# Patient Record
Sex: FEMALE | Race: WHITE | Employment: OTHER | URBAN - METROPOLITAN AREA
[De-identification: names, ages, dates, MRNs, and addresses within clinical notes are randomized per-mention and may not be internally consistent; named-entity substitution may affect disease eponyms.]

---

## 2017-02-20 ENCOUNTER — OFFICE VISIT (OUTPATIENT)
Dept: FAMILY MEDICINE | Facility: CLINIC | Age: 62
End: 2017-02-20
Payer: COMMERCIAL

## 2017-02-20 VITALS
HEIGHT: 58 IN | TEMPERATURE: 98.5 F | SYSTOLIC BLOOD PRESSURE: 98 MMHG | DIASTOLIC BLOOD PRESSURE: 69 MMHG | HEART RATE: 80 BPM | BODY MASS INDEX: 32.12 KG/M2 | WEIGHT: 153 LBS

## 2017-02-20 DIAGNOSIS — R35.0 URINARY FREQUENCY: Primary | ICD-10-CM

## 2017-02-20 DIAGNOSIS — R60.9 EDEMA, UNSPECIFIED TYPE: ICD-10-CM

## 2017-02-20 DIAGNOSIS — R10.32 LLQ ABDOMINAL PAIN: ICD-10-CM

## 2017-02-20 DIAGNOSIS — R01.1 HEART MURMUR: ICD-10-CM

## 2017-02-20 LAB
ALBUMIN SERPL-MCNC: 2.6 G/DL (ref 3.4–5)
ALBUMIN UR-MCNC: NEGATIVE MG/DL
ALP SERPL-CCNC: 83 U/L (ref 40–150)
ALT SERPL W P-5'-P-CCNC: 31 U/L (ref 0–50)
ANION GAP SERPL CALCULATED.3IONS-SCNC: 5 MMOL/L (ref 3–14)
APPEARANCE UR: CLEAR
AST SERPL W P-5'-P-CCNC: 12 U/L (ref 0–45)
BILIRUB SERPL-MCNC: 0.3 MG/DL (ref 0.2–1.3)
BILIRUB UR QL STRIP: NEGATIVE
BUN SERPL-MCNC: 25 MG/DL (ref 7–30)
CALCIUM SERPL-MCNC: 8.1 MG/DL (ref 8.5–10.1)
CHLORIDE SERPL-SCNC: 108 MMOL/L (ref 94–109)
CO2 SERPL-SCNC: 32 MMOL/L (ref 20–32)
COLOR UR AUTO: YELLOW
CREAT SERPL-MCNC: 0.9 MG/DL (ref 0.52–1.04)
ERYTHROCYTE [DISTWIDTH] IN BLOOD BY AUTOMATED COUNT: 13.8 % (ref 10–15)
GFR SERPL CREATININE-BSD FRML MDRD: 63 ML/MIN/1.7M2
GLUCOSE SERPL-MCNC: 85 MG/DL (ref 70–99)
GLUCOSE UR STRIP-MCNC: NEGATIVE MG/DL
HCT VFR BLD AUTO: 37.9 % (ref 35–47)
HGB BLD-MCNC: 11.7 G/DL (ref 11.7–15.7)
HGB UR QL STRIP: NEGATIVE
KETONES UR STRIP-MCNC: NEGATIVE MG/DL
LEUKOCYTE ESTERASE UR QL STRIP: NEGATIVE
MCH RBC QN AUTO: 32.6 PG (ref 26.5–33)
MCHC RBC AUTO-ENTMCNC: 30.9 G/DL (ref 31.5–36.5)
MCV RBC AUTO: 106 FL (ref 78–100)
NITRATE UR QL: NEGATIVE
PH UR STRIP: 7 PH (ref 5–7)
PLATELET # BLD AUTO: 240 10E9/L (ref 150–450)
POTASSIUM SERPL-SCNC: 4 MMOL/L (ref 3.4–5.3)
PROT SERPL-MCNC: 5.6 G/DL (ref 6.8–8.8)
RBC # BLD AUTO: 3.59 10E12/L (ref 3.8–5.2)
SODIUM SERPL-SCNC: 145 MMOL/L (ref 133–144)
SP GR UR STRIP: 1.01 (ref 1–1.03)
URN SPEC COLLECT METH UR: ABNORMAL
UROBILINOGEN UR STRIP-ACNC: 2 EU/DL (ref 0.2–1)
WBC # BLD AUTO: 11.3 10E9/L (ref 4–11)

## 2017-02-20 PROCEDURE — 80053 COMPREHEN METABOLIC PANEL: CPT | Performed by: INTERNAL MEDICINE

## 2017-02-20 PROCEDURE — 85027 COMPLETE CBC AUTOMATED: CPT | Performed by: INTERNAL MEDICINE

## 2017-02-20 PROCEDURE — 99203 OFFICE O/P NEW LOW 30 MIN: CPT | Performed by: INTERNAL MEDICINE

## 2017-02-20 PROCEDURE — 36415 COLL VENOUS BLD VENIPUNCTURE: CPT | Performed by: INTERNAL MEDICINE

## 2017-02-20 PROCEDURE — 81003 URINALYSIS AUTO W/O SCOPE: CPT | Performed by: INTERNAL MEDICINE

## 2017-02-20 NOTE — NURSING NOTE
"Chief Complaint   Patient presents with     Urinary Problem     Pt has had frequency of urination for the past 10 days.     Edema     Also has had some swelling from waist down.          Initial BP 98/69 (BP Location: Left arm, Patient Position: Chair, Cuff Size: Adult Regular)  Pulse 80  Temp 98.5  F (36.9  C) (Tympanic)  Ht 4' 10\" (1.473 m)  Wt 153 lb (69.4 kg)  BMI 31.98 kg/m2 Estimated body mass index is 31.98 kg/(m^2) as calculated from the following:    Height as of this encounter: 4' 10\" (1.473 m).    Weight as of this encounter: 153 lb (69.4 kg).  Medication Reconciliation: complete  Leeanna Lund CMA    "

## 2017-02-20 NOTE — MR AVS SNAPSHOT
After Visit Summary   2/20/2017    Tere Ortiz    MRN: [id number]           Patient Information     Date Of Birth          1955        Visit Information        Provider Department      2/20/2017 3:40 PM David Arias MD [facility]        Today's Diagnoses     Urinary frequency    -  1    Edema, unspecified type        Heart murmur        LLQ abdominal pain           Follow-ups after your visit        Your next 10 appointments already scheduled     Mar 07, 2017  8:15 AM CST   [facility] with [facility]   [facility]    [address] [address]   [phone]              Future tests that were ordered for you today     Open Future Orders        Priority Expected Expires Ordered    Echocardiogram Complete Routine  2/20/2018 2/20/2017    CT Abdomen Pelvis w Contrast Routine  2/20/2018 2/20/2017            Who to contact     If you have questions or need follow up information about today's clinic visit or your schedule please contact [facility] directly at [phone].  Normal or non-critical lab and imaging results will be communicated to you by [employer], letter or phone within 4 business days after the clinic has received the results. If you do not hear from us within 7 days, please contact the clinic through [employer] or phone. If you have a critical or abnormal lab result, we will notify you by phone as soon as possible.  Submit refill requests through [employer] or call your pharmacy and they will forward the refill request to us. Please allow 3 business days for your refill to be completed.          Additional Information About Your Visit        MyChart Information     [employer] gives you secure access to your electronic health record. If you see a primary care provider, you can also send messages to your care team and make appointments. If you have questions, please call your primary care clinic.  " If you do not have a primary care provider, please call [phone] and they will assist you.        Care EveryWhere ID     This is your Care EveryWhere ID. This could be used by other organizations to access your [facility] medical records  [id number]        Your Vitals Were     Pulse Temperature Height BMI (Body Mass Index)          80 98.5  F (36.9  C) (Tympanic) 4' 10\" (1.473 m) 31.98 kg/m2         Blood Pressure from Last 3 Encounters:   02/20/17 98/69   09/23/16 111/69   11/02/15 102/61    Weight from Last 3 Encounters:   02/20/17 153 lb (69.4 kg)   09/23/16 143 lb 12.8 oz (65.2 kg)   11/02/15 160 lb (72.6 kg)              We Performed the Following     *UA reflex to Microscopic and Culture ([facility], [place] and [facility] (except [place] and [place])     CBC with platelets     Comprehensive metabolic panel        Primary Care Provider Office Phone # Fax #    Chelsi Menendez [phone]       [place] [phone]        Thank you!     Thank you for choosing [facility]  for your care. Our goal is always to provide you with excellent care. Hearing back from our patients is one way we can continue to improve our services. Please take a few minutes to complete the written survey that you may receive in the mail after your visit with us. Thank you!             Your Updated Medication List - Protect others around you: Learn how to safely use, store and throw away your medicines at www.disposemymeds.org.          This list is accurate as of: 2/20/17  4:19 PM.  Always use your most recent med list.                   Brand Name Dispense Instructions for use    aspirin 81 MG tablet      Take 1 tablet by mouth daily.       baclofen 10 MG tablet    LIORESAL     Take 10 mg by mouth daily 2 at bedtime       BUSPAR PO      Take 10 mg by mouth At Bedtime       * COUMADIN 7.5 MG tablet   Generic drug:  warfarin      Take 1 tablet by mouth " daily.       * WARFARIN SODIUM PO      Take 10 mg by mouth every 7 days Fridays.       DULoxetine 60 MG EC capsule    CYMBALTA     Take 1 capsule by mouth daily.       DUONEB 0.5-2.5 (3) MG/3ML neb solution   Generic drug:  ipratropium - albuterol 0.5 mg/2.5 mg/3 mL      Take 1 ampule by nebulization every 4 hours as needed.       FLONASE 50 MCG/ACT spray   Generic drug:  fluticasone      Spray 1 spray into both nostrils daily.       IMITREX 100 MG tablet   Generic drug:  SUMAtriptan      Take by mouth at onset of headache Reported on 2/20/2017       ipratropium 0.03 % spray    ATROVENT    1 Box    Spray 2 sprays into both nostrils every 12 hours       LORazepam 0.5 MG tablet    ATIVAN    3 tablet    Take 1 hr before CT scan, can repeat 0.5 hrs before - no driving for 10 hrs after the ativan       * order for DME      Compression stockings (Thigh High)       * order for DME     1 Device    BiPAP: IPAP 11 cm H2O EPAP 6 cm H2O Pt has her own BiPAP New CPAP supplies- try [place] mask if it comes in an extra small size.  Alternatively could try [place] with nasal prongs occluded. Lifetime need and heated humidity.       * order for DME      BiPAP: IPAP 12 cm H2O EPAP 7 cm H2O  Lifetime need and heated humidity.       * order for DME     1 Device    O2: During sleep 1.5 LPM via O2 Concentrator  Bled in through BiPAP       OxyContin 80 MG 12 hr tablet   Generic drug:  oxyCODONE      Take  by mouth every 12 hours. Take at bedtime       potassium chloride 10 MEQ tablet    K-TAB,KLOR-CON    360 tablet    2 tablets twice daily       pramipexole 1 MG tablet    MIRAPEX     Take 1 mg by mouth 3 times daily       Pramoxine-Calamine 1-3 % Lotn    AVEENO ANTI-ITCH    118 mL    Pt to use twice daily       SOMA 350 MG tablet   Generic drug:  carisoprodol      Take 1 tablet by mouth. Take at bedtime       SPIRIVA HANDIHALER 18 MCG capsule   Generic drug:  tiotropium     90 capsule    Inhale 1 capsule into the lungs. Inhale contents of  one capsule       SYNTHROID 150 MCG tablet   Generic drug:  levothyroxine     90 tablet    Take 1 tablet (150 mcg) by mouth daily       TOPAMAX 100 MG tablet   Generic drug:  topiramate      Take  by mouth 2 times daily. Take 1.5 pill in mornings Take 1.5 at bedtime       traZODone HCl 150 MG 24 hr tablet    OLEPTRO     Take  by mouth.       VICODIN ES PO      Take 7.5 mg by mouth as needed       vitamin D3 2000 UNITS Caps      Take by mouth daily       * Notice:  This list has 6 medication(s) that are the same as other medications prescribed for you. Read the directions carefully, and ask your doctor or other care provider to review them with you.

## 2017-02-20 NOTE — PROGRESS NOTES
SUBJECTIVE:                                                    Tere Ortiz is a 62 year old female who presents to clinic today for the following health issues:    URINARY TRACT SYMPTOMS- frequency     Onset: 10 days ago    Description:   Painful urination (Dysuria): no   Blood in urine (Hematuria): no   Delay in urine (Hesitency): no, more incontinence    Frequency: Yes, going every 30 minutes    Intensity: no pain    Progression of Symptoms:  worsening and constant    Accompanying Signs & Symptoms:  Fever/chills: no   Flank pain no   Nausea and vomiting: no   Any vaginal symptoms: none  Abdominal/Pelvic Pain: no    History:   History of frequent UTI's: YES- has only had 2  History of kidney stones: YES  Sexually Active: no   Possibility of pregnancy: No    Precipitating factors:   none         Therapies Tried and outcome: none, normally drinks a lot of water      Concern - edema in lower extremites     Onset: started last night    Description:   Feels bloated around waist, also in legs and feet    Intensity: moderate    Progression of Symptoms:  same    Accompanying Signs & Symptoms:  Lower legs and feet are worse       Previous history of similar problem:   Has had in the past but not come on this fast    Precipitating factors:   Worsened by: has had some deli meats the last couple days, not sure on the sodium    Alleviating factors:  Improved by: none       Therapies Tried and outcome: none  Pt does have an rx for lasix at home but not currently taking.    Has had edema in the past attributed to venous insufficiency and she uses compression stockings.      Recently discharged from [facility] on 2/9 for flare up of copd.  She recently had normal CMP, TSH, BNP, A1C there.        TTE 2008   Final Impression:  1.  Normal left ventricular size with upper normal wall thickness and   normal function.   2.  Moderate ascending aortic root enlargement up to 4.1.  3.  Mild mitral regurgitation, tricuspid  regurgitation and pulmonary   insufficiency.    Problem list and histories reviewed & adjusted, as indicated.  Additional history: as documented    Current Outpatient Prescriptions   Medication Sig Dispense Refill     SYNTHROID 150 MCG tablet Take 1 tablet (150 mcg) by mouth daily 90 tablet 1     Pramoxine-Calamine (AVEENO ANTI-ITCH) 1-3 % LOTN Pt to use twice daily 118 mL 3     ipratropium (ATROVENT) 0.03 % nasal spray Spray 2 sprays into both nostrils every 12 hours 1 Box 1     WARFARIN SODIUM PO Take 10 mg by mouth every 7 days Fridays.       BusPIRone HCl (BUSPAR PO) Take 10 mg by mouth At Bedtime       baclofen (LIORESAL) 10 MG tablet Take 10 mg by mouth daily 2 at bedtime       pramipexole (MIRAPEX) 1 MG tablet Take 1 mg by mouth 3 times daily        Hydrocodone-Acetaminophen (VICODIN ES PO) Take 7.5 mg by mouth as needed        Cholecalciferol (VITAMIN D3) 2000 UNITS CAPS Take by mouth daily       potassium chloride (K-DUR) 10 MEQ tablet 2 tablets twice daily 360 tablet 0     ipratropium - albuterol 0.5 mg/2.5 mg/3 mL (DUONEB) 0.5-2.5 (3) MG/3ML nebulization Take 1 ampule by nebulization every 4 hours as needed.       fluticasone (FLONASE) 50 MCG/ACT nasal spray Spray 1 spray into both nostrils daily.       DULoxetine (CYMBALTA) 60 MG capsule Take 1 capsule by mouth daily.       aspirin 81 MG tablet Take 1 tablet by mouth daily.       oxycodone (OXYCONTIN) 80 MG 12 hr tablet Take  by mouth every 12 hours. Take at bedtime       carisoprodol (SOMA) 350 MG tablet Take 1 tablet by mouth. Take at bedtime       tiotropium (SPIRIVA HANDIHALER) 18 MCG inhalation capsule Inhale 1 capsule into the lungs. Inhale contents of one capsule 90 capsule 3     topiramate (TOPAMAX) 100 MG tablet Take  by mouth 2 times daily. Take 1.5 pill in mornings  Take 1.5 at bedtime       TraZODone HCl 150 MG TB24 Take  by mouth.       LORazepam (ATIVAN) 0.5 MG tablet Take 1 hr before CT scan, can repeat 0.5 hrs before - no driving for 10  "hrs after the ativan (Patient not taking: Reported on 2/20/2017) 3 tablet 0     SUMAtriptan (IMITREX) 100 MG tablet Take by mouth at onset of headache Reported on 2/20/2017       warfarin (COUMADIN) 7.5 MG tablet Take 1 tablet by mouth daily.       ORDER FOR DME Compression stockings (Thigh High)       ORDER FOR DME O2: During sleep  1.5 LPM via O2 Concentrator   Bled in through BiPAP   1 Device 0     ORDER FOR DME BiPAP:  IPAP 12 cm H2O  EPAP 7 cm H2O    Lifetime need and heated humidity.           ORDER FOR DME BiPAP:  IPAP 11 cm H2O  EPAP 6 cm H2O  Pt has her own BiPAP  New CPAP supplies- try [place] mask if it comes in an extra small size.  Alternatively could try [place] with nasal prongs occluded.  Lifetime need and heated humidity.     1 Device 0     Allergies   Allergen Reactions     No Clinical Screening - See Comments      PLASTICS     Prednisone      Sulfa Drugs      Tape [Adhesive Tape]      Nitroglycerin Itching     Flushing, headache       ROS:  Constitutional, CV, and gu systems are negative, except as otherwise noted.    OBJECTIVE:                                                    BP 98/69 (BP Location: Left arm, Patient Position: Chair, Cuff Size: Adult Regular)  Pulse 80  Temp 98.5  F (36.9  C) (Tympanic)  Ht 4' 10\" (1.473 m)  Wt 153 lb (69.4 kg)  BMI 31.98 kg/m2  Body mass index is 31.98 kg/(m^2).  GENERAL: healthy, alert and no distress  RESP: diffuse wheezes, normal effort  CV: regular rate and rhythm, normal S1 S2, 2/6 systolic murmur over the aorta, moderate pitting edema in both lower legs  ABDOMEN: soft, tenderness in LLQ and RLQ, no hepatosplenomegaly, no masses and bowel sounds normal    Diagnostic Test Results:  Results for orders placed or performed in visit on 02/20/17 (from the past 24 hour(s))   *UA reflex to Microscopic and Culture ([facility] (except [place] and [place])   Result Value Ref Range    Color Urine Yellow     Appearance Urine Clear     " Glucose Urine Negative NEG mg/dL    Bilirubin Urine Negative NEG    Ketones Urine Negative NEG mg/dL    Specific Gravity Urine 1.015 1.003 - 1.035    Blood Urine Negative NEG    pH Urine 7.0 5.0 - 7.0 pH    Protein Albumin Urine Negative NEG mg/dL    Urobilinogen Urine 2.0 (H) 0.2 - 1.0 EU/dL    Nitrite Urine Negative NEG    Leukocyte Esterase Urine Negative NEG    Source Midstream Urine    Comprehensive metabolic panel   Result Value Ref Range    Sodium 145 (H) 133 - 144 mmol/L    Potassium 4.0 3.4 - 5.3 mmol/L    Chloride 108 94 - 109 mmol/L    Carbon Dioxide 32 20 - 32 mmol/L    Anion Gap 5 3 - 14 mmol/L    Glucose 85 70 - 99 mg/dL    Urea Nitrogen 25 7 - 30 mg/dL    Creatinine 0.90 0.52 - 1.04 mg/dL    GFR Estimate 63 >60 mL/min/1.7m2    GFR Estimate If Black 77 >60 mL/min/1.7m2    Calcium 8.1 (L) 8.5 - 10.1 mg/dL    Bilirubin Total 0.3 0.2 - 1.3 mg/dL    Albumin 2.6 (L) 3.4 - 5.0 g/dL    Protein Total 5.6 (L) 6.8 - 8.8 g/dL    Alkaline Phosphatase 83 40 - 150 U/L    ALT 31 0 - 50 U/L    AST 12 0 - 45 U/L   CBC with platelets   Result Value Ref Range    WBC 11.3 (H) 4.0 - 11.0 10e9/L    RBC Count 3.59 (L) 3.8 - 5.2 10e12/L    Hemoglobin 11.7 11.7 - 15.7 g/dL    Hematocrit 37.9 35.0 - 47.0 %     (H) 78 - 100 fl    MCH 32.6 26.5 - 33.0 pg    MCHC 30.9 (L) 31.5 - 36.5 g/dL    RDW 13.8 10.0 - 15.0 %    Platelet Count 240 150 - 450 10e9/L        ASSESSMENT/PLAN:                                                        1. Urinary frequency  2. Edema, unspecified type  3. Heart murmur  4. LLQ abdominal pain    U/A normal, creatinine normal.  Na slightly elevated but lytes otherwise okay.  Albumin and protein are low- liver function is otherwise normal and no protein in the urine, so this seems most likely to be due to poor nutritional status related to recent COPD exacerbation and hospitalization.  May be contributing to edema.  She does have an elevated WBC and LLQ and RLQ pain on exam, so will pursue CT for  further work-up.  Aortic murmur noted on exam.  She did recently have a normal BNP on echo at OSH, so less likely to be having CHF contributing to edema, but we will get an updated TTE.        - *UA reflex to Microscopic and Culture ([facility] (except [place] and [place])  - Comprehensive metabolic panel  - CBC with platelets  - CT Abdomen Pelvis w Contrast; Future  - Echocardiogram Complete; Future      AiramShruthi Arias MD  [facility]

## 2017-02-26 ENCOUNTER — HOSPITAL ENCOUNTER (EMERGENCY)
Facility: CLINIC | Age: 62
Discharge: HOME | End: 2017-02-26
Attending: STUDENT IN AN ORGANIZED HEALTH CARE EDUCATION/TRAINING PROGRAM | Admitting: STUDENT IN AN ORGANIZED HEALTH CARE EDUCATION/TRAINING PROGRAM
Payer: COMMERCIAL

## 2017-02-26 ENCOUNTER — APPOINTMENT (OUTPATIENT)
Dept: GENERAL RADIOLOGY | Facility: CLINIC | Age: 62
End: 2017-02-26
Attending: STUDENT IN AN ORGANIZED HEALTH CARE EDUCATION/TRAINING PROGRAM
Payer: COMMERCIAL

## 2017-02-26 ENCOUNTER — TELEPHONE (OUTPATIENT)
Dept: NURSING | Facility: CLINIC | Age: 62
End: 2017-02-26

## 2017-02-26 VITALS
SYSTOLIC BLOOD PRESSURE: 110 MMHG | RESPIRATION RATE: 18 BRPM | TEMPERATURE: 98 F | OXYGEN SATURATION: 94 % | DIASTOLIC BLOOD PRESSURE: 68 MMHG | HEART RATE: 74 BPM

## 2017-02-26 DIAGNOSIS — E88.09 HYPOALBUMINEMIA: ICD-10-CM

## 2017-02-26 DIAGNOSIS — R60.0 BILATERAL EDEMA OF LOWER EXTREMITY: ICD-10-CM

## 2017-02-26 LAB
ALBUMIN SERPL-MCNC: 2.8 G/DL (ref 3.4–5)
ALBUMIN UR-MCNC: NEGATIVE MG/DL
ALP SERPL-CCNC: 74 U/L (ref 40–150)
ALT SERPL W P-5'-P-CCNC: 30 U/L (ref 0–50)
ANION GAP SERPL CALCULATED.3IONS-SCNC: 6 MMOL/L (ref 3–14)
APPEARANCE UR: ABNORMAL
AST SERPL W P-5'-P-CCNC: 15 U/L (ref 0–45)
BASOPHILS # BLD AUTO: 0 10E9/L (ref 0–0.2)
BASOPHILS NFR BLD AUTO: 0.3 %
BILIRUB SERPL-MCNC: 0.2 MG/DL (ref 0.2–1.3)
BILIRUB UR QL STRIP: NEGATIVE
BUN SERPL-MCNC: 18 MG/DL (ref 7–30)
CALCIUM SERPL-MCNC: 8.2 MG/DL (ref 8.5–10.1)
CHLORIDE SERPL-SCNC: 109 MMOL/L (ref 94–109)
CO2 SERPL-SCNC: 28 MMOL/L (ref 20–32)
COLOR UR AUTO: YELLOW
CREAT SERPL-MCNC: 0.76 MG/DL (ref 0.52–1.04)
DIFFERENTIAL METHOD BLD: ABNORMAL
EOSINOPHIL # BLD AUTO: 0.2 10E9/L (ref 0–0.7)
EOSINOPHIL NFR BLD AUTO: 2.8 %
ERYTHROCYTE [DISTWIDTH] IN BLOOD BY AUTOMATED COUNT: 14 % (ref 10–15)
GFR SERPL CREATININE-BSD FRML MDRD: 77 ML/MIN/1.7M2
GLUCOSE SERPL-MCNC: 88 MG/DL (ref 70–99)
GLUCOSE UR STRIP-MCNC: NEGATIVE MG/DL
HCT VFR BLD AUTO: 38.1 % (ref 35–47)
HGB BLD-MCNC: 12.4 G/DL (ref 11.7–15.7)
HGB UR QL STRIP: ABNORMAL
IMM GRANULOCYTES # BLD: 0 10E9/L (ref 0–0.4)
IMM GRANULOCYTES NFR BLD: 0.3 %
INR PPP: 2.74 (ref 0.86–1.14)
KETONES UR STRIP-MCNC: NEGATIVE MG/DL
LEUKOCYTE ESTERASE UR QL STRIP: NEGATIVE
LYMPHOCYTES # BLD AUTO: 2.1 10E9/L (ref 0.8–5.3)
LYMPHOCYTES NFR BLD AUTO: 28.7 %
MCH RBC QN AUTO: 33.7 PG (ref 26.5–33)
MCHC RBC AUTO-ENTMCNC: 32.5 G/DL (ref 31.5–36.5)
MCV RBC AUTO: 104 FL (ref 78–100)
MONOCYTES # BLD AUTO: 0.6 10E9/L (ref 0–1.3)
MONOCYTES NFR BLD AUTO: 7.5 %
MUCOUS THREADS #/AREA URNS LPF: PRESENT /LPF
NEUTROPHILS # BLD AUTO: 4.5 10E9/L (ref 1.6–8.3)
NEUTROPHILS NFR BLD AUTO: 60.4 %
NITRATE UR QL: NEGATIVE
NT-PROBNP SERPL-MCNC: 38 PG/ML (ref 0–900)
PH UR STRIP: 7 PH (ref 5–7)
PLATELET # BLD AUTO: 187 10E9/L (ref 150–450)
POTASSIUM SERPL-SCNC: 3.8 MMOL/L (ref 3.4–5.3)
PROT SERPL-MCNC: 6.4 G/DL (ref 6.8–8.8)
RBC # BLD AUTO: 3.68 10E12/L (ref 3.8–5.2)
RBC #/AREA URNS AUTO: 6 /HPF (ref 0–2)
SODIUM SERPL-SCNC: 143 MMOL/L (ref 133–144)
SP GR UR STRIP: 1.02 (ref 1–1.03)
SQUAMOUS #/AREA URNS AUTO: <1 /HPF (ref 0–1)
TROPONIN I SERPL-MCNC: NORMAL UG/L (ref 0–0.04)
TSH SERPL DL<=0.005 MIU/L-ACNC: 2.19 MU/L (ref 0.4–4)
URN SPEC COLLECT METH UR: ABNORMAL
UROBILINOGEN UR STRIP-MCNC: NORMAL MG/DL (ref 0–2)
WBC # BLD AUTO: 7.4 10E9/L (ref 4–11)
WBC #/AREA URNS AUTO: 0 /HPF (ref 0–2)

## 2017-02-26 PROCEDURE — 85025 COMPLETE CBC W/AUTO DIFF WBC: CPT | Performed by: STUDENT IN AN ORGANIZED HEALTH CARE EDUCATION/TRAINING PROGRAM

## 2017-02-26 PROCEDURE — 84443 ASSAY THYROID STIM HORMONE: CPT | Performed by: STUDENT IN AN ORGANIZED HEALTH CARE EDUCATION/TRAINING PROGRAM

## 2017-02-26 PROCEDURE — 71020 XR CHEST 2 VW: CPT

## 2017-02-26 PROCEDURE — 80053 COMPREHEN METABOLIC PANEL: CPT | Performed by: STUDENT IN AN ORGANIZED HEALTH CARE EDUCATION/TRAINING PROGRAM

## 2017-02-26 PROCEDURE — 99285 EMERGENCY DEPT VISIT HI MDM: CPT | Mod: 25 | Performed by: STUDENT IN AN ORGANIZED HEALTH CARE EDUCATION/TRAINING PROGRAM

## 2017-02-26 PROCEDURE — 84484 ASSAY OF TROPONIN QUANT: CPT | Performed by: STUDENT IN AN ORGANIZED HEALTH CARE EDUCATION/TRAINING PROGRAM

## 2017-02-26 PROCEDURE — 93005 ELECTROCARDIOGRAM TRACING: CPT

## 2017-02-26 PROCEDURE — 99285 EMERGENCY DEPT VISIT HI MDM: CPT | Mod: 25

## 2017-02-26 PROCEDURE — 85610 PROTHROMBIN TIME: CPT | Performed by: STUDENT IN AN ORGANIZED HEALTH CARE EDUCATION/TRAINING PROGRAM

## 2017-02-26 PROCEDURE — 93010 ELECTROCARDIOGRAM REPORT: CPT | Performed by: STUDENT IN AN ORGANIZED HEALTH CARE EDUCATION/TRAINING PROGRAM

## 2017-02-26 PROCEDURE — 83880 ASSAY OF NATRIURETIC PEPTIDE: CPT | Performed by: STUDENT IN AN ORGANIZED HEALTH CARE EDUCATION/TRAINING PROGRAM

## 2017-02-26 PROCEDURE — 81001 URINALYSIS AUTO W/SCOPE: CPT | Performed by: STUDENT IN AN ORGANIZED HEALTH CARE EDUCATION/TRAINING PROGRAM

## 2017-02-26 RX ORDER — LIDOCAINE 40 MG/G
CREAM TOPICAL
Status: DISCONTINUED | OUTPATIENT
Start: 2017-02-26 | End: 2017-02-26 | Stop reason: HOSPADM

## 2017-02-26 RX ORDER — CLINDAMYCIN PHOSPHATE 10 MG/G
1 GEL TOPICAL 2 TIMES DAILY
Qty: 20 G | Refills: 0 | Status: SHIPPED | OUTPATIENT
Start: 2017-02-26 | End: 2017-03-08

## 2017-02-26 RX ORDER — FUROSEMIDE 20 MG
20 TABLET ORAL 2 TIMES DAILY
Qty: 14 TABLET | Refills: 0 | Status: SHIPPED | OUTPATIENT
Start: 2017-02-26 | End: 2017-04-07

## 2017-02-26 NOTE — TELEPHONE ENCOUNTER
"Call Type: Triage Call    Presenting Problem: \" I am scheduled for a heart u/s coming up ,  but  I have a swelling problem that will not go down. I have compresion  sox,  and have been taking Lasix, and have my adjustable bed, but  nothing is helping. My ankles are so swollen, when my feet are on  the ground , my toes don't touch. I couldn't sleep last night my  feet hurt so bad, they hurt bad now. No breathing problems or chest  pain at all. \" Advised to be seen within 24 hours, due to problems  with pain advised to go to [place] ED today.  Triage Note:  Guideline Title: Edema, Atraumatic  Recommended Disposition: See Provider within 24 hours  Original Inclination: Wanted to speak with a nurse  Override Disposition:  Intended Action: Go to Hospital / ED  Physician Contacted: No  Edema newly worse than usual pattern OR newly developed in last 12 hours ?  YES  Pregnant ? NO  Difficulty breathing ? NO  New or worsening shortness of breath/difficulty breathing AND any other cardiac  signs/symptoms for more than 5 minutes, now or within last hour ? NO  Swelling in extremity post trauma ? NO  Swelling in extremity post trauma ? NO  Swelling in extremity post trauma ? NO  Swelling in extremity post trauma ? NO  Swelling in extremity post trauma ? NO  Swelling in extremity post trauma ? NO  Swelling after insect bite/sting ? NO  Previously diagnosed angina AND symptoms have increased in frequency or severity ?  NO  Known heart failure (HF) and new onset of palpitations or irregular pulse ? NO  Worsening breathing problems AND known cardiac or respiratory condition (coronary  artery disease, angina, heart failure, asthma, chronic bronchitis, COPD) NOT  responding to treatment OR treatment not available. ? NO  New or worsening shortness of breath/difficulty breathing AND new onset of  fatigue, weakness, confusion, or change in ability to care out daily activities ?  NO  Chest pain in last 24 hours ? NO  New or worsening " fatigue, weakness, confusion, or change in function. ? NO  New or worsening edema and recent unexplained illness ? NO  Swelling came on in a matter of minutes ? NO  New or worsening one-sided leg swelling with pain that may be described as achy;  pain may worsen with standing or walking. ? NO  New onset or worsening shortness of breath or difficulty breathing ? NO  Physician Instructions:  Care Advice: IMMEDIATE ACTION

## 2017-02-26 NOTE — ED AVS SNAPSHOT
[facility] Emergency Department    [address] [address]    Phone:  [phone]    Fax:  [phone]                                       Tere Ortiz   MRN: [id number]    Department:  [facility] Emergency Department   Date of Visit:  2/26/2017           After Visit Summary Signature Page     I have received my discharge instructions, and my questions have been answered. I have discussed any challenges I see with this plan with the nurse or doctor.    ..........................................................................................................................................  Patient/Patient Representative Signature      ..........................................................................................................................................  Patient Representative Print Name and Relationship to Patient    ..................................................               ................................................  Date                                            Time    ..........................................................................................................................................  Reviewed by Signature/Title    ...................................................              ..............................................  Date                                                            Time

## 2017-02-26 NOTE — ED NOTES
"Pt complains of increased bilateral edema since 2/24/17. Hx of PE, on coumadin. Pt appears drowsy, \" hasn't been sleeping well due to my leg pain and my sleep apnea\" Hx of sleep apnea per . +3 BLE edema, legs elevated in bed. Pulse ox applied. IV started and saline locked. Patient placed on EKG monitor, pulse oximeter and blood pressure cuff.  "

## 2017-02-26 NOTE — ED AVS SNAPSHOT
[facility] Emergency Department    [address] [address]    Phone:  [phone]    Fax:  [phone]                                       Tere Ortiz   MRN: [id number]    Department:  [facility] Emergency Department   Date of Visit:  2/26/2017           Patient Information     Date Of Birth          1955        Your diagnoses for this visit were:     Bilateral edema of lower extremity     Hypoalbuminemia        You were seen by Mj Chou DO.      Follow-up Information     Follow up with Chelsi Menendez MD. Schedule an appointment as soon as possible for a visit in 5 days.    Specialty:  Family Practice    Why:  Followup for reevaluation and managment plan.    Contact information:    [place] [id number] [facility] [id number] [phone]        Discharge References/Attachments     LEG SWELLING (1 LEG ONLY) (ENGLISH)    PERIPHERAL EDEMA, BILATERAL (ENGLISH)      Future Appointments        Provider Department Dept Phone Center    3/6/2017 8:45 AM [facility] ECHO ROOM 1 [facility] Echocardiography [phone] [facility]    3/6/2017 10:30 AM [facility] CT ROOM 1 [facility] [phone] [facility]    3/7/2017 8:15 AM Hugh Schmidt MD [facility] [phone] [facility]      24 Hour Appointment Hotline       To make an appointment at any [facility], call [phone]). If you don't have a family doctor or clinic, we will help you find one. [facility] are conveniently located to serve the needs of you and your family.             Review of your medicines      START taking        Dose / Directions Last dose taken    furosemide 20 MG tablet   Commonly known as:  LASIX   Dose:  20 mg   Quantity:  14 tablet        Take 1 tablet (20 mg) by mouth 2 times daily for 7 days   Refills:  0          Our records show that you are taking the medicines listed below. If these are incorrect,  please call your family doctor or clinic.        Dose / Directions Last dose taken    aspirin 81 MG tablet   Dose:  1 tablet        Take 1 tablet by mouth daily.   Refills:  0        baclofen 10 MG tablet   Commonly known as:  LIORESAL   Dose:  10 mg        Take 10 mg by mouth daily 2 at bedtime   Refills:  0        BUSPAR PO   Dose:  10 mg        Take 10 mg by mouth At Bedtime   Refills:  0        * COUMADIN 7.5 MG tablet   Dose:  1 tablet   Generic drug:  warfarin        Take 1 tablet by mouth daily.   Refills:  0        * WARFARIN SODIUM PO   Dose:  10 mg        Take 10 mg by mouth every 7 days Fridays.   Refills:  0        DULoxetine 60 MG EC capsule   Commonly known as:  CYMBALTA   Dose:  60 mg        Take 1 capsule by mouth daily.   Refills:  0        DUONEB 0.5-2.5 (3) MG/3ML neb solution   Dose:  1 ampule   Generic drug:  ipratropium - albuterol 0.5 mg/2.5 mg/3 mL        Take 1 ampule by nebulization every 4 hours as needed.   Refills:  0        FLONASE 50 MCG/ACT spray   Dose:  1 spray   Generic drug:  fluticasone        Spray 1 spray into both nostrils daily.   Refills:  0        IMITREX 100 MG tablet   Generic drug:  SUMAtriptan        Take by mouth at onset of headache Reported on 2/20/2017   Refills:  0        ipratropium 0.03 % spray   Commonly known as:  ATROVENT   Dose:  2 spray   Quantity:  1 Box        Spray 2 sprays into both nostrils every 12 hours   Refills:  1        LORazepam 0.5 MG tablet   Commonly known as:  ATIVAN   Quantity:  3 tablet        Take 1 hr before CT scan, can repeat 0.5 hrs before - no driving for 10 hrs after the ativan   Refills:  0        * order for DME        Compression stockings (Thigh High)   Refills:  0        * order for DME   Quantity:  1 Device        BiPAP: IPAP 11 cm H2O EPAP 6 cm H2O Pt has her own BiPAP New CPAP supplies- try [place] mask if it comes in an extra small size.  Alternatively could try [place] with nasal prongs occluded. Lifetime need and heated  humidity.   Refills:  0        * order for DME        BiPAP: IPAP 12 cm H2O EPAP 7 cm H2O  Lifetime need and heated humidity.   Refills:  0        * order for DME   Quantity:  1 Device        O2: During sleep 1.5 LPM via O2 Concentrator  Bled in through BiPAP   Refills:  0        OxyContin 80 MG 12 hr tablet   Generic drug:  oxyCODONE        Take  by mouth every 12 hours. Take at bedtime   Refills:  0        potassium chloride 10 MEQ tablet   Commonly known as:  K-TAB,KLOR-CON   Quantity:  360 tablet        2 tablets twice daily   Refills:  0        pramipexole 1 MG tablet   Commonly known as:  MIRAPEX   Dose:  1 mg        Take 1 mg by mouth At Bedtime   Refills:  0        Pramoxine-Calamine 1-3 % Lotn   Commonly known as:  AVEENO ANTI-ITCH   Quantity:  118 mL        Pt to use twice daily   Refills:  3        SOMA 350 MG tablet   Dose:  350 mg   Generic drug:  carisoprodol        Take 1 tablet by mouth. Take at bedtime   Refills:  0        SPIRIVA HANDIHALER 18 MCG capsule   Dose:  18 mcg   Quantity:  90 capsule   Generic drug:  tiotropium        Inhale 1 capsule into the lungs. Inhale contents of one capsule   Refills:  3        SYNTHROID 150 MCG tablet   Dose:  150 mcg   Quantity:  90 tablet   Generic drug:  levothyroxine        Take 1 tablet (150 mcg) by mouth daily   Refills:  1        TOPAMAX 100 MG tablet   Generic drug:  topiramate        Take  by mouth 2 times daily. Take 1.5 pill in mornings Take 1.5 at bedtime   Refills:  0        traZODone HCl 150 MG 24 hr tablet   Commonly known as:  OLEPTRO   Dose:  150 mg        Take 150 mg by mouth At Bedtime   Refills:  0        VICODIN ES PO   Dose:  7.5 mg        Take 7.5 mg by mouth as needed   Refills:  0        vitamin D3 2000 UNITS Caps        Take by mouth daily   Refills:  0        * Notice:  This list has 6 medication(s) that are the same as other medications prescribed for you. Read the directions carefully, and ask your doctor or other care provider to  review them with you.            Prescriptions were sent or printed at these locations (1 Prescription)                   Other Prescriptions                Printed at Department/Unit printer (1 of 1)         furosemide (LASIX) 20 MG tablet                Procedures and tests performed during your visit     CBC with platelets differential    Cardiac Continuous Monitoring    Comprehensive metabolic panel    EKG 12-lead, tracing only    INR    Nt probnp inpatient (BNP)    Peripheral IV: Standard    Pulse oximetry nursing    TSH with free T4 reflex    Troponin I    UA reflex to Microscopic    Vital signs    XR Chest 2 Views      Orders Needing Specimen Collection     None      Pending Results     No orders found from 2/24/2017 to 2/27/2017.            Pending Culture Results     No orders found from 2/24/2017 to 2/27/2017.             Test Results from your hospital stay     2/26/2017  1:26 PM - Interface, Flexilab Results      Component Results     Component Value Ref Range & Units Status    WBC 7.4 4.0 - 11.0 10e9/L Final    RBC Count 3.68 (L) 3.8 - 5.2 10e12/L Final    Hemoglobin 12.4 11.7 - 15.7 g/dL Final    Hematocrit 38.1 35.0 - 47.0 % Final     (H) 78 - 100 fl Final    MCH 33.7 (H) 26.5 - 33.0 pg Final    MCHC 32.5 31.5 - 36.5 g/dL Final    RDW 14.0 10.0 - 15.0 % Final    Platelet Count 187 150 - 450 10e9/L Final    Diff Method Automated Method  Final    % Neutrophils 60.4 % Final    % Lymphocytes 28.7 % Final    % Monocytes 7.5 % Final    % Eosinophils 2.8 % Final    % Basophils 0.3 % Final    % Immature Granulocytes 0.3 % Final    Absolute Neutrophil 4.5 1.6 - 8.3 10e9/L Final    Absolute Lymphocytes 2.1 0.8 - 5.3 10e9/L Final    Absolute Monocytes 0.6 0.0 - 1.3 10e9/L Final    Absolute Eosinophils 0.2 0.0 - 0.7 10e9/L Final    Absolute Basophils 0.0 0.0 - 0.2 10e9/L Final    Abs Immature Granulocytes 0.0 0 - 0.4 10e9/L Final         2/26/2017  1:31 PM - Interface, Flexilab Results      Component  Results     Component Value Ref Range & Units Status    INR 2.74 (H) 0.86 - 1.14 Final         2/26/2017  1:44 PM - Interface, Flexilab Results      Component Results     Component Value Ref Range & Units Status    Sodium 143 133 - 144 mmol/L Final    Potassium 3.8 3.4 - 5.3 mmol/L Final    Chloride 109 94 - 109 mmol/L Final    Carbon Dioxide 28 20 - 32 mmol/L Final    Anion Gap 6 3 - 14 mmol/L Final    Glucose 88 70 - 99 mg/dL Final    Urea Nitrogen 18 7 - 30 mg/dL Final    Creatinine 0.76 0.52 - 1.04 mg/dL Final    GFR Estimate 77 >60 mL/min/1.7m2 Final    Non  GFR Calc    GFR Estimate If Black >90   GFR Calc   >60 mL/min/1.7m2 Final    Calcium 8.2 (L) 8.5 - 10.1 mg/dL Final    Bilirubin Total 0.2 0.2 - 1.3 mg/dL Final    Albumin 2.8 (L) 3.4 - 5.0 g/dL Final    Protein Total 6.4 (L) 6.8 - 8.8 g/dL Final    Alkaline Phosphatase 74 40 - 150 U/L Final    ALT 30 0 - 50 U/L Final    AST 15 0 - 45 U/L Final         2/26/2017  1:20 PM - Interface, Radiant Ib      Narrative     CHEST TWO VIEWS 2/26/2017 1:05 PM     HISTORY: Chest pain    COMPARISON: 9/2/2015 chest CT        Impression     IMPRESSION: Lungs clear. Heart and pulmonary vessels within normal  limits. No pleural effusion.    NAVEEN MELGOZA MD         2/26/2017  1:44 PM - Interface, Flexilab Results      Component Results     Component Value Ref Range & Units Status    N-Terminal Pro BNP Inpatient 38 0 - 900 pg/mL Final    Reference range shown and results flagged as abnormal are suggested inpatient   cut points for confirming diagnosis if CHF in an acute setting. Establishing   a   baseline value for each individual patient is useful for follow-up. An   inpatient or emergency department NT-proPBNP <300 pg/mL effectively rules out   acute CHF, with 99% negative predictive value.  The outpatient non-acute reference range for ruling out CHF is:   0-125 pg/mL (age 18 to less than 75)   0-450 pg/mL (age 75 yrs and older)            2/26/2017  1:44 PM - Interface, Flexilab Results      Component Results     Component Value Ref Range & Units Status    Troponin I ES  0.000 - 0.045 ug/L Final    <0.015  The 99th percentile for upper reference range is 0.045 ug/L.  Troponin values in   the range of 0.045 - 0.120 ug/L may be associated with risks of adverse   clinical events.           2/26/2017  2:15 PM - Interface, Flexilab Results      Component Results     Component Value Ref Range & Units Status    Color Urine Yellow  Final    Appearance Urine Slightly Cloudy  Final    Glucose Urine Negative NEG mg/dL Final    Bilirubin Urine Negative NEG Final    Ketones Urine Negative NEG mg/dL Final    Specific Gravity Urine 1.016 1.003 - 1.035 Final    Blood Urine Trace (A) NEG Final    pH Urine 7.0 5.0 - 7.0 pH Final    Protein Albumin Urine Negative NEG mg/dL Final    Urobilinogen mg/dL Normal 0.0 - 2.0 mg/dL Final    Nitrite Urine Negative NEG Final    Leukocyte Esterase Urine Negative NEG Final    Source Midstream Urine  Final    RBC Urine 6 (H) 0 - 2 /HPF Final    WBC Urine 0 0 - 2 /HPF Final    Squamous Epithelial /HPF Urine <1 0 - 1 /HPF Final    Mucous Urine Present (A) NEG /LPF Final         2/26/2017  1:49 PM - Interface, Flexilab Results      Component Results     Component Value Ref Range & Units Status    TSH 2.19 0.40 - 4.00 mU/L Final                Thank you for choosing [facility]       Thank you for choosing [facility] for your care. Our goal is always to provide you with excellent care. Hearing back from our patients is one way we can continue to improve our services. Please take a few minutes to complete the written survey that you may receive in the mail after you visit with us. Thank you!        [employer] Information     [employer] gives you secure access to your electronic health record. If you see a primary care provider, you can also send messages to your care team and make appointments. If you have questions, please call your primary care  clinic.  If you do not have a primary care provider, please call [phone] and they will assist you.        Care EveryWhere ID     This is your Care EveryWhere ID. This could be used by other organizations to access your [facility] medical records  [id number]        After Visit Summary       This is your record. Keep this with you and show to your community pharmacist(s) and doctor(s) at your next visit.
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
## 2017-03-03 RX ORDER — IOPAMIDOL 755 MG/ML
75 INJECTION, SOLUTION INTRAVASCULAR ONCE
Status: COMPLETED | OUTPATIENT
Start: 2017-03-06 | End: 2017-03-06

## 2017-03-04 ENCOUNTER — MYC MEDICAL ADVICE (OUTPATIENT)
Dept: FAMILY MEDICINE | Facility: CLINIC | Age: 62
End: 2017-03-04

## 2017-03-06 ENCOUNTER — HOSPITAL ENCOUNTER (OUTPATIENT)
Dept: CT IMAGING | Facility: CLINIC | Age: 62
Discharge: HOME | End: 2017-03-06
Attending: INTERNAL MEDICINE | Admitting: INTERNAL MEDICINE
Payer: COMMERCIAL

## 2017-03-06 ENCOUNTER — HOSPITAL ENCOUNTER (OUTPATIENT)
Dept: CARDIOLOGY | Facility: CLINIC | Age: 62
Discharge: HOME | End: 2017-03-06
Attending: INTERNAL MEDICINE | Admitting: INTERNAL MEDICINE
Payer: COMMERCIAL

## 2017-03-06 DIAGNOSIS — R10.32 LLQ ABDOMINAL PAIN: ICD-10-CM

## 2017-03-06 DIAGNOSIS — I77.810 AORTIC ROOT DILATION (H): Primary | ICD-10-CM

## 2017-03-06 DIAGNOSIS — R60.9 EDEMA, UNSPECIFIED TYPE: ICD-10-CM

## 2017-03-06 DIAGNOSIS — R01.1 HEART MURMUR: ICD-10-CM

## 2017-03-06 PROCEDURE — 25000125 ZZHC RX 250: Performed by: RADIOLOGY

## 2017-03-06 PROCEDURE — 25500064 ZZH RX 255 OP 636: Performed by: RADIOLOGY

## 2017-03-06 PROCEDURE — 74177 CT ABD & PELVIS W/CONTRAST: CPT

## 2017-03-06 PROCEDURE — 93306 TTE W/DOPPLER COMPLETE: CPT | Mod: 26 | Performed by: INTERNAL MEDICINE

## 2017-03-06 RX ADMIN — IOPAMIDOL 75 ML: 755 INJECTION, SOLUTION INTRAVENOUS at 10:29

## 2017-03-06 RX ADMIN — SODIUM CHLORIDE 54 ML: 9 INJECTION, SOLUTION INTRAVENOUS at 10:29

## 2017-03-07 ENCOUNTER — OFFICE VISIT (OUTPATIENT)
Dept: UROLOGY | Facility: CLINIC | Age: 62
End: 2017-03-07
Payer: COMMERCIAL

## 2017-03-07 VITALS — DIASTOLIC BLOOD PRESSURE: 60 MMHG | RESPIRATION RATE: 18 BRPM | HEART RATE: 104 BPM | SYSTOLIC BLOOD PRESSURE: 107 MMHG

## 2017-03-07 DIAGNOSIS — Z72.0 TOBACCO ABUSE: ICD-10-CM

## 2017-03-07 DIAGNOSIS — R35.0 URINARY FREQUENCY: Primary | ICD-10-CM

## 2017-03-07 LAB
ALBUMIN UR-MCNC: NEGATIVE MG/DL
AMORPH CRY #/AREA URNS HPF: ABNORMAL /HPF
APPEARANCE UR: ABNORMAL
BILIRUB UR QL STRIP: NEGATIVE
COLOR UR AUTO: YELLOW
GLUCOSE UR STRIP-MCNC: NEGATIVE MG/DL
HGB UR QL STRIP: ABNORMAL
KETONES UR STRIP-MCNC: NEGATIVE MG/DL
LEUKOCYTE ESTERASE UR QL STRIP: NEGATIVE
NITRATE UR QL: NEGATIVE
NON-SQ EPI CELLS #/AREA URNS LPF: ABNORMAL /LPF
PH UR STRIP: 7.5 PH (ref 5–7)
RBC #/AREA URNS AUTO: ABNORMAL /HPF (ref 0–2)
SP GR UR STRIP: 1.01 (ref 1–1.03)
URN SPEC COLLECT METH UR: ABNORMAL
UROBILINOGEN UR STRIP-ACNC: 1 EU/DL (ref 0.2–1)
WBC #/AREA URNS AUTO: ABNORMAL /HPF (ref 0–2)

## 2017-03-07 PROCEDURE — 52000 CYSTOURETHROSCOPY: CPT | Performed by: UROLOGY

## 2017-03-07 PROCEDURE — 81001 URINALYSIS AUTO W/SCOPE: CPT | Performed by: UROLOGY

## 2017-03-07 PROCEDURE — 99204 OFFICE O/P NEW MOD 45 MIN: CPT | Mod: 25 | Performed by: UROLOGY

## 2017-03-07 NOTE — PATIENT INSTRUCTIONS
Per Physician's instructions    HOW TO QUIT SMOKING  Smoking is one of the hardest habits to break. About half of all those who have ever smoked have been able to quit, and most of those (about 70%) who still smoke want to quit. Here are some of the best ways to stop smoking.     KEEP TRYING:  It takes most smokers about 8 tries before they are finally able to fully quit. So, the more often you try and fail, the better your chance of quitting the next time! So, don't give up!    GO COLD TURKEY:  Most ex-smokers quit cold turkey. Trying to cut back gradually doesn't seem to work as well, perhaps because it continues the smoking habit. Also, it is possible to fool yourself by inhaling more while smoking fewer cigarettes. This results in the same amount of nicotine in your body!    GET SUPPORT:  Support programs can make an important difference, especially for the heavy smoker. These groups offer lectures, methods to change your behavior and peer support. Call the free national Quitline for more information. 800-QUIT-NOW (213-696-4819). Low-cost or free programs are offered by many hospitals, local chapters of the American Lung Association (270-507-4088) and the American Cancer Society (393-032-5080). Support at home is important too. Non-smokers can help by offering praise and encouragement. If the smoker fails to quit, encourage them to try again!    OVER-THE-COUNTER MEDICINES:  For those who can't quit on their own, Nicotine Replacement Therapy (NRT) may make quitting much easier. Certain aids such as the nicotine patch, gum and lozenge are available without a prescription. However, it is best to use these under the guidance of your doctor. The skin patch provides a steady supply of nicotine to the body. Nicotine gum and lozenge gives temporary bursts of low levels of nicotine. Both methods take the edge off the craving for cigarettes. WARNING: If you feel symptoms of nicotine overdose, such as nausea, vomiting,  dizziness, weakness, or fast heartbeat, stop using these and see your doctor.    PRESCRIPTION MEDICINES:  After evaluating your smoking patterns and prior attempts at quitting, your doctor may offer a prescription medicine such as bupropion (Zyban, Wellbutrin), varenicline (Chantix, Champix), a niocotine inhaler or nasal spray. Each has its unique advantage and side effects which your doctor can review with you.    HEALTH BENEFITS OF QUITTING:  The benefits of quitting start right away and keep improving the longer you go without smokin minutes: blood pressure and pulse return to normal  8 hours: oxygen levels return to normal  2 days: ability to smell and taste begins to improve as damaged nerves start to regrow  2-3 weeks: circulation and lung function improves  1-9 months: decreased cough, congestion and shortness of breath; less tired  1 year: risk of heart attack decreases by half  5 years: risk of lung cancer decreases by half; risk of stroke becomes the same as a non-smoker  For information about how to quit smoking, visit the following links:  [employer] [place] ,   Clearing the Air, Quit Smoking Today   - an online booklet. http://www.smokefree.gov/pubs/clearing_the_air.pdf  Smokefree.gov http://smokefree.gov/  QuitNet http://www.quitnet.com/    8836-2839 [facility], [address] [place] [address]. All rights reserved. This information is not intended as a substitute for professional medical care. Always follow your healthcare professional's instructions.    The Benefits of Living Smoke Free  What do you want to gain from quitting? Check off some reasons to quit.  Health Benefits  ___ Reduce my risk of lung cancer, heart disease, chronic lung disease  ___ Have fewer wrinkles and softer skin  ___ Improve my sense of taste and smell  ___ For pregnant women--reduce the risk of having a miscarriage, stillbirth, premature birth, or low-birth-weight baby  Personal Benefits  ___ Feel  more in control of my life  ___ Have better-smelling hair, breath, clothes, home, and car  ___ Save time by not having to take smoke breaks, buy cigarettes, or hunt for a light  ___ Have whiter teeth  Family Benefits  ___ Reduce my children s respiratory tract infections  ___ Set a good example for my children  ___ Reduce my family s cancer risk  Financial Benefits  ___ Save hundreds of dollars each year that would be spent on cigarettes  ___ Save money on medical bills  ___ Save on life, health, and car insurance premiums    Those Dollars Add Up!  Cigarettes are expensive, and getting more expensive all the time. Do you realize how much money you are spending on cigarettes per year? What is the average amount you spend on a pack of cigarettes? What is the average number of packs that you smoke per day? Using your answers to these questions, fill in this formula to help you find out:  ($ _____ per pack) ×  ( _____ number of packs per day) × (365 days) =  $ _____ yearly cost of smoking  Besides tobacco, there are other costs, including extra cleaning bills and replacement costs for clothing and furniture; medical expenses for smoking-related illnesses; and higher health, life, and car insurance premiums.    Cigars and Pipes Count Too!  Cigars and pipes are also dangerous. So are smokeless (chewing) tobacco and snuff. All of these products contain nicotine, a highly addictive substance that has harmful effects on your body. Quitting smoking means giving up all tobacco products.      6160-6613 [facility], [address] [place] [address], [place] [address]. All rights reserved. This information is not intended as a substitute for professional medical care. Always follow your healthcare professional's instructions.
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
## 2017-03-07 NOTE — MR AVS SNAPSHOT
After Visit Summary   3/7/2017    Tere Ortiz    MRN: [id number]           Patient Information     Date Of Birth          1955        Visit Information        Provider Department      3/7/2017 8:15 AM Hugh Schmidt MD [facility]        Today's Diagnoses     Urinary frequency    -  1    Tobacco abuse          Care Instructions    Per Physician's instructions    HOW TO QUIT SMOKING  Smoking is one of the hardest habits to break. About half of all those who have ever smoked have been able to quit, and most of those (about 70%) who still smoke want to quit. Here are some of the best ways to stop smoking.     KEEP TRYING:  It takes most smokers about 8 tries before they are finally able to fully quit. So, the more often you try and fail, the better your chance of quitting the next time! So, don't give up!    GO COLD TURKEY:  Most ex-smokers quit cold turkey. Trying to cut back gradually doesn't seem to work as well, perhaps because it continues the smoking habit. Also, it is possible to fool yourself by inhaling more while smoking fewer cigarettes. This results in the same amount of nicotine in your body!    GET SUPPORT:  Support programs can make an important difference, especially for the heavy smoker. These groups offer lectures, methods to change your behavior and peer support. Call the free national Quitline for more information. 800-QUIT-NOW (232-742-0261). Low-cost or free programs are offered by many hospitals, local chapters of the American Lung Association (233-328-2568) and the American Cancer Society (733-335-6167). Support at home is important too. Non-smokers can help by offering praise and encouragement. If the smoker fails to quit, encourage them to try again!    OVER-THE-COUNTER MEDICINES:  For those who can't quit on their own, Nicotine Replacement Therapy (NRT) may make quitting much easier. Certain aids such as the nicotine patch, gum and lozenge are  available without a prescription. However, it is best to use these under the guidance of your doctor. The skin patch provides a steady supply of nicotine to the body. Nicotine gum and lozenge gives temporary bursts of low levels of nicotine. Both methods take the edge off the craving for cigarettes. WARNING: If you feel symptoms of nicotine overdose, such as nausea, vomiting, dizziness, weakness, or fast heartbeat, stop using these and see your doctor.    PRESCRIPTION MEDICINES:  After evaluating your smoking patterns and prior attempts at quitting, your doctor may offer a prescription medicine such as bupropion (Zyban, Wellbutrin), varenicline (Chantix, Champix), a niocotine inhaler or nasal spray. Each has its unique advantage and side effects which your doctor can review with you.    HEALTH BENEFITS OF QUITTING:  The benefits of quitting start right away and keep improving the longer you go without smokin minutes: blood pressure and pulse return to normal  8 hours: oxygen levels return to normal  2 days: ability to smell and taste begins to improve as damaged nerves start to regrow  2-3 weeks: circulation and lung function improves  1-9 months: decreased cough, congestion and shortness of breath; less tired  1 year: risk of heart attack decreases by half  5 years: risk of lung cancer decreases by half; risk of stroke becomes the same as a non-smoker  For information about how to quit smoking, visit the following links:  National Cancer [place] ,   Clearing the Air, Quit Smoking Today   - an online booklet. http://www.smokefree.gov/pubs/clearing_the_air.pdf  Smokefree.gov http://smokefree.gov/  QuitNet http://www.quitnet.com/    0517-6640 Heena Wade, [address] [place] [address], [place] [address]. All rights reserved. This information is not intended as a substitute for professional medical care. Always follow your healthcare professional's instructions.    The Benefits of Living Smoke Free  What do you  want to gain from quitting? Check off some reasons to quit.  Health Benefits  ___ Reduce my risk of lung cancer, heart disease, chronic lung disease  ___ Have fewer wrinkles and softer skin  ___ Improve my sense of taste and smell  ___ For pregnant women--reduce the risk of having a miscarriage, stillbirth, premature birth, or low-birth-weight baby  Personal Benefits  ___ Feel more in control of my life  ___ Have better-smelling hair, breath, clothes, home, and car  ___ Save time by not having to take smoke breaks, buy cigarettes, or hunt for a light  ___ Have whiter teeth  Family Benefits  ___ Reduce my children s respiratory tract infections  ___ Set a good example for my children  ___ Reduce my family s cancer risk  Financial Benefits  ___ Save hundreds of dollars each year that would be spent on cigarettes  ___ Save money on medical bills  ___ Save on life, health, and car insurance premiums    Those Dollars Add Up!  Cigarettes are expensive, and getting more expensive all the time. Do you realize how much money you are spending on cigarettes per year? What is the average amount you spend on a pack of cigarettes? What is the average number of packs that you smoke per day? Using your answers to these questions, fill in this formula to help you find out:  ($ _____ per pack) ×  ( _____ number of packs per day) × (365 days) =  $ _____ yearly cost of smoking  Besides tobacco, there are other costs, including extra cleaning bills and replacement costs for clothing and furniture; medical expenses for smoking-related illnesses; and higher health, life, and car insurance premiums.    Cigars and Pipes Count Too!  Cigars and pipes are also dangerous. So are smokeless (chewing) tobacco and snuff. All of these products contain nicotine, a highly addictive substance that has harmful effects on your body. Quitting smoking means giving up all tobacco products.      1417-4554 Heena Wade, [address], [place]  [address]. All rights reserved. This information is not intended as a substitute for professional medical care. Always follow your healthcare professional's instructions.        Follow-ups after your visit        Additional Services     QUITPLAN  Referral       [place] [employer] QUITLINES FAX FORM  Fax form to: [phone]    The clinic will facilitate the referral to the quitline.    Provider Information:  ===============================================================  Hugh Schmidt MD  ID#: [id number] - [facility]: [facility] [phone] Fax: [phone]   [email or web address]  Payor: HEALTH PARTNERS / Plan: HEALTHPARTJump On It CIGNA / Product Type: PPO /   ===============================================================    The Public Health Service Guideline does not recommend providing over-the-counter nicotine replacement therapy products without physician authorization to patients with the following conditions: pregnancy, uncontrolled high blood pressure, or cardiovascular diseases.     I authorize the [place] Tobacco Quitlines to provide over-the-counter nicotine replacement products for the patient listed below if the patient's health plan benefits cover NRT or if the patient is eligible for QUITPLAN services.    Patient Consented to:  ===============================================================  - YES - I am ready to quit tobacco and request the above information be given to the quitline so they may contact me.  I understand that one of [facility] Tobacco Quitlines will inform my provider about my participation.  ===============================================================  Please check the BEST 3-hour call window for them to reach you: 11am - 2pm  May we leave a message?  YES  Language Preference:  English  Phone Number: Home Phone      [phone]  Mobile          [phone]     E-mail Address:  [email or web address]    ========================================================================  FOR QUITLINE USE ONLY:  THIS INFORMATION WILL BE PROVIDED BACK TO THE PROVIDER  Contact date: __/ __/__ or ____ Did not reach after three attempts.    Outcome:  __ Enrolled in telephone counseling program  __ Declined  __ Not Reached    Stage of readiness: _______________________  Planned Quit Date: ___/ ___/ ___  Comments:      [facility]   This message funded by [employer] [place], an independent licensee of the Blue Cross and Blue Shield Association. Rev. 11/1/12                  Follow-up notes from your care team     Return in about 1 month (around 4/7/2017).      Your next 10 appointments already scheduled     Apr 04, 2017  1:30 PM CDT   [facility] Schmidt MD   [facility]    [address] [address] [phone]              Future tests that were ordered for you today     Open Future Orders        Priority Expected Expires Ordered    QUITPLAN  Referral Routine  5/6/2017 3/7/2017    Echocardiogram Complete Routine 9/6/2017 3/6/2018 3/6/2017            Who to contact     If you have questions or need follow up information about today's clinic visit or your schedule please contact [facility] directly at [phone].  Normal or non-critical lab and imaging results will be communicated to you by MyChart, letter or phone within 4 business days after the clinic has received the results. If you do not hear from us within 7 days, please contact the clinic through MyChart or phone. If you have a critical or abnormal lab result, we will notify you by phone as soon as possible.  Submit refill requests through [employer] or call your pharmacy and they will forward the refill request to us. Please allow 3 business days for your refill to be completed.          Additional Information About Your  Visit        MyChart Information     [employer] gives you secure access to your electronic health record. If you see a primary care provider, you can also send messages to your care team and make appointments. If you have questions, please call your primary care clinic.  If you do not have a primary care provider, please call [phone] and they will assist you.        Care EveryWhere ID     This is your Care EveryWhere ID. This could be used by other organizations to access your [facility] medical records  [id number]        Your Vitals Were     Pulse Respirations                104 18           Blood Pressure from Last 3 Encounters:   03/07/17 107/60   02/26/17 110/68   02/20/17 98/69    Weight from Last 3 Encounters:   02/20/17 153 lb (69.4 kg)   09/23/16 143 lb 12.8 oz (65.2 kg)   11/02/15 160 lb (72.6 kg)              We Performed the Following     *UA reflex to Microscopic and Culture ([facility] and [facility] (except [place] and [place])     CYSTOURETHROSCOPY     Tobacco Cessation - for Health Maintenance     Urine Microscopic        Primary Care Provider Office Phone # Fax #    AiramShruthi Arias [phone]       [facility] [address] [facility] [address]        Thank you!     Thank you for choosing [facility]  for your care. Our goal is always to provide you with excellent care. Hearing back from our patients is one way we can continue to improve our services. Please take a few minutes to complete the written survey that you may receive in the mail after your visit with us. Thank you!             Your Updated Medication List - Protect others around you: Learn how to safely use, store and throw away your medicines at www.disposemymeds.org.          This list is accurate as of: 3/7/17  9:35 AM.  Always use your most recent med list.                   Brand Name Dispense Instructions for use    aspirin 81 MG tablet      Take 1 tablet by mouth daily.        baclofen 10 MG tablet    LIORESAL     Take 10 mg by mouth daily 2 at bedtime       BUSPAR PO      Take 10 mg by mouth At Bedtime       clindamycin 1 % topical gel    CLINDAMAX    20 g    Apply 1 g topically 2 times daily for 10 days       * COUMADIN 7.5 MG tablet   Generic drug:  warfarin      Take 1 tablet by mouth daily.       * WARFARIN SODIUM PO      Take 10 mg by mouth every 7 days Fridays.       DULoxetine 60 MG EC capsule    CYMBALTA     Take 1 capsule by mouth daily.       DUONEB 0.5-2.5 (3) MG/3ML neb solution   Generic drug:  ipratropium - albuterol 0.5 mg/2.5 mg/3 mL      Take 1 ampule by nebulization every 4 hours as needed.       FLONASE 50 MCG/ACT spray   Generic drug:  fluticasone      Spray 1 spray into both nostrils daily.       furosemide 20 MG tablet    LASIX    14 tablet    Take 1 tablet (20 mg) by mouth 2 times daily for 7 days       IMITREX 100 MG tablet   Generic drug:  SUMAtriptan      Take by mouth at onset of headache Reported on 2/20/2017       ipratropium 0.03 % spray    ATROVENT    1 Box    Spray 2 sprays into both nostrils every 12 hours       LORazepam 0.5 MG tablet    ATIVAN    3 tablet    Take 1 hr before CT scan, can repeat 0.5 hrs before - no driving for 10 hrs after the ativan       * order for DME      Compression stockings (Thigh High)       * order for DME     1 Device    BiPAP: IPAP 11 cm H2O EPAP 6 cm H2O Pt has her own BiPAP New CPAP supplies- try Bern mask if it comes in an extra small size.  Alternatively could try [place] with nasal prongs occluded. Lifetime need and heated humidity.       * order for DME      BiPAP: IPAP 12 cm H2O EPAP 7 cm H2O  Lifetime need and heated humidity.       * order for DME     1 Device    O2: During sleep 1.5 LPM via O2 Concentrator  Bled in through BiPAP       OxyContin 80 MG 12 hr tablet   Generic drug:  oxyCODONE      Take  by mouth every 12 hours. Take at bedtime       potassium chloride 10 MEQ tablet    K-TAB,KLOR-CON    360  tablet    2 tablets twice daily       pramipexole 1 MG tablet    MIRAPEX     Take 1 mg by mouth At Bedtime       Pramoxine-Calamine 1-3 % Lotn    AVEENO ANTI-ITCH    118 mL    Pt to use twice daily       SOMA 350 MG tablet   Generic drug:  carisoprodol      Take 1 tablet by mouth. Take at bedtime       SPIRIVA HANDIHALER 18 MCG capsule   Generic drug:  tiotropium     90 capsule    Inhale 1 capsule into the lungs. Inhale contents of one capsule       SYNTHROID 150 MCG tablet   Generic drug:  levothyroxine     90 tablet    Take 1 tablet (150 mcg) by mouth daily       TOPAMAX 100 MG tablet   Generic drug:  topiramate      Take  by mouth 2 times daily. Take 1.5 pill in mornings Take 1.5 at bedtime       traZODone HCl 150 MG 24 hr tablet    OLEPTRO     Take 150 mg by mouth At Bedtime       VICODIN ES PO      Take 7.5 mg by mouth as needed       vitamin D3 2000 UNITS Caps      Take by mouth daily       * Notice:  This list has 6 medication(s) that are the same as other medications prescribed for you. Read the directions carefully, and ask your doctor or other care provider to review them with you.

## 2017-03-15 DIAGNOSIS — I74.9 EMBOLISM (H): Primary | ICD-10-CM

## 2017-03-15 DIAGNOSIS — I26.99 PULMONARY EMBOLISM WITH INFARCTION (H): ICD-10-CM

## 2017-03-15 DIAGNOSIS — Z86.711 HISTORY OF PULMONARY EMBOLISM: Primary | ICD-10-CM

## 2017-03-15 PROBLEM — Z79.01 LONG-TERM (CURRENT) USE OF ANTICOAGULANTS: Status: ACTIVE | Noted: 2017-03-15

## 2017-03-23 ENCOUNTER — ANTICOAGULATION THERAPY VISIT (OUTPATIENT)
Dept: ANTICOAGULATION | Facility: CLINIC | Age: 62
End: 2017-03-23
Payer: COMMERCIAL

## 2017-03-23 DIAGNOSIS — I26.99 PULMONARY EMBOLISM WITH INFARCTION (H): ICD-10-CM

## 2017-03-23 DIAGNOSIS — D68.62 LUPUS ANTICOAGULANT INHIBITOR SYNDROME (H): ICD-10-CM

## 2017-03-23 DIAGNOSIS — I74.9 EMBOLISM (H): ICD-10-CM

## 2017-03-23 DIAGNOSIS — Z79.01 LONG-TERM (CURRENT) USE OF ANTICOAGULANTS: ICD-10-CM

## 2017-03-23 LAB — INR POINT OF CARE: 2.1 (ref 0.86–1.14)

## 2017-03-23 PROCEDURE — 85610 PROTHROMBIN TIME: CPT | Mod: QW

## 2017-03-23 PROCEDURE — 36416 COLLJ CAPILLARY BLOOD SPEC: CPT

## 2017-03-23 PROCEDURE — 99211 OFF/OP EST MAY X REQ PHY/QHP: CPT

## 2017-03-23 NOTE — MR AVS SNAPSHOT
Tere GREWAL Diana   3/23/2017 2:30 PM   Anticoagulation Therapy Visit    Description:  62 year old female   Provider:  [facility]   Department:  [facility]           INR as of 3/23/2017     Today's INR 2.1      Anticoagulation Summary as of 3/23/2017     INR goal 2.0-3.0   Today's INR 2.1   Full instructions 7.5 mg every day   Next INR check 4/24/2017    Indications   Lupus anticoagulant inhibitor syndrome (H) [D68.62]  Pulmonary embolism with infarction (H) [I26.99]  Long-term (current) use of anticoagulants [Z79.01] [Z79.01]  Embolism - blood clot [I74.9]         Description     Warfarin dose: 7.5mg daily has been her stable maintenance dose per pt.      Contact Numbers     Please call [phone] to cancel and/or reschedule your appointment.  Please call [phone] with any problems or questions regarding your therapy          March 2017 Details    Sun Mon Tue Wed Thu Fri Sat        1               2               3               4                 5               6               7               8               9               10               11                 12               13               14               15               16               17               18                 19               20               21               22               23      7.5 mg   See details      24      7.5 mg         25      7.5 mg           26      7.5 mg         27      7.5 mg         28      7.5 mg         29      7.5 mg         30      7.5 mg         31      7.5 mg           Date Details   03/23 This INR check               How to take your warfarin dose     To take:  7.5 mg Take 1.5 of the 5 mg tablets.           April 2017 Details    Sun Mon Tue Wed Thu Fri Sat           1      7.5 mg           2      7.5 mg         3      7.5 mg         4      7.5 mg         5      7.5 mg         6      7.5 mg         7      7.5 mg         8      7.5 mg           9      7.5 mg         10      7.5 mg         11      7.5 mg          12      7.5 mg         13      7.5 mg         14      7.5 mg         15      7.5 mg           16      7.5 mg         17      7.5 mg         18      7.5 mg         19      7.5 mg         20      7.5 mg         21      7.5 mg         22      7.5 mg           23      7.5 mg         24            25               26               27               28               29                 30                      Date Details   No additional details    Date of next INR:  4/24/2017         How to take your warfarin dose     To take:  7.5 mg Take 1.5 of the 5 mg tablets.

## 2017-03-23 NOTE — PROGRESS NOTES
ANTICOAGULATION INITIAL CLINIC VISIT    Patient Name:  Tere Ortiz  Date:  3/23/2017  Referred by: Dr. Arias  Contact Type:  Face to Face    SUBJECTIVE:  Coumadin education was completed today.  Topics covered include:  -Introduction to coumadin- Pt has been our warfarin 10 years  -Proper Administration  -INR Testing  -Sign/Symptoms of Bleeding  -Signs/Symptoms of Clot Formation or Stroke  -Dietary Intake of Vitamin K - Pt reports she is very consistent with diet  -Drug Interactions  -Anticoagulation Identification (bracelet, necklace or wallet card) Pt has medical ID bracelet  -Future Surgery  -Effects of Alcohol, Tobacco, and Exercise on Coumadin Pt in     Coumadin Education Booklet and Coumadin Identification Wallet Card were given to the patient.       Patient Findings     Comments Patient has been on warfarin 10 years she is switching from the [facility] in [place]. Prefers Mondays in the afternoon.          OBJECTIVE    INR Protime   Date Value Ref Range Status   03/23/2017 2.1 (A) 0.86 - 1.14 Final       ASSESSMENT / PLAN  No question data found.  Anticoagulation Summary as of 3/23/2017     INR goal 2.0-3.0   Today's INR 2.1   Maintenance plan 7.5 mg (5 mg x 1.5) every day   Full instructions 7.5 mg every day   Weekly total 52.5 mg   Plan last modified Ara Kaplan, RN (3/23/2017)   Next INR check 4/24/2017   Target end date Indefinite    Indications   Lupus anticoagulant inhibitor syndrome (H) [D68.62]  Pulmonary embolism with infarction (H) [I26.99]  Long-term (current) use of anticoagulants [Z79.01] [Z79.01]  Embolism - blood clot [I74.9]         Anticoagulation Episode Summary     INR check location     Preferred lab     Send INR reminders to [facility] POOL    Comments  + comes in       Anticoagulation Care Providers     Provider Role Specialty Phone number    David Arias MD Referring Internal Medicine [phone]            See the Encounter Report to view Anticoagulation  Flowsheet and Dosing Calendar (Go to Encounters tab in chart review, and find the Anticoagulation Therapy Visit)    Dosage adjustment made based on physician directed care plan.    Ara Kaplan RN

## 2017-04-07 ENCOUNTER — OFFICE VISIT (OUTPATIENT)
Dept: ENDOCRINOLOGY | Facility: CLINIC | Age: 62
End: 2017-04-07

## 2017-04-07 VITALS — DIASTOLIC BLOOD PRESSURE: 71 MMHG | SYSTOLIC BLOOD PRESSURE: 108 MMHG | HEART RATE: 112 BPM

## 2017-04-07 DIAGNOSIS — R60.9 EDEMA, UNSPECIFIED TYPE: Primary | ICD-10-CM

## 2017-04-07 DIAGNOSIS — R59.1 LA (LYMPHADENOPATHY): ICD-10-CM

## 2017-04-07 DIAGNOSIS — R35.0 URINARY FREQUENCY: ICD-10-CM

## 2017-04-07 DIAGNOSIS — G44.219 EPISODIC TENSION-TYPE HEADACHE, NOT INTRACTABLE: ICD-10-CM

## 2017-04-07 RX ORDER — SUMATRIPTAN 20 MG/1
SPRAY NASAL
Qty: 30 EACH | Refills: 1 | Status: SHIPPED | OUTPATIENT
Start: 2017-04-07 | End: 2017-04-07

## 2017-04-07 RX ORDER — SUMATRIPTAN 20 MG/1
SPRAY NASAL
Qty: 30 EACH | Refills: 1 | Status: SHIPPED | OUTPATIENT
Start: 2017-04-07 | End: 2018-01-01

## 2017-04-07 ASSESSMENT — ENCOUNTER SYMPTOMS
WHEEZING: 1
HOARSE VOICE: 0
HALLUCINATIONS: 0
POLYDIPSIA: 1
SINUS CONGESTION: 0
HEADACHES: 1
EXERCISE INTOLERANCE: 1
SORE THROAT: 1
NAIL CHANGES: 0
STIFFNESS: 1
SWOLLEN GLANDS: 1
MYALGIAS: 1
WEAKNESS: 1
DISTURBANCES IN COORDINATION: 1
DIZZINESS: 1
TREMORS: 0
CHILLS: 1
SYNCOPE: 0
DOUBLE VISION: 1
TINGLING: 1
SINUS PAIN: 1
LOSS OF CONSCIOUSNESS: 0
PANIC: 1
NUMBNESS: 1
POOR WOUND HEALING: 1
POSTURAL DYSPNEA: 1
NIGHT SWEATS: 0
ARTHRALGIAS: 1
NERVOUS/ANXIOUS: 1
NECK PAIN: 1
PARALYSIS: 0
EYE PAIN: 0
INCREASED ENERGY: 1
ALTERED TEMPERATURE REGULATION: 1
BACK PAIN: 1
DECREASED APPETITE: 1
LEG PAIN: 1
COUGH: 1
ORTHOPNEA: 1
SKIN CHANGES: 0
TACHYCARDIA: 0
FEVER: 0
HYPERTENSION: 0
SPUTUM PRODUCTION: 1
MUSCLE WEAKNESS: 1
SHORTNESS OF BREATH: 1
TASTE DISTURBANCE: 0
CLAUDICATION: 0
JOINT SWELLING: 1
RESPIRATORY PAIN: 0
POLYPHAGIA: 0
SNORES LOUDLY: 0
COUGH DISTURBING SLEEP: 0
SLEEP DISTURBANCES DUE TO BREATHING: 1
EYE WATERING: 0
SPEECH CHANGE: 1
SMELL DISTURBANCE: 0
BRUISES/BLEEDS EASILY: 1
EYE REDNESS: 1
LIGHT-HEADEDNESS: 1
WEIGHT GAIN: 1
SEIZURES: 0
LEG SWELLING: 1
WEIGHT LOSS: 0
NECK MASS: 1
DYSPNEA ON EXERTION: 1
DECREASED CONCENTRATION: 1
PALPITATIONS: 0
MEMORY LOSS: 1
MUSCLE CRAMPS: 1
EYE IRRITATION: 1
INSOMNIA: 1
HYPOTENSION: 1
DEPRESSION: 1
HEMOPTYSIS: 0
TROUBLE SWALLOWING: 0
FATIGUE: 1

## 2017-04-07 ASSESSMENT — PAIN SCALES - GENERAL: PAINLEVEL: NO PAIN (0)

## 2017-04-07 NOTE — MR AVS SNAPSHOT
After Visit Summary   2017    Tere Ortiz    MRN: [id number]           Patient Information     Date Of Birth          1955        Visit Information        Provider Department      2017 12:30 PM Karol Simmons MD [facility]        Today's Diagnoses     Edema, unspecified type    -  1    Episodic tension-type headache, not intractable        Urinary frequency        LA (lymphadenopathy)          Care Instructions    1) see neurology locally for headaches  2) See urology for urinary frequency  3) get lower extremity US and neck  4) take naprosyn for arthritis - 1 tablet twice daily for 2 weeks then 1 tablet daily for 1 week then as needed    See Dr. Arias for follow up    [facility]: [phone]    To expedite your medication refill(s), please contact your pharmacy and have them fax a refill request to: [phone].  *Please allow 3 business days for routine medication refills.  *Please allow 5 business days for controlled substance medication refills.  --------------------  For scheduling appointments (including lab work), please request an appointment through [facility], or call: [phone].    For questions for your provider or the endocrine nurse, please send a [facility] message.  For after-hours urgent issues, please dial [phone], and ask to speak with the Endocrinologist On-Call.  --------------------  Please Note: If you are active on [facility], all future test results will be sent by [facility] message only and will no longer be sent by mail. You may also receive communication directly from your physician.          Follow-ups after your visit        Additional Services     NEUROLOGY ADULT REFERRAL       Pt has headaches - please see at [facility]            UROLOGY ADULT REFERRAL       Pt to see urology at [facility]                  Follow-up notes from your care team     Return in about 6 months (around 10/7/2017).      Your next 10 appointments  already scheduled     Apr 11, 2017  2:00 PM CDT   SHORT with David Arias MD   [facility]    [address]  [facility] [address]   [phone]            Apr 24, 2017  2:00 PM CDT   Anticoagulation Visit with NB ANTI COAG   [facility]    [address] [place] [id number] [phone]            Oct 13, 2017 11:30 AM CDT   (Arrive by 11:15 AM)   RETURN ENDOCRINE with Karol Simmons MD   [facility] ([facility] [place])    [address] [facility] [address]  3rd Floor  [facility] [address] [phone]              Future tests that were ordered for you today     Open Future Orders        Priority Expected Expires Ordered    US head neck soft tissue Routine 7/6/2017 11/3/2017 4/7/2017    US Venous lower extremity bilat Routine  4/7/2018 4/7/2017            Who to contact     Please call your clinic at [phone] to:    Ask questions about your health    Make or cancel appointments    Discuss your medicines    Learn about your test results    Speak to your doctor   If you have compliments or concerns about an experience at your clinic, or if you wish to file a complaint, please contact [facility] Patient Relations at [phone] or email us at [email or web address]         Additional Information About Your Visit        [employer] Information     [employer] gives you secure access to your electronic health record. If you see a primary care provider, you can also send messages to your care team and make appointments. If you have questions, please call your primary care clinic.  If you do not have a primary care provider, please call [phone] and they will assist you.      [employer] is an electronic gateway that provides easy, online access to your medical records. With [employer], you can request a clinic appointment, read your test results, renew a  prescription or communicate with your care team.     To access your existing account, please contact your [facility] or call [phone] for assistance.        Care EveryWhere ID     This is your Care EveryWhere ID. This could be used by other organizations to access your [facility] medical records  [id number]        Your Vitals Were     Pulse                   112            Blood Pressure from Last 3 Encounters:   04/07/17 108/71   03/07/17 107/60   02/26/17 110/68    Weight from Last 3 Encounters:   02/20/17 69.4 kg (153 lb)   09/23/16 65.2 kg (143 lb 12.8 oz)   11/02/15 72.6 kg (160 lb)              We Performed the Following     NEUROLOGY ADULT REFERRAL     UROLOGY ADULT REFERRAL          Today's Medication Changes          These changes are accurate as of: 4/7/17  1:17 PM.  If you have any questions, ask your nurse or doctor.               Start taking these medicines.        Dose/Directions    naproxen 375 MG tablet   Commonly known as:  NAPROSYN   Used for:  Episodic tension-type headache, not intractable   Started by:  Karol Simmons MD        Dose:  375 mg   Take 1 tablet (375 mg) by mouth 2 times daily (with meals) For 2 weeks, then 1 tablet daily for 2 weeks, then as needed   Quantity:  70 tablet   Refills:  1       SUMAtriptan 20 MG/ACT nasal spray   Commonly known as:  IMITREX   Used for:  Episodic tension-type headache, not intractable   Started by:  Karol Simmons MD        Give once, can repeat in 2 hour if not better   Quantity:  30 each   Refills:  1         These medicines have changed or have updated prescriptions.        Dose/Directions    WARFARIN SODIUM PO   This may have changed:  Another medication with the same name was removed. Continue taking this medication, and follow the directions you see here.   Changed by:  Oh Navarrete MD        Dose:  10 mg   Take 10 mg by mouth every 7 days Fridays.   Refills:  0         Stop taking these  medicines if you haven't already. Please contact your care team if you have questions.     furosemide 20 MG tablet   Commonly known as:  LASIX   Stopped by:  Karol Simmons MD           IMITREX 100 MG tablet   Generic drug:  SUMAtriptan   Stopped by:  Karol Simmons MD                Where to get your medicines      These medications were sent to [place], [place] - [address]  [address], [facility] [address]    Hours:  TRANSPLANT PHONE NUMBER [phone] Phone:  [phone]     naproxen 375 MG tablet    SUMAtriptan 20 MG/ACT nasal spray                Primary Care Provider Office Phone # Fax #    AiramShruthi Arias [phone]       [facility] [address] [facility] [address]        Thank you!     Thank you for choosing [facility]  for your care. Our goal is always to provide you with excellent care. Hearing back from our patients is one way we can continue to improve our services. Please take a few minutes to complete the written survey that you may receive in the mail after your visit with us. Thank you!             Your Updated Medication List - Protect others around you: Learn how to safely use, store and throw away your medicines at www.disposemymeds.org.          This list is accurate as of: 4/7/17  1:17 PM.  Always use your most recent med list.                   Brand Name Dispense Instructions for use    aspirin 81 MG tablet      Take 1 tablet by mouth daily.       baclofen 10 MG tablet    LIORESAL     Take 10 mg by mouth daily 2 at bedtime       BUSPAR PO      Take 10 mg by mouth At Bedtime       DULoxetine 60 MG EC capsule    CYMBALTA     Take 1 capsule by mouth daily.       DUONEB 0.5-2.5 (3) MG/3ML neb solution   Generic drug:  ipratropium - albuterol 0.5 mg/2.5 mg/3 mL      Take 1 ampule by nebulization every 4 hours as needed.       FLONASE 50 MCG/ACT spray   Generic drug:   fluticasone      Spray 1 spray into both nostrils daily.       ipratropium 0.03 % spray    ATROVENT    1 Box    Spray 2 sprays into both nostrils every 12 hours       LORazepam 0.5 MG tablet    ATIVAN    3 tablet    Take 1 hr before CT scan, can repeat 0.5 hrs before - no driving for 10 hrs after the ativan       naproxen 375 MG tablet    NAPROSYN    70 tablet    Take 1 tablet (375 mg) by mouth 2 times daily (with meals) For 2 weeks, then 1 tablet daily for 2 weeks, then as needed       * order for DME      Compression stockings (Thigh High)       * order for DME     1 Device    BiPAP: IPAP 11 cm H2O EPAP 6 cm H2O Pt has her own BiPAP New CPAP supplies- try Halls mask if it comes in an extra small size.  Alternatively could try Freeborn with nasal prongs occluded. Lifetime need and heated humidity.       * order for DME      BiPAP: IPAP 12 cm H2O EPAP 7 cm H2O  Lifetime need and heated humidity.       * order for DME     1 Device    O2: During sleep 1.5 LPM via O2 Concentrator  Bled in through BiPAP       OxyContin 80 MG 12 hr tablet   Generic drug:  oxyCODONE      Take  by mouth every 12 hours. Take at bedtime       potassium chloride 10 MEQ tablet    K-TAB,KLOR-CON    360 tablet    2 tablets twice daily       pramipexole 1 MG tablet    MIRAPEX     Take 1 mg by mouth At Bedtime       Pramoxine-Calamine 1-3 % Lotn    AVEENO ANTI-ITCH    118 mL    Pt to use twice daily       SOMA 350 MG tablet   Generic drug:  carisoprodol      Take 1 tablet by mouth. Take at bedtime       SPIRIVA HANDIHALER 18 MCG capsule   Generic drug:  tiotropium     90 capsule    Inhale 1 capsule into the lungs. Inhale contents of one capsule       SUMAtriptan 20 MG/ACT nasal spray    IMITREX    30 each    Give once, can repeat in 2 hour if not better       SYNTHROID 150 MCG tablet   Generic drug:  levothyroxine     90 tablet    Take 1 tablet (150 mcg) by mouth daily       TOPAMAX 100 MG tablet   Generic drug:  topiramate      Take  by mouth 2  times daily. Take 1.5 pill in mornings Take 1.5 at bedtime       traZODone HCl 150 MG 24 hr tablet    OLEPTRO     Take 150 mg by mouth At Bedtime       VICODIN ES PO      Take 7.5 mg by mouth as needed       vitamin D3 2000 UNITS Caps      Take by mouth daily       WARFARIN SODIUM PO      Take 10 mg by mouth every 7 days Fridays.       * Notice:  This list has 4 medication(s) that are the same as other medications prescribed for you. Read the directions carefully, and ask your doctor or other care provider to review them with you.
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
## 2017-04-07 NOTE — NURSING NOTE
Chief Complaint   Patient presents with     RECHECK     F/U ADRENAL NODULE, THYROID     Sirena Rodriguez, [facility]  Endocrinology & Diabetes 3G
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
## 2017-04-14 ENCOUNTER — TELEPHONE (OUTPATIENT)
Dept: FAMILY MEDICINE | Facility: CLINIC | Age: 62
End: 2017-04-14

## 2017-04-14 NOTE — TELEPHONE ENCOUNTER
Dr Karol Simmons  Called to  [facility]  Dr Argueta  Was able to review  Recent clinic visit Endo   04/07/2017  Patient had  multiple issues   That need review   Any questions please page  [phone]  Please call and advise  Melinda Lainez- ROSS

## 2017-04-14 NOTE — TELEPHONE ENCOUNTER
Pt notified, she apologized for missing her appt, her  had surgery. She did reschedule for Monday April 17 at 120 pm.    [facility]

## 2017-04-14 NOTE — TELEPHONE ENCOUNTER
Yes, I was able to see that note.  Please call Tere to recommend she reschedule her appointment- she no showed for an appointment with me earlier this week and should be seen to address multiple issues.  Thanks.    David Arias MD

## 2017-04-17 ENCOUNTER — RADIANT APPOINTMENT (OUTPATIENT)
Dept: GENERAL RADIOLOGY | Facility: CLINIC | Age: 62
End: 2017-04-17
Attending: INTERNAL MEDICINE
Payer: COMMERCIAL

## 2017-04-17 ENCOUNTER — OFFICE VISIT (OUTPATIENT)
Dept: FAMILY MEDICINE | Facility: CLINIC | Age: 62
End: 2017-04-17
Payer: COMMERCIAL

## 2017-04-17 VITALS
WEIGHT: 148.8 LBS | TEMPERATURE: 98.3 F | HEIGHT: 58 IN | SYSTOLIC BLOOD PRESSURE: 99 MMHG | DIASTOLIC BLOOD PRESSURE: 60 MMHG | HEART RATE: 92 BPM | BODY MASS INDEX: 31.23 KG/M2 | OXYGEN SATURATION: 90 %

## 2017-04-17 DIAGNOSIS — R06.02 SOB (SHORTNESS OF BREATH): ICD-10-CM

## 2017-04-17 DIAGNOSIS — J44.9 CHRONIC OBSTRUCTIVE PULMONARY DISEASE, UNSPECIFIED COPD TYPE (H): ICD-10-CM

## 2017-04-17 DIAGNOSIS — R05.9 COUGH: ICD-10-CM

## 2017-04-17 DIAGNOSIS — R60.0 PERIPHERAL EDEMA: ICD-10-CM

## 2017-04-17 DIAGNOSIS — R53.83 FATIGUE, UNSPECIFIED TYPE: Primary | ICD-10-CM

## 2017-04-17 DIAGNOSIS — G25.81 RESTLESS LEG SYNDROME: ICD-10-CM

## 2017-04-17 DIAGNOSIS — R63.8 OTHER SYMPTOMS AND SIGNS CONCERNING FOOD AND FLUID INTAKE: ICD-10-CM

## 2017-04-17 DIAGNOSIS — J44.1 COPD EXACERBATION (H): ICD-10-CM

## 2017-04-17 PROCEDURE — 99214 OFFICE O/P EST MOD 30 MIN: CPT | Performed by: INTERNAL MEDICINE

## 2017-04-17 PROCEDURE — 71020 XR CHEST 2 VW: CPT

## 2017-04-17 RX ORDER — DOXYCYCLINE 100 MG/1
100 CAPSULE ORAL 2 TIMES DAILY
Qty: 20 CAPSULE | Refills: 0 | Status: SHIPPED | OUTPATIENT
Start: 2017-04-17 | End: 2017-06-05

## 2017-04-17 NOTE — PATIENT INSTRUCTIONS
Call the INR clinic to let them know you are starting doxycycline since this interacts with warfarin.      Increase your Advair to twice per day.

## 2017-04-17 NOTE — NURSING NOTE
"Chief Complaint   Patient presents with     RECHECK     referred from endocrinologist for follow up        Initial BP 99/60 (BP Location: Left arm, Patient Position: Chair, Cuff Size: Adult Regular)  Pulse 92  Temp 98.3  F (36.8  C) (Tympanic)  Ht 4' 10\" (1.473 m)  Wt 148 lb 12.8 oz (67.5 kg)  SpO2 90%  BMI 31.1 kg/m2 Estimated body mass index is 31.1 kg/(m^2) as calculated from the following:    Height as of this encounter: 4' 10\" (1.473 m).    Weight as of this encounter: 148 lb 12.8 oz (67.5 kg).  Medication Reconciliation: complete   Hannah PERSAUD CMA (AAMA)    "

## 2017-04-17 NOTE — PROGRESS NOTES
SUBJECTIVE:                                                    Tere Ortiz is a 62 year old female who presents to clinic today for the following health issues:  Chief Complaint   Patient presents with     RECHECK     referred from endocrinologist for follow up        Tere presents to follow-up for multiple issues, the most prominent of which is fatigue.  She has been having excessive daytime fatigue and trouble sleeping for over a year that seems to be worsening.  She falls asleep at night okay but wakens multiples times overnight.  She estimates amount of sleep at 3-4 hours nightly.  She naps frequently during the day.  Does have some restless legs overnight despite pramipexole.  She has SOB constantly and is having some cough.  She has not had any fevers, chills, night sweats, chest pain, abdominal pain, urinary problems, blood in the stool or urine.  She is on Oxycontin and Vicodin twice daily for chronic low back pain and fibromyalgia which she reports is managed by her neurologist.  She has been on this for 10 years at least and says these meds do not make her sleepy.  She does have TITO with BiPAP overnight and is also on chronic oxygen, but does not have a portable machine.      Problem list and histories reviewed & adjusted, as indicated.  Additional history: as documented    Current Outpatient Prescriptions   Medication Sig Dispense Refill     doxycycline (VIBRAMYCIN) 100 MG capsule Take 1 capsule (100 mg) by mouth 2 times daily 20 capsule 0     order for DME Compression stockings (Thigh High)- 2 pairs 4 Units 0     naproxen (NAPROSYN) 375 MG tablet Take 1 tablet (375 mg) by mouth 2 times daily (with meals) For 2 weeks, then 1 tablet daily for 2 weeks, then as needed 70 tablet 1     SYNTHROID 150 MCG tablet Take 1 tablet (150 mcg) by mouth daily 90 tablet 1     WARFARIN SODIUM PO Take 10 mg by mouth every 7 days Fridays.       BusPIRone HCl (BUSPAR PO) Take 10 mg by mouth At Bedtime       baclofen  (LIORESAL) 10 MG tablet Take 10 mg by mouth daily 2 at bedtime       pramipexole (MIRAPEX) 1 MG tablet Take 1 mg by mouth At Bedtime        Hydrocodone-Acetaminophen (VICODIN ES PO) Take 7.5 mg by mouth as needed        Cholecalciferol (VITAMIN D3) 2000 UNITS CAPS Take by mouth daily       potassium chloride (K-DUR) 10 MEQ tablet 2 tablets twice daily 360 tablet 0     ipratropium - albuterol 0.5 mg/2.5 mg/3 mL (DUONEB) 0.5-2.5 (3) MG/3ML nebulization Take 1 ampule by nebulization every 4 hours as needed.       fluticasone (FLONASE) 50 MCG/ACT nasal spray Spray 1 spray into both nostrils daily.       DULoxetine (CYMBALTA) 60 MG capsule Take 1 capsule by mouth daily.       aspirin 81 MG tablet Take 1 tablet by mouth daily.       oxycodone (OXYCONTIN) 80 MG 12 hr tablet Take  by mouth every 12 hours. Take at bedtime       carisoprodol (SOMA) 350 MG tablet Take 1 tablet by mouth. Take at bedtime       tiotropium (SPIRIVA HANDIHALER) 18 MCG inhalation capsule Inhale 1 capsule into the lungs. Inhale contents of one capsule 90 capsule 3     topiramate (TOPAMAX) 100 MG tablet Take  by mouth 2 times daily. Take 1.5 pill in mornings  Take 1.5 at bedtime       TraZODone HCl 150 MG TB24 Take 150 mg by mouth At Bedtime        SUMAtriptan (IMITREX) 20 MG/ACT nasal spray Give once, can repeat in 2 hour if not better 30 each 1     Pramoxine-Calamine (AVEENO ANTI-ITCH) 1-3 % LOTN Pt to use twice daily 118 mL 3     LORazepam (ATIVAN) 0.5 MG tablet Take 1 hr before CT scan, can repeat 0.5 hrs before - no driving for 10 hrs after the ativan 3 tablet 0     ipratropium (ATROVENT) 0.03 % nasal spray Spray 2 sprays into both nostrils every 12 hours 1 Box 1     ORDER FOR DME O2: During sleep  1.5 LPM via O2 Concentrator   Bled in through BiPAP   1 Device 0     ORDER FOR DME BiPAP:  IPAP 12 cm H2O  EPAP 7 cm H2O    Lifetime need and heated humidity.           ORDER FOR DME BiPAP:  IPAP 11 cm H2O  EPAP 6 cm H2O  Pt has her own BiPAP  New CPAP  "supplies- try [place] mask if it comes in an extra small size.  Alternatively could try [place] with nasal prongs occluded.  Lifetime need and heated humidity.     1 Device 0     Allergies   Allergen Reactions     No Clinical Screening - See Comments      PLASTICS     Prednisone      Sulfa Drugs      Tape [Adhesive Tape]      Nitroglycerin Itching     Flushing, headache       Reviewed and updated as needed this visit by clinical staff  Tobacco  Allergies  Med Hx  Surg Hx  Fam Hx  Soc Hx      Reviewed and updated as needed this visit by Provider         ROS:  Constitutional, cardiovascular, pulmonary, gi and gu systems are negative, except as otherwise noted.    OBJECTIVE:                                                    BP 99/60 (BP Location: Left arm, Patient Position: Chair, Cuff Size: Adult Regular)  Pulse 92  Temp 98.3  F (36.8  C) (Tympanic)  Ht 4' 10\" (1.473 m)  Wt 148 lb 12.8 oz (67.5 kg)  SpO2 90%  BMI 31.1 kg/m2  Body mass index is 31.1 kg/(m^2).  GENERAL: healthy, alert and no distress  RESP: lungs are diffusely course  CV: regular rate and rhythm, normal S1 S2, no S3 or S4, no murmur, click or rub  ABDOMEN: soft, nontender    Diagnostic Test Results:  Results for orders placed or performed in visit on 04/17/17 (from the past 24 hour(s))   XR Chest 2 Views    Narrative    XR CHEST 2 VW 4/17/2017 2:13 PM    HISTORY: Cough.    COMPARISON: 2/26/2017.      Impression    IMPRESSION: 2 views of the chest show modest pulmonary vascular  congestion with no hector CHF. Heart size is within normal limits.  Slight blunting of the right posterior costophrenic angle on the  lateral view is seen and minimal pleural fluid cannot be excluded. No  focal consolidation is identified.     CHAU BAIRD MD        ASSESSMENT/PLAN:                                                        1. Fatigue, unspecified type  2. Restless leg syndrome    I think that seeing the sleep clinic is going to be key for further work-up " for her fatigue since she is clearing not sleeping well at night.  She reports she has an appointment scheduled with them soon.  Restless legs may also be contributing to her disrupted sleep, so we will check iron levels to see if this may be contributing to the restless sensation.  She is on a number of sedating medications, but she reports she's been on these for years and doesn't feel that the are contributing and she is reluctant to make changes.  Her poor respiratory status may also be contributing to fatigue- see below.   TSH recently checked and wnl.      - Iron and iron binding capacity; Future  - Ferritin; Future    4. Cough  5. SOB (shortness of breath)  6. COPD exacerbation (H)    She reports having PFTs done recently at [facility], but I can't find these in the records.  She reports having a pulmonologist, but can't remember his name.  She is on chronic O2 but does not have a portable cannister, so I recommended she contact her pulmonologist to get a prescription for this. Her lungs sounds are very poor on exam today.  CXR shows possible small effusion on right and increased vascular markings (recent TTE showed normal function).  We will treat with doxycycline for COPD exacerbation.  She has an allergy listed to prednisone but doesn't remember what this is, so will not treat with prednisone at this time.  She is on Spiriva and thinks she might be on Advair, though this is not on our list.  I showed her a picture of an Advair diskus and she said it looked familiar.  She thinks she's only taking it once per day though, so I recommended increasing to BID.  If it turns out she is not taking Advair, would recommend starting.  She is interested in trying pulmonary rehab; states she has not done it before.     - doxycycline (VIBRAMYCIN) 100 MG capsule; Take 1 capsule (100 mg) by mouth 2 times daily  Dispense: 20 capsule; Refill: 0    7. Chronic obstructive pulmonary disease, unspecified COPD type (H)    - PULMONARY  REHAB REFERRAL; Future    8. Peripheral edema    Venous ultrasound previously ordered by Dr. Simmons in endocrinology, but not yet completed.      - order for DME; Compression stockings (Thigh High)- 2 pairs  Dispense: 4 Units; Refill: 0      David Arias MD  [facility]

## 2017-04-17 NOTE — MR AVS SNAPSHOT
After Visit Summary   4/17/2017    Tere Ortiz    MRN: [id number]           Patient Information     Date Of Birth          1955        Visit Information        Provider Department      4/17/2017 1:20 PM David Arias MD [facility]        Today's Diagnoses     Cough    -  1    SOB (shortness of breath)        Restless leg syndrome        COPD exacerbation (H)        Fatigue, unspecified type        Other symptoms and signs concerning food and fluid intake         Chronic obstructive pulmonary disease, unspecified COPD type (H)        Peripheral edema          Care Instructions    Call the INR clinic to let them know you are starting doxycycline since this interacts with warfarin.      Increase your Advair to twice per day.          Follow-ups after your visit        Additional Services     PULMONARY REHAB REFERRAL                 Your next 10 appointments already scheduled     Apr 24, 2017  2:00 PM CDT   Anticoagulation Visit with NB ANTI COAG   [facility] ([facility])    [address] [place] [id number] [phone]            Oct 13, 2017 11:30 AM CDT   (Arrive by 11:15 AM)   RETURN ENDOCRINE with Karol Simmons MD   [facility] ([facility])    [address] [place] [id number] [phone]              Future tests that were ordered for you today     Open Future Orders        Priority Expected Expires Ordered    PULMONARY REHAB REFERRAL Routine  4/17/2018 4/17/2017            Who to contact     If you have questions or need follow up information about today's clinic visit or your schedule please contact [facility] directly at [phone].  Normal or non-critical lab and imaging results will be communicated to you by MyChart, letter or phone within 4 business days after the clinic has received the results. If you do not hear from us within 7  "days, please contact the clinic through [employer] or phone. If you have a critical or abnormal lab result, we will notify you by phone as soon as possible.  Submit refill requests through [employer] or call your pharmacy and they will forward the refill request to us. Please allow 3 business days for your refill to be completed.          Additional Information About Your Visit        [email or web address] Information     [employer] gives you secure access to your electronic health record. If you see a primary care provider, you can also send messages to your care team and make appointments. If you have questions, please call your primary care clinic.  If you do not have a primary care provider, please call [phone] and they will assist you.        Care EveryWhere ID     This is your Care EveryWhere ID. This could be used by other organizations to access your [facility] medical records  [id number]        Your Vitals Were     Pulse Temperature Height Pulse Oximetry BMI (Body Mass Index)       92 98.3  F (36.8  C) (Tympanic) 4' 10\" (1.473 m) 90% 31.1 kg/m2        Blood Pressure from Last 3 Encounters:   04/17/17 99/60   04/07/17 108/71   03/07/17 107/60    Weight from Last 3 Encounters:   04/17/17 148 lb 12.8 oz (67.5 kg)   02/20/17 153 lb (69.4 kg)   09/23/16 143 lb 12.8 oz (65.2 kg)              We Performed the Following     Ferritin     Iron and iron binding capacity     XR Chest 2 Views          Today's Medication Changes          These changes are accurate as of: 4/17/17  2:39 PM.  If you have any questions, ask your nurse or doctor.               Start taking these medicines.        Dose/Directions    doxycycline 100 MG capsule   Commonly known as:  VIBRAMYCIN   Used for:  COPD exacerbation (H)   Started by:  David Arias MD        Dose:  100 mg   Take 1 capsule (100 mg) by mouth 2 times daily   Quantity:  20 capsule   Refills:  0         These medicines have changed or have updated prescriptions.        Dose/Directions    " order for DME   This may have changed:  additional instructions   Used for:  Peripheral edema   Changed by:  David Arias MD        Compression stockings (Thigh High)- 2 pairs   Quantity:  4 Units   Refills:  0            Where to get your medicines      These medications were sent to [id number] - [place], [place] - [address]  [address] [facility] [address]    Hours:  Closed 10-16-08 business to [facility] Phone:  [phone]     doxycycline 100 MG capsule         Some of these will need a paper prescription and others can be bought over the counter.  Ask your nurse if you have questions.     Bring a paper prescription for each of these medications     order for DME                Primary Care Provider Office Phone # Fax #    David Arias [phone]       [facility] [address] [facility] [address]        Thank you!     Thank you for choosing [facility]  for your care. Our goal is always to provide you with excellent care. Hearing back from our patients is one way we can continue to improve our services. Please take a few minutes to complete the written survey that you may receive in the mail after your visit with us. Thank you!             Your Updated Medication List - Protect others around you: Learn how to safely use, store and throw away your medicines at www.disposemymeds.org.          This list is accurate as of: 4/17/17  2:39 PM.  Always use your most recent med list.                   Brand Name Dispense Instructions for use    aspirin 81 MG tablet      Take 1 tablet by mouth daily.       baclofen 10 MG tablet    LIORESAL     Take 10 mg by mouth daily 2 at bedtime       BUSPAR PO      Take 10 mg by mouth At Bedtime       doxycycline 100 MG capsule    VIBRAMYCIN    20 capsule    Take 1 capsule (100 mg) by mouth 2 times daily       DULoxetine 60 MG EC capsule    CYMBALTA     Take 1 capsule by mouth daily.       DUONEB  0.5-2.5 (3) MG/3ML neb solution   Generic drug:  ipratropium - albuterol 0.5 mg/2.5 mg/3 mL      Take 1 ampule by nebulization every 4 hours as needed.       FLONASE 50 MCG/ACT spray   Generic drug:  fluticasone      Spray 1 spray into both nostrils daily.       ipratropium 0.03 % spray    ATROVENT    1 Box    Spray 2 sprays into both nostrils every 12 hours       LORazepam 0.5 MG tablet    ATIVAN    3 tablet    Take 1 hr before CT scan, can repeat 0.5 hrs before - no driving for 10 hrs after the ativan       naproxen 375 MG tablet    NAPROSYN    70 tablet    Take 1 tablet (375 mg) by mouth 2 times daily (with meals) For 2 weeks, then 1 tablet daily for 2 weeks, then as needed       * order for DME     1 Device    BiPAP: IPAP 11 cm H2O EPAP 6 cm H2O Pt has her own BiPAP New CPAP supplies- try [place] mask if it comes in an extra small size.  Alternatively could try [place] with nasal prongs occluded. Lifetime need and heated humidity.       * order for DME      BiPAP: IPAP 12 cm H2O EPAP 7 cm H2O  Lifetime need and heated humidity.       * order for DME     1 Device    O2: During sleep 1.5 LPM via O2 Concentrator  Bled in through BiPAP       order for DME     4 Units    Compression stockings (Thigh High)- 2 pairs       OxyContin 80 MG 12 hr tablet   Generic drug:  oxyCODONE      Take  by mouth every 12 hours. Take at bedtime       potassium chloride 10 MEQ tablet    K-TAB,KLOR-CON    360 tablet    2 tablets twice daily       pramipexole 1 MG tablet    MIRAPEX     Take 1 mg by mouth At Bedtime       Pramoxine-Calamine 1-3 % Lotn    AVEENO ANTI-ITCH    118 mL    Pt to use twice daily       SOMA 350 MG tablet   Generic drug:  carisoprodol      Take 1 tablet by mouth. Take at bedtime       SPIRIVA HANDIHALER 18 MCG capsule   Generic drug:  tiotropium     90 capsule    Inhale 1 capsule into the lungs. Inhale contents of one capsule       SUMAtriptan 20 MG/ACT nasal spray    IMITREX    30 each    Give once, can repeat in 2  hour if not better       SYNTHROID 150 MCG tablet   Generic drug:  levothyroxine     90 tablet    Take 1 tablet (150 mcg) by mouth daily       TOPAMAX 100 MG tablet   Generic drug:  topiramate      Take  by mouth 2 times daily. Take 1.5 pill in mornings Take 1.5 at bedtime       traZODone HCl 150 MG 24 hr tablet    OLEPTRO     Take 150 mg by mouth At Bedtime       VICODIN ES PO      Take 7.5 mg by mouth as needed       vitamin D3 2000 UNITS Caps      Take by mouth daily       WARFARIN SODIUM PO      Take 10 mg by mouth every 7 days Fridays.       * Notice:  This list has 3 medication(s) that are the same as other medications prescribed for you. Read the directions carefully, and ask your doctor or other care provider to review them with you.

## 2017-04-24 ENCOUNTER — ANTICOAGULATION THERAPY VISIT (OUTPATIENT)
Dept: ANTICOAGULATION | Facility: CLINIC | Age: 62
End: 2017-04-24
Payer: COMMERCIAL

## 2017-04-24 LAB — INR POINT OF CARE: 1.2 (ref 0.86–1.14)

## 2017-04-24 PROCEDURE — 85610 PROTHROMBIN TIME: CPT | Mod: QW

## 2017-04-24 PROCEDURE — 99207 ZZC NO CHARGE NURSE ONLY: CPT

## 2017-04-24 PROCEDURE — 36416 COLLJ CAPILLARY BLOOD SPEC: CPT

## 2017-04-24 RX ORDER — WARFARIN SODIUM 5 MG/1
TABLET ORAL
Qty: 30 TABLET | COMMUNITY
Start: 2017-04-24 | End: 2017-06-05

## 2017-04-24 NOTE — MR AVS SNAPSHOT
Tere Ortiz   4/24/2017 2:00 PM   Anticoagulation Therapy Visit    Description:  62 year old female   Provider:  NB ANTI COAG   Department:  [facility] Antico[facility]           INR as of 4/24/2017     Today's INR 1.2!      Anticoagulation Summary as of 4/24/2017     INR goal 2.0-3.0   Today's INR 1.2!   Full instructions 4/24: 12.5 mg; 4/25: 10 mg; Otherwise 10 mg on Mon, Fri; 7.5 mg all other days   Next INR check 5/4/2017    Indications   Lupus anticoagulant inhibitor syndrome (H) [D68.62]  Pulmonary embolism with infarction (H) [I26.99]  Long-term (current) use of anticoagulants [Z79.01] [Z79.01]  Embolism - blood clot [I74.9]         Description     Take 12.5mg Monday 4/24/17.  Take 10mg Tuesday 4/25/17.  Then begin 10mg Monday & Friday and 7.5mg all other days.  Recheck INR Thursday 5/4/17.      Your next Anticoagulation Clinic appointment(s)     May 04, 2017  2:00 PM CDT   Anticoagulation Visit with NB ANTI COAG   [facility] ([facility]    [address] [place] [id number] [phone]              Contact Numbers     Please call [phone] to cancel and/or reschedule your appointment.  Please call [phone] with any problems or questions regarding your therapy          April 2017 Details    Sun Mon Tue Wed Thu Fri Sat           1                 2               3               4               5               6               7               8                 9               10               11               12               13               14               15                 16               17               18               19               20               21               22                 23               24      12.5 mg   See details      25      10 mg         26      7.5 mg         27      7.5 mg         28      10 mg         29      7.5 mg           30      7.5 mg                Date Details   04/24 This INR check               How to take your  warfarin dose     To take:  7.5 mg Take 1.5 of the 5 mg tablets.    To take:  10 mg Take 2 of the 5 mg tablets.    To take:  12.5 mg Take 2.5 of the 5 mg tablets.           May 2017 Details    Sun Mon Tue Wed Thu Fri Sat      1      10 mg         2      7.5 mg         3      7.5 mg         4            5               6                 7               8               9               10               11               12               13                 14               15               16               17               18               19               20                 21               22               23               24               25               26               27                 28               29               30               31                   Date Details   No additional details    Date of next INR:  5/4/2017         How to take your warfarin dose     To take:  7.5 mg Take 1.5 of the 5 mg tablets.    To take:  10 mg Take 2 of the 5 mg tablets.

## 2017-04-24 NOTE — Clinical Note
Just FYI; pt seen in Anticoag Clinic today. Pt was started on naproxen 4/7/17 by Dr. Simmons and pt did not fill/take doxycycline prescribed 4/17/17.

## 2017-04-24 NOTE — PROGRESS NOTES
"  ANTICOAGULATION FOLLOW-UP CLINIC VISIT    Patient Name:  Tere Ortiz  Date:  4/24/2017  Contact Type:  Face to Face    SUBJECTIVE:     Patient Findings     Positives Change in medications (Dr. Simmons started the pt on naproxen 4/7/17 - writer discussed increased risk of bleeding when on warfarin ), Antibiotic use or infection (pt was prescribed doxycyline 4/17, however never picked it up, never took it), Missed doses (4/20)    Comments Pt reports having an \"upper respiratory\" and has been using her neb TID. Reports she is feeling better today. \"I actually got dressed, last night I took a shower!\"           OBJECTIVE    INR Protime   Date Value Ref Range Status   04/24/2017 1.2 (A) 0.86 - 1.14 Final       ASSESSMENT / PLAN  INR assessment SUB increase dose x2 days then increase overall maintenance dose 9.5%   Recheck INR In: 10 DAYS    INR Location Clinic      Anticoagulation Summary as of 4/24/2017     INR goal 2.0-3.0   Today's INR 1.2!   Maintenance plan 10 mg (5 mg x 2) on Mon, Fri; 7.5 mg (5 mg x 1.5) all other days   Full instructions 4/24: 12.5 mg; 4/25: 10 mg; Otherwise 10 mg on Mon, Fri; 7.5 mg all other days   Weekly total 57.5 mg   Plan last modified Cony Pagan RN (4/24/2017)   Next INR check 5/4/2017   Target end date Indefinite    Indications   Lupus anticoagulant inhibitor syndrome (H) [D68.62]  Pulmonary embolism with infarction (H) [I26.99]  Long-term (current) use of anticoagulants [Z79.01] [Z79.01]  Embolism - blood clot [I74.9]         Anticoagulation Episode Summary     INR check location     Preferred lab     Send INR reminders to [facility]    Comments * comes in electric WC. warfarin 5mg tablets.      Anticoagulation Care Providers     Provider Role Specialty Phone number    David Arias MD [facility] [phone]            See the Encounter Report to view Anticoagulation Flowsheet and Dosing Calendar (Go to Encounters tab in chart review, and find " the Anticoagulation Therapy Visit)    Routed to Dr. Arias that 1.) pt was started on naproxen and 2.) pt did not fill/take doxycycline.    Cony Pagan RN

## 2017-04-26 ENCOUNTER — TRANSFERRED RECORDS (OUTPATIENT)
Dept: HEALTH INFORMATION MANAGEMENT | Facility: CLINIC | Age: 62
End: 2017-04-26

## 2017-05-01 ENCOUNTER — TELEPHONE (OUTPATIENT)
Dept: ANTICOAGULATION | Facility: CLINIC | Age: 62
End: 2017-05-01

## 2017-05-01 RX ORDER — CLINDAMYCIN PHOSPHATE 10 UG/ML
LOTION TOPICAL 2 TIMES DAILY
Qty: 60 ML | Refills: 11 | COMMUNITY
Start: 2017-05-01 | End: 2019-01-01

## 2017-05-01 NOTE — TELEPHONE ENCOUNTER
Patient called to report that she is starting clindamycin 1% topical medication to her face BID. This should not interact with her warfarin or INR but writer will update medication list.    THAI NesbittN, RN

## 2017-05-17 ENCOUNTER — OFFICE VISIT (OUTPATIENT)
Dept: FAMILY MEDICINE | Facility: CLINIC | Age: 62
End: 2017-05-17
Payer: COMMERCIAL

## 2017-05-17 VITALS
OXYGEN SATURATION: 91 % | HEART RATE: 75 BPM | SYSTOLIC BLOOD PRESSURE: 101 MMHG | TEMPERATURE: 99.2 F | DIASTOLIC BLOOD PRESSURE: 54 MMHG

## 2017-05-17 DIAGNOSIS — B37.2 CANDIDIASIS OF SKIN: Primary | ICD-10-CM

## 2017-05-17 PROCEDURE — 99213 OFFICE O/P EST LOW 20 MIN: CPT | Performed by: INTERNAL MEDICINE

## 2017-05-17 RX ORDER — NYSTATIN 100000 [USP'U]/G
POWDER TOPICAL 3 TIMES DAILY
Qty: 60 G | Refills: 1 | Status: SHIPPED | OUTPATIENT
Start: 2017-05-17 | End: 2017-12-19

## 2017-05-17 NOTE — PROGRESS NOTES
SUBJECTIVE:                                                    Tere Ortiz is a 62 year old female who presents to clinic today for the following health issues:  Chief Complaint   Patient presents with     Rash     x 1 week, lower abdomen, buring/itchy/blisters     Rash     Onset: x 1 week     Description:   Location: lower abdomen  Character: painful, burning, red  Itching (Pruritis): YES    Progression of Symptoms:  worsening    Accompanying Signs & Symptoms:  Fever: no, but feels a bit feverish at times.   Body aches or joint pain: no   Sore throat symptoms: no   Recent cold symptoms: no    History:   Previous similar rash: no     Precipitating factors:   Exposure to similar rash: no   New exposures: None   Recent travel: no     Alleviating factors:  None      Therapies Tried and outcome: A & D ointment - made worse, [employer] Medicated Powder - no help.      Problem list and histories reviewed & adjusted, as indicated.  Additional history: as documented    Current Outpatient Prescriptions   Medication Sig Dispense Refill     nystatin (MYCOSTATIN) 530762 UNIT/GM POWD Apply topically 3 times daily For 1-2 weeks until rash cleared 60 g 1     clindamycin (CLINDAMAX) 1 % lotion Apply topically 2 times daily 60 mL 11     warfarin (COUMADIN) 5 MG tablet Take 10mg by mouth every Mon & Fri and 7.5mg all other days or as directed by Anticoagulation Clinic 30 tablet      doxycycline (VIBRAMYCIN) 100 MG capsule Take 1 capsule (100 mg) by mouth 2 times daily 20 capsule 0     SYNTHROID 150 MCG tablet Take 1 tablet (150 mcg) by mouth daily 90 tablet 1     ipratropium (ATROVENT) 0.03 % nasal spray Spray 2 sprays into both nostrils every 12 hours 1 Box 1     BusPIRone HCl (BUSPAR PO) Take 10 mg by mouth At Bedtime       baclofen (LIORESAL) 10 MG tablet Take 10 mg by mouth daily 2 at bedtime       pramipexole (MIRAPEX) 1 MG tablet Take 1 mg by mouth At Bedtime        Hydrocodone-Acetaminophen (VICODIN ES PO) Take 7.5 mg by  mouth as needed        Cholecalciferol (VITAMIN D3) 2000 UNITS CAPS Take by mouth daily       potassium chloride (K-DUR) 10 MEQ tablet 2 tablets twice daily 360 tablet 0     fluticasone (FLONASE) 50 MCG/ACT nasal spray Spray 1 spray into both nostrils daily.       DULoxetine (CYMBALTA) 60 MG capsule Take 1 capsule by mouth daily.       aspirin 81 MG tablet Take 1 tablet by mouth daily.       oxycodone (OXYCONTIN) 80 MG 12 hr tablet Take  by mouth every 12 hours. Take at bedtime       carisoprodol (SOMA) 350 MG tablet Take 1 tablet by mouth. Take at bedtime       tiotropium (SPIRIVA HANDIHALER) 18 MCG inhalation capsule Inhale 1 capsule into the lungs. Inhale contents of one capsule 90 capsule 3     topiramate (TOPAMAX) 100 MG tablet Take  by mouth 2 times daily. Take 1.5 pill in mornings  Take 1.5 at bedtime       TraZODone HCl 150 MG TB24 Take 150 mg by mouth At Bedtime        order for DME Compression stockings (Thigh High)- 2 pairs 4 Units 0     SUMAtriptan (IMITREX) 20 MG/ACT nasal spray Give once, can repeat in 2 hour if not better 30 each 1     naproxen (NAPROSYN) 375 MG tablet Take 1 tablet (375 mg) by mouth 2 times daily (with meals) For 2 weeks, then 1 tablet daily for 2 weeks, then as needed 70 tablet 1     Pramoxine-Calamine (AVEENO ANTI-ITCH) 1-3 % LOTN Pt to use twice daily 118 mL 3     LORazepam (ATIVAN) 0.5 MG tablet Take 1 hr before CT scan, can repeat 0.5 hrs before - no driving for 10 hrs after the ativan 3 tablet 0     ipratropium - albuterol 0.5 mg/2.5 mg/3 mL (DUONEB) 0.5-2.5 (3) MG/3ML nebulization Take 1 ampule by nebulization every 4 hours as needed.       ORDER FOR DME O2: During sleep  1.5 LPM via O2 Concentrator   Bled in through BiPAP   1 Device 0     ORDER FOR DME BiPAP:  IPAP 12 cm H2O  EPAP 7 cm H2O    Lifetime need and heated humidity.           ORDER FOR DME BiPAP:  IPAP 11 cm H2O  EPAP 6 cm H2O  Pt has her own BiPAP  New CPAP supplies- try [place] mask if it comes in an extra small  size.  Alternatively could try [place] with nasal prongs occluded.  Lifetime need and heated humidity.     1 Device 0     Allergies   Allergen Reactions     No Clinical Screening - See Comments      PLASTICS     Prednisone      Sulfa Drugs      Tape [Adhesive Tape]      Nitroglycerin Itching     Flushing, headache       Reviewed and updated as needed this visit by clinical staff  Tobacco  Allergies  Med Hx  Surg Hx  Fam Hx  Soc Hx      Reviewed and updated as needed this visit by Provider         ROS:  Constitutional, derm systems are negative, except as otherwise noted.    OBJECTIVE:                                                    /54 (BP Location: Right arm, Patient Position: Chair, Cuff Size: Adult Regular)  Pulse 75  Temp 99.2  F (37.3  C) (Tympanic)  SpO2 91%  There is no height or weight on file to calculate BMI.  GENERAL: healthy, alert and no distress  SKIN: red rash with satellite lesions present below abdominal pannus R>L    Diagnostic Test Results:  none      ASSESSMENT/PLAN:                                                        1. Candidiasis of skin    Rash consistent with candida.  She notes she's been perspiring a lot in the skin folds with the warmer weather, so will try nystatin powder rather than cream to see if that will keep the area drier.  Also suggested placing paper towels between the skin folds to avoid moisture and reviewed handout on candida skin infections.  Follow-up if not improving.      - nystatin (MYCOSTATIN) 016052 UNIT/GM POWD; Apply topically 3 times daily For 1-2 weeks until rash cleared  Dispense: 60 g; Refill: 1    I did see her last month for fatigue, and she had not followed up with all recommended testing.  However, she appears much more alert today and she says her fatigue is much improved after wearing her BiPAP more consistently at night, so further work-up may not be needed as long as things continue to go well for her.      David Arias MD  [place]  [facility]

## 2017-05-17 NOTE — PATIENT INSTRUCTIONS
Candida Skin Infection (Adult)  Candida is type of yeast. It grows naturally on the skin and in the mouth. If it grows out of control, it can cause an infection. Candida can cause infections in the genital area, skin folds, and under the breasts. Anyone can get this infection. It is more common in a person with a weakened immune system, such as from diabetes, HIV, or cancer. It s also more common in someone who has been on antibiotic therapy. And it s more common people who are overweight or who have incontinence. Wearing tight-fitting clothing and taking part in activities with lots of skin-to-skin contact can also put you at risk.  Candida causes the skin to become bright red and inflamed. The border of the infected part of the skin is often raised. The infection causes pain and itching. Sometimes the skin peels and bleeds.  A Candida rash is most often treated with an antifungal cream or ointment. The rash will clear a few days after starting the medication. Infections that don t go away may need a prescription medication. In rare cases, a bacterial infection can also occur.  Home care  Your health care provider will recommend an antifungal cream or ointment for the rash. He or she may also prescribe a medication for the itch. Follow all instructions for using these medications. Don t use cornstarch powder. Cornstarch can cause the Candida infection to get worse.  General care:    Keep your skin clean by washing the area twice a day.    Use the cream as directed until your rash is gone. Once the skin has healed, keep it dry to prevent another infection.     If you are overweight, talk with your health care provider about a plan to lose excess weight.    Avoid clothes that fit tightly.  Follow-up care  Your rash will clear in 7 to 14 days. Follow up with your health care provider if the rash is not gone after 14 days.  When to seek medical advice  Call your health care provider right away if any of these  occur:    Pain or redness that gets worse or spreads    Fluid coming from the skin    Yellow crusts on the skin    Fever of 100.4 F (38 C) or higher, or as directed by your health care provider       8866-6202 [employer]. [address] [place] [address], [place] [address]. All rights reserved. This information is not intended as a substitute for professional medical care. Always follow your healthcare professional's instructions.

## 2017-05-17 NOTE — NURSING NOTE
"Chief Complaint   Patient presents with     Rash     x 1 week, lower abdomen, buring/itchy/blisters       Initial /54 (BP Location: Right arm, Patient Position: Chair, Cuff Size: Adult Regular)  Pulse 75  Temp 99.2  F (37.3  C) (Tympanic)  SpO2 91% Estimated body mass index is 31.1 kg/(m^2) as calculated from the following:    Height as of 4/17/17: 4' 10\" (1.473 m).    Weight as of 4/17/17: 148 lb 12.8 oz (67.5 kg).  Medication Reconciliation: complete   Hannah PERSAUD CMA (AAMA)    "

## 2017-05-17 NOTE — MR AVS SNAPSHOT
After Visit Summary   5/17/2017    Tere Ortiz    MRN: [id number]           Patient Information     Date Of Birth          1955        Visit Information        Provider Department      5/17/2017 11:20 AM David Arias MD [facility]        Today's Diagnoses     Candidiasis of skin    -  1      Care Instructions      Candida Skin Infection (Adult)  Candida is type of yeast. It grows naturally on the skin and in the mouth. If it grows out of control, it can cause an infection. Candida can cause infections in the genital area, skin folds, and under the breasts. Anyone can get this infection. It is more common in a person with a weakened immune system, such as from diabetes, HIV, or cancer. It s also more common in someone who has been on antibiotic therapy. And it s more common people who are overweight or who have incontinence. Wearing tight-fitting clothing and taking part in activities with lots of skin-to-skin contact can also put you at risk.  Candida causes the skin to become bright red and inflamed. The border of the infected part of the skin is often raised. The infection causes pain and itching. Sometimes the skin peels and bleeds.  A Candida rash is most often treated with an antifungal cream or ointment. The rash will clear a few days after starting the medication. Infections that don t go away may need a prescription medication. In rare cases, a bacterial infection can also occur.  Home care  Your health care provider will recommend an antifungal cream or ointment for the rash. He or she may also prescribe a medication for the itch. Follow all instructions for using these medications. Don t use cornstarch powder. Cornstarch can cause the Candida infection to get worse.  General care:    Keep your skin clean by washing the area twice a day.    Use the cream as directed until your rash is gone. Once the skin has healed, keep it dry to prevent another infection.     If you  are overweight, talk with your health care provider about a plan to lose excess weight.    Avoid clothes that fit tightly.  Follow-up care  Your rash will clear in 7 to 14 days. Follow up with your health care provider if the rash is not gone after 14 days.  When to seek medical advice  Call your health care provider right away if any of these occur:    Pain or redness that gets worse or spreads    Fluid coming from the skin    Yellow crusts on the skin    Fever of 100.4 F (38 C) or higher, or as directed by your health care provider       3839-8564 [employer]. [address] [place] [address]. All rights reserved. This information is not intended as a substitute for professional medical care. Always follow your healthcare professional's instructions.              Follow-ups after your visit        Your next 10 appointments already scheduled     Oct 13, 2017 11:30 AM CDT   (Arrive by 11:15 AM)   RETURN ENDOCRINE with Karol Simmons MD   [facility] Endocr[facility]ology ([facility])    [address] [place] [id number] [phone]              Who to contact     If you have questions or need follow up information about today's clinic visit or your schedule please contact [facility] directly at [phone].  Normal or non-critical lab and imaging results will be communicated to you by [employer], letter or phone within 4 business days after the clinic has received the results. If you do not hear from us within 7 days, please contact the clinic through [employer] or phone. If you have a critical or abnormal lab result, we will notify you by phone as soon as possible.  Submit refill requests through [employer] or call your pharmacy and they will forward the refill request to us. Please allow 3 business days for your refill to be completed.          Additional Information About Your Visit        [employer] Information     [employer] gives you secure  access to your electronic health record. If you see a primary care provider, you can also send messages to your care team and make appointments. If you have questions, please call your primary care clinic.  If you do not have a primary care provider, please call [phone] and they will assist you.        Care EveryWhere ID     This is your Care EveryWhere ID. This could be used by other organizations to access your [facility] medical records  [id number]        Your Vitals Were     Pulse Temperature Pulse Oximetry             75 99.2  F (37.3  C) (Tympanic) 91%          Blood Pressure from Last 3 Encounters:   05/17/17 101/54   04/17/17 99/60   04/07/17 108/71    Weight from Last 3 Encounters:   04/17/17 148 lb 12.8 oz (67.5 kg)   02/20/17 153 lb (69.4 kg)   09/23/16 143 lb 12.8 oz (65.2 kg)              Today, you had the following     No orders found for display         Today's Medication Changes          These changes are accurate as of: 5/17/17 11:51 AM.  If you have any questions, ask your nurse or doctor.               Start taking these medicines.        Dose/Directions    nystatin 806183 UNIT/GM Powd   Commonly known as:  MYCOSTATIN   Used for:  Candidiasis of skin   Started by:  David Arias MD        Apply topically 3 times daily For 1-2 weeks until rash cleared   Quantity:  60 g   Refills:  1            Where to get your medicines      These medications were sent to [id number] [address] [facility]  [address] [facility] [address]    Hours:  Closed 10-16-08 business to [facility] Phone:  [phone]     nystatin 279191 UNIT/GM Powd                Primary Care Provider Office Phone # Fax #    David Arias [phone]       [facility] [address] [facility] [place] [address]        Thank you!     Thank you for choosing [facility]  for your care. Our goal is always to provide you with excellent care. Hearing back from our  patients is one way we can continue to improve our services. Please take a few minutes to complete the written survey that you may receive in the mail after your visit with us. Thank you!             Your Updated Medication List - Protect others around you: Learn how to safely use, store and throw away your medicines at www.disposemymeds.org.          This list is accurate as of: 5/17/17 11:51 AM.  Always use your most recent med list.                   Brand Name Dispense Instructions for use    aspirin 81 MG tablet      Take 1 tablet by mouth daily.       baclofen 10 MG tablet    LIORESAL     Take 10 mg by mouth daily 2 at bedtime       BUSPAR PO      Take 10 mg by mouth At Bedtime       CLINDAMAX 1 % lotion   Generic drug:  clindamycin     60 mL    Apply topically 2 times daily       doxycycline 100 MG capsule    VIBRAMYCIN    20 capsule    Take 1 capsule (100 mg) by mouth 2 times daily       DULoxetine 60 MG EC capsule    CYMBALTA     Take 1 capsule by mouth daily.       DUONEB 0.5-2.5 (3) MG/3ML neb solution   Generic drug:  ipratropium - albuterol 0.5 mg/2.5 mg/3 mL      Take 1 ampule by nebulization every 4 hours as needed.       FLONASE 50 MCG/ACT spray   Generic drug:  fluticasone      Spray 1 spray into both nostrils daily.       ipratropium 0.03 % spray    ATROVENT    1 Box    Spray 2 sprays into both nostrils every 12 hours       LORazepam 0.5 MG tablet    ATIVAN    3 tablet    Take 1 hr before CT scan, can repeat 0.5 hrs before - no driving for 10 hrs after the ativan       naproxen 375 MG tablet    NAPROSYN    70 tablet    Take 1 tablet (375 mg) by mouth 2 times daily (with meals) For 2 weeks, then 1 tablet daily for 2 weeks, then as needed       nystatin [id number] UNIT/GM Powd    MYCOSTATIN    60 g    Apply topically 3 times daily For 1-2 weeks until rash cleared       * order for DME     1 Device    BiPAP: IPAP 11 cm H2O EPAP 6 cm H2O Pt has her own BiPAP New CPAP supplies- try [place] mask if it comes  in an extra small size.  Alternatively could try [place] with nasal prongs occluded. Lifetime need and heated humidity.       * order for DME      BiPAP: IPAP 12 cm H2O EPAP 7 cm H2O  Lifetime need and heated humidity.       * order for DME     1 Device    O2: During sleep 1.5 LPM via O2 Concentrator  Bled in through BiPAP       order for DME     4 Units    Compression stockings (Thigh High)- 2 pairs       OxyContin 80 MG 12 hr tablet   Generic drug:  oxyCODONE      Take  by mouth every 12 hours. Take at bedtime       potassium chloride 10 MEQ tablet    K-TAB,KLOR-CON    360 tablet    2 tablets twice daily       pramipexole 1 MG tablet    MIRAPEX     Take 1 mg by mouth At Bedtime       Pramoxine-Calamine 1-3 % Lotn    AVEENO ANTI-ITCH    118 mL    Pt to use twice daily       SOMA 350 MG tablet   Generic drug:  carisoprodol      Take 1 tablet by mouth. Take at bedtime       SPIRIVA HANDIHALER 18 MCG capsule   Generic drug:  tiotropium     90 capsule    Inhale 1 capsule into the lungs. Inhale contents of one capsule       SUMAtriptan 20 MG/ACT nasal spray    IMITREX    30 each    Give once, can repeat in 2 hour if not better       SYNTHROID 150 MCG tablet   Generic drug:  levothyroxine     90 tablet    Take 1 tablet (150 mcg) by mouth daily       TOPAMAX 100 MG tablet   Generic drug:  topiramate      Take  by mouth 2 times daily. Take 1.5 pill in mornings Take 1.5 at bedtime       traZODone HCl 150 MG 24 hr tablet    OLEPTRO     Take 150 mg by mouth At Bedtime       VICODIN ES PO      Take 7.5 mg by mouth as needed       vitamin D3 2000 UNITS Caps      Take by mouth daily       warfarin 5 MG tablet    COUMADIN    30 tablet    Take 10mg by mouth every Mon & Fri and 7.5mg all other days or as directed by Anticoagulation Clinic       * Notice:  This list has 3 medication(s) that are the same as other medications prescribed for you. Read the directions carefully, and ask your doctor or other care provider to review them  with you.

## 2017-05-18 ENCOUNTER — ANTICOAGULATION THERAPY VISIT (OUTPATIENT)
Dept: ANTICOAGULATION | Facility: CLINIC | Age: 62
End: 2017-05-18
Payer: COMMERCIAL

## 2017-05-18 DIAGNOSIS — I26.99 PULMONARY EMBOLISM WITH INFARCTION (H): ICD-10-CM

## 2017-05-18 DIAGNOSIS — Z79.01 LONG-TERM (CURRENT) USE OF ANTICOAGULANTS: ICD-10-CM

## 2017-05-18 DIAGNOSIS — D68.62 LUPUS ANTICOAGULANT INHIBITOR SYNDROME (H): ICD-10-CM

## 2017-05-18 DIAGNOSIS — I74.9 EMBOLISM (H): ICD-10-CM

## 2017-05-18 LAB — INR POINT OF CARE: 1.4 (ref 0.86–1.14)

## 2017-05-18 PROCEDURE — 85610 PROTHROMBIN TIME: CPT | Mod: QW

## 2017-05-18 PROCEDURE — 36416 COLLJ CAPILLARY BLOOD SPEC: CPT

## 2017-06-05 ENCOUNTER — ANTICOAGULATION THERAPY VISIT (OUTPATIENT)
Dept: ANTICOAGULATION | Facility: CLINIC | Age: 62
End: 2017-06-05
Payer: COMMERCIAL

## 2017-06-05 DIAGNOSIS — Z79.01 LONG-TERM (CURRENT) USE OF ANTICOAGULANTS: ICD-10-CM

## 2017-06-05 DIAGNOSIS — I74.9 EMBOLISM (H): Primary | ICD-10-CM

## 2017-06-05 DIAGNOSIS — D68.62 LUPUS ANTICOAGULANT INHIBITOR SYNDROME (H): ICD-10-CM

## 2017-06-05 DIAGNOSIS — I26.99 PULMONARY EMBOLISM WITH INFARCTION (H): ICD-10-CM

## 2017-06-05 DIAGNOSIS — I74.9 EMBOLISM (H): ICD-10-CM

## 2017-06-05 LAB — INR POINT OF CARE: 1.7 (ref 0.86–1.14)

## 2017-06-05 PROCEDURE — 36416 COLLJ CAPILLARY BLOOD SPEC: CPT

## 2017-06-05 PROCEDURE — 99207 ZZC NO CHARGE NURSE ONLY: CPT

## 2017-06-05 PROCEDURE — 85610 PROTHROMBIN TIME: CPT | Mod: QW

## 2017-06-05 RX ORDER — WARFARIN SODIUM 5 MG/1
TABLET ORAL
Qty: 156 TABLET | Refills: 0 | Status: SHIPPED | OUTPATIENT
Start: 2017-06-05 | End: 2017-06-22

## 2017-06-05 NOTE — PROGRESS NOTES
ANTICOAGULATION FOLLOW-UP CLINIC VISIT    Patient Name:  Tere Ortiz  Date:  6/5/2017  Contact Type:  Face to Face    SUBJECTIVE:     Patient Findings     Positives No Problem Findings    Comments Already took 10mg dose this AM.           OBJECTIVE    INR Protime   Date Value Ref Range Status   06/05/2017 1.7 (A) 0.86 - 1.14 Final       ASSESSMENT / PLAN  INR assessment SUB increase maintenance dose 13%   Recheck INR In: 2 WEEKS    INR Location Clinic      Anticoagulation Summary as of 6/5/2017     INR goal 2.0-3.0   Today's INR 1.7!   Maintenance plan 7.5 mg (5 mg x 1.5) on Mon, Fri; 10 mg (5 mg x 2) all other days   Full instructions 7.5 mg on Mon, Fri; 10 mg all other days   Weekly total 65 mg   Plan last modified Cony Pagan RN (6/5/2017)   Next INR check 6/22/2017   Priority INR   Target end date Indefinite    Indications   Lupus anticoagulant inhibitor syndrome (H) [D68.62]  Pulmonary embolism with infarction (H) [I26.99]  Long-term (current) use of anticoagulants [Z79.01] [Z79.01]  Embolism - blood clot [I74.9]         Anticoagulation Episode Summary     INR check location     Preferred lab     Send INR reminders to [facility]    Comments * comes in electric WC. warfarin 5mg tablets.      Anticoagulation Care Providers     Provider Role Specialty Phone number    David Arias MD [facility] Internal Medicine [phone]            See the Encounter Report to view Anticoagulation Flowsheet and Dosing Calendar (Go to Encounters tab in chart review, and find the Anticoagulation Therapy Visit)    Cony Pagan RN

## 2017-06-05 NOTE — MR AVS SNAPSHOT
Tere Ortiz   6/5/2017 3:00 PM   Anticoagulation Therapy Visit    Description:  62 year old female   Provider:  NB ANTI COAG   Department:  [facility] Anticoag           INR as of 6/5/2017     Today's INR 1.7!      Anticoagulation Summary as of 6/5/2017     INR goal 2.0-3.0   Today's INR 1.7!   Full instructions 7.5 mg on Mon, Fri; 10 mg all other days   Next INR check 6/22/2017    Indications   Lupus anticoagulant inhibitor syndrome (H) [D68.62]  Pulmonary embolism with infarction (H) [I26.99]  Long-term (current) use of anticoagulants [Z79.01] [Z79.01]  Embolism - blood clot [I74.9]         Description     Increase dose to every 7.5mg Mon & Fri and 10mg all other days.  Recheck INR in 2 1/2 weeks.      Your next Anticoagulation Clinic appointment(s)     Jun 22, 2017  3:00 PM CDT   Anticoagulation Visit with NB ANTI COAG   [facility]    [address] [place] [address] [phone]              Contact Numbers     Please call [phone] to cancel and/or reschedule your appointment.  Please call [phone] with any problems or questions regarding your therapy          June 2017 Details    Sun Mon Tue Wed Thu Fri Sat         1               2               3                 4               5      10 mg   See details      6      10 mg         7      10 mg         8      10 mg         9      7.5 mg         10      10 mg           11      10 mg         12      7.5 mg         13      10 mg         14      10 mg         15      10 mg         16      7.5 mg         17      10 mg           18      10 mg         19      7.5 mg         20      10 mg         21      10 mg         22            23               24                 25               26               27               28               29               30                 Date Details   06/05 This INR check       Date of next INR:  6/22/2017         How to take your warfarin dose     To take:  7.5  mg Take 1.5 of the 5 mg tablets.    To take:  10 mg Take 2 of the 5 mg tablets.

## 2017-06-12 DIAGNOSIS — E03.9 HYPOTHYROIDISM: ICD-10-CM

## 2017-06-12 RX ORDER — LEVOTHYROXINE SODIUM 150 MCG
150 TABLET ORAL DAILY
Qty: 90 TABLET | Refills: 3 | Status: SHIPPED | OUTPATIENT
Start: 2017-06-12 | End: 2017-10-13

## 2017-06-14 ENCOUNTER — TELEPHONE (OUTPATIENT)
Dept: ANTICOAGULATION | Facility: CLINIC | Age: 62
End: 2017-06-14

## 2017-06-14 NOTE — TELEPHONE ENCOUNTER
Patient called and given current dosing instructions. She has no further questions.    Sharee Braxton, BSN, RN

## 2017-06-14 NOTE — TELEPHONE ENCOUNTER
Reason for Call:  Other     Detailed comments: Tere says she lost her schedule of how she is to take her Warfarin. She would like someone to call her to go over this with her.     Phone Number Patient can be reached at: Home number on file [phone] (home)    Best Time: anytime    Can we leave a detailed message on this number? YES    Call taken on 6/14/2017 at 10:54 AM by Irma Anthony

## 2017-06-22 ENCOUNTER — ANTICOAGULATION THERAPY VISIT (OUTPATIENT)
Dept: ANTICOAGULATION | Facility: CLINIC | Age: 62
End: 2017-06-22
Payer: COMMERCIAL

## 2017-06-22 DIAGNOSIS — D68.62 LUPUS ANTICOAGULANT INHIBITOR SYNDROME (H): ICD-10-CM

## 2017-06-22 DIAGNOSIS — I74.9 EMBOLISM (H): ICD-10-CM

## 2017-06-22 DIAGNOSIS — I26.99 PULMONARY EMBOLISM WITH INFARCTION (H): ICD-10-CM

## 2017-06-22 DIAGNOSIS — Z79.01 LONG-TERM (CURRENT) USE OF ANTICOAGULANTS: ICD-10-CM

## 2017-06-22 LAB — INR POINT OF CARE: 1.8 (ref 0.86–1.14)

## 2017-06-22 PROCEDURE — 85610 PROTHROMBIN TIME: CPT | Mod: QW

## 2017-06-22 PROCEDURE — 36416 COLLJ CAPILLARY BLOOD SPEC: CPT

## 2017-06-22 PROCEDURE — 99207 ZZC NO CHARGE NURSE ONLY: CPT

## 2017-06-22 RX ORDER — WARFARIN SODIUM 5 MG/1
TABLET ORAL
Qty: 156 TABLET | Refills: 0 | COMMUNITY
Start: 2017-06-22 | End: 2017-09-05

## 2017-06-22 NOTE — MR AVS SNAPSHOT
Tere Ortiz   6/22/2017 3:00 PM   Anticoagulation Therapy Visit    Description:  62 year old female   Provider:  NB ANTI COAG   Department:  [facility]           INR as of 6/22/2017     Today's INR 1.8!      Anticoagulation Summary as of 6/22/2017     INR goal 2.0-3.0   Today's INR 1.8!   Full instructions 10 mg every day   Next INR check 7/6/2017    Indications   Lupus anticoagulant inhibitor syndrome (H) [D68.62]  Pulmonary embolism with infarction (H) [I26.99]  Long-term (current) use of anticoagulants [Z79.01] [Z79.01]  Embolism - blood clot [I74.9]         Your next Anticoagulation Clinic appointment(s)     Jul 06, 2017  3:00 PM CDT   Anticoagulation Visit with NB ANTI COAG   [facility]    [address] [place] [address] [phone]              Contact Numbers     Please call [phone] to cancel and/or reschedule your appointment.  Please call [phone] with any problems or questions regarding your therapy          June 2017 Details    Sun Mon Tue Wed Thu Fri Sat         1               2               3                 4               5               6               7               8               9               10                 11               12               13               14               15               16               17                 18               19               20               21               22      10 mg   See details      23      10 mg         24      10 mg           25      10 mg         26      10 mg         27      10 mg         28      10 mg         29      10 mg         30      10 mg           Date Details   06/22 This INR check               How to take your warfarin dose     To take:  10 mg Take 2 of the 5 mg tablets.           July 2017 Details    Sun Mon Tue Wed Thu Fri Sat           1      10 mg           2      10 mg         3      10 mg         4      10 mg         5      10 mg          6            7               8                 9               10               11               12               13               14               15                 16               17               18               19               20               21               22                 23               24               25               26               27               28               29                 30               31                     Date Details   No additional details    Date of next INR:  7/6/2017         How to take your warfarin dose     To take:  10 mg Take 2 of the 5 mg tablets.

## 2017-06-22 NOTE — PROGRESS NOTES
ANTICOAGULATION FOLLOW-UP CLINIC VISIT    Patient Name:  Tere Ortiz  Date:  6/22/2017  Contact Type:  Face to Face    SUBJECTIVE:     Patient Findings     Positives No Problem Findings    Comments Patient's dose has been increased steadily for the last couple months. Her recent increase only raised the INR 0.1 so will increase another 7.7% with a recheck in 2 weeks. She asked what she can take for allergy drainage and writer suggested Claritin or another antihistamine. These shouldn't interfere with her INR but suggested she speak with a pharmacist also in case there are other reasons she should not take this.           OBJECTIVE    INR Protime   Date Value Ref Range Status   06/22/2017 1.8 (A) 0.86 - 1.14 Final       ASSESSMENT / PLAN  INR assessment SUB    Recheck INR In: 2 WEEKS    INR Location Clinic      Anticoagulation Summary as of 6/22/2017     INR goal 2.0-3.0   Today's INR 1.8!   Maintenance plan 10 mg (5 mg x 2) every day   Full instructions 10 mg every day   Weekly total 70 mg   Plan last modified Sharee Braxton RN (6/22/2017)   Next INR check 7/6/2017   Priority INR   Target end date Indefinite    Indications   Lupus anticoagulant inhibitor syndrome (H) [D68.62]  Pulmonary embolism with infarction (H) [I26.99]  Long-term (current) use of anticoagulants [Z79.01] [Z79.01]  Embolism - blood clot [I74.9]         Anticoagulation Episode Summary     INR check location     Preferred lab     Send INR reminders to [facility] PO[facility]    Comments * comes in electric WC. warfarin 5mg tablets.      Anticoagulation Care Providers     Provider Role Specialty Phone number    David Arias MD [facility] Internal Medicine [phone]            See the Encounter Report to view Anticoagulation Flowsheet and Dosing Calendar (Go to Encounters tab in chart review, and find the Anticoagulation Therapy Visit)        Sharee Braxton RN

## 2017-07-06 ENCOUNTER — ANTICOAGULATION THERAPY VISIT (OUTPATIENT)
Dept: ANTICOAGULATION | Facility: CLINIC | Age: 62
End: 2017-07-06
Payer: COMMERCIAL

## 2017-07-06 DIAGNOSIS — Z79.01 LONG-TERM (CURRENT) USE OF ANTICOAGULANTS: ICD-10-CM

## 2017-07-06 DIAGNOSIS — I74.9 EMBOLISM (H): ICD-10-CM

## 2017-07-06 DIAGNOSIS — I26.99 PULMONARY EMBOLISM WITH INFARCTION (H): ICD-10-CM

## 2017-07-06 DIAGNOSIS — D68.62 LUPUS ANTICOAGULANT INHIBITOR SYNDROME (H): ICD-10-CM

## 2017-07-06 LAB — INR POINT OF CARE: 2.1 (ref 0.86–1.14)

## 2017-07-06 PROCEDURE — 36416 COLLJ CAPILLARY BLOOD SPEC: CPT

## 2017-07-06 PROCEDURE — 99207 ZZC NO CHARGE NURSE ONLY: CPT

## 2017-07-06 PROCEDURE — 85610 PROTHROMBIN TIME: CPT | Mod: QW

## 2017-07-06 NOTE — MR AVS SNAPSHOT
Tere Ortiz   7/6/2017 3:00 PM   Anticoagulation Therapy Visit    Description:  62 year old female   Provider:  NB ANTI COAG   Department:  [facility]           INR as of 7/6/2017     Today's INR 2.1      Anticoagulation Summary as of 7/6/2017     INR goal 2.0-3.0   Today's INR 2.1   Full instructions 10 mg every day   Next INR check 7/24/2017    Indications   Lupus anticoagulant inhibitor syndrome (H) [D68.62]  Pulmonary embolism with infarction (H) [I26.99]  Long-term (current) use of anticoagulants [Z79.01] [Z79.01]  Embolism - blood clot [I74.9]         Description     Warfarin dose: 10mg daily      Your next Anticoagulation Clinic appointment(s)     Jul 24, 2017  3:15 PM CDT   Anticoagulation Visit with NB ANTI COAG   [facility])    [address] [place] [address] [phone]              Contact Numbers     Please call [phone] to cancel and/or reschedule your appointment.  Please call [phone] with any problems or questions regarding your therapy          July 2017 Details    Sun Mon Tue Wed Thu Fri Sat           1                 2               3               4               5               6      10 mg   See details      7      10 mg         8      10 mg           9      10 mg         10      10 mg         11      10 mg         12      10 mg         13      10 mg         14      10 mg         15      10 mg           16      10 mg         17      10 mg         18      10 mg         19      10 mg         20      10 mg         21      10 mg         22      10 mg           23      10 mg         24            25               26               27               28               29                 30               31                     Date Details   07/06 This INR check       Date of next INR:  7/24/2017         How to take your warfarin dose     To take:  10 mg Take 2 of the 5 mg tablets.

## 2017-07-06 NOTE — PROGRESS NOTES
ANTICOAGULATION FOLLOW-UP CLINIC VISIT    Patient Name:  Tere Ortiz  Date:  7/6/2017  Contact Type:  Face to Face    SUBJECTIVE:     Patient Findings     Comments Patient was taking an over the counter Generic Sinus medication           OBJECTIVE    INR Protime   Date Value Ref Range Status   07/06/2017 2.1 (A) 0.86 - 1.14 Final       ASSESSMENT / PLAN  No question data found.  Anticoagulation Summary as of 7/6/2017     INR goal 2.0-3.0   Today's INR 2.1   Maintenance plan 10 mg (5 mg x 2) every day   Full instructions 10 mg every day   Weekly total 70 mg   Plan last modified Sharee Braxton RN (6/22/2017)   Next INR check 7/24/2017   Priority INR   Target end date Indefinite    Indications   Lupus anticoagulant inhibitor syndrome (H) [D68.62]  Pulmonary embolism with infarction (H) [I26.99]  Long-term (current) use of anticoagulants [Z79.01] [Z79.01]  Embolism - blood clot [I74.9]         Anticoagulation Episode Summary     INR check location     Preferred lab     Send INR reminders to [facility] [place]    Comments * comes in electric WC. warfarin 5mg tablets.      Anticoagulation Care Providers     Provider Role Specialty Phone number    David Arias MD [facility] Internal Medicine [phone]            See the Encounter Report to view Anticoagulation Flowsheet and Dosing Calendar (Go to Encounters tab in chart review, and find the Anticoagulation Therapy Visit)        Ara Kaplan RN                Cardiac

## 2017-07-08 ENCOUNTER — HEALTH MAINTENANCE LETTER (OUTPATIENT)
Age: 62
End: 2017-07-08

## 2017-07-14 ENCOUNTER — MYC MEDICAL ADVICE (OUTPATIENT)
Dept: FAMILY MEDICINE | Facility: CLINIC | Age: 62
End: 2017-07-14

## 2017-07-17 NOTE — TELEPHONE ENCOUNTER
Dr Arias,Please see her mychart note.    I don't see reason for her hysterectomy on the chart.   What do you advise? Thanks,   Angela Bell RNC

## 2017-07-17 NOTE — TELEPHONE ENCOUNTER
It depends.  What was the reason for the hysterectomy?  Did they leave her cervix in place?    If the hysterectomy was for benign reasons and it they removed her cervix, then no, she no longer needs a PAP.  Then the surgical history needs to be properly documented by our staff so that the PAP screening comes off her Health Maintenance.    If the hysterectomy was for cancer (depending on the year) or if they left her cervix, then she still needs a PAP.    If she is unsure if she has a cervix or not, then we can assess for it her during her annual physical.    Talia Vallejo, CNP

## 2017-07-31 ENCOUNTER — ANTICOAGULATION THERAPY VISIT (OUTPATIENT)
Dept: ANTICOAGULATION | Facility: CLINIC | Age: 62
End: 2017-07-31
Payer: COMMERCIAL

## 2017-07-31 DIAGNOSIS — D68.62 LUPUS ANTICOAGULANT INHIBITOR SYNDROME (H): ICD-10-CM

## 2017-07-31 DIAGNOSIS — Z79.01 LONG-TERM (CURRENT) USE OF ANTICOAGULANTS: ICD-10-CM

## 2017-07-31 DIAGNOSIS — I74.9 EMBOLISM (H): ICD-10-CM

## 2017-07-31 DIAGNOSIS — I26.99 PULMONARY EMBOLISM WITH INFARCTION (H): ICD-10-CM

## 2017-07-31 LAB — INR POINT OF CARE: 3.5 (ref 0.86–1.14)

## 2017-07-31 PROCEDURE — 36416 COLLJ CAPILLARY BLOOD SPEC: CPT

## 2017-07-31 PROCEDURE — 99207 ZZC NO CHARGE NURSE ONLY: CPT

## 2017-07-31 PROCEDURE — 85610 PROTHROMBIN TIME: CPT | Mod: QW

## 2017-07-31 NOTE — PROGRESS NOTES
ANTICOAGULATION FOLLOW-UP CLINIC VISIT    Patient Name:  Tere Ortiz  Date:  7/31/2017  Contact Type:  Face to Face    SUBJECTIVE:     Patient Findings     Positives Other complaints (patient is very stressed. her  is in the hospital awaiting some type of surgery on his bowels)    Comments No bleeding concerns.           OBJECTIVE    INR Protime   Date Value Ref Range Status   07/31/2017 3.5 (A) 0.86 - 1.14 Final       ASSESSMENT / PLAN  INR assessment SUPRA    Recheck INR In: 2 WEEKS    INR Location Clinic      Anticoagulation Summary as of 7/31/2017     INR goal 2.0-3.0   Today's INR 3.5!   Maintenance plan 10 mg (5 mg x 2) every day   Full instructions 8/1: 5 mg; Otherwise 10 mg every day   Weekly total 70 mg   Plan last modified Sharee Braxton RN (6/22/2017)   Next INR check 8/14/2017   Priority INR   Target end date Indefinite    Indications   Lupus anticoagulant inhibitor syndrome (H) [D68.62]  Pulmonary embolism with infarction (H) [I26.99]  Long-term (current) use of anticoagulants [Z79.01] [Z79.01]  Embolism - blood clot [I74.9]         Anticoagulation Episode Summary     INR check location     Preferred lab     Send INR reminders to [facility] [place]    Comments * comes in electric WC. warfarin 5mg tablets.      Anticoagulation Care Providers     Provider Role Specialty Phone number    David Arias MD [facility] Internal Medicine [phone]            See the Encounter Report to view Anticoagulation Flowsheet and Dosing Calendar (Go to Encounters tab in chart review, and find the Anticoagulation Therapy Visit)        Sharee Braxton RN

## 2017-07-31 NOTE — MR AVS SNAPSHOT
Tere Ortiz   7/31/2017 3:00 PM   Anticoagulation Therapy Visit    Description:  62 year old female   Provider:  NB ANTI COAG   Department:  [facility]           INR as of 7/31/2017     Today's INR 3.5!      Anticoagulation Summary as of 7/31/2017     INR goal 2.0-3.0   Today's INR 3.5!   Full instructions 8/1: 5 mg; Otherwise 10 mg every day   Next INR check 8/14/2017    Indications   Lupus anticoagulant inhibitor syndrome (H) [D68.62]  Pulmonary embolism with infarction (H) [I26.99]  Long-term (current) use of anticoagulants [Z79.01] [Z79.01]  Embolism - blood clot [I74.9]         Your next Anticoagulation Clinic appointment(s)     Aug 14, 2017  3:00 PM CDT   Anticoagulation Visit with NB ANTI COAG   [facility] ([facility])    [address] [place] [address] [phone]              Contact Numbers     Please call [phone] to cancel and/or reschedule your appointment.  Please call [phone] with any problems or questions regarding your therapy          July 2017 Details    Sun Mon Tue Wed Thu Fri Sat           1                 2               3               4               5               6               7               8                 9               10               11               12               13               14               15                 16               17               18               19               20               21               22                 23               24               25               26               27               28               29                 30               31      10 mg   See details            Date Details   07/31 This INR check               How to take your warfarin dose     To take:  10 mg Take 2 of the 5 mg tablets.           August 2017 Details    Sun Mon Tue Wed Thu Fri Sat       1      5 mg         2      10 mg         3      10 mg         4      10 mg         5      10 mg            6      10 mg         7      10 mg         8      10 mg         9      10 mg         10      10 mg         11      10 mg         12      10 mg           13      10 mg         14            15               16               17               18               19                 20               21               22               23               24               25               26                 27               28               29               30               31                  Date Details   No additional details    Date of next INR:  8/14/2017         How to take your warfarin dose     To take:  5 mg Take 1 of the 5 mg tablets.    To take:  10 mg Take 2 of the 5 mg tablets.

## 2017-08-14 ENCOUNTER — ANTICOAGULATION THERAPY VISIT (OUTPATIENT)
Dept: ANTICOAGULATION | Facility: CLINIC | Age: 62
End: 2017-08-14
Payer: COMMERCIAL

## 2017-08-14 DIAGNOSIS — I26.99 PULMONARY EMBOLISM WITH INFARCTION (H): ICD-10-CM

## 2017-08-14 DIAGNOSIS — Z79.01 LONG-TERM (CURRENT) USE OF ANTICOAGULANTS: ICD-10-CM

## 2017-08-14 DIAGNOSIS — D68.62 LUPUS ANTICOAGULANT INHIBITOR SYNDROME (H): ICD-10-CM

## 2017-08-14 DIAGNOSIS — I74.9 EMBOLISM (H): ICD-10-CM

## 2017-08-14 LAB — INR POINT OF CARE: 2 (ref 0.86–1.14)

## 2017-08-14 PROCEDURE — 85610 PROTHROMBIN TIME: CPT | Mod: QW

## 2017-08-14 PROCEDURE — 36416 COLLJ CAPILLARY BLOOD SPEC: CPT

## 2017-08-14 PROCEDURE — 99207 ZZC NO CHARGE NURSE ONLY: CPT

## 2017-08-14 NOTE — PROGRESS NOTES
ANTICOAGULATION FOLLOW-UP CLINIC VISIT    Patient Name:  Tere Ortiz  Date:  8/14/2017  Contact Type:  Face to Face    SUBJECTIVE:     Patient Findings     Positives No Problem Findings           OBJECTIVE    INR Protime   Date Value Ref Range Status   08/14/2017 2.0 (A) 0.86 - 1.14 Final       ASSESSMENT / PLAN  INR assessment THER    Recheck INR In: 4 WEEKS    INR Location Clinic      Anticoagulation Summary as of 8/14/2017     INR goal 2.0-3.0   Today's INR 2.0   Maintenance plan 10 mg (5 mg x 2) every day   Full instructions 10 mg every day   Weekly total 70 mg   No change documented Cony Pagan RN   Plan last modified Sharee Braxton RN (6/22/2017)   Next INR check 9/11/2017   Priority INR   Target end date Indefinite    Indications   Lupus anticoagulant inhibitor syndrome (H) [D68.62]  Pulmonary embolism with infarction (H) [I26.99]  Long-term (current) use of anticoagulants [Z79.01] [Z79.01]  Embolism - blood clot [I74.9]         Anticoagulation Episode Summary     INR check location     Preferred lab     Send INR reminders to [facility] POOL    Comments * comes in electric WC. warfarin 5mg tablets.      Anticoagulation Care Providers     Provider Role Specialty Phone number    David Arias MD Responsible Internal Medicine [phone]            See the Encounter Report to view Anticoagulation Flowsheet and Dosing Calendar (Go to Encounters tab in chart review, and find the Anticoagulation Therapy Visit)    Cony Pagan, RN

## 2017-08-14 NOTE — MR AVS SNAPSHOT
Tere Ortiz   8/14/2017 3:00 PM   Anticoagulation Therapy Visit    Description:  62 year old female   Provider:  NB ANTI COAG   Department:  [facility]tico[facility]           INR as of 8/14/2017     Today's INR 2.0      Anticoagulation Summary as of 8/14/2017     INR goal 2.0-3.0   Today's INR 2.0   Full instructions 10 mg every day   Next INR check 9/11/2017    Indications   Lupus anticoagulant inhibitor syndrome (H) [D68.62]  Pulmonary embolism with infarction (H) [I26.99]  Long-term (current) use of anticoagulants [Z79.01] [Z79.01]  Embolism - blood clot [I74.9]         Description     No change, recheck INR in 4 weeks.      Your next Anticoagulation Clinic appointment(s)     Sep 11, 2017  3:00 PM CDT   Anticoagulation Visit with NB ANTI COAG   [facility] ([facility])    [address] [place] [address] [phone]              Contact Numbers     Please call [phone] to cancel and/or reschedule your appointment.  Please call [phone] with any problems or questions regarding your therapy          August 2017 Details    Sun Mon Tue Wed Thu Fri Sat       1               2               3               4               5                 6               7               8               9               10               11               12                 13               14      10 mg   See details      15      10 mg         16      10 mg         17      10 mg         18      10 mg         19      10 mg           20      10 mg         21      10 mg         22      10 mg         23      10 mg         24      10 mg         25      10 mg         26      10 mg           27      10 mg         28      10 mg         29      10 mg         30      10 mg         31      10 mg            Date Details   08/14 This INR check               How to take your warfarin dose     To take:  10 mg Take 2 of the 5 mg tablets.           September 2017 Details    Sun Mon Tue Wed Thu  Fri Sat          1      10 mg         2      10 mg           3      10 mg         4      10 mg         5      10 mg         6      10 mg         7      10 mg         8      10 mg         9      10 mg           10      10 mg         11            12               13               14               15               16                 17               18               19               20               21               22               23                 24               25               26               27               28               29               30                Date Details   No additional details    Date of next INR:  9/11/2017         How to take your warfarin dose     To take:  10 mg Take 2 of the 5 mg tablets.

## 2017-08-26 ENCOUNTER — NURSE TRIAGE (OUTPATIENT)
Dept: NURSING | Facility: CLINIC | Age: 62
End: 2017-08-26

## 2017-08-27 ENCOUNTER — HOSPITAL ENCOUNTER (EMERGENCY)
Facility: CLINIC | Age: 62
Discharge: HOME | End: 2017-08-27
Attending: EMERGENCY MEDICINE | Admitting: EMERGENCY MEDICINE
Payer: COMMERCIAL

## 2017-08-27 ENCOUNTER — APPOINTMENT (OUTPATIENT)
Dept: GENERAL RADIOLOGY | Facility: CLINIC | Age: 62
End: 2017-08-27
Attending: EMERGENCY MEDICINE
Payer: COMMERCIAL

## 2017-08-27 VITALS
RESPIRATION RATE: 22 BRPM | SYSTOLIC BLOOD PRESSURE: 101 MMHG | WEIGHT: 160 LBS | OXYGEN SATURATION: 95 % | DIASTOLIC BLOOD PRESSURE: 61 MMHG | HEIGHT: 59 IN | TEMPERATURE: 98.4 F | HEART RATE: 93 BPM | BODY MASS INDEX: 32.25 KG/M2

## 2017-08-27 DIAGNOSIS — R07.89 RIGHT-SIDED CHEST WALL PAIN: ICD-10-CM

## 2017-08-27 DIAGNOSIS — R06.02 SOB (SHORTNESS OF BREATH): ICD-10-CM

## 2017-08-27 LAB
ANION GAP SERPL CALCULATED.3IONS-SCNC: 3 MMOL/L (ref 3–14)
BASOPHILS # BLD AUTO: 0 10E9/L (ref 0–0.2)
BASOPHILS NFR BLD AUTO: 0 %
BUN SERPL-MCNC: 8 MG/DL (ref 7–30)
CALCIUM SERPL-MCNC: 8.4 MG/DL (ref 8.5–10.1)
CHLORIDE SERPL-SCNC: 107 MMOL/L (ref 94–109)
CO2 SERPL-SCNC: 32 MMOL/L (ref 20–32)
CREAT SERPL-MCNC: 0.68 MG/DL (ref 0.52–1.04)
DIFFERENTIAL METHOD BLD: ABNORMAL
EOSINOPHIL # BLD AUTO: 0.1 10E9/L (ref 0–0.7)
EOSINOPHIL NFR BLD AUTO: 1.9 %
ERYTHROCYTE [DISTWIDTH] IN BLOOD BY AUTOMATED COUNT: 13.4 % (ref 10–15)
GFR SERPL CREATININE-BSD FRML MDRD: 88 ML/MIN/1.7M2
GLUCOSE SERPL-MCNC: 95 MG/DL (ref 70–99)
HCT VFR BLD AUTO: 43.1 % (ref 35–47)
HGB BLD-MCNC: 14.4 G/DL (ref 11.7–15.7)
IMM GRANULOCYTES # BLD: 0 10E9/L (ref 0–0.4)
IMM GRANULOCYTES NFR BLD: 0.2 %
INR PPP: 1.29 (ref 0.86–1.14)
LYMPHOCYTES # BLD AUTO: 1.9 10E9/L (ref 0.8–5.3)
LYMPHOCYTES NFR BLD AUTO: 32.5 %
MCH RBC QN AUTO: 34.4 PG (ref 26.5–33)
MCHC RBC AUTO-ENTMCNC: 33.4 G/DL (ref 31.5–36.5)
MCV RBC AUTO: 103 FL (ref 78–100)
MONOCYTES # BLD AUTO: 0.5 10E9/L (ref 0–1.3)
MONOCYTES NFR BLD AUTO: 8.3 %
NEUTROPHILS # BLD AUTO: 3.4 10E9/L (ref 1.6–8.3)
NEUTROPHILS NFR BLD AUTO: 57.1 %
PLATELET # BLD AUTO: 174 10E9/L (ref 150–450)
POTASSIUM SERPL-SCNC: 3.6 MMOL/L (ref 3.4–5.3)
RBC # BLD AUTO: 4.19 10E12/L (ref 3.8–5.2)
SODIUM SERPL-SCNC: 142 MMOL/L (ref 133–144)
TROPONIN I SERPL-MCNC: <0.015 UG/L (ref 0–0.04)
WBC # BLD AUTO: 5.9 10E9/L (ref 4–11)

## 2017-08-27 PROCEDURE — 94640 AIRWAY INHALATION TREATMENT: CPT

## 2017-08-27 PROCEDURE — 99285 EMERGENCY DEPT VISIT HI MDM: CPT | Mod: 25

## 2017-08-27 PROCEDURE — 76604 US EXAM CHEST: CPT | Mod: 26 | Performed by: EMERGENCY MEDICINE

## 2017-08-27 PROCEDURE — 84484 ASSAY OF TROPONIN QUANT: CPT | Performed by: EMERGENCY MEDICINE

## 2017-08-27 PROCEDURE — 71101 X-RAY EXAM UNILAT RIBS/CHEST: CPT | Mod: RT

## 2017-08-27 PROCEDURE — 76604 US EXAM CHEST: CPT

## 2017-08-27 PROCEDURE — 40000270 ZZH STATISTIC OXYGEN  O2DAILY TECH TIME

## 2017-08-27 PROCEDURE — 93005 ELECTROCARDIOGRAM TRACING: CPT

## 2017-08-27 PROCEDURE — 85610 PROTHROMBIN TIME: CPT | Performed by: EMERGENCY MEDICINE

## 2017-08-27 PROCEDURE — 85025 COMPLETE CBC W/AUTO DIFF WBC: CPT | Performed by: EMERGENCY MEDICINE

## 2017-08-27 PROCEDURE — 93010 ELECTROCARDIOGRAM REPORT: CPT | Mod: 59 | Performed by: EMERGENCY MEDICINE

## 2017-08-27 PROCEDURE — 99285 EMERGENCY DEPT VISIT HI MDM: CPT | Mod: 25 | Performed by: EMERGENCY MEDICINE

## 2017-08-27 PROCEDURE — 40000275 ZZH STATISTIC RCP TIME EA 10 MIN

## 2017-08-27 PROCEDURE — 25000125 ZZHC RX 250: Performed by: EMERGENCY MEDICINE

## 2017-08-27 PROCEDURE — 25000132 ZZH RX MED GY IP 250 OP 250 PS 637: Performed by: EMERGENCY MEDICINE

## 2017-08-27 PROCEDURE — 80048 BASIC METABOLIC PNL TOTAL CA: CPT | Performed by: EMERGENCY MEDICINE

## 2017-08-27 RX ORDER — IPRATROPIUM BROMIDE AND ALBUTEROL SULFATE 2.5; .5 MG/3ML; MG/3ML
3 SOLUTION RESPIRATORY (INHALATION) ONCE
Status: COMPLETED | OUTPATIENT
Start: 2017-08-27 | End: 2017-08-27

## 2017-08-27 RX ORDER — ACETAMINOPHEN 500 MG
1000 TABLET ORAL ONCE
Status: COMPLETED | OUTPATIENT
Start: 2017-08-27 | End: 2017-08-27

## 2017-08-27 RX ADMIN — ACETAMINOPHEN 1000 MG: 500 TABLET ORAL at 01:39

## 2017-08-27 RX ADMIN — IPRATROPIUM BROMIDE AND ALBUTEROL SULFATE 3 ML: .5; 3 SOLUTION RESPIRATORY (INHALATION) at 01:19

## 2017-08-27 NOTE — ED AVS SNAPSHOT
[facility] Emergency Department    [address] [facility] [address]    Phone:  [phone]    Fax:  [phone]                                       Tere Ortiz   MRN: [id number]    Department:  [facility] Emergency Department   Date of Visit:  8/27/2017           After Visit Summary Signature Page     I have received my discharge instructions, and my questions have been answered. I have discussed any challenges I see with this plan with the nurse or doctor.    ..........................................................................................................................................  Patient/Patient Representative Signature      ..........................................................................................................................................  Patient Representative Print Name and Relationship to Patient    ..................................................               ................................................  Date                                            Time    ..........................................................................................................................................  Reviewed by Signature/Title    ...................................................              ..............................................  Date                                                            Time

## 2017-08-27 NOTE — TELEPHONE ENCOUNTER
Pt states last night she tripped and fell at home and hit her ribs. Tonight she is having moderate chest pain, worse w/ breathing, and feeling SOB. Speaking in complete sentences. She is also on wafarin and has multiple health problems including COPD, sleep apnea and hx of PE. Advised ED now for evaluation.  Advised to have another person drive. Pt agreed. Kelle Hernandez RN/FNA    Reason for Disposition    [1] Difficulty breathing AND [2] not severe    Additional Information    Negative: Major injury from dangerous force or speed (e.g., MVA, fall > 10 feet or 3 meters)    Negative: Bullet wound, knife wound, or other penetrating object    Negative: Puncture wound that sounds life-threatening to the triager    Negative: Severe difficulty breathing (e.g., struggling for each breath, speaks in single words)    Negative: [1] Major bleeding (e.g., actively dripping or spurting) AND [2] can't be stopped    Negative: Open wound of the chest with sound of moving air (sucking wound) or visible air bubbles    Negative: Shock suspected (e.g., cold/pale/clammy skin, too weak to stand, low BP, rapid pulse)    Negative: Coughing or spitting up blood    Negative: Bluish lips, tongue, or face now    Negative: Unconscious or was unconscious    Negative: Sounds like a life-threatening emergency to the triager    Negative: [1] Injuries at more than 1 site AND [2] unsure which guideline to use    Negative: Chest pain not from an injury OR cause is unknown    Negative: Wound looks infected    Negative: SEVERE chest pain    Protocols used: CHEST INJURY-ADULT-

## 2017-08-27 NOTE — ED PROVIDER NOTES
"  History     Chief Complaint   Patient presents with     Rib Pain     Shortness of Breath     HPI  Tere Ortiz is a 62 year old female who presents for evaluation of shortness of breath and right-sided chest pain.  Symptoms started last night after the patient fell and landed with her right chest wall against the arm of a reclining chair.  She did not hit her head or have loss of consciousness.  She denies cough, headache, lightheadedness, nausea, vomiting, diarrhea, dysuria, or rash.  She does have a history of COPD and uses oxygen at night as well as BiPAP.  She is not sure what her normal oxygen saturations are.  Review of her chart shows she normally is around 90%.  She has been taking hydrocodone and oxycodone for pain.    Past medical history includes DVT and PE, hypothyroidism, depression, chronic pain, COPD  Daily medications include hydrocodone, oxycodone, trazodone, topiramate, Spiriva, aspirin, duloxetine, warfarin, Mirapex, baclofen, levothyroxine, warfarin  Drug allergies include sulfa, nitroglycerin, prednisone  Current everyday smoker      I have reviewed the Medications, Allergies, Past Medical and Surgical History, and Social History in the Epic system.         Review of Systems  Pertinent positives and negatives listed in the HPI, all other systems reviewed and are negative.    Physical Exam   BP: 101/61  Pulse: 93  Temp: 98.4  F (36.9  C)  Resp: 22  Height: 149.9 cm (4' 11\")  Weight: 72.6 kg (160 lb)  SpO2: (!) 87 %  Physical Exam  /61  Pulse 93  Temp 98.4  F (36.9  C) (Oral)  Resp 22  Ht 1.499 m (4' 11\")  Wt 72.6 kg (160 lb)  SpO2 95%  Breastfeeding? No  BMI 32.32 kg/m2   GENERAL:  Alert, cooperative, mild distress  HEAD: No scalp laceration, hematoma, or abrasion.  No tenderness.  EYES: Conjunctivae clear, EOM intact. PERLL, Pupils are 3 mm.  HEENT:  Normal external ears, normal TM's without evidence of hemotympanum.  No nasal discharge or evidence of septal hematoma. No " facial instability or asymmetry. No evidence of intraoral trauma.  Intact bite without malalignment.  NECK: Full painless range of motion.  No midline tenderness, no lateral tenderness.  CHEST:  No crepitus; tenderness with palpation of the right chest wall without ecchymosis  CARDIOVASCULAR : Nml rate, distal pulses are normal and symmetric  LUNGS: No respiratory distress.  GI: Soft, ND, NT.   PELVIS: No crepitus or tenderness with AP or lateral compression  BACK: No step-offs palpated, no tenderness to palpation of thoracic or lumbar spine.  EXTREMITIES: No obvious deformities, no tenderness to palpation.  NEUROLOGICAL : GCS= 15.  Awake, alert, and oriented x 3.  Cranial nerves II-XII grossly intact. Normal muscle strength and tone, sensation to light touch grossly intact in all extremities.  No focal deficits.   SKIN : Normal color, no jaundice or rash. No abrasions.      ED Course     ED Course     Procedures  Results for orders placed during the hospital encounter of 08/27/17   POC US CHEST B-SCAN    Impression [facility] Procedure Note      Limited Bedside ED Ultrasound of Thorax:    PROCEDURE: PERFORMED BY: Dr. Jason Falk  INDICATIONS/SYMPTOM:  Chest pain  PROBE: High frequency linear probe  BODY LOCATION: Chest  FINDINGS:  Images of both lung hemithoracies taken in 2D in multiple rib spaces        Right side:  Lung sliding artifact  Present     Comet tail artifacts  Present   Hemothorax: Right side Absent        Pleural effusion: Right side Absent          INTERPRETATION: The exam was normal. There was no free fluid identified in the chest cavity. No evidence of pneumothorax, hemothorax or pleural effusion.  IMAGE DOCUMENTATION: Images were archived to PACs system.                 EKG Interpretation:      Interpreted by Jaosn Falk  Time reviewed: 0135  Symptoms at time of EKG: SOB   Rhythm: sinus   Rate: 76  Axis: Normal  Ectopy: none  Conduction: normal  ST Segments/ T Waves: No  "acute ischemic changes  Q Waves: v3  Comparison to prior: Unchanged    Clinical Impression: no acute changes    Critical Care time:  none             Labs Ordered and Resulted from Time of ED Arrival Up to the Time of Departure from the ED   BASIC METABOLIC PANEL - Abnormal; Notable for the following:        Result Value    Calcium 8.4 (*)     All other components within normal limits   CBC WITH PLATELETS DIFFERENTIAL - Abnormal; Notable for the following:      (*)     MCH 34.4 (*)     All other components within normal limits   INR - Abnormal; Notable for the following:     INR 1.29 (*)     All other components within normal limits   TROPONIN I       Assessments & Plan (with Medical Decision Making)   63-year-old female who presents with shortness of breath and right chest wall pain.  Differential includes rib fracture, pneumonia, pneumothorax, COPD exacerbation, soft tissue injury.  Blood pressure 101/61, heart rate 93, temperature 98.4 F, SPO2 87% on room air.  She is placed on oxygen via nasal cannula with improvement in her oxygen saturations.  Bedside ultrasound negative for signs of hemothorax or pneumothorax.  EKG sinus rhythm without signs of ischemia.  Troponin normal.  She was given a DuoNeb with improvement in her shortness of breath.  Afterwards she was taken off oxygen and was able to maintain oxygen saturations above 90%.  X-ray of the ribs obtained, images reviewed independently as well as radiology report reviewed, no signs of pneumonia, pneumothorax, rib fracture.  White blood cell count 5.9.  Elect lites normal.  Her INR is low and she says that she has been taking it.  During her evaluation the patient reported that she did have a headache, came on while she was here, described as her \"normal\" headache.  Repeat neurologic examination was normal.  She was asking for acetaminophen for this and this was provided.  With her breathing better now, she is safe to discharge home with instructions " to return if she has worsening symptoms or concerns, otherwise follow up clinic.  She is told to take 15 mg of warfarin daily for the next 2 days and then return to her normal dose of 10 mg of warfarin daily.  She does have follow-up in the INR clinic scheduled for later this week and is told to follow-up then.  She is in agreement with this plan.    I have reviewed the nursing notes.    I have reviewed the findings, diagnosis, plan and need for follow up with the patient.       New Prescriptions    No medications on file       Final diagnoses:   Right-sided chest wall pain   SOB (shortness of breath)       8/27/2017   [facility] EMERGENCY DEPARTMENT     Jason Falk MD  08/27/17 0236

## 2017-08-27 NOTE — DISCHARGE INSTRUCTIONS
Your INR is low.  Take 3 tablets (15 mg) of your warfarin daily for the next 2 days.  Then go back to your normal dose of 2 tablets (10 mg) daily and have your INR checked in clinic on Thursday.  Return to the emergency department.  Difficulty breathing, worsening symptoms, fever, or other concerns.  Otherwise follow up in clinic.

## 2017-08-27 NOTE — ED NOTES
"Arrived through triage c/o right sided rib pain since falling on her way to the recliner landing on the recliner arm, denies LOC. Happened Friday morning around 0200. Pain has progressed and now \"hurts to breath\" HX COPD and smokes 1 pack a day, oxygen sat's 85-88% RA. Pt admits to using home oxygen at night 4 liters. Placed on NC @ 4 liters, oxygen saturation now 94%.  "

## 2017-08-27 NOTE — ED AVS SNAPSHOT
[facility] Emergency Department    [address] [address]    Phone:  [phone]    Fax:  [phone]                                       Tere Ortiz   MRN: [id number]    Department:  [facility] Emergency Department   Date of Visit:  8/27/2017           Patient Information     Date Of Birth          1955        Your diagnoses for this visit were:     Right-sided chest wall pain     SOB (shortness of breath)        You were seen by Jason Falk MD.        Discharge Instructions       Your INR is low.  Take 3 tablets (15 mg) of your warfarin daily for the next 2 days.  Then go back to your normal dose of 2 tablets (10 mg) daily and have your INR checked in clinic on Thursday.  Return to the emergency department.  Difficulty breathing, worsening symptoms, fever, or other concerns.  Otherwise follow up in clinic.    Future Appointments        Provider Department Dept Phone Center    9/11/2017 3:00 PM [place] Anticoagulation provider, [facility] [phone] [facility]    10/13/2017 11:30 AM Karol Simmons MD [facility]ocrinology [phone] [facility]      24 Hour Appointment Hotline       To make an appointment at any [facility], call [phone] ([phone]). If you don't have a family doctor or clinic, we will help you find one. [facility] are conveniently located to serve the needs of you and your family.             Review of your medicines      Our records show that you are taking the medicines listed below. If these are incorrect, please call your family doctor or clinic.        Dose / Directions Last dose taken    aspirin 81 MG tablet   Dose:  1 tablet        Take 1 tablet by mouth daily.   Refills:  0        baclofen 10 MG tablet   Commonly known as:  LIORESAL   Dose:  10 mg        Take 10 mg by mouth daily 2 at bedtime   Refills:  0        BUSPAR PO   Dose:  10 mg        Take 10 mg by mouth At Bedtime   Refills:  0         CLINDAMAX 1 % lotion   Quantity:  60 mL   Generic drug:  clindamycin        Apply topically 2 times daily   Refills:  11        DULoxetine 60 MG EC capsule   Commonly known as:  CYMBALTA   Dose:  60 mg        Take 1 capsule by mouth daily.   Refills:  0        DUONEB 0.5-2.5 (3) MG/3ML neb solution   Dose:  1 ampule   Generic drug:  ipratropium - albuterol 0.5 mg/2.5 mg/3 mL        Take 1 ampule by nebulization every 4 hours as needed.   Refills:  0        FLONASE 50 MCG/ACT spray   Dose:  1 spray   Generic drug:  fluticasone        Spray 1 spray into both nostrils daily.   Refills:  0        ipratropium 0.03 % spray   Commonly known as:  ATROVENT   Dose:  2 spray   Quantity:  1 Box        Spray 2 sprays into both nostrils every 12 hours   Refills:  1        LORazepam 0.5 MG tablet   Commonly known as:  ATIVAN   Quantity:  3 tablet        Take 1 hr before CT scan, can repeat 0.5 hrs before - no driving for 10 hrs after the ativan   Refills:  0        naproxen 375 MG tablet   Commonly known as:  NAPROSYN   Dose:  375 mg   Quantity:  70 tablet        Take 1 tablet (375 mg) by mouth 2 times daily (with meals) For 2 weeks, then 1 tablet daily for 2 weeks, then as needed   Refills:  1        nystatin 610858 UNIT/GM Powd   Commonly known as:  MYCOSTATIN   Quantity:  60 g        Apply topically 3 times daily For 1-2 weeks until rash cleared   Refills:  1        * order for DME   Quantity:  1 Device        BiPAP: IPAP 11 cm H2O EPAP 6 cm H2O Pt has her own BiPAP New CPAP supplies- try [place] mask if it comes in an extra small size.  Alternatively could try [place] with nasal prongs occluded. Lifetime need and heated humidity.   Refills:  0        * order for DME        BiPAP: IPAP 12 cm H2O EPAP 7 cm H2O  Lifetime need and heated humidity.   Refills:  0        * order for DME   Quantity:  1 Device        O2: During sleep 1.5 LPM via O2 Concentrator  Bled in through BiPAP   Refills:  0        order for DME   Quantity:   4 Units        Compression stockings (Thigh High)- 2 pairs   Refills:  0        OxyContin 80 MG 12 hr tablet   Generic drug:  oxyCODONE        Take  by mouth every 12 hours. Take at bedtime   Refills:  0        potassium chloride 10 MEQ tablet   Commonly known as:  K-TAB,KLOR-CON   Quantity:  360 tablet        2 tablets twice daily   Refills:  0        pramipexole 1 MG tablet   Commonly known as:  MIRAPEX   Dose:  1 mg        Take 1 mg by mouth At Bedtime   Refills:  0        Pramoxine-Calamine 1-3 % Lotn   Commonly known as:  AVEENO ANTI-ITCH   Quantity:  118 mL        Pt to use twice daily   Refills:  3        SOMA 350 MG tablet   Dose:  350 mg   Generic drug:  carisoprodol        Take 1 tablet by mouth. Take at bedtime   Refills:  0        SPIRIVA HANDIHALER 18 MCG capsule   Dose:  18 mcg   Quantity:  90 capsule   Generic drug:  tiotropium        Inhale 1 capsule into the lungs. Inhale contents of one capsule   Refills:  3        SUMAtriptan 20 MG/ACT nasal spray   Commonly known as:  IMITREX   Quantity:  30 each        Give once, can repeat in 2 hour if not better   Refills:  1        SYNTHROID 150 MCG tablet   Dose:  150 mcg   Quantity:  90 tablet   Generic drug:  levothyroxine        Take 1 tablet (150 mcg) by mouth daily   Refills:  3        TOPAMAX 100 MG tablet   Generic drug:  topiramate        Take  by mouth 2 times daily. Take 1.5 pill in mornings Take 1.5 at bedtime   Refills:  0        traZODone HCl 150 MG 24 hr tablet   Commonly known as:  OLEPTRO   Dose:  150 mg        Take 150 mg by mouth At Bedtime   Refills:  0        VICODIN ES PO   Dose:  7.5 mg        Take 7.5 mg by mouth as needed   Refills:  0        vitamin D3 2000 UNITS Caps        Take by mouth daily   Refills:  0        warfarin 5 MG tablet   Commonly known as:  COUMADIN   Quantity:  156 tablet        Take 10 mg daily or as directed by Anticoagulation Clinic   Refills:  0        * Notice:  This list has 3 medication(s) that are the same  as other medications prescribed for you. Read the directions carefully, and ask your doctor or other care provider to review them with you.            Procedures and tests performed during your visit     Basic metabolic panel    CBC with platelets differential    EKG 12 lead    INR    POC US CHEST B-SCAN    Ribs XR, unilat 3 views + PA chest, right    Troponin I      Orders Needing Specimen Collection     None      Pending Results     No orders found from 8/25/2017 to 8/28/2017.            Pending Culture Results     No orders found from 8/25/2017 to 8/28/2017.            Pending Results Instructions     If you had any lab results that were not finalized at the time of your Discharge, you can call the ED Lab Result RN at [phone]. You will be contacted by this team for any positive Lab results or changes in treatment. The nurses are available 7 days a week from 10A to 6:30P.  You can leave a message 24 hours per day and they will return your call.        Test Results From Your Hospital Stay        8/27/2017  1:13 AM      Impression     [facility] Procedure Note      Limited Bedside ED Ultrasound of Thorax:    PROCEDURE: PERFORMED BY: Dr. Jason Falk  INDICATIONS/SYMPTOM:  Chest pain  PROBE: High frequency linear probe  BODY LOCATION: Chest  FINDINGS:  Images of both lung hemithoracies taken in 2D in multiple rib spaces        Right side:  Lung sliding artifact  Present     Comet tail artifacts  Present   Hemothorax: Right side Absent        Pleural effusion: Right side Absent          INTERPRETATION: The exam was normal. There was no free fluid identified in the chest cavity. No evidence of pneumothorax, hemothorax or pleural effusion.  IMAGE DOCUMENTATION: Images were archived to PACs system.             8/27/2017  2:18 AM      Narrative     XR RIBS & CHEST RT 3VW  8/27/2017 1:58 AM     INDICATION: Fall, right lateral rib pain.    COMPARISON: Chest x-ray 4/17/2017.        Impression      IMPRESSION: Interstitial markings appear less prominent than on the  previous exam. No new focal air-space disease or other significant  change. Normal heart size. Calcified tortuous aorta. No displaced rib  fracture is identified. No pneumothorax.    CINTHIA JUNIOR MD         8/27/2017  2:00 AM      Component Results     Component Value Ref Range & Units Status    Troponin I ES <0.015 0.000 - 0.045 ug/L Final    The 99th percentile for upper reference range is 0.045 ug/L.  Troponin values   in the range of 0.045 - 0.120 ug/L may be associated with risks of adverse   clinical events.           8/27/2017  2:00 AM      Component Results     Component Value Ref Range & Units Status    Sodium 142 133 - 144 mmol/L Final    Potassium 3.6 3.4 - 5.3 mmol/L Final    Chloride 107 94 - 109 mmol/L Final    Carbon Dioxide 32 20 - 32 mmol/L Final    Anion Gap 3 3 - 14 mmol/L Final    Glucose 95 70 - 99 mg/dL Final    Urea Nitrogen 8 7 - 30 mg/dL Final    Creatinine 0.68 0.52 - 1.04 mg/dL Final    GFR Estimate 88 >60 mL/min/1.7m2 Final    Non  GFR Calc    GFR Estimate If Black >90 >60 mL/min/1.7m2 Final    African American GFR Calc    Calcium 8.4 (L) 8.5 - 10.1 mg/dL Final         8/27/2017  1:48 AM      Component Results     Component Value Ref Range & Units Status    WBC 5.9 4.0 - 11.0 10e9/L Final    RBC Count 4.19 3.8 - 5.2 10e12/L Final    Hemoglobin 14.4 11.7 - 15.7 g/dL Final    Hematocrit 43.1 35.0 - 47.0 % Final     (H) 78 - 100 fl Final    MCH 34.4 (H) 26.5 - 33.0 pg Final    MCHC 33.4 31.5 - 36.5 g/dL Final    RDW 13.4 10.0 - 15.0 % Final    Platelet Count 174 150 - 450 10e9/L Final    Diff Method Automated Method  Final    % Neutrophils 57.1 % Final    % Lymphocytes 32.5 % Final    % Monocytes 8.3 % Final    % Eosinophils 1.9 % Final    % Basophils 0.0 % Final    % Immature Granulocytes 0.2 % Final    Absolute Neutrophil 3.4 1.6 - 8.3 10e9/L Final    Absolute Lymphocytes 1.9 0.8 - 5.3 10e9/L  Final    Absolute Monocytes 0.5 0.0 - 1.3 10e9/L Final    Absolute Eosinophils 0.1 0.0 - 0.7 10e9/L Final    Absolute Basophils 0.0 0.0 - 0.2 10e9/L Final    Abs Immature Granulocytes 0.0 0 - 0.4 10e9/L Final         8/27/2017  1:49 AM      Component Results     Component Value Ref Range & Units Status    INR 1.29 (H) 0.86 - 1.14 Final                Thank you for choosing [facility]       Thank you for choosing [facility] for your care. Our goal is always to provide you with excellent care. Hearing back from our patients is one way we can continue to improve our services. Please take a few minutes to complete the written survey that you may receive in the mail after you visit with us. Thank you!        [employer] Information     [employer] gives you secure access to your electronic health record. If you see a primary care provider, you can also send messages to your care team and make appointments. If you have questions, please call your primary care clinic.  If you do not have a primary care provider, please call [phone] and they will assist you.        Care EveryWhere ID     This is your Care EveryWhere ID. This could be used by other organizations to access your [facility] medical records  [id number]        Equal Access to Services     SHANE ATKINSON : Nicola Macias, wacharleen fofana, qaephraimta kaalmayolanda mckenzie, salas cifuentes. So [facility] [phone].    ATENCIÓN: Si habla español, tiene a acosta disposición servicios gratuitos de asistencia lingüística. Llame al [phone].    We comply with applicable federal civil rights laws and [place] laws. We do not discriminate on the basis of race, color, national origin, age, disability sex, sexual orientation or gender identity.            After Visit Summary       This is your record. Keep this with you and show to your community pharmacist(s) and doctor(s) at your next visit.

## 2017-08-27 NOTE — ED NOTES
Received report from prev shift. Pt states still has pain with breathing after neb, SaO2 stable on 4L per NC. Pt c/o HA, asking for ice pack, given.

## 2017-09-05 DIAGNOSIS — I74.9 EMBOLISM (H): ICD-10-CM

## 2017-09-05 DIAGNOSIS — D68.62 LUPUS ANTICOAGULANT INHIBITOR SYNDROME (H): ICD-10-CM

## 2017-09-06 NOTE — TELEPHONE ENCOUNTER
Warfarin    Last Written Prescription Date: 06/22/2017  Last Fill Qty: 156, # refills: 0  Last Office Visit with G, P or [facility] prescribing provider: 05/17/2017  Next 5 appointments (look out 90 days)     Sep 11, 2017  2:40 PM CDT   Office Visit with Zina Pruett PA-C   [facility] ([facility])    [address] [place] [address] [phone]                   Date and Result of Last PT/INR:   Lab Results   Component Value Date    INR 1.29 08/27/2017    INR 2.0 08/14/2017    INR 3.5 07/31/2017    INR 2.74 02/26/2017        Jason HODGES (R)

## 2017-09-07 RX ORDER — WARFARIN SODIUM 5 MG/1
TABLET ORAL
Qty: 168 TABLET | Refills: 0 | Status: SHIPPED | OUTPATIENT
Start: 2017-09-07 | End: 2017-10-02

## 2017-09-11 ENCOUNTER — ANTICOAGULATION THERAPY VISIT (OUTPATIENT)
Dept: ANTICOAGULATION | Facility: CLINIC | Age: 62
End: 2017-09-11
Payer: COMMERCIAL

## 2017-09-11 ENCOUNTER — HOSPITAL ENCOUNTER (OUTPATIENT)
Dept: CT IMAGING | Facility: CLINIC | Age: 62
Discharge: HOME | End: 2017-09-11
Attending: PHYSICIAN ASSISTANT | Admitting: PHYSICIAN ASSISTANT
Payer: COMMERCIAL

## 2017-09-11 ENCOUNTER — OFFICE VISIT (OUTPATIENT)
Dept: FAMILY MEDICINE | Facility: CLINIC | Age: 62
End: 2017-09-11
Payer: COMMERCIAL

## 2017-09-11 VITALS
RESPIRATION RATE: 20 BRPM | OXYGEN SATURATION: 91 % | TEMPERATURE: 98.7 F | HEART RATE: 68 BPM | BODY MASS INDEX: 32.25 KG/M2 | WEIGHT: 160 LBS | HEIGHT: 59 IN | DIASTOLIC BLOOD PRESSURE: 72 MMHG | SYSTOLIC BLOOD PRESSURE: 108 MMHG

## 2017-09-11 DIAGNOSIS — G25.81 RESTLESS LEGS SYNDROME (RLS): ICD-10-CM

## 2017-09-11 DIAGNOSIS — M62.9 DISORDER OF MUSCLE: ICD-10-CM

## 2017-09-11 DIAGNOSIS — F11.20 NARCOTIC DEPENDENCE (H): ICD-10-CM

## 2017-09-11 DIAGNOSIS — R06.02 SOB (SHORTNESS OF BREATH): Primary | ICD-10-CM

## 2017-09-11 DIAGNOSIS — I74.9 EMBOLISM (H): ICD-10-CM

## 2017-09-11 DIAGNOSIS — R79.9 ABNORMAL FINDING OF BLOOD CHEMISTRY: ICD-10-CM

## 2017-09-11 DIAGNOSIS — D75.89 MACROCYTOSIS WITHOUT ANEMIA: ICD-10-CM

## 2017-09-11 DIAGNOSIS — D68.62 LUPUS ANTICOAGULANT INHIBITOR SYNDROME (H): ICD-10-CM

## 2017-09-11 DIAGNOSIS — M79.605 LOWER EXTREMITY PAIN, BILATERAL: ICD-10-CM

## 2017-09-11 DIAGNOSIS — I26.99 PULMONARY EMBOLISM WITH INFARCTION (H): ICD-10-CM

## 2017-09-11 DIAGNOSIS — F32.0 MILD MAJOR DEPRESSION (H): ICD-10-CM

## 2017-09-11 DIAGNOSIS — E27.9 ADRENAL NODULE (H): ICD-10-CM

## 2017-09-11 DIAGNOSIS — Z79.01 LONG-TERM (CURRENT) USE OF ANTICOAGULANTS: ICD-10-CM

## 2017-09-11 DIAGNOSIS — M79.604 LOWER EXTREMITY PAIN, BILATERAL: ICD-10-CM

## 2017-09-11 DIAGNOSIS — G47.33 OSA (OBSTRUCTIVE SLEEP APNEA): ICD-10-CM

## 2017-09-11 DIAGNOSIS — J44.9 CHRONIC OBSTRUCTIVE PULMONARY DISEASE, UNSPECIFIED COPD TYPE (H): ICD-10-CM

## 2017-09-11 DIAGNOSIS — I77.89 ENLARGED AORTA (H): ICD-10-CM

## 2017-09-11 LAB
ERYTHROCYTE [DISTWIDTH] IN BLOOD BY AUTOMATED COUNT: 13.3 % (ref 10–15)
HCT VFR BLD AUTO: 46.6 % (ref 35–47)
HGB BLD-MCNC: 14.8 G/DL (ref 11.7–15.7)
INR POINT OF CARE: 2.5 (ref 0.86–1.14)
MCH RBC QN AUTO: 33.2 PG (ref 26.5–33)
MCHC RBC AUTO-ENTMCNC: 31.8 G/DL (ref 31.5–36.5)
MCV RBC AUTO: 105 FL (ref 78–100)
PLATELET # BLD AUTO: 212 10E9/L (ref 150–450)
RBC # BLD AUTO: 4.46 10E12/L (ref 3.8–5.2)
WBC # BLD AUTO: 7.9 10E9/L (ref 4–11)

## 2017-09-11 PROCEDURE — 93000 ELECTROCARDIOGRAM COMPLETE: CPT | Performed by: PHYSICIAN ASSISTANT

## 2017-09-11 PROCEDURE — 84439 ASSAY OF FREE THYROXINE: CPT | Performed by: PHYSICIAN ASSISTANT

## 2017-09-11 PROCEDURE — 82728 ASSAY OF FERRITIN: CPT | Performed by: PHYSICIAN ASSISTANT

## 2017-09-11 PROCEDURE — 83550 IRON BINDING TEST: CPT | Performed by: PHYSICIAN ASSISTANT

## 2017-09-11 PROCEDURE — 25000128 H RX IP 250 OP 636: Performed by: PHYSICIAN ASSISTANT

## 2017-09-11 PROCEDURE — 83540 ASSAY OF IRON: CPT | Performed by: PHYSICIAN ASSISTANT

## 2017-09-11 PROCEDURE — 99215 OFFICE O/P EST HI 40 MIN: CPT | Performed by: PHYSICIAN ASSISTANT

## 2017-09-11 PROCEDURE — 85610 PROTHROMBIN TIME: CPT | Mod: QW

## 2017-09-11 PROCEDURE — 84443 ASSAY THYROID STIM HORMONE: CPT | Performed by: PHYSICIAN ASSISTANT

## 2017-09-11 PROCEDURE — 36416 COLLJ CAPILLARY BLOOD SPEC: CPT

## 2017-09-11 PROCEDURE — 82746 ASSAY OF FOLIC ACID SERUM: CPT | Performed by: PHYSICIAN ASSISTANT

## 2017-09-11 PROCEDURE — 80048 BASIC METABOLIC PNL TOTAL CA: CPT | Performed by: PHYSICIAN ASSISTANT

## 2017-09-11 PROCEDURE — 25000125 ZZHC RX 250: Performed by: PHYSICIAN ASSISTANT

## 2017-09-11 PROCEDURE — 82607 VITAMIN B-12: CPT | Performed by: PHYSICIAN ASSISTANT

## 2017-09-11 PROCEDURE — 85027 COMPLETE CBC AUTOMATED: CPT | Performed by: PHYSICIAN ASSISTANT

## 2017-09-11 PROCEDURE — 71260 CT THORAX DX C+: CPT

## 2017-09-11 PROCEDURE — 99207 ZZC NO CHARGE NURSE ONLY: CPT

## 2017-09-11 RX ORDER — IOPAMIDOL 755 MG/ML
80 INJECTION, SOLUTION INTRAVASCULAR ONCE
Status: COMPLETED | OUTPATIENT
Start: 2017-09-11 | End: 2017-09-11

## 2017-09-11 RX ORDER — ALPRAZOLAM 0.5 MG
TABLET ORAL
Qty: 1 TABLET | Refills: 0 | Status: SHIPPED | OUTPATIENT
Start: 2017-09-11 | End: 2017-09-12

## 2017-09-11 RX ORDER — PREDNISONE 20 MG/1
20 TABLET ORAL DAILY
Qty: 5 TABLET | Refills: 0 | Status: SHIPPED | OUTPATIENT
Start: 2017-09-11 | End: 2017-09-16

## 2017-09-11 RX ADMIN — SODIUM CHLORIDE 100 ML: 9 INJECTION, SOLUTION INTRAVENOUS at 18:22

## 2017-09-11 RX ADMIN — IOPAMIDOL 80 ML: 755 INJECTION, SOLUTION INTRAVENOUS at 18:22
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
## 2017-09-11 NOTE — MR AVS SNAPSHOT
Tere Ortiz   9/11/2017 3:00 PM   Anticoagulation Therapy Visit    Description:  62 year old female   Provider:  NB ANTI COAG   Department:  [facility] Anticoag           INR as of 9/11/2017     Today's INR 2.5      Anticoagulation Summary as of 9/11/2017     INR goal 2.0-3.0   Today's INR 2.5   Full instructions 10 mg every day   Next INR check 10/2/2017    Indications   Lupus anticoagulant inhibitor syndrome (H) [D68.62]  Pulmonary embolism with infarction (H) [I26.99]  Long-term (current) use of anticoagulants [Z79.01] [Z79.01]  Embolism - blood clot [I74.9]         Description     No change, recheck INR in three weeks, unless instructed to come in sooner by Zina.       Your next Anticoagulation Clinic appointment(s)     Oct 02, 2017  3:15 PM CDT   Anticoagulation Visit with [facility] ANTI COAG   [facility]    [address] [place] [address] [phone]              Contact Numbers     Please call [phone] to cancel and/or reschedule your appointment.  Please call [phone] with any problems or questions regarding your therapy          September 2017 Details    Sun Mon Tue Wed Thu Fri Sat          1               2                 3               4               5               6               7               8               9                 10               11      10 mg   See details      12      10 mg         13      10 mg         14      10 mg         15      10 mg         16      10 mg           17      10 mg         18      10 mg         19      10 mg         20      10 mg         21      10 mg         22      10 mg         23      10 mg           24      10 mg         25      10 mg         26      10 mg         27      10 mg         28      10 mg         29      10 mg         30      10 mg          Date Details   09/11 This INR check               How to take your warfarin dose     To take:  10 mg Take 2 of the 5 mg tablets.            October 2017 Details    Sun Mon Tue Wed Thu Fri Sat     1      10 mg         2            3               4               5               6               7                 8               9               10               11               12               13               14                 15               16               17               18               19               20               21                 22               23               24               25               26               27               28                 29               30               31                    Date Details   No additional details    Date of next INR:  10/2/2017         How to take your warfarin dose     To take:  10 mg Take 2 of the 5 mg tablets.

## 2017-09-11 NOTE — PATIENT INSTRUCTIONS
Tough to breathe -  EKG, labs and CT to check for any blood clots   Will go from there    Leg pain -  Rule out your back as a cause with MRI.  1 tab xanax given to get you through MRI.  Caution on combining this with pain pills - can slow breathing  - so would not overlap.  Need a  after taking that med.  If normal, either neurology to work on further or we order ultrasound to check blood vessels.    Recheck if not improving.

## 2017-09-11 NOTE — PROGRESS NOTES
ANTICOAGULATION FOLLOW-UP CLINIC VISIT    Patient Name:  Tere Ortiz  Date:  9/11/2017  Contact Type:  Face to Face    SUBJECTIVE:     Patient Findings     Positives Hospital admission (Seen in ED 8/27 - INR was only 1.27 at that time, per notes: '..Her INR is low and she says that she has been taking it..')    Comments In to establish care with Zina Pruett PA-C today - having work up done at time of this ACC entry/visit.           OBJECTIVE    INR Protime   Date Value Ref Range Status   09/11/2017 2.5 (A) 0.86 - 1.14 Final       ASSESSMENT / PLAN  INR assessment THER    Recheck INR In: 3 WEEKS    INR Location Clinic      Anticoagulation Summary as of 9/11/2017     INR goal 2.0-3.0   Today's INR 2.5   Maintenance plan 10 mg (5 mg x 2) every day   Full instructions 10 mg every day   Weekly total 70 mg   No change documented Cony Pagan RN   Plan last modified Sharee Braxton RN (6/22/2017)   Next INR check 10/2/2017   Priority INR   Target end date Indefinite    Indications   Lupus anticoagulant inhibitor syndrome (H) [D68.62]  Pulmonary embolism with infarction (H) [I26.99]  Long-term (current) use of anticoagulants [Z79.01] [Z79.01]  Embolism - blood clot [I74.9]         Anticoagulation Episode Summary     INR check location     Preferred lab     Send INR reminders to [facility] [place]    Comments * comes in electric WC. warfarin 5mg tablets.      Anticoagulation Care Providers     Provider Role Specialty Phone number    David Arias MD [facility] [phone]            See the Encounter Report to view Anticoagulation Flowsheet and Dosing Calendar (Go to Encounters tab in chart review, and find the Anticoagulation Therapy Visit)    Cony Pagan, RN

## 2017-09-11 NOTE — MR AVS SNAPSHOT
After Visit Summary   9/11/2017    Tere Ortiz    MRN: [id number]           Patient Information     Date Of Birth          1955        Visit Information        Provider Department      9/11/2017 2:40 PM Zina Pruett PA-C [facility]        Today's Diagnoses     SOB (shortness of breath)    -  1    Chronic obstructive pulmonary disease, unspecified COPD type (H)        Pulmonary embolism with infarction (H)        Enlarged aorta (H)        Adrenal nodule (H)        Mild major depression (H)        TITO (obstructive sleep apnea)        Lower extremity pain, bilateral        Restless legs syndrome (RLS)        Disorder of muscle         Abnormal finding of blood chemistry           Care Instructions    Tough to breathe -  EKG, labs and CT to check for any blood clots   Will go from there    Leg pain -  Rule out your back as a cause with MRI.  1 tab xanax given to get you through MRI.  Caution on combining this with pain pills - can slow breathing  - so would not overlap.  Need a  after taking that med.  If normal, either neurology to work on further or we order ultrasound to check blood vessels.    Recheck if not improving.          Follow-ups after your visit        Your next 10 appointments already scheduled     Oct 13, 2017 11:30 AM CDT   (Arrive by 11:15 AM)   RETURN ENDOCRINE with Karol Simmons MD   [facility] ([facility])    [address] [place] [id number] [phone]              Future tests that were ordered for you today     Open Future Orders        Priority Expected Expires Ordered    MR Lumbar Spine w/o Contrast Routine  9/11/2018 9/11/2017            Who to contact     If you have questions or need follow up information about today's clinic visit or your schedule please contact [facility] directly at [phone].  Normal or non-critical lab and imaging results  "will be communicated to you by [employer], letter or phone within 4 business days after the clinic has received the results. If you do not hear from us within 7 days, please contact the clinic through [employer] or phone. If you have a critical or abnormal lab result, we will notify you by phone as soon as possible.  Submit refill requests through [employer] or call your pharmacy and they will forward the refill request to us. Please allow 3 business days for your refill to be completed.          Additional Information About Your Visit        [employer] Information     [employer] gives you secure access to your electronic health record. If you see a primary care provider, you can also send messages to your care team and make appointments. If you have questions, please call your primary care clinic.  If you do not have a primary care provider, please call [phone] and they will assist you.        Care EveryWhere ID     This is your Care EveryWhere ID. This could be used by other organizations to access your [facility] medical records  [id number]        Your Vitals Were     Pulse Temperature Respirations Height Pulse Oximetry BMI (Body Mass Index)    68 98.7  F (37.1  C) 20 4' 11\" (1.499 m) 91% 32.32 kg/m2       Blood Pressure from Last 3 Encounters:   09/11/17 108/72   08/27/17 101/61   05/17/17 101/54    Weight from Last 3 Encounters:   09/11/17 160 lb (72.6 kg)   08/27/17 160 lb (72.6 kg)   04/17/17 148 lb 12.8 oz (67.5 kg)              We Performed the Following     Basic metabolic panel  (Ca, Cl, CO2, Creat, Gluc, K, Na, BUN)     CBC with platelets     CT Chest Pulmonary Embolism w Contrast     EKG 12-lead complete w/read - Clinics     Ferritin     Iron and iron binding capacity     TSH with free T4 reflex          Today's Medication Changes          These changes are accurate as of: 9/11/17  3:39 PM.  If you have any questions, ask your nurse or doctor.               Start taking these medicines.        Dose/Directions    " ALPRAZolam 0.5 MG tablet   Commonly known as:  XANAX   Used for:  Lower extremity pain, bilateral   Started by:  Zina Pruett PA-C        1 tab by mouth approx 20 minutes prior to MRI.  Will need .   Quantity:  1 tablet   Refills:  0         Stop taking these medicines if you haven't already. Please contact your care team if you have questions.     LORazepam 0.5 MG tablet   Commonly known as:  ATIVAN   Stopped by:  Zina Pruett PA-C                Where to get your medicines      Some of these will need a paper prescription and others can be bought over the counter.  Ask your nurse if you have questions.     Bring a paper prescription for each of these medications     ALPRAZolam 0.5 MG tablet                Primary Care Provider Office Phone # Fax #    AiramShruthi Arias [phone] [address] [facility] [address]        Equal Access to Services     [facility] campusSUSAN : Hadii alcides tellezo Sotena, waaxda luqadaha, qaybta kaalmada adealexyayolanda, salas callahan . So [facility] [phone].    ATENCIÓN: Si habla español, tiene a acosta disposición servicios gratuitos de asistencia lingüística. Llame al [phone].    We comply with applicable federal civil rights laws and [place] laws. We do not discriminate on the basis of race, color, national origin, age, disability sex, sexual orientation or gender identity.            Thank you!     Thank you for choosing [facility]  for your care. Our goal is always to provide you with excellent care. Hearing back from our patients is one way we can continue to improve our services. Please take a few minutes to complete the written survey that you may receive in the mail after your visit with us. Thank you!             Your Updated Medication List - Protect others around you: Learn how to safely use, store and throw away your medicines at www.disposemymeds.org.          This list is accurate as of:  9/11/17  3:39 PM.  Always use your most recent med list.                   Brand Name Dispense Instructions for use Diagnosis    ALPRAZolam 0.5 MG tablet    XANAX    1 tablet    1 tab by mouth approx 20 minutes prior to MRI.  Will need .    Lower extremity pain, bilateral       aspirin 81 MG tablet      Take 1 tablet by mouth daily.        baclofen 10 MG tablet    LIORESAL     Take 10 mg by mouth daily 2 at bedtime        BUSPAR PO      Take 10 mg by mouth At Bedtime        CLINDAMAX 1 % lotion   Generic drug:  clindamycin     60 mL    Apply topically 2 times daily        DULoxetine 60 MG EC capsule    CYMBALTA     Take 1 capsule by mouth daily.        DUONEB 0.5-2.5 (3) MG/3ML neb solution   Generic drug:  ipratropium - albuterol 0.5 mg/2.5 mg/3 mL      Take 1 ampule by nebulization every 4 hours as needed.        FLONASE 50 MCG/ACT spray   Generic drug:  fluticasone      Spray 1 spray into both nostrils daily.        ipratropium 0.03 % spray    ATROVENT    1 Box    Spray 2 sprays into both nostrils every 12 hours    Other acute sinusitis, recurrence not specified       naproxen 375 MG tablet    NAPROSYN    70 tablet    Take 1 tablet (375 mg) by mouth 2 times daily (with meals) For 2 weeks, then 1 tablet daily for 2 weeks, then as needed    Episodic tension-type headache, not intractable       nystatin 054563 UNIT/GM Powd    MYCOSTATIN    60 g    Apply topically 3 times daily For 1-2 weeks until rash cleared    Candidiasis of skin       * order for DME     1 Device    BiPAP: IPAP 11 cm H2O EPAP 6 cm H2O Pt has her own BiPAP New CPAP supplies- try [employer] mask if it comes in an extra small size.  Alternatively could try [place] with nasal prongs occluded. Lifetime need and heated humidity.    TITO (obstructive sleep apnea)       * order for DME      BiPAP: IPAP 12 cm H2O EPAP 7 cm H2O  Lifetime need and heated humidity.    TITO (obstructive sleep apnea)       * order for DME     1 Device    O2: During sleep 1.5  LPM via O2 Concentrator  Bled in through BiPAP    TITO (obstructive sleep apnea), Sleep related hypoventilation/hypoxemia in other disease       order for DME     4 Units    Compression stockings (Thigh High)- 2 pairs    Peripheral edema       OxyContin 80 MG 12 hr tablet   Generic drug:  oxyCODONE      Take  by mouth every 12 hours. Take at bedtime        potassium chloride 10 MEQ tablet    K-TAB,KLOR-CON    360 tablet    2 tablets twice daily    Low blood potassium       pramipexole 1 MG tablet    MIRAPEX     Take 1 mg by mouth At Bedtime        Pramoxine-Calamine 1-3 % Lotn    AVEENO ANTI-ITCH    118 mL    Pt to use twice daily    Dry skin       SOMA 350 MG tablet   Generic drug:  carisoprodol      Take 1 tablet by mouth. Take at bedtime        SPIRIVA HANDIHALER 18 MCG capsule   Generic drug:  tiotropium     90 capsule    Inhale 1 capsule into the lungs. Inhale contents of one capsule        SUMAtriptan 20 MG/ACT nasal spray    IMITREX    30 each    Give once, can repeat in 2 hour if not better    Episodic tension-type headache, not intractable       SYNTHROID 150 MCG tablet   Generic drug:  levothyroxine     90 tablet    Take 1 tablet (150 mcg) by mouth daily    Hypothyroidism       TOPAMAX 100 MG tablet   Generic drug:  topiramate      Take  by mouth 2 times daily. Take 1.5 pill in mornings Take 1.5 at bedtime        traZODone HCl 150 MG 24 hr tablet    OLEPTRO     Take 150 mg by mouth At Bedtime        VICODIN ES PO      Take 7.5 mg by mouth as needed        vitamin D3 2000 UNITS Caps      Take by mouth daily        warfarin 5 MG tablet    COUMADIN    168 tablet    Take 10mg by mouth daily or as directed by Anticoagulation Clinic    Embolism (H), Lupus anticoagulant inhibitor syndrome (H)       * Notice:  This list has 3 medication(s) that are the same as other medications prescribed for you. Read the directions carefully, and ask your doctor or other care provider to review them with you.

## 2017-09-11 NOTE — NURSING NOTE
"Chief Complaint   Patient presents with     Establish Care       Initial /72 (BP Location: Left arm, Patient Position: Chair, Cuff Size: Adult Regular)  Pulse 68  Temp 98.7  F (37.1  C)  Resp 20  Ht 4' 11\" (1.499 m)  Wt 160 lb (72.6 kg)  SpO2 91%  BMI 32.32 kg/m2 Estimated body mass index is 32.32 kg/(m^2) as calculated from the following:    Height as of this encounter: 4' 11\" (1.499 m).    Weight as of this encounter: 160 lb (72.6 kg).  Medication Reconciliation: complete    Health Maintenance that is potentially due pending provider review:  NONE        Is there anyone who you would like to be able to receive your results? No  If yes have patient fill out DON      "

## 2017-09-12 DIAGNOSIS — M79.605 LOWER EXTREMITY PAIN, BILATERAL: ICD-10-CM

## 2017-09-12 DIAGNOSIS — M79.604 LOWER EXTREMITY PAIN, BILATERAL: ICD-10-CM

## 2017-09-12 LAB
ANION GAP SERPL CALCULATED.3IONS-SCNC: 8 MMOL/L (ref 3–14)
BUN SERPL-MCNC: 12 MG/DL (ref 7–30)
CALCIUM SERPL-MCNC: 8.8 MG/DL (ref 8.5–10.1)
CHLORIDE SERPL-SCNC: 105 MMOL/L (ref 94–109)
CO2 SERPL-SCNC: 24 MMOL/L (ref 20–32)
CREAT SERPL-MCNC: 0.63 MG/DL (ref 0.52–1.04)
FERRITIN SERPL-MCNC: 34 NG/ML (ref 8–252)
FOLATE SERPL-MCNC: 17 NG/ML
GFR SERPL CREATININE-BSD FRML MDRD: >90 ML/MIN/1.7M2
GLUCOSE SERPL-MCNC: 68 MG/DL (ref 70–99)
IRON SATN MFR SERPL: 26 % (ref 15–46)
IRON SERPL-MCNC: 96 UG/DL (ref 35–180)
POTASSIUM SERPL-SCNC: 4.2 MMOL/L (ref 3.4–5.3)
SODIUM SERPL-SCNC: 137 MMOL/L (ref 133–144)
T4 FREE SERPL-MCNC: 1.27 NG/DL (ref 0.76–1.46)
TIBC SERPL-MCNC: 369 UG/DL (ref 240–430)
TSH SERPL DL<=0.005 MIU/L-ACNC: 0.29 MU/L (ref 0.4–4)
VIT B12 SERPL-MCNC: 385 PG/ML (ref 193–986)

## 2017-09-12 NOTE — TELEPHONE ENCOUNTER
Pt says yesterday Zina sent in Rx for her for Xanax yesterday for her MRI. She picked it up and took it but when she got there they had scheduled a CT scan.  She says they told her she needs to scheduled the MRI. She says she will need another Xanax to take for the MRI.  She has not scheduled it yet and will once she has a Rx.  Send to [facility].  She is aware Zina is not in the office today.

## 2017-09-13 RX ORDER — ALPRAZOLAM 0.5 MG
TABLET ORAL
Qty: 1 TABLET | Refills: 0 | Status: SHIPPED | OUTPATIENT
Start: 2017-09-13 | End: 2017-10-11

## 2017-09-13 NOTE — PROGRESS NOTES
Mercy Carranza,    Your labs all look fine.  Blood cells are slightly different size.  I've placed future lab orders to check into this a bit, but am not worried.      Please call clinic at [phone] if you have questions.    Zina Pruett PA-C

## 2017-09-21 ENCOUNTER — HOSPITAL ENCOUNTER (OUTPATIENT)
Dept: MRI IMAGING | Facility: CLINIC | Age: 62
Discharge: HOME | End: 2017-09-21
Attending: PHYSICIAN ASSISTANT | Admitting: PHYSICIAN ASSISTANT
Payer: COMMERCIAL

## 2017-09-21 ENCOUNTER — NURSE TRIAGE (OUTPATIENT)
Dept: NURSING | Facility: CLINIC | Age: 62
End: 2017-09-21

## 2017-09-21 DIAGNOSIS — M79.604 LOWER EXTREMITY PAIN, BILATERAL: ICD-10-CM

## 2017-09-21 DIAGNOSIS — M79.605 LOWER EXTREMITY PAIN, BILATERAL: ICD-10-CM

## 2017-09-21 PROCEDURE — 72148 MRI LUMBAR SPINE W/O DYE: CPT

## 2017-09-21 NOTE — TELEPHONE ENCOUNTER
Patient calling requesting to know if she could eat prior to MRI? Reviewed per [facility]. Before an MRI exam, eat normally and continue to take your usual medications, unless otherwise instructed.     Additional Information    Negative: [1] Caller is not with the adult (patient) AND [2] reporting urgent symptoms    Negative: Lab result questions    Negative: Medication questions    Negative: Caller cannot be reached by phone    Negative: Caller has already spoken to PCP or another triager    Negative: RN needs further essential information from caller in order to complete triage    Negative: Requesting regular office appointment    Negative: [1] Caller requesting NON-URGENT health information AND [2] PCP's office is the best resource    Health Information question, no triage required and triager able to answer question    Protocols used: INFORMATION ONLY CALL-ADULT-

## 2017-09-22 ENCOUNTER — TELEPHONE (OUTPATIENT)
Dept: FAMILY MEDICINE | Facility: CLINIC | Age: 62
End: 2017-09-22

## 2017-09-22 NOTE — TELEPHONE ENCOUNTER
Tere is having troubles sleeping for the past several months. She states she wakes 2-3 x a night. She has had her bi-pap machine checked and filter changed and no problems.  Is wondering if she needs to be seen?

## 2017-10-02 ENCOUNTER — ANTICOAGULATION THERAPY VISIT (OUTPATIENT)
Dept: ANTICOAGULATION | Facility: CLINIC | Age: 62
End: 2017-10-02
Payer: COMMERCIAL

## 2017-10-02 DIAGNOSIS — D68.62 LUPUS ANTICOAGULANT INHIBITOR SYNDROME (H): ICD-10-CM

## 2017-10-02 DIAGNOSIS — I26.99 PULMONARY EMBOLISM WITH INFARCTION (H): ICD-10-CM

## 2017-10-02 DIAGNOSIS — I74.9 EMBOLISM (H): ICD-10-CM

## 2017-10-02 DIAGNOSIS — Z79.01 LONG-TERM (CURRENT) USE OF ANTICOAGULANTS: ICD-10-CM

## 2017-10-02 LAB — INR POINT OF CARE: 1.4 (ref 0.86–1.14)

## 2017-10-02 PROCEDURE — 36416 COLLJ CAPILLARY BLOOD SPEC: CPT

## 2017-10-02 PROCEDURE — 99207 ZZC NO CHARGE NURSE ONLY: CPT

## 2017-10-02 PROCEDURE — 85610 PROTHROMBIN TIME: CPT | Mod: QW

## 2017-10-02 RX ORDER — WARFARIN SODIUM 5 MG/1
TABLET ORAL
Qty: 168 TABLET | Refills: 0 | COMMUNITY
Start: 2017-10-02 | End: 2017-10-19

## 2017-10-02 NOTE — MR AVS SNAPSHOT
Tere CARMINA Ortiz   10/2/2017 3:15 PM   Anticoagulation Therapy Visit    Description:  62 year old female   Provider:  NB ANTI COAG   Department:  [facility]           INR as of 10/2/2017     Today's INR 1.4!      Anticoagulation Summary as of 10/2/2017     INR goal 2.0-3.0   Today's INR 1.4!   Full instructions 10/2: 15 mg; Otherwise 10 mg on Mon, Fri; 12.5 mg all other days   Next INR check 10/16/2017    Indications   Lupus anticoagulant inhibitor syndrome (H) [D68.62]  Pulmonary embolism with infarction (H) [I26.99]  Long-term (current) use of anticoagulants [Z79.01] [Z79.01]  Embolism - blood clot [I74.9]         Description     Take 15mg Monday 10/2/17.  Then increase dose to 10mg every Mon & Fri and 12.5mg all other days.  Recheck INR in two weeks.         Your next Anticoagulation Clinic appointment(s)     Oct 16, 2017  3:30 PM CDT   [facility]    [address] [place] [address] [phone]              Contact Numbers     Please call [phone] to cancel and/or reschedule your appointment.  Please call [phone] with any problems or questions regarding your therapy          October 2017 Details    Sun Mon Tue Wed Thu Fri Sat     1               2      15 mg   See details      3      12.5 mg         4      12.5 mg         5      12.5 mg         6      10 mg         7      12.5 mg           8      12.5 mg         9      10 mg         10      12.5 mg         11      12.5 mg         12      12.5 mg         13      10 mg         14      12.5 mg           15      12.5 mg         16            17               18               19               20               21                 22               23               24               25               26               27               28                 29               30               31                    Date Details   10/02 This INR check        Date of next INR:  10/16/2017         How to take your warfarin dose     To take:  10 mg Take 2 of the 5 mg tablets.    To take:  12.5 mg Take 2.5 of the 5 mg tablets.    To take:  15 mg Take 3 of the 5 mg tablets.

## 2017-10-02 NOTE — PROGRESS NOTES
ANTICOAGULATION FOLLOW-UP CLINIC VISIT    Patient Name:  Tere Ortiz  Date:  10/2/2017  Contact Type:  Face to Face    SUBJECTIVE:     Patient Findings     Positives Change in medications (was on prednisone for five days), Dental/Other procedures (five teeth extracted last Monday - states she did not hold warfarin at all for procedure, did have some post surgical bleeding. Will be getting dentures.), Herbal/Supplements (pt has been having Ensure 1-2x/day since she had her dental extractions. She usually only has one QOD. She will be getting more, and will change to BOOST.), Antibiotic use or infection (pt was put on PCN last week after extractions, and will be on for approx another week)    Comments Discussed continuing her supplements as she has and that warfarin will need to be adjusted according to her intake.            OBJECTIVE    INR Protime   Date Value Ref Range Status   10/02/2017 1.4 (A) 0.86 - 1.14 Final       ASSESSMENT / PLAN  INR assessment SUB increase maintenance dose 17.9%   Recheck INR In: 2 WEEKS    INR Location Clinic      Anticoagulation Summary as of 10/2/2017     INR goal 2.0-3.0   Today's INR 1.4!   Maintenance plan 10 mg (5 mg x 2) on Mon, Fri; 12.5 mg (5 mg x 2.5) all other days   Full instructions 10/2: 15 mg; Otherwise 10 mg on Mon, Fri; 12.5 mg all other days   Weekly total 82.5 mg   Plan last modified Cony Pagan RN (10/2/2017)   Next INR check 10/16/2017   Priority INR   Target end date Indefinite    Indications   Lupus anticoagulant inhibitor syndrome (H) [D68.62]  Pulmonary embolism with infarction (H) [I26.99]  Long-term (current) use of anticoagulants [Z79.01] [Z79.01]  Embolism - blood clot [I74.9]         Anticoagulation Episode Summary     INR check location     Preferred lab     Send INR reminders to [facility] CLINIC POOL    Comments * comes in electric WC. warfarin 5mg tablets.      Anticoagulation Care Providers     Provider Role Specialty Phone number     Zina Pruett PA-C Responsible Physician Assistant [phone]            See the Encounter Report to view Anticoagulation Flowsheet and Dosing Calendar (Go to Encounters tab in chart review, and find the Anticoagulation Therapy Visit)      Cony Pagan RN

## 2017-10-03 DIAGNOSIS — R11.0 NAUSEA: Primary | ICD-10-CM

## 2017-10-03 NOTE — TELEPHONE ENCOUNTER
I talked with Tere and she says the Ondansetron was prescribed by another doctor.  She says she uses this occasionally.  She is aware that Zina Pruett PA-C is not in clinic today to review this refill  Nayana Maier RN

## 2017-10-03 NOTE — TELEPHONE ENCOUNTER
RX Fax from pharmacy -     Zofran 4mg Tab - Place one tablet on the tongue every 8 hours as needed for nausea/vomiting

## 2017-10-04 RX ORDER — ONDANSETRON 4 MG/1
4 TABLET, ORALLY DISINTEGRATING ORAL EVERY 8 HOURS PRN
Qty: 18 TABLET | Refills: 0 | Status: SHIPPED | OUTPATIENT
Start: 2017-10-04 | End: 2017-12-19

## 2017-10-04 NOTE — TELEPHONE ENCOUNTER
Prescription sent for occasional use.  If using very often, we need to discuss further at an appt.

## 2017-10-11 ENCOUNTER — OFFICE VISIT (OUTPATIENT)
Dept: FAMILY MEDICINE | Facility: CLINIC | Age: 62
End: 2017-10-11
Payer: COMMERCIAL

## 2017-10-11 VITALS
OXYGEN SATURATION: 90 % | WEIGHT: 143 LBS | DIASTOLIC BLOOD PRESSURE: 54 MMHG | TEMPERATURE: 98 F | RESPIRATION RATE: 16 BRPM | BODY MASS INDEX: 28.88 KG/M2 | SYSTOLIC BLOOD PRESSURE: 92 MMHG | HEART RATE: 93 BPM

## 2017-10-11 DIAGNOSIS — J44.9 CHRONIC OBSTRUCTIVE PULMONARY DISEASE, UNSPECIFIED COPD TYPE (H): ICD-10-CM

## 2017-10-11 DIAGNOSIS — G47.33 OSA (OBSTRUCTIVE SLEEP APNEA): ICD-10-CM

## 2017-10-11 DIAGNOSIS — Z23 NEED FOR PROPHYLACTIC VACCINATION AND INOCULATION AGAINST INFLUENZA: ICD-10-CM

## 2017-10-11 DIAGNOSIS — Z72.0 TOBACCO ABUSE: ICD-10-CM

## 2017-10-11 DIAGNOSIS — F41.9 ANXIETY: Primary | ICD-10-CM

## 2017-10-11 PROCEDURE — 90686 IIV4 VACC NO PRSV 0.5 ML IM: CPT | Performed by: PHYSICIAN ASSISTANT

## 2017-10-11 PROCEDURE — 90471 IMMUNIZATION ADMIN: CPT | Performed by: PHYSICIAN ASSISTANT

## 2017-10-11 PROCEDURE — 99214 OFFICE O/P EST MOD 30 MIN: CPT | Mod: 25 | Performed by: PHYSICIAN ASSISTANT

## 2017-10-11 RX ORDER — BUSPIRONE HYDROCHLORIDE 10 MG/1
20-30 TABLET ORAL AT BEDTIME
Qty: 90 TABLET | Refills: 1 | Status: SHIPPED | OUTPATIENT
Start: 2017-10-11 | End: 2018-01-01

## 2017-10-11 ASSESSMENT — ANXIETY QUESTIONNAIRES
5. BEING SO RESTLESS THAT IT IS HARD TO SIT STILL: NEARLY EVERY DAY
IF YOU CHECKED OFF ANY PROBLEMS ON THIS QUESTIONNAIRE, HOW DIFFICULT HAVE THESE PROBLEMS MADE IT FOR YOU TO DO YOUR WORK, TAKE CARE OF THINGS AT HOME, OR GET ALONG WITH OTHER PEOPLE: VERY DIFFICULT
7. FEELING AFRAID AS IF SOMETHING AWFUL MIGHT HAPPEN: SEVERAL DAYS
1. FEELING NERVOUS, ANXIOUS, OR ON EDGE: NEARLY EVERY DAY
3. WORRYING TOO MUCH ABOUT DIFFERENT THINGS: NEARLY EVERY DAY
2. NOT BEING ABLE TO STOP OR CONTROL WORRYING: NEARLY EVERY DAY
GAD7 TOTAL SCORE: 17
6. BECOMING EASILY ANNOYED OR IRRITABLE: SEVERAL DAYS

## 2017-10-11 ASSESSMENT — PATIENT HEALTH QUESTIONNAIRE - PHQ9
5. POOR APPETITE OR OVEREATING: NEARLY EVERY DAY
SUM OF ALL RESPONSES TO PHQ QUESTIONS 1-9: 17

## 2017-10-11 NOTE — MR AVS SNAPSHOT
After Visit Summary   10/11/2017    Tere Ortiz    MRN: [id number]           Patient Information     Date Of Birth          1955        Visit Information        Provider Department      10/11/2017 11:00 AM Zina Pruett PA-C [facility]        Today's Diagnoses     Anxiety    -  1    TITO (obstructive sleep apnea)        Chronic obstructive pulmonary disease, unspecified COPD type (H)        Tobacco abuse          Care Instructions    Anxiety -  Try increasing buspar to 20 mg at night.  Can increase to 30 mg if needed.  If awake later in night, we may need to add 2nd dose.  Can adjust dose every 3 days if needed.  Will receive a call to set up counselor.    See me if not doing well.    Sleep -  Call to set up appt.  buspar strategies as above.  Other strategies if sleep specialist booked out far.    COPD/smoking -  Flu shot today  Tackle smoking when anxiety better            Follow-ups after your visit        Additional Services     MENTAL HEALTH REFERRAL       Your provider has referred you to: [facility] Providers Intake Scheduling [phone]  [email or web address]  NEEDS COUNSELOR IN [place]    All scheduling is subject to the client's specific insurance plan & benefits, provider/location availability, and provider clinical specialities.  Please arrive 15 minutes early for your first appointment and bring your completed paperwork.    Please be aware that coverage of these services is subject to the terms and limitations of your health insurance plan.  Call member services at your health plan with any benefit or coverage questions.            SLEEP EVALUATION & MANAGEMENT REFERRAL - ADULT       Please be aware that coverage of these services is subject to the terms and limitations of your health insurance plan.  Call member services at your health plan with any benefit or coverage questions.      Please bring the following to your  appointment:    >>   List of current medications   >>   This referral request   >>   Any documents/labs given to you for this referral    [facility] / Lab  Ph [phone] (Age 2 and up)                  Your next 10 appointments already scheduled     Oct 13, 2017 11:30 AM CDT   (Arrive by 11:15 AM)   RETURN ENDOCRINE with Karol Simmons MD   [facility] ([facility])    [address]  3rd Floor  [place] [place] [id number] [phone]            Oct 16, 2017  3:30 PM CDT   Anticoagulation Visit with NB ANTI COAG   [facility] ([facility])    [address] [place] [address] [phone]              Future tests that were ordered for you today     Open Future Orders        Priority Expected Expires Ordered    SLEEP EVALUATION & MANAGEMENT REFERRAL - ADULT Routine  10/11/2018 10/11/2017            Who to contact     If you have questions or need follow up information about today's clinic visit or your schedule please contact [facility] directly at [phone].  Normal or non-critical lab and imaging results will be communicated to you by Sunglasshart, letter or phone within 4 business days after the clinic has received the results. If you do not hear from us within 7 days, please contact the clinic through Trippyt or phone. If you have a critical or abnormal lab result, we will notify you by phone as soon as possible.  Submit refill requests through [employer] or call your pharmacy and they will forward the refill request to us. Please allow 3 business days for your refill to be completed.          Additional Information About Your Visit        Sunglasshart Information     [employer] gives you secure access to your electronic health record. If you see a primary care provider, you can also send messages to your care team and make appointments. If you have questions, please call your primary care  clinic.  If you do not have a primary care provider, please call [phone] and they will assist you.        Care EveryWhere ID     This is your Care EveryWhere ID. This could be used by other organizations to access your [facility] medical records  [id number]        Your Vitals Were     Pulse Temperature Respirations Pulse Oximetry BMI (Body Mass Index)       93 98  F (36.7  C) (Tympanic) 16 90% 28.88 kg/m2        Blood Pressure from Last 3 Encounters:   10/11/17 92/54   09/11/17 108/72   08/27/17 101/61    Weight from Last 3 Encounters:   10/11/17 143 lb (64.9 kg)   09/11/17 160 lb (72.6 kg)   08/27/17 160 lb (72.6 kg)              We Performed the Following     MENTAL HEALTH REFERRAL          Today's Medication Changes          These changes are accurate as of: 10/11/17 11:40 AM.  If you have any questions, ask your nurse or doctor.               These medicines have changed or have updated prescriptions.        Dose/Directions    busPIRone 10 MG tablet   Commonly known as:  BUSPAR   This may have changed:    - medication strength  - how much to take   Used for:  Anxiety   Changed by:  Zina Pruett PA-C        Dose:  20-30 mg   Take 2-3 tablets (20-30 mg) by mouth At Bedtime   Quantity:  90 tablet   Refills:  1         Stop taking these medicines if you haven't already. Please contact your care team if you have questions.     ALPRAZolam 0.5 MG tablet   Commonly known as:  XANAX   Stopped by:  Zina Pruett PA-C                Where to get your medicines      These medications were sent to [id number] [address]  [address] [address]    Hours:  Closed 10-16-08 business to [facility] Phone:  [phone]     busPIRone 10 MG tablet                Primary Care Provider Office Phone # Fax #    Zina Pruett PA-C [phone] [address] [facility] [address]        Equal Access to Services     SHANE ATKINSON AH:  Hadii aad ku hadyulyo Soomaali, waaxda luqadaha, qaybta kaalmada jonah, salas jassieloisa currycristopher vane. So [facility] [phone].    ATENCIÓN: Si kvng smalls, tiene a acosta disposición servicios gratuitos de asistencia lingüística. Llame al [phone].    We comply with applicable federal civil rights laws and [place] laws. We do not discriminate on the basis of race, color, national origin, age, disability, sex, sexual orientation, or gender identity.            Thank you!     Thank you for choosing [facility]  for your care. Our goal is always to provide you with excellent care. Hearing back from our patients is one way we can continue to improve our services. Please take a few minutes to complete the written survey that you may receive in the mail after your visit with us. Thank you!             Your Updated Medication List - Protect others around you: Learn how to safely use, store and throw away your medicines at www.disposemymeds.org.          This list is accurate as of: 10/11/17 11:40 AM.  Always use your most recent med list.                   Brand Name Dispense Instructions for use Diagnosis    aspirin 81 MG tablet      Take 1 tablet by mouth daily.        baclofen 10 MG tablet    LIORESAL     Take 10 mg by mouth daily 2 at bedtime        busPIRone 10 MG tablet    BUSPAR    90 tablet    Take 2-3 tablets (20-30 mg) by mouth At Bedtime    Anxiety       CLINDAMAX 1 % lotion   Generic drug:  clindamycin     60 mL    Apply topically 2 times daily        DULoxetine 60 MG EC capsule    CYMBALTA     Take 1 capsule by mouth daily.        DUONEB 0.5-2.5 (3) MG/3ML neb solution   Generic drug:  ipratropium - albuterol 0.5 mg/2.5 mg/3 mL      Take 1 ampule by nebulization every 4 hours as needed.        FLONASE 50 MCG/ACT spray   Generic drug:  fluticasone      Spray 1 spray into both nostrils daily.        ipratropium 0.03 % spray    ATROVENT    1 Box    Spray 2 sprays into both  nostrils every 12 hours    Other acute sinusitis, recurrence not specified       naproxen 375 MG tablet    NAPROSYN    70 tablet    Take 1 tablet (375 mg) by mouth 2 times daily (with meals) For 2 weeks, then 1 tablet daily for 2 weeks, then as needed    Episodic tension-type headache, not intractable       nystatin 369329 UNIT/GM Powd    MYCOSTATIN    60 g    Apply topically 3 times daily For 1-2 weeks until rash cleared    Candidiasis of skin       ondansetron 4 MG ODT tab    ZOFRAN ODT    18 tablet    Take 1 tablet (4 mg) by mouth every 8 hours as needed    Nausea       * order for DME     1 Device    BiPAP: IPAP 11 cm H2O EPAP 6 cm H2O Pt has her own BiPAP New CPAP supplies- try [place] mask if it comes in an extra small size.  Alternatively could try [place] with nasal prongs occluded. Lifetime need and heated humidity.    TITO (obstructive sleep apnea)       * order for DME      BiPAP: IPAP 12 cm H2O EPAP 7 cm H2O  Lifetime need and heated humidity.    TITO (obstructive sleep apnea)       * order for DME     1 Device    O2: During sleep 1.5 LPM via O2 Concentrator  Bled in through BiPAP    TITO (obstructive sleep apnea), Sleep related hypoventilation/hypoxemia in other disease       order for DME     4 Units    Compression stockings (Thigh High)- 2 pairs    Peripheral edema       OxyContin 80 MG 12 hr tablet   Generic drug:  oxyCODONE      Take  by mouth every 12 hours. Take at bedtime        potassium chloride 10 MEQ tablet    K-TAB,KLOR-CON    360 tablet    2 tablets twice daily    Low blood potassium       pramipexole 1 MG tablet    MIRAPEX     Take 1 mg by mouth At Bedtime        Pramoxine-Calamine 1-3 % Lotn    AVEENO ANTI-ITCH    118 mL    Pt to use twice daily    Dry skin       SOMA 350 MG tablet   Generic drug:  carisoprodol      Take 1 tablet by mouth. Take at bedtime        SPIRIVA HANDIHALER 18 MCG capsule   Generic drug:  tiotropium     90 capsule    Inhale 1 capsule into the lungs. Inhale contents of  one capsule        SUMAtriptan 20 MG/ACT nasal spray    IMITREX    30 each    Give once, can repeat in 2 hour if not better    Episodic tension-type headache, not intractable       SYNTHROID 150 MCG tablet   Generic drug:  levothyroxine     90 tablet    Take 1 tablet (150 mcg) by mouth daily    Hypothyroidism       TOPAMAX 100 MG tablet   Generic drug:  topiramate      Take  by mouth 2 times daily. Take 1.5 pill in mornings Take 1.5 at bedtime        traZODone HCl 150 MG 24 hr tablet    OLEPTRO     Take 150 mg by mouth At Bedtime        VICODIN ES PO      Take 7.5 mg by mouth as needed        vitamin D3 2000 UNITS Caps      Take by mouth daily        warfarin 5 MG tablet    COUMADIN    168 tablet    Take 10mg by mouth Mon & Fri and 12.5mg all other days or as directed by Anticoagulation Clinic    Embolism (H), Lupus anticoagulant inhibitor syndrome (H)       * Notice:  This list has 3 medication(s) that are the same as other medications prescribed for you. Read the directions carefully, and ask your doctor or other care provider to review them with you.

## 2017-10-11 NOTE — PROGRESS NOTES
"  SUBJECTIVE:   Tere Ortiz is a 62 year old female who presents to clinic today for the following health issues:    Chronic fatigue - falling asleep in random places    Amount of exercise or physical activity: None    Problems taking medications regularly: No    Medication side effects: none    Diet: regular (no restrictions)    Anxiety Follow-Up    Status since last visit: Worsened     Other associated symptoms:    Complicating factors:   Significant life event: Yes-   is ill and income   Current substance abuse: None  Depression symptoms: No  DAMION-7 SCORE 10/11/2017   Total Score 17       GAD7    On trazodone 150, cymbalta 60, buspar 10 qday - makes sleepy if taken during day.     Anxiety is lifelong.  \"I take on everyone's problems.\"  Thinks remote history depression.  Stress with , daughter's death few yrs ago, other deaths in family.  Has tried relaxation CDs, meditation, biofeedback.  Helpful but causes to fall asleep.  Had done [facility] yrs ago - very helpful, has continued some of those relaxation strategies.    Never saw a counselor but open to it.  Usually loves crafts, but no desire to do it currently.  Sees endocrine for thyroid on Friday.    TITO - on bipap.  Last sleep appt 1 yr ago.  Awakens after 1 hr at night.  Unsure why wakes up, happens even if stress is down.  Falls asleep suddenly in days, banging face.  Some intentional naps.      COPD.  Smoking - 1 ppd.  Easy for her to quit, but hard to remain that way.    Problem list and histories reviewed & adjusted, as indicated.  Additional history: as documented    BP Readings from Last 3 Encounters:   10/11/17 92/54   09/11/17 108/72   08/27/17 101/61    Wt Readings from Last 3 Encounters:   10/11/17 143 lb (64.9 kg)   09/11/17 160 lb (72.6 kg)   08/27/17 160 lb (72.6 kg)         Reviewed and updated as needed this visit by clinical staffTobacco  Allergies  Meds  Problems  Med Hx  Surg Hx  Fam Hx  Soc Hx      "   Reviewed and updated as needed this visit by Provider  Tobacco  Allergies  Meds  Problems  Med Hx  Surg Hx  Fam Hx  Soc Hx          ROS:  Constitutional, cardiovascular, pulmonary, and psych systems are negative, except as otherwise noted.      OBJECTIVE:   BP 92/54 (BP Location: Right arm)  Pulse 93  Temp 98  F (36.7  C) (Tympanic)  Resp 16  Wt 143 lb (64.9 kg)  SpO2 90%  BMI 28.88 kg/m2  Body mass index is 28.88 kg/(m^2).  GENERAL: healthy, alert and no distress  PSYCH: mentation appears normal, affect normal/bright    PHQ-9 SCORE 10/11/2017   Total Score 17     DAMION-7 SCORE 10/11/2017   Total Score 17     ASSESSMENT/PLAN:       ICD-10-CM    1. Anxiety F41.9 MENTAL HEALTH REFERRAL     busPIRone (BUSPAR) 10 MG tablet   2. TITO (obstructive sleep apnea) G47.33 SLEEP EVALUATION & MANAGEMENT REFERRAL - ADULT   3. Chronic obstructive pulmonary disease, unspecified COPD type (H) J44.9 SLEEP EVALUATION & MANAGEMENT REFERRAL - ADULT   4. Tobacco abuse Z72.0    5. Need for prophylactic vaccination and inoculation against influenza Z23 FLU VAC, SPLIT VIRUS IM > 3 YO (QUADRIVALENT) [66710]     Vaccine Administration, Initial [47650]       Patient Instructions   Anxiety -  Try increasing buspar to 20 mg at night.  Can increase to 30 mg if needed.  If awake later in night, we may need to add 2nd dose.  Can adjust dose every 3 days if needed.  Will receive a call to set up counselor.    See me if not doing well.    Sleep -  Call to set up appt.  buspar strategies as above.  Other strategies if sleep specialist booked out far.    COPD/smoking -  Flu shot today  Tackle smoking when anxiety better        Zina Pruett PA-C  [facility]

## 2017-10-11 NOTE — PATIENT INSTRUCTIONS
Anxiety -  Try increasing buspar to 20 mg at night.  Can increase to 30 mg if needed.  If awake later in night, we may need to add 2nd dose.  Can adjust dose every 3 days if needed.  Will receive a call to set up counselor.    See me if not doing well.    Sleep -  Call to set up appt.  buspar strategies as above.  Other strategies if sleep specialist booked out far.    COPD/smoking -  Flu shot today  Tackle smoking when anxiety better

## 2017-10-11 NOTE — PROGRESS NOTES
Injectable Influenza Immunization Documentation    1.  Is the person to be vaccinated sick today?   No    2. Does the person to be vaccinated have an allergy to a component   of the vaccine?   No    3. Has the person to be vaccinated ever had a serious reaction   to influenza vaccine in the past?   No    4. Has the person to be vaccinated ever had Guillain-Barré syndrome?   No    Form completed by joanna

## 2017-10-11 NOTE — NURSING NOTE
"Chief Complaint   Patient presents with     Sleep Problem     falling asleep in random places      Anxiety       Initial BP 92/54 (BP Location: Right arm)  Pulse 93  Temp 98  F (36.7  C) (Tympanic)  Resp 16  Wt 143 lb (64.9 kg)  SpO2 90%  BMI 28.88 kg/m2 Estimated body mass index is 28.88 kg/(m^2) as calculated from the following:    Height as of 9/11/17: 4' 11\" (1.499 m).    Weight as of this encounter: 143 lb (64.9 kg).  Medication Reconciliation: complete    Health Maintenance that is potentially due pending provider review:  NONE    n/a    Is there anyone who you would like to be able to receive your results? No  If yes have patient fill out DON    "

## 2017-10-12 ASSESSMENT — ENCOUNTER SYMPTOMS
NERVOUS/ANXIOUS: 1
WHEEZING: 1
HEMOPTYSIS: 0
TREMORS: 0
CLAUDICATION: 0
SLEEP DISTURBANCES DUE TO BREATHING: 0
SPEECH CHANGE: 0
STIFFNESS: 1
SEIZURES: 0
NIGHT SWEATS: 0
DEPRESSION: 1
JOINT SWELLING: 0
COUGH: 1
CHILLS: 1
TINGLING: 0
DECREASED APPETITE: 1
POLYDIPSIA: 1
PANIC: 1
MUSCLE WEAKNESS: 1
DECREASED CONCENTRATION: 1
TACHYCARDIA: 0
MYALGIAS: 1
RESPIRATORY PAIN: 0
HALLUCINATIONS: 0
MUSCLE CRAMPS: 1
POSTURAL DYSPNEA: 1
DYSPNEA ON EXERTION: 1
ALTERED TEMPERATURE REGULATION: 1
WEAKNESS: 1
BRUISES/BLEEDS EASILY: 1
LEG SWELLING: 1
SNORES LOUDLY: 0
WEIGHT GAIN: 0
INSOMNIA: 1
PALPITATIONS: 0
POLYPHAGIA: 0
DIZZINESS: 1
EXERCISE INTOLERANCE: 1
PARALYSIS: 0
ARTHRALGIAS: 1
INCREASED ENERGY: 1
NUMBNESS: 0
HYPERTENSION: 0
COUGH DISTURBING SLEEP: 1
LIGHT-HEADEDNESS: 0
FEVER: 0
FATIGUE: 1
SYNCOPE: 0
BACK PAIN: 1
ORTHOPNEA: 1
LOSS OF CONSCIOUSNESS: 0
HEADACHES: 1
DISTURBANCES IN COORDINATION: 1
NECK PAIN: 1
HYPOTENSION: 1
LEG PAIN: 1
MEMORY LOSS: 1
SHORTNESS OF BREATH: 1
WEIGHT LOSS: 1
SWOLLEN GLANDS: 1
SPUTUM PRODUCTION: 1

## 2017-10-12 ASSESSMENT — ANXIETY QUESTIONNAIRES: GAD7 TOTAL SCORE: 17

## 2017-10-13 ENCOUNTER — OFFICE VISIT (OUTPATIENT)
Dept: ENDOCRINOLOGY | Facility: CLINIC | Age: 62
End: 2017-10-13

## 2017-10-13 ENCOUNTER — ALLIED HEALTH/NURSE VISIT (OUTPATIENT)
Dept: ENDOCRINOLOGY | Facility: CLINIC | Age: 62
End: 2017-10-13

## 2017-10-13 ENCOUNTER — TELEPHONE (OUTPATIENT)
Dept: FAMILY MEDICINE | Facility: CLINIC | Age: 62
End: 2017-10-13

## 2017-10-13 VITALS — HEART RATE: 73 BPM

## 2017-10-13 DIAGNOSIS — E03.9 HYPOTHYROIDISM, UNSPECIFIED TYPE: Primary | ICD-10-CM

## 2017-10-13 DIAGNOSIS — Z78.0 ASYMPTOMATIC MENOPAUSAL STATE: ICD-10-CM

## 2017-10-13 DIAGNOSIS — M85.80 OSTEOPENIA, UNSPECIFIED LOCATION: ICD-10-CM

## 2017-10-13 DIAGNOSIS — E27.9 ADRENAL NODULE (H): ICD-10-CM

## 2017-10-13 DIAGNOSIS — Z71.9 VISIT FOR COUNSELING: Primary | ICD-10-CM

## 2017-10-13 RX ORDER — LEVOTHYROXINE SODIUM 125 MCG
125 TABLET ORAL DAILY
Qty: 90 TABLET | Refills: 3 | Status: SHIPPED | OUTPATIENT
Start: 2017-10-13 | End: 2018-01-01

## 2017-10-13 ASSESSMENT — PAIN SCALES - GENERAL: PAINLEVEL: NO PAIN (0)

## 2017-10-13 NOTE — NURSING NOTE
Chief Complaint   Patient presents with     RECHECK     F/U HYPOTHYROID     Sirena Rodriguez, SIDNEY  Endocrinology & Diabetes 3G
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
## 2017-10-13 NOTE — TELEPHONE ENCOUNTER
COMPARISON: 9/2/2015.     TECHNIQUE: Pulmonary embolism protocol with attention to the pulmonary  arteries.  IV contrast: 80 mL Isovue-370. Coronal reconstructions.  Radiation dose for this scan was reduced using automated exposure  control, adjustment of the mA and/or kV according to patient size, or  iterative reconstruction technique.     FINDINGS: No thoracic aortic dissection. No pulmonary embolism  identified. Aortic tortuosity. Aneurysmal dilatation of the ascending  thoracic aorta with a transverse diameter of 4.4 cm; this is  unchanged. No pleural effusion. No acute consolidation. Stable  appearance of multiple small bilateral lung nodules, as previously  described.         IMPRESSION:  1. No PE identified.  2. Additional findings discussed above.       MEDINA MARTINEZ MD

## 2017-10-13 NOTE — MR AVS SNAPSHOT
After Visit Summary   10/13/2017    Tere Ortiz    MRN: [id number]           Patient Information     Date Of Birth          1955        Visit Information        Provider Department      10/13/2017 12:27 PM Dory Crowder LICSW  [facility]        Today's Diagnoses     Visit for counseling    -  1       Follow-ups after your visit        Your next 10 appointments already scheduled     Oct 16, 2017  3:30 PM CDT   Anticoagulation Visit with NB ANTI COAG   [facility] ([facility])    [address] [place] [address] [phone]            Apr 03, 2018 11:00 AM CDT   DX HIP/PELVIS/SPINE with WYDX1   [facility] ([facility])    [address] [facility] [id number] [phone]           Please do not take any of the following 24 hours prior to the day of your exam: vitamins, calcium tablets, antacids.  If possible, please wear clothes without metal (snaps, zippers). A sweatsuit works well.            Apr 13, 2018 11:30 AM CDT   (Arrive by 11:15 AM)   RETURN ENDOCRINE with Karol Simmons MD   [facility] ([facility])    [address] [address] [phone]              Future tests that were ordered for you today     Open Future Orders        Priority Expected Expires Ordered    TSH Routine 2/1/2018 5/18/2018 10/13/2017    T4 free Routine 2/1/2018 5/18/2018 10/13/2017    Basic metabolic panel Routine 2/1/2018 5/18/2018 10/13/2017    25 Hydroxyvitamin D2 and D3 Routine 2/1/2018 5/18/2018 10/13/2017    Dexa hip/pelvis/spine Routine  5/11/2018 10/13/2017            Who to contact     Please call your clinic at [phone] to:    Ask questions about your health    Make or cancel appointments    Discuss your medicines    Learn about your test results    Speak to your doctor   If you have compliments or concerns about an experience  at your clinic, or if you wish to file a complaint, please contact [facility] Patient Relations at [phone] or email us at [email or web address]         Additional Information About Your Visit        [email or web address] Information     [employer] gives you secure access to your electronic health record. If you see a primary care provider, you can also send messages to your care team and make appointments. If you have questions, please call your primary care clinic.  If you do not have a primary care provider, please call [phone] and they will assist you.      [employer] is an electronic gateway that provides easy, online access to your medical records. With [employer], you can request a clinic appointment, read your test results, renew a prescription or communicate with your care team.     To access your existing account, please contact your [facility] or call [phone] for assistance.        Care EveryWhere ID     This is your Care EveryWhere ID. This could be used by other organizations to access your [place] medical records  [id number]         Blood Pressure from Last 3 Encounters:   10/13/17 (P) 101/67   10/11/17 92/54   09/11/17 108/72    Weight from Last 3 Encounters:   10/11/17 64.9 kg (143 lb)   09/11/17 72.6 kg (160 lb)   08/27/17 72.6 kg (160 lb)              Today, you had the following     No orders found for display         Today's Medication Changes          These changes are accurate as of: 10/13/17 12:33 PM.  If you have any questions, ask your nurse or doctor.               Start taking these medicines.        Dose/Directions    SYNTHROID 125 MCG tablet   Used for:  Hypothyroidism, unspecified type   Generic drug:  levothyroxine   Replaces:  SYNTHROID 150 MCG tablet   Started by:  Karol Simmons MD        Dose:  125 mcg   Take 1 tablet (125 mcg) by mouth daily   Quantity:  90 tablet   Refills:  3         Stop taking these medicines if  you haven't already. Please contact your care team if you have questions.     SYNTHROID 150 MCG tablet   Generic drug:  levothyroxine   Replaced by:  SYNTHROID 125 MCG tablet   Stopped by:  Karol Simmons MD                Where to get your medicines      These medications were sent to [id number] - [facility] [address] [facility]  [address] [facility] [address]    Hours:  Closed 10-16-08 business to [facility] Phone:  [phone]     SYNTHROID 125 MCG tablet                Primary Care Provider Office Phone # Fax #    Zina Pruett PA-C [phone] [address] [facility] [address]        Equal Access to Services     MELISA [facility] CenterSUSAN : Hadii alcides tellezo Colleen, waaxda luqadaha, qaybta kaalmada gallitoyayolanda, salas callahan . So [facility] [phone].    ATENCIÓN: Si habla español, tiene a acosta disposición servicios gratuitos de asistencia lingüística. [facility] [phone].    We comply with applicable federal civil rights laws and [place] laws. We do not discriminate on the basis of race, color, national origin, age, disability, sex, sexual orientation, or gender identity.            Thank you!     Thank you for choosing [facility]  for your care. Our goal is always to provide you with excellent care. Hearing back from our patients is one way we can continue to improve our services. Please take a few minutes to complete the written survey that you may receive in the mail after your visit with us. Thank you!             Your Updated Medication List - Protect others around you: Learn how to safely use, store and throw away your medicines at www.disposemymeds.org.          This list is accurate as of: 10/13/17 12:33 PM.  Always use your most recent med list.                   Brand Name Dispense Instructions for use Diagnosis    aspirin 81 MG tablet      Take 1 tablet by mouth daily.        baclofen 10 MG tablet    LIORESAL      Take 10 mg by mouth daily 2 at bedtime        busPIRone 10 MG tablet    BUSPAR    90 tablet    Take 2-3 tablets (20-30 mg) by mouth At Bedtime    Anxiety       CLINDAMAX 1 % lotion   Generic drug:  clindamycin     60 mL    Apply topically 2 times daily        DULoxetine 60 MG EC capsule    CYMBALTA     Take 1 capsule by mouth daily.        DUONEB 0.5-2.5 (3) MG/3ML neb solution   Generic drug:  ipratropium - albuterol 0.5 mg/2.5 mg/3 mL      Take 1 ampule by nebulization every 4 hours as needed.        FLONASE 50 MCG/ACT spray   Generic drug:  fluticasone      Spray 1 spray into both nostrils daily.        ipratropium 0.03 % spray    ATROVENT    1 Box    Spray 2 sprays into both nostrils every 12 hours    Other acute sinusitis, recurrence not specified       naproxen 375 MG tablet    NAPROSYN    70 tablet    Take 1 tablet (375 mg) by mouth 2 times daily (with meals) For 2 weeks, then 1 tablet daily for 2 weeks, then as needed    Episodic tension-type headache, not intractable       nystatin 532980 UNIT/GM Powd    MYCOSTATIN    60 g    Apply topically 3 times daily For 1-2 weeks until rash cleared    Candidiasis of skin       ondansetron 4 MG ODT tab    ZOFRAN ODT    18 tablet    Take 1 tablet (4 mg) by mouth every 8 hours as needed    Nausea       * order for DME     1 Device    BiPAP: IPAP 11 cm H2O EPAP 6 cm H2O Pt has her own BiPAP New CPAP supplies- try [place] mask if it comes in an extra small size.  Alternatively could try [place] with nasal prongs occluded. Lifetime need and heated humidity.    TITO (obstructive sleep apnea)       * order for DME      BiPAP: IPAP 12 cm H2O EPAP 7 cm H2O  Lifetime need and heated humidity.    TITO (obstructive sleep apnea)       * order for DME     1 Device    O2: During sleep 1.5 LPM via O2 Concentrator  Bled in through BiPAP    TITO (obstructive sleep apnea), Sleep related hypoventilation/hypoxemia in other disease       order for DME     4 Units    Compression stockings  (Thigh High)- 2 pairs    Peripheral edema       OxyContin 80 MG 12 hr tablet   Generic drug:  oxyCODONE      Take  by mouth every 12 hours. Take at bedtime        potassium chloride 10 MEQ tablet    K-TAB,KLOR-CON    360 tablet    2 tablets twice daily    Low blood potassium       pramipexole 1 MG tablet    MIRAPEX     Take 1 mg by mouth At Bedtime        Pramoxine-Calamine 1-3 % Lotn    AVEENO ANTI-ITCH    118 mL    Pt to use twice daily    Dry skin       SOMA 350 MG tablet   Generic drug:  carisoprodol      Take 1 tablet by mouth. Take at bedtime        SPIRIVA HANDIHALER 18 MCG capsule   Generic drug:  tiotropium     90 capsule    Inhale 1 capsule into the lungs. Inhale contents of one capsule        SUMAtriptan 20 MG/ACT nasal spray    IMITREX    30 each    Give once, can repeat in 2 hour if not better    Episodic tension-type headache, not intractable       SYNTHROID 125 MCG tablet   Generic drug:  levothyroxine     90 tablet    Take 1 tablet (125 mcg) by mouth daily    Hypothyroidism, unspecified type       TOPAMAX 100 MG tablet   Generic drug:  topiramate      Take  by mouth 2 times daily. Take 1.5 pill in mornings Take 1.5 at bedtime        traZODone HCl 150 MG 24 hr tablet    OLEPTRO     Take 150 mg by mouth At Bedtime        VICODIN ES PO      Take 7.5 mg by mouth as needed        vitamin D3 2000 UNITS Caps      Take by mouth daily        warfarin 5 MG tablet    COUMADIN    168 tablet    Take 10mg by mouth Mon & Fri and 12.5mg all other days or as directed by Anticoagulation Clinic    Embolism (H), Lupus anticoagulant inhibitor syndrome (H)       * Notice:  This list has 3 medication(s) that are the same as other medications prescribed for you. Read the directions carefully, and ask your doctor or other care provider to review them with you.

## 2017-10-13 NOTE — PROGRESS NOTES
Social Work Brief Intervention  [facility]    Data/Intervention:    Patient Name:  Tere Ortiz  /Age:  1955 (62 year old)    Visit Type: in person  Referral Source: Dr Simmons  Reason for Referral:  Financial issues    Collaborated With:    -Tere  Living Environment:  Private Home  Patient Concerns/Issues:  Tere indicated that she and her husb have many medical bills at home. They are mostly from her husb who had major surgery this summer. He was unable to work for a few months and their income dropped significantly.His bills from from [employer]. Hers are from [employer]. She is not having any difficulty with medication expenses. She indicated that she has no copays for her meds    Education and Resources Provided:  Encouraged her to contact both [employer] and [employer] about applying for their [employer] programs. discussed that they will need documents to verify their income and assets    Assessment:  Pt glad to hear they may be eligible for assistance. She didn't have any other concerns to discuss today.    Plan:  No further f/u planned at this time    Provided patient with contact information and availability to follow up as needed.

## 2017-10-13 NOTE — TELEPHONE ENCOUNTER
Tere was seen today at the [facility]  Endocrinologist and they told her she has an aneurysm.  They didn't say where.  This is news to her and she is upset.  Please call her.  Hannah Aquino  [facility] [place]

## 2017-10-13 NOTE — MR AVS SNAPSHOT
After Visit Summary   10/13/2017    Tere Ortiz    MRN: [id number]           Patient Information     Date Of Birth          1955        Visit Information        Provider Department      10/13/2017 11:30 AM Karol Simmons MD [facility]        Today's Diagnoses     Hypothyroidism, unspecified type    -  1    Adrenal nodule (H)        Osteopenia, unspecified location        Asymptomatic menopausal state           Care Instructions    Pt to take glucoasmine chondritin     Pt to use amlactin cream on dry skin      To expedite your medication refill(s), please contact your pharmacy and have them fax a refill request to: [phone].  *Please allow 3 business days for routine medication refills.  *Please allow 5 business days for controlled substance medication refills.  --------------------  For scheduling appointments (including lab work), please request an appointment through [employer], or call: [phone].    For questions for your provider or the endocrine nurse, please send a [employer] message.  For after-hours urgent issues, please dial [phone], and ask to speak with the Endocrinologist On-Call.  --------------------  Please Note: If you are active on [employer], all future test results will be sent by [employer] message only and will no longer be sent by mail. You may also receive communication directly from your physician.            Follow-ups after your visit        Additional Services      REFERRAL       Please have rhea vazquezs see pt to help with financial concerns and social support                  Follow-up notes from your care team     Return in about 6 months (around 4/13/2018).      Your next 10 appointments already scheduled     Oct 16, 2017  3:30 PM CDT   Anticoagulation Visit with NB ANTI COAG   [facility] ([facility]    [address] [place] [address] [phone]            Apr 03, 2018  11:00 AM CDT   DX HIP/PELVIS/SPINE with WYDX1   [facility] ([facility])    [address]  [facility] [address] [phone]           Please do not take any of the following 24 hours prior to the day of your exam: vitamins, calcium tablets, antacids.  If possible, please wear clothes without metal (snaps, zippers). A sweatsuit works well.            Apr 13, 2018 11:30 AM CDT   (Arrive by 11:15 AM)   RETURN ENDOCRINE with Karol Simmons MD   [facility] ([facility] [place])    [address] [facility]  3rd Floor  [facility] [address] [phone]              Future tests that were ordered for you today     Open Future Orders        Priority Expected Expires Ordered    TSH Routine 2/1/2018 5/18/2018 10/13/2017    T4 free Routine 2/1/2018 5/18/2018 10/13/2017    Basic metabolic panel Routine 2/1/2018 5/18/2018 10/13/2017    25 Hydroxyvitamin D2 and D3 Routine 2/1/2018 5/18/2018 10/13/2017    Dexa hip/pelvis/spine Routine  5/11/2018 10/13/2017            Who to contact     Please call your clinic at [phone] to:    Ask questions about your health    Make or cancel appointments    Discuss your medicines    Learn about your test results    Speak to your doctor   If you have compliments or concerns about an experience at your clinic, or if you wish to file a complaint, please contact [facility] Patient Relations at [phone] or email us at [email or web address]         Additional Information About Your Visit        Biotzhart Information     [employer] gives you secure access to your electronic health record. If you see a primary care provider, you can also send messages to your care team and make appointments. If you have questions, please call your primary care clinic.  If you do not have a primary care provider, please call [phone] and they will assist you.      [employer] is an electronic gateway that provides  easy, online access to your medical records. With Let, you can request a clinic appointment, read your test results, renew a prescription or communicate with your care team.     To access your existing account, please contact your [facility] or call [phone] for assistance.        Care EveryWhere ID     This is your Care EveryWhere ID. This could be used by other organizations to access your [place] medical records  [id number]        Your Vitals Were     Pulse                   73            Blood Pressure from Last 3 Encounters:   10/13/17 (P) 101/67   10/11/17 92/54   09/11/17 108/72    Weight from Last 3 Encounters:   10/11/17 64.9 kg (143 lb)   09/11/17 72.6 kg (160 lb)   08/27/17 72.6 kg (160 lb)              We Performed the Following      REFERRAL          Today's Medication Changes          These changes are accurate as of: 10/13/17 12:19 PM.  If you have any questions, ask your nurse or doctor.               Start taking these medicines.        Dose/Directions    SYNTHROID 125 MCG tablet   Used for:  Hypothyroidism, unspecified type   Generic drug:  levothyroxine   Replaces:  SYNTHROID 150 MCG tablet   Started by:  Karol Simmons MD        Dose:  125 mcg   Take 1 tablet (125 mcg) by mouth daily   Quantity:  90 tablet   Refills:  3         Stop taking these medicines if you haven't already. Please contact your care team if you have questions.     SYNTHROID 150 MCG tablet   Generic drug:  levothyroxine   Replaced by:  SYNTHROID 125 MCG tablet   Stopped by:  Karol Simmons MD                Where to get your medicines      These medications were sent to [id number] - [facility] [address] [facility]  [address] [facility] [address]    Hours:  Closed 10-16-08 business to [facility] Phone:  [phone]     SYNTHROID 125 MCG tablet                Primary Care Provider Office Phone # Fax #    Zina Pruett PA-C  [phone]       [address] [facility] [address]        Equal Access to Services     SHANE ATKINSON : Hadii alcides manriquez mundo Macias, hermelinda raeannvernon, nicky mckenzie, salas álvarez laLeonachandan cifuentes. So [facility] [phone].    ATENCIÓN: Si habla español, tiene a acosta disposición servicios gratuitos de asistencia lingüística. Llame al [phone].    We comply with applicable federal civil rights laws and [place] laws. We do not discriminate on the basis of race, color, national origin, age, disability, sex, sexual orientation, or gender identity.            Thank you!     Thank you for choosing [facility]  for your care. Our goal is always to provide you with excellent care. Hearing back from our patients is one way we can continue to improve our services. Please take a few minutes to complete the written survey that you may receive in the mail after your visit with us. Thank you!             Your Updated Medication List - Protect others around you: Learn how to safely use, store and throw away your medicines at www.disposemymeds.org.          This list is accurate as of: 10/13/17 12:19 PM.  Always use your most recent med list.                   Brand Name Dispense Instructions for use Diagnosis    aspirin 81 MG tablet      Take 1 tablet by mouth daily.        baclofen 10 MG tablet    LIORESAL     Take 10 mg by mouth daily 2 at bedtime        busPIRone 10 MG tablet    BUSPAR    90 tablet    Take 2-3 tablets (20-30 mg) by mouth At Bedtime    Anxiety       CLINDAMAX 1 % lotion   Generic drug:  clindamycin     60 mL    Apply topically 2 times daily        DULoxetine 60 MG EC capsule    CYMBALTA     Take 1 capsule by mouth daily.        DUONEB 0.5-2.5 (3) MG/3ML neb solution   Generic drug:  ipratropium - albuterol 0.5 mg/2.5 mg/3 mL      Take 1 ampule by nebulization every 4 hours as needed.        FLONASE 50 MCG/ACT spray   Generic drug:  fluticasone      Spray 1 spray  into both nostrils daily.        ipratropium 0.03 % spray    ATROVENT    1 Box    Spray 2 sprays into both nostrils every 12 hours    Other acute sinusitis, recurrence not specified       naproxen 375 MG tablet    NAPROSYN    70 tablet    Take 1 tablet (375 mg) by mouth 2 times daily (with meals) For 2 weeks, then 1 tablet daily for 2 weeks, then as needed    Episodic tension-type headache, not intractable       nystatin 696002 UNIT/GM Powd    MYCOSTATIN    60 g    Apply topically 3 times daily For 1-2 weeks until rash cleared    Candidiasis of skin       ondansetron 4 MG ODT tab    ZOFRAN ODT    18 tablet    Take 1 tablet (4 mg) by mouth every 8 hours as needed    Nausea       * order for DME     1 Device    BiPAP: IPAP 11 cm H2O EPAP 6 cm H2O Pt has her own BiPAP New CPAP supplies- try [place] mask if it comes in an extra small size.  Alternatively could try [place] with nasal prongs occluded. Lifetime need and heated humidity.    TITO (obstructive sleep apnea)       * order for DME      BiPAP: IPAP 12 cm H2O EPAP 7 cm H2O  Lifetime need and heated humidity.    TITO (obstructive sleep apnea)       * order for DME     1 Device    O2: During sleep 1.5 LPM via O2 Concentrator  Bled in through BiPAP    TITO (obstructive sleep apnea), Sleep related hypoventilation/hypoxemia in other disease       order for DME     4 Units    Compression stockings (Thigh High)- 2 pairs    Peripheral edema       OxyContin 80 MG 12 hr tablet   Generic drug:  oxyCODONE      Take  by mouth every 12 hours. Take at bedtime        potassium chloride 10 MEQ tablet    K-TAB,KLOR-CON    360 tablet    2 tablets twice daily    Low blood potassium       pramipexole 1 MG tablet    MIRAPEX     Take 1 mg by mouth At Bedtime        Pramoxine-Calamine 1-3 % Lotn    AVEENO ANTI-ITCH    118 mL    Pt to use twice daily    Dry skin       SOMA 350 MG tablet   Generic drug:  carisoprodol      Take 1 tablet by mouth. Take at bedtime        SPIRIVA HANDIHALER 18 MCG  capsule   Generic drug:  tiotropium     90 capsule    Inhale 1 capsule into the lungs. Inhale contents of one capsule        SUMAtriptan 20 MG/ACT nasal spray    IMITREX    30 each    Give once, can repeat in 2 hour if not better    Episodic tension-type headache, not intractable       SYNTHROID 125 MCG tablet   Generic drug:  levothyroxine     90 tablet    Take 1 tablet (125 mcg) by mouth daily    Hypothyroidism, unspecified type       TOPAMAX 100 MG tablet   Generic drug:  topiramate      Take  by mouth 2 times daily. Take 1.5 pill in mornings Take 1.5 at bedtime        traZODone HCl 150 MG 24 hr tablet    OLEPTRO     Take 150 mg by mouth At Bedtime        VICODIN ES PO      Take 7.5 mg by mouth as needed        vitamin D3 2000 UNITS Caps      Take by mouth daily        warfarin 5 MG tablet    COUMADIN    168 tablet    Take 10mg by mouth Mon & Fri and 12.5mg all other days or as directed by Anticoagulation Clinic    Embolism (H), Lupus anticoagulant inhibitor syndrome (H)       * Notice:  This list has 3 medication(s) that are the same as other medications prescribed for you. Read the directions carefully, and ask your doctor or other care provider to review them with you.
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
## 2017-10-13 NOTE — PATIENT INSTRUCTIONS
Pt to take glucoasmine chondritin     Pt to use amlactin cream on dry skin      To expedite your medication refill(s), please contact your pharmacy and have them fax a refill request to: [phone].  *Please allow 3 business days for routine medication refills.  *Please allow 5 business days for controlled substance medication refills.  --------------------  For scheduling appointments (including lab work), please request an appointment through [employer], or call: [phone].    For questions for your provider or the endocrine nurse, please send a [employer] message.  For after-hours urgent issues, please dial [phone], and ask to speak with the Endocrinologist On-Call.  --------------------  Please Note: If you are active on [employer], all future test results will be sent by [employer] message only and will no longer be sent by mail. You may also receive communication directly from your physician.

## 2017-10-13 NOTE — TELEPHONE ENCOUNTER
Please call. Pt was concerned that she was told she has an aneurysm at her endocrinology appointment today. States she has never been told this before. Please advise on recommendations, does she need to do anything? Any concerns? Amee Alexandra RN

## 2017-10-16 NOTE — TELEPHONE ENCOUNTER
"Let patient know -  Enlarged aorta first showed up on imaging ordered in March by previous provider.  Recommendations are to monitor initially at 6 mo, then if stable on that imaging, to go to yearly imaging.  She has had the 6 month stability check via our recent imaging, so this would now be monitored yearly.  Generally we need to control BP, and monitor for any severe or \"tearing\" pain in belly or back - if this type of pain were to occur, should be seen in emergency room immediately.  Otherwise, we monitor size and how quickly any changes in size happen.  Usually no surgical repair until these type of aneurysms are 5-6 cm.  If wants to talk about this further, make an appt.  "

## 2017-10-16 NOTE — TELEPHONE ENCOUNTER
Spoke with pt and provided this information.  States understanding.  Answered questions regarding BP monitoring and long-term annual monitoring.  Provided hypertension symptoms and educated on BP appointments in clinic if she feels necessary.  States understanding. Will call back with additional questions.      Maegan GREWAL RN

## 2017-10-19 ENCOUNTER — ANTICOAGULATION THERAPY VISIT (OUTPATIENT)
Dept: ANTICOAGULATION | Facility: CLINIC | Age: 62
End: 2017-10-19
Payer: COMMERCIAL

## 2017-10-19 DIAGNOSIS — D68.62 LUPUS ANTICOAGULANT INHIBITOR SYNDROME (H): ICD-10-CM

## 2017-10-19 DIAGNOSIS — I26.99 PULMONARY EMBOLISM WITH INFARCTION (H): ICD-10-CM

## 2017-10-19 DIAGNOSIS — I74.9 EMBOLISM (H): ICD-10-CM

## 2017-10-19 DIAGNOSIS — Z79.01 LONG-TERM (CURRENT) USE OF ANTICOAGULANTS: ICD-10-CM

## 2017-10-19 PROBLEM — F41.9 ANXIETY: Status: ACTIVE | Noted: 2017-10-19

## 2017-10-19 LAB — INR POINT OF CARE: 2.9 (ref 0.86–1.14)

## 2017-10-19 PROCEDURE — 85610 PROTHROMBIN TIME: CPT | Mod: QW

## 2017-10-19 PROCEDURE — 36416 COLLJ CAPILLARY BLOOD SPEC: CPT

## 2017-10-19 PROCEDURE — 99207 ZZC NO CHARGE NURSE ONLY: CPT

## 2017-10-19 RX ORDER — WARFARIN SODIUM 5 MG/1
TABLET ORAL
Qty: 200 TABLET | Refills: 0 | Status: SHIPPED | OUTPATIENT
Start: 2017-10-19 | End: 2017-11-16

## 2017-10-19 NOTE — PROGRESS NOTES
ANTICOAGULATION FOLLOW-UP CLINIC VISIT    Patient Name:  Tere Ortiz  Date:  10/19/2017  Contact Type:  Face to Face    SUBJECTIVE:     Patient Findings     Positives Other complaints (issues with sleep - will see Dr. Whitehead )           OBJECTIVE    INR Protime   Date Value Ref Range Status   10/19/2017 2.9 (A) 0.86 - 1.14 Final       ASSESSMENT / PLAN  INR assessment THER    Recheck INR In: 4 WEEKS    INR Location Clinic      Anticoagulation Summary as of 10/19/2017     INR goal 2.0-3.0   Today's INR 2.9   Maintenance plan 10 mg (5 mg x 2) on Mon, Fri; 12.5 mg (5 mg x 2.5) all other days   Full instructions 10 mg on Mon, Fri; 12.5 mg all other days   Weekly total 82.5 mg   No change documented Cony Pagan RN   Plan last modified Cony Pagan RN (10/2/2017)   Next INR check 11/20/2017   Priority INR   Target end date Indefinite    Indications   Lupus anticoagulant inhibitor syndrome (H) [D68.62]  Pulmonary embolism with infarction (H) [I26.99]  Long-term (current) use of anticoagulants [Z79.01] [Z79.01]  Embolism - blood clot [I74.9]         Anticoagulation Episode Summary     INR check location     Preferred lab     Send INR reminders to [facility]    Comments * comes in electric WC. warfarin 5mg tablets.      Anticoagulation Care Providers     Provider Role Specialty Phone number    Zina Pruett PA-C Responsible Physician Assistant [phone]            See the Encounter Report to view Anticoagulation Flowsheet and Dosing Calendar (Go to Encounters tab in chart review, and find the Anticoagulation Therapy Visit)    #90 day refill sent to [facility].    Cony Pagan, DAWSON

## 2017-10-31 ENCOUNTER — OFFICE VISIT (OUTPATIENT)
Dept: SLEEP MEDICINE | Facility: CLINIC | Age: 62
End: 2017-10-31
Payer: COMMERCIAL

## 2017-10-31 VITALS
DIASTOLIC BLOOD PRESSURE: 53 MMHG | WEIGHT: 143 LBS | BODY MASS INDEX: 28.83 KG/M2 | HEIGHT: 59 IN | HEART RATE: 85 BPM | OXYGEN SATURATION: 91 % | SYSTOLIC BLOOD PRESSURE: 98 MMHG

## 2017-10-31 DIAGNOSIS — J44.9 CHRONIC OBSTRUCTIVE PULMONARY DISEASE, UNSPECIFIED COPD TYPE (H): ICD-10-CM

## 2017-10-31 DIAGNOSIS — G47.33 OSA (OBSTRUCTIVE SLEEP APNEA): ICD-10-CM

## 2017-10-31 PROCEDURE — 99203 OFFICE O/P NEW LOW 30 MIN: CPT | Performed by: FAMILY MEDICINE
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
## 2017-10-31 NOTE — PATIENT INSTRUCTIONS

## 2017-10-31 NOTE — MR AVS SNAPSHOT
After Visit Summary   10/31/2017    Tere Ortiz    MRN: [id number]           Patient Information     Date Of Birth          1955        Visit Information        Provider Department      10/31/2017 1:00 PM Mj Whitehead MD [facility]        Today's Diagnoses     Chronic obstructive pulmonary disease, unspecified COPD type (H)        TITO (obstructive sleep apnea)          Care Instructions      Your BMI is Body mass index is 28.88 kg/(m^2).  Weight management is a personal decision.  If you are interested in exploring weight loss strategies, the following discussion covers the approaches that may be successful. Body mass index (BMI) is one way to tell whether you are at a healthy weight, overweight, or obese. It measures your weight in relation to your height.  A BMI of 18.5 to 24.9 is in the healthy range. A person with a BMI of 25 to 29.9 is considered overweight, and someone with a BMI of 30 or greater is considered obese. More than two-thirds of American adults are considered overweight or obese.  Being overweight or obese increases the risk for further weight gain. Excess weight may lead to heart disease and diabetes.  Creating and following plans for healthy eating and physical activity may help you improve your health.  Weight control is part of healthy lifestyle and includes exercise, emotional health, and healthy eating habits. Careful eating habits lifelong are the mainstay of weight control. Though there are significant health benefits from weight loss, long-term weight loss with diet alone may be very difficult to achieve- studies show long-term success with dietary management in less than 10% of people. Attaining a healthy weight may be especially difficult to achieve in those with severe obesity. In some cases, medications, devices and surgical management might be considered.  What can you do?  If you are overweight or obese and are interested in  methods for weight loss, you should discuss this with your provider.     Consider reducing daily calorie intake by 500 calories.     Keep a food journal.     Avoiding skipping meals, consider cutting portions instead.    Diet combined with exercise helps maintain muscle while optimizing fat loss. Strength training is particularly important for building and maintaining muscle mass. Exercise helps reduce stress, increase energy, and improves fitness. Increasing exercise without diet control, however, may not burn enough calories to loose weight.       Start walking three days a week 10-20 minutes at a time    Work towards walking thirty minutes five days a week     Eventually, increase the speed of your walking for 1-2 minutes at time    In addition, we recommend that you review healthy lifestyles and methods for weight loss available through the National Institutes of Health patient information sites:  http://win.niddk.nih.gov/publications/index.htm    And look into health and wellness programs that may be available through your health insurance provider, employer, local community center, or laly club.                Follow-ups after your visit        Follow-up notes from your care team     Return if symptoms worsen or fail to improve.      Your next 10 appointments already scheduled     Nov 06, 2017 10:45 AM CST   Ech Complete with SHAMA   [facility] ([facility])    [address] [facility] [facility] [address]   [phone]           1. Please bring or wear a comfortable two-piece outfit. 2. You may eat, drink and take your normal medicines. 3. For any questions that cannot be answered, please contact the ordering physician            Nov 20, 2017  1:15 PM CST   Anticoagulation Visit with NB ANTI COAG   [facility]    [address] [place] [id number] [phone]            Nov 27, 2017  1:00 PM CST   LAB with  [place] LAB   [facility]    [address] [facility] [address]   [phone]           Please do not eat 10-12 hours before your appointment if you are coming in fasting for labs on lipids, cholesterol, or glucose (sugar). This does not apply to pregnant women. Water, hot tea and black coffee (with nothing added) are okay. Do not drink other fluids, diet soda or chew gum.            Nov 28, 2017  8:00 PM CST   PSG Titration w/TCM with SLEEP LAB, BED FOUR   [facility] [place]    [address]  [facility] [address]   [phone]            Nov 29, 2017  8:00 AM CST   Return Sleep Patient with Mj Whitehead MD   [facility] ([facility])    [address]  [facility] [address] [phone]            Apr 03, 2018 11:00 AM CDT   DX HIP/PELVIS/SPINE with W[place]DX1   [facility] ([facility])    [address] [address]   [phone]           Please do not take any of the following 24 hours prior to the day of your exam: vitamins, calcium tablets, antacids.  If possible, please wear clothes without metal (snaps, zippers). A sweatsuit works well.            Apr 13, 2018 11:30 AM CDT   (Arrive by 11:15 AM)   RETURN ENDOCRINE with Karol Simmons MD   [facility] ([facility])    [address] [place] [address] [phone]              Who to contact     If you have questions or need follow up information about today's clinic visit or your schedule please contact [facility] directly at [phone].  Normal or non-critical lab and imaging results will be communicated to you by MyChart, letter or phone within 4 business days after the clinic has received the results. If you do not hear from us within 7 days, please contact the clinic  "through [employer] or phone. If you have a critical or abnormal lab result, we will notify you by phone as soon as possible.  Submit refill requests through [employer] or call your pharmacy and they will forward the refill request to us. Please allow 3 business days for your refill to be completed.          Additional Information About Your Visit        [employer] Information     [employer] gives you secure access to your electronic health record. If you see a primary care provider, you can also send messages to your care team and make appointments. If you have questions, please call your primary care clinic.  If you do not have a primary care provider, please call [phone] and they will assist you.        Care EveryWhere ID     This is your Care EveryWhere ID. This could be used by other organizations to access your [facility] medical records  [id number]        Your Vitals Were     Pulse Height Pulse Oximetry BMI (Body Mass Index)          85 1.499 m (4' 11\") 91% 28.88 kg/m2         Blood Pressure from Last 3 Encounters:   10/31/17 98/53   10/13/17 (P) 101/67   10/11/17 92/54    Weight from Last 3 Encounters:   10/31/17 64.9 kg (143 lb)   10/11/17 64.9 kg (143 lb)   09/11/17 72.6 kg (160 lb)              We Performed the Following     SLEEP EVALUATION & MANAGEMENT REFERRAL - ADULT        Primary Care Provider Office Phone # Fax #    Zina Pruett PA-C [phone] [address] [place], [place] [id number]        Equal Access to Services     SHANE ATKINSON : Hadii aad ku hadasho Soomaali, waaxda luqadaha, qaybta kaalmada adeegyada, waxay alphonse callahan . So [facility] [phone].    ATENCIÓN: Si habla selenaañol, tiene a acosta disposición servicios gratuitos de asistencia lingüística. Llame al [phone].    We comply with applicable federal civil rights laws and [place] laws. We do not discriminate on the basis of race, color, national origin, age, disability, sex, sexual orientation, or " gender identity.            Thank you!     Thank you for choosing [facility]  for your care. Our goal is always to provide you with excellent care. Hearing back from our patients is one way we can continue to improve our services. Please take a few minutes to complete the written survey that you may receive in the mail after your visit with us. Thank you!             Your Updated Medication List - Protect others around you: Learn how to safely use, store and throw away your medicines at www.disposemymeds.org.          This list is accurate as of: 10/31/17 11:59 PM.  Always use your most recent med list.                   Brand Name Dispense Instructions for use Diagnosis    aspirin 81 MG tablet      Take 1 tablet by mouth daily.        baclofen 10 MG tablet    LIORESAL     Take 10 mg by mouth daily 2 at bedtime        busPIRone 10 MG tablet    BUSPAR    90 tablet    Take 2-3 tablets (20-30 mg) by mouth At Bedtime    Anxiety       CLINDAMAX 1 % lotion   Generic drug:  clindamycin     60 mL    Apply topically 2 times daily        DULoxetine 60 MG EC capsule    CYMBALTA     Take 1 capsule by mouth daily.        DUONEB 0.5-2.5 (3) MG/3ML neb solution   Generic drug:  ipratropium - albuterol 0.5 mg/2.5 mg/3 mL      Take 1 ampule by nebulization every 4 hours as needed.        FLONASE 50 MCG/ACT spray   Generic drug:  fluticasone      Spray 1 spray into both nostrils daily.        ipratropium 0.03 % spray    ATROVENT    1 Box    Spray 2 sprays into both nostrils every 12 hours    Other acute sinusitis, recurrence not specified       naproxen 375 MG tablet    NAPROSYN    70 tablet    Take 1 tablet (375 mg) by mouth 2 times daily (with meals) For 2 weeks, then 1 tablet daily for 2 weeks, then as needed    Episodic tension-type headache, not intractable       nystatin [id number] UNIT/GM Powd    MYCOSTATIN    60 g    Apply topically 3 times daily For 1-2 weeks until rash cleared    Candidiasis of skin        ondansetron 4 MG ODT tab    ZOFRAN ODT    18 tablet    Take 1 tablet (4 mg) by mouth every 8 hours as needed    Nausea       * order for DME     1 Device    BiPAP: IPAP 11 cm H2O EPAP 6 cm H2O Pt has her own BiPAP New CPAP supplies- try [place] mask if it comes in an extra small size.  Alternatively could try [place] with nasal prongs occluded. Lifetime need and heated humidity.    TITO (obstructive sleep apnea)       * order for DME      BiPAP: IPAP 12 cm H2O EPAP 7 cm H2O  Lifetime need and heated humidity.    TITO (obstructive sleep apnea)       * order for DME     1 Device    O2: During sleep 1.5 LPM via O2 Concentrator  Bled in through BiPAP    TITO (obstructive sleep apnea), Sleep related hypoventilation/hypoxemia in other disease       order for DME     4 Units    Compression stockings (Thigh High)- 2 pairs    Peripheral edema       OxyContin 80 MG 12 hr tablet   Generic drug:  oxyCODONE      Take  by mouth every 12 hours. Take at bedtime        potassium chloride 10 MEQ tablet    K-TAB,KLOR-CON    360 tablet    2 tablets twice daily    Low blood potassium       pramipexole 1 MG tablet    MIRAPEX     Take 1 mg by mouth At Bedtime        Pramoxine-Calamine 1-3 % Lotn    AVEENO ANTI-ITCH    118 mL    Pt to use twice daily    Dry skin       SOMA 350 MG tablet   Generic drug:  carisoprodol      Take 1 tablet by mouth. Take at bedtime        SPIRIVA HANDIHALER 18 MCG capsule   Generic drug:  tiotropium     90 capsule    Inhale 1 capsule into the lungs. Inhale contents of one capsule        SUMAtriptan 20 MG/ACT nasal spray    IMITREX    30 each    Give once, can repeat in 2 hour if not better    Episodic tension-type headache, not intractable       SYNTHROID 125 MCG tablet   Generic drug:  levothyroxine     90 tablet    Take 1 tablet (125 mcg) by mouth daily    Hypothyroidism, unspecified type       TOPAMAX 100 MG tablet   Generic drug:  topiramate      Take  by mouth 2 times daily. Take 1.5 pill in mornings Take 1.5  at bedtime        traZODone HCl 150 MG 24 hr tablet    OLEPTRO     Take 150 mg by mouth At Bedtime        VICODIN ES PO      Take 7.5 mg by mouth as needed        vitamin D3 2000 UNITS Caps      Take by mouth daily        warfarin 5 MG tablet    COUMADIN    200 tablet    Take 10mg by mouth Mon & Fri and 12.5mg all other days or as directed by Anticoagulation Clinic    Embolism (H), Lupus anticoagulant inhibitor syndrome (H)       * Notice:  This list has 3 medication(s) that are the same as other medications prescribed for you. Read the directions carefully, and ask your doctor or other care provider to review them with you.

## 2017-10-31 NOTE — NURSING NOTE
"Chief Complaint   Patient presents with     Consult For     Consult per Dr. Pruett RE: Severe daytime fatigue. Falling asleep in a chair       Initial BP 98/53  Pulse 85  Ht 1.499 m (4' 11\")  Wt 64.9 kg (143 lb)  SpO2 91%  BMI 28.88 kg/m2 Estimated body mass index is 28.88 kg/(m^2) as calculated from the following:    Height as of this encounter: 1.499 m (4' 11\").    Weight as of this encounter: 64.9 kg (143 lb).  Medication Reconciliation: complete  "

## 2017-11-03 ENCOUNTER — TRANSFERRED RECORDS (OUTPATIENT)
Dept: HEALTH INFORMATION MANAGEMENT | Facility: CLINIC | Age: 62
End: 2017-11-03

## 2017-11-06 ENCOUNTER — HOSPITAL ENCOUNTER (OUTPATIENT)
Dept: CARDIOLOGY | Facility: CLINIC | Age: 62
Discharge: HOME | End: 2017-11-06
Attending: INTERNAL MEDICINE | Admitting: INTERNAL MEDICINE
Payer: COMMERCIAL

## 2017-11-06 DIAGNOSIS — I77.810 AORTIC ROOT DILATION (H): ICD-10-CM

## 2017-11-06 PROCEDURE — 93306 TTE W/DOPPLER COMPLETE: CPT | Mod: 26 | Performed by: INTERNAL MEDICINE

## 2017-11-06 PROCEDURE — 93306 TTE W/DOPPLER COMPLETE: CPT

## 2017-11-07 ENCOUNTER — TELEPHONE (OUTPATIENT)
Dept: FAMILY MEDICINE | Facility: CLINIC | Age: 62
End: 2017-11-07

## 2017-11-08 NOTE — TELEPHONE ENCOUNTER
Since Tere is a newer patient to me, has a complicated medical history, and we need to get to know each other and her medical history better, please have her set up an appt to discuss this.  If question is aortic dilation, this is mild and typically we recheck it on a yearly basis.

## 2017-11-09 ENCOUNTER — OFFICE VISIT (OUTPATIENT)
Dept: FAMILY MEDICINE | Facility: CLINIC | Age: 62
End: 2017-11-09
Payer: COMMERCIAL

## 2017-11-09 VITALS
SYSTOLIC BLOOD PRESSURE: 104 MMHG | HEART RATE: 92 BPM | DIASTOLIC BLOOD PRESSURE: 62 MMHG | OXYGEN SATURATION: 93 % | TEMPERATURE: 99 F | RESPIRATION RATE: 16 BRPM

## 2017-11-09 DIAGNOSIS — G47.33 OSA (OBSTRUCTIVE SLEEP APNEA): ICD-10-CM

## 2017-11-09 DIAGNOSIS — K13.0 ANGULAR CHEILITIS: ICD-10-CM

## 2017-11-09 DIAGNOSIS — I27.20 PULMONARY HYPERTENSION (H): ICD-10-CM

## 2017-11-09 DIAGNOSIS — H02.9 EYELID ABNORMALITY: ICD-10-CM

## 2017-11-09 DIAGNOSIS — J44.9 CHRONIC OBSTRUCTIVE PULMONARY DISEASE, UNSPECIFIED COPD TYPE (H): ICD-10-CM

## 2017-11-09 DIAGNOSIS — I77.810 AORTIC ROOT DILATATION (H): Primary | ICD-10-CM

## 2017-11-09 DIAGNOSIS — F41.9 ANXIETY: ICD-10-CM

## 2017-11-09 DIAGNOSIS — L01.00 IMPETIGO: ICD-10-CM

## 2017-11-09 PROCEDURE — 99214 OFFICE O/P EST MOD 30 MIN: CPT | Performed by: PHYSICIAN ASSISTANT

## 2017-11-09 RX ORDER — CEPHALEXIN 500 MG/1
500 CAPSULE ORAL 2 TIMES DAILY
Qty: 14 CAPSULE | Refills: 0 | Status: SHIPPED | OUTPATIENT
Start: 2017-11-09 | End: 2017-11-13 | Stop reason: ALTCHOICE

## 2017-11-09 NOTE — NURSING NOTE
"Chief Complaint   Patient presents with     Results       Initial /62  Pulse 92  Temp 99  F (37.2  C) (Tympanic)  Resp 16  SpO2 93% Estimated body mass index is 28.88 kg/(m^2) as calculated from the following:    Height as of 10/31/17: 4' 11\" (1.499 m).    Weight as of 10/31/17: 143 lb (64.9 kg).  Medication Reconciliation: complete    Health Maintenance that is potentially due pending provider review:          Is there anyone who you would like to be able to receive your results? If yes have patient fill out DON    "

## 2017-11-09 NOTE — MR AVS SNAPSHOT
After Visit Summary   11/9/2017    Tere Ortiz    MRN: [id number]           Patient Information     Date Of Birth          1955        Visit Information        Provider Department      11/9/2017 4:20 PM Zina Pruett PA-C [facility]        Today's Diagnoses     Angular cheilitis    -  1    Impetigo        Anxiety        Chronic obstructive pulmonary disease, unspecified COPD type (H)        TITO (obstructive sleep apnea)        Pulmonary hypertension        Eyelid abnormality          Care Instructions    Sleep -  Continue with sleep study  Increase buspar if needed    Anxiety -  May improve with treating sleep apnea   Also may improve with increased buspar   Trying different counselor - they will call you    Aorta dilation -  Monitoring for future problems but not an issue currently   Any severe tearing pain in chest or back - go to emergency room.  Unlikely to happen.  Repeat echo in 1 yr.    Lip -  Prescription sent.  Chose based on eyelid issue as well.  If not improving, call for other prescription.  Have future labs for whenever you do labs next.          Follow-ups after your visit        Additional Services     MENTAL HEALTH REFERRAL       Your provider has referred you to: Behavioral [facility] Providers Intake Scheduling [phone]  [email or web address]  NEEDS COUNSELOR WHO TAKES HER INSURANCE    All scheduling is subject to the client's specific insurance plan & benefits, provider/location availability, and provider clinical specialities.  Please arrive 15 minutes early for your first appointment and bring your completed paperwork.    Please be aware that coverage of these services is subject to the terms and limitations of your health insurance plan.  Call member services at your health plan with any benefit or coverage questions.                  Your next 10 appointments already scheduled     Nov 20, 2017  1:15 PM CST   Anticoagulation Visit with  NB ANTI COAG   [facility]    [address] [place] [address]   [phone]            Nov 27, 2017  1:00 PM CST   LAB with [place] LAB   [facility]    [address] [facility] [address] [facility] [address]   [phone]           Please do not eat 10-12 hours before your appointment if you are coming in fasting for labs on lipids, cholesterol, or glucose (sugar). This does not apply to pregnant women. Water, hot tea and black coffee (with nothing added) are okay. Do not drink other fluids, diet soda or chew gum.            Nov 28, 2017  8:00 PM CST   PSG Titration w/TCM with SLEEP LAB, BED FOUR   [facility] ([facility]    [address] [address]   [phone]            Nov 29, 2017  8:00 AM CST   Return Sleep Patient with Mj Whitehead MD   [facility] ([facility])    [address]  [facility] [address]   [phone]            Apr 03, 2018 11:00 AM CDT   DX HIP/PELVIS/SPINE with W[place]DX1   [facility] ([facility])    [address] [address]   [phone]           Please do not take any of the following 24 hours prior to the day of your exam: vitamins, calcium tablets, antacids.  If possible, please wear clothes without metal (snaps, zippers). A sweatsuit works well.            Apr 13, 2018 11:30 AM CDT   (Arrive by 11:15 AM)   RETURN ENDOCRINE with Karol Simmons MD   [facility] ([facility])    [address] [place] [address] [phone]              Who to contact     If you have questions or need follow up information about today's clinic visit or your schedule please contact [facility] directly at [phone].  Normal or non-critical lab and imaging results will  be communicated to you by VoÃ¶lkshart, letter or phone within 4 business days after the clinic has received the results. If you do not hear from us within 7 days, please contact the clinic through Feathr or phone. If you have a critical or abnormal lab result, we will notify you by phone as soon as possible.  Submit refill requests through Feathr or call your pharmacy and they will forward the refill request to us. Please allow 3 business days for your refill to be completed.          Additional Information About Your Visit        Feathr Information     Feathr gives you secure access to your electronic health record. If you see a primary care provider, you can also send messages to your care team and make appointments. If you have questions, please call your primary care clinic.  If you do not have a primary care provider, please call [phone] and they will assist you.        Care EveryWhere ID     This is your Care EveryWhere ID. This could be used by other organizations to access your [place] medical records  [id number]        Your Vitals Were     Pulse Temperature Respirations Pulse Oximetry          92 99  F (37.2  C) (Tympanic) 16 93%         Blood Pressure from Last 3 Encounters:   11/09/17 104/62   10/31/17 98/53   10/13/17 (P) 101/67    Weight from Last 3 Encounters:   10/31/17 143 lb (64.9 kg)   10/11/17 143 lb (64.9 kg)   09/11/17 160 lb (72.6 kg)              We Performed the Following     MENTAL HEALTH REFERRAL          Today's Medication Changes          These changes are accurate as of: 11/9/17  4:57 PM.  If you have any questions, ask your nurse or doctor.               Start taking these medicines.        Dose/Directions    cephALEXin 500 MG capsule   Commonly known as:  KEFLEX   Used for:  Impetigo, Eyelid abnormality        Dose:  500 mg   Take 1 capsule (500 mg) by mouth 2 times daily for 7 days   Quantity:  14 capsule   Refills:  0            Where to get your medicines      These  medications were sent to [id number] - [place], [place] - [address]  [address] [address]    Hours:  Closed 10-16-08 business to [facility] Phone:  [phone]     cephALEXin 500 MG capsule                Primary Care Provider Office Phone # Fax #    Zina Pruett PA-C [phone] [address] [facility] [address]        Equal Access to Services     SHANE ATKINSON : Hadii aad ku hadasho Soomaali, waaxda luqadaha, qaybta kaalmada adeegyada, waxay idiin hayaan adeeg kharash laruslan . So [facility] [phone].    ATENCIÓN: Si habla español, tiene a acosta disposición servicios gratuitos de asistencia lingüística. [facility] [phone].    We comply with applicable federal civil rights laws and [place] laws. We do not discriminate on the basis of race, color, national origin, age, disability, sex, sexual orientation, or gender identity.            Thank you!     Thank you for choosing [facility]  for your care. Our goal is always to provide you with excellent care. Hearing back from our patients is one way we can continue to improve our services. Please take a few minutes to complete the written survey that you may receive in the mail after your visit with us. Thank you!             Your Updated Medication List - Protect others around you: Learn how to safely use, store and throw away your medicines at www.disposemymeds.org.          This list is accurate as of: 11/9/17  4:57 PM.  Always use your most recent med list.                   Brand Name Dispense Instructions for use Diagnosis    aspirin 81 MG tablet      Take 1 tablet by mouth daily.        baclofen 10 MG tablet    LIORESAL     Take 10 mg by mouth daily 2 at bedtime        busPIRone 10 MG tablet    BUSPAR    90 tablet    Take 2-3 tablets (20-30 mg) by mouth At Bedtime    Anxiety       cephALEXin 500 MG capsule    KEFLEX    14 capsule    Take 1 capsule (500 mg) by mouth 2 times  daily for 7 days    Impetigo, Eyelid abnormality       CLINDAMAX 1 % lotion   Generic drug:  clindamycin     60 mL    Apply topically 2 times daily        DULoxetine 60 MG EC capsule    CYMBALTA     Take 1 capsule by mouth daily.        DUONEB 0.5-2.5 (3) MG/3ML neb solution   Generic drug:  ipratropium - albuterol 0.5 mg/2.5 mg/3 mL      Take 1 ampule by nebulization every 4 hours as needed.        FLONASE 50 MCG/ACT spray   Generic drug:  fluticasone      Spray 1 spray into both nostrils daily.        ipratropium 0.03 % spray    ATROVENT    1 Box    Spray 2 sprays into both nostrils every 12 hours    Other acute sinusitis, recurrence not specified       naproxen 375 MG tablet    NAPROSYN    70 tablet    Take 1 tablet (375 mg) by mouth 2 times daily (with meals) For 2 weeks, then 1 tablet daily for 2 weeks, then as needed    Episodic tension-type headache, not intractable       nystatin 470508 UNIT/GM Powd    MYCOSTATIN    60 g    Apply topically 3 times daily For 1-2 weeks until rash cleared    Candidiasis of skin       ondansetron 4 MG ODT tab    ZOFRAN ODT    18 tablet    Take 1 tablet (4 mg) by mouth every 8 hours as needed    Nausea       * order for DME     1 Device    BiPAP: IPAP 11 cm H2O EPAP 6 cm H2O Pt has her own BiPAP New CPAP supplies- try [place] mask if it comes in an extra small size.  Alternatively could try [place] with nasal prongs occluded. Lifetime need and heated humidity.    TITO (obstructive sleep apnea)       * order for DME      BiPAP: IPAP 12 cm H2O EPAP 7 cm H2O  Lifetime need and heated humidity.    TITO (obstructive sleep apnea)       * order for DME     1 Device    O2: During sleep 1.5 LPM via O2 Concentrator  Bled in through BiPAP    TITO (obstructive sleep apnea), Sleep related hypoventilation/hypoxemia in other disease       order for DME     4 Units    Compression stockings (Thigh High)- 2 pairs    Peripheral edema       OxyContin 80 MG 12 hr tablet   Generic drug:  oxyCODONE       Take  by mouth every 12 hours. Take at bedtime        potassium chloride 10 MEQ tablet    K-TAB,KLOR-CON    360 tablet    2 tablets twice daily    Low blood potassium       pramipexole 1 MG tablet    MIRAPEX     Take 1 mg by mouth At Bedtime        Pramoxine-Calamine 1-3 % Lotn    AVEENO ANTI-ITCH    118 mL    Pt to use twice daily    Dry skin       SOMA 350 MG tablet   Generic drug:  carisoprodol      Take 1 tablet by mouth. Take at bedtime        SPIRIVA HANDIHALER 18 MCG capsule   Generic drug:  tiotropium     90 capsule    Inhale 1 capsule into the lungs. Inhale contents of one capsule        SUMAtriptan 20 MG/ACT nasal spray    IMITREX    30 each    Give once, can repeat in 2 hour if not better    Episodic tension-type headache, not intractable       SYNTHROID 125 MCG tablet   Generic drug:  levothyroxine     90 tablet    Take 1 tablet (125 mcg) by mouth daily    Hypothyroidism, unspecified type       TOPAMAX 100 MG tablet   Generic drug:  topiramate      Take  by mouth 2 times daily. Take 1.5 pill in mornings Take 1.5 at bedtime        traZODone HCl 150 MG 24 hr tablet    OLEPTRO     Take 150 mg by mouth At Bedtime        VICODIN ES PO      Take 7.5 mg by mouth as needed        vitamin D3 2000 UNITS Caps      Take by mouth daily        warfarin 5 MG tablet    COUMADIN    200 tablet    Take 10mg by mouth Mon & Fri and 12.5mg all other days or as directed by Anticoagulation Clinic    Embolism (H), Lupus anticoagulant inhibitor syndrome (H)       * Notice:  This list has 3 medication(s) that are the same as other medications prescribed for you. Read the directions carefully, and ask your doctor or other care provider to review them with you.

## 2017-11-09 NOTE — PROGRESS NOTES
"  SUBJECTIVE:   Tere Ortiz is a 62 year old female who presents to clinic today for the following health issues:      Discuss Echo  Mild aortic root and ascending dilation, stable since March.  Mild atrial enlargement bilaterally.  Pulm HTN.  COPD.  Sleep apnea, upcoming sleep study.      R eyelid flaky every morning, chronic.  R mouth corner with sore.  Lots of pus multiple times per day today, \"pouring off it\". Trying camphophenique.  History chronic breakdown at lip corner.  Doesn't lick.    Anxiety still an issue.  First counseling appt discouraging, wants to switch.  Increase buspar to 20 has helped sleep.      Problem list and histories reviewed & adjusted, as indicated.  Additional history: as documented    Labs reviewed in EPIC    Reviewed and updated as needed this visit by clinical staff  Tobacco  Allergies  Meds  Problems  Med Hx  Surg Hx  Fam Hx  Soc Hx        Reviewed and updated as needed this visit by Provider  Tobacco  Allergies  Meds  Problems  Med Hx  Surg Hx  Fam Hx  Soc Hx          ROS:  Constitutional, HEENT, cardiovascular, pulmonary, psych systems are negative, except as otherwise noted.    OBJECTIVE:   /62  Pulse 92  Temp 99  F (37.2  C) (Tympanic)  Resp 16  SpO2 93%  There is no height or weight on file to calculate BMI.  GENERAL: healthy, alert and no distress  PSYCH: affect calm, normal  Mouth: tiny ulceration with white base, at apex of lips and buccal surface, no crusting or drainage currently  R eyelid: normal without crusting or exudate or flaking  ASSESSMENT/PLAN:       ICD-10-CM    1. Aortic root dilatation (H) I77.810    2. Angular cheilitis K13.0    3. Impetigo L01.00 cephALEXin (KEFLEX) 500 MG capsule   4. Anxiety F41.9 MENTAL HEALTH REFERRAL   5. Chronic obstructive pulmonary disease, unspecified COPD type (H) J44.9    6. TITO (obstructive sleep apnea) G47.33    7. Pulmonary hypertension I27.20    8. Eyelid abnormality H02.9 cephALEXin (KEFLEX) 500 MG " capsule       Patient Instructions   Sleep -  Continue with sleep study  Increase buspar if needed    Anxiety -  May improve with treating sleep apnea   Also may improve with increased buspar   Trying different counselor - they will call you    Aorta dilation -  Monitoring for future problems but not an issue currently   Any severe tearing pain in chest or back - go to emergency room.  Unlikely to happen.  Repeat echo in 1 yr.    Lip -  Prescription sent.  Chose based on eyelid issue as well.  If not improving, call for other prescription.  Have future labs for whenever you do labs next.      Zina Pruett PA-C  [facility]

## 2017-11-09 NOTE — PATIENT INSTRUCTIONS
Sleep -  Continue with sleep study  Increase buspar if needed    Anxiety -  May improve with treating sleep apnea   Also may improve with increased buspar   Trying different counselor - they will call you    Aorta dilation -  Monitoring for future problems but not an issue currently   Any severe tearing pain in chest or back - go to emergency room.  Unlikely to happen.  Repeat echo in 1 yr.    Lip -  Prescription sent.  Chose based on eyelid issue as well.  If not improving, call for other prescription.  Have future labs for whenever you do labs next.

## 2017-11-13 ENCOUNTER — TELEPHONE (OUTPATIENT)
Dept: FAMILY MEDICINE | Facility: CLINIC | Age: 62
End: 2017-11-13

## 2017-11-13 DIAGNOSIS — L01.00 IMPETIGO: Primary | ICD-10-CM

## 2017-11-13 RX ORDER — MUPIROCIN 20 MG/G
OINTMENT TOPICAL 3 TIMES DAILY
Qty: 22 G | Refills: 1 | Status: SHIPPED | OUTPATIENT
Start: 2017-11-13 | End: 2017-11-18

## 2017-11-13 NOTE — TELEPHONE ENCOUNTER
Reason for call:  Patient reporting a symptom    Symptom or request: Tere says Zina prescribed a pill for a rash on her face. The pill seemed to help but made her nauseas and gave her a headache. She says Zina told her to let her know and she would prescribe a cream to put on it.     Additional comments: [employer]    Phone Number patient can be reached at:  Home number on file [phone] (home)    Best Time:  anytime    Can we leave a detailed message on this number:  YES    Call taken on 11/13/2017 at 9:57 AM by Irma Anthony

## 2017-11-16 ENCOUNTER — ANTICOAGULATION THERAPY VISIT (OUTPATIENT)
Dept: ANTICOAGULATION | Facility: CLINIC | Age: 62
End: 2017-11-16
Payer: COMMERCIAL

## 2017-11-16 DIAGNOSIS — I74.9 EMBOLISM (H): ICD-10-CM

## 2017-11-16 DIAGNOSIS — Z79.01 LONG-TERM (CURRENT) USE OF ANTICOAGULANTS: ICD-10-CM

## 2017-11-16 DIAGNOSIS — D68.62 LUPUS ANTICOAGULANT INHIBITOR SYNDROME (H): ICD-10-CM

## 2017-11-16 DIAGNOSIS — I26.99 PULMONARY EMBOLISM WITH INFARCTION (H): ICD-10-CM

## 2017-11-16 LAB — INR POINT OF CARE: 4.9 (ref 0.86–1.14)

## 2017-11-16 PROCEDURE — 99207 ZZC NO CHARGE NURSE ONLY: CPT

## 2017-11-16 PROCEDURE — 85610 PROTHROMBIN TIME: CPT | Mod: QW

## 2017-11-16 PROCEDURE — 36416 COLLJ CAPILLARY BLOOD SPEC: CPT

## 2017-11-16 RX ORDER — WARFARIN SODIUM 5 MG/1
TABLET ORAL
Qty: 200 TABLET | Refills: 0 | COMMUNITY
Start: 2017-11-16 | End: 2017-11-30

## 2017-11-16 NOTE — MR AVS SNAPSHOT
Tere CARMINA Ortiz   11/16/2017 1:30 PM   Anticoagulation Therapy Visit    Description:  62 year old female   Provider:  NB ANTI COAG   Department:  [facility] Anticoag           INR as of 11/16/2017     Today's INR 4.9!      Anticoagulation Summary as of 11/16/2017     INR goal 2.0-3.0   Today's INR 4.9!   Full instructions 11/17: Hold; 11/18: 5 mg; Otherwise 12.5 mg on Mon; 10 mg all other days   Next INR check 11/30/2017    Indications   Lupus anticoagulant inhibitor syndrome (H) [D68.62]  Pulmonary embolism with infarction (H) [I26.99]  Long-term (current) use of anticoagulants [Z79.01] [Z79.01]  Embolism - blood clot [I74.9]         Description     No warfarin Friday 11/17/17.  Take 5mg Saturday 11/18/17.  Then take 12.5mg every Monday and 10mg all other days.  Recheck INR in two weeks.      Your next Anticoagulation Clinic appointment(s)     Nov 30, 2017  2:00 PM CST   [facility]agulation [facility] with NB ANTI COAG   [facility]    [address] [place] [address] [phone]              Contact Numbers     Please call [phone] to cancel and/or reschedule your appointment.  Please call [phone] with any problems or questions regarding your therapy          November 2017 Details    Sun Mon Tue Wed Thu Fri Sat        1               2               3               4                 5               6               7               8               9               10               11                 12               13               14               15               16      10 mg   See details      17      Hold         18      5 mg           19      10 mg         20      12.5 mg         21      10 mg         22      10 mg         23      10 mg         24      10 mg         25      10 mg           26      10 mg         27      12.5 mg         28      10 mg         29      10 mg         30               Date Details   11/16 This INR check       Date of  next INR:  11/30/2017         How to take your warfarin dose     To take:  5 mg Take 1 of the 5 mg tablets.    To take:  10 mg Take 2 of the 5 mg tablets.    To take:  12.5 mg Take 2.5 of the 5 mg tablets.    Hold Do not take your warfarin dose. See the Details table to the right for additional instructions.

## 2017-11-16 NOTE — PROGRESS NOTES
"  ANTICOAGULATION FOLLOW-UP CLINIC VISIT    Patient Name:  Tere Ortiz  Date:  11/16/2017  Contact Type:  Face to Face    SUBJECTIVE:     Patient Findings     Positives Change in diet/appetite (appetite overall is \"poor. I have none. I'm never hungry.\"), Antibiotic use or infection (was on cephalexin for only a few days, stopped taking in Monday 11/13), Missed doses (11/7)    Comments Reports she was \"sick\" all weekend. Pain all over, nausea, headaches.  Arrive 5 minute late for appt and also requested to be weighed. Weight today: 138#. Has lost 5lbs in the last two weeks.  Already took warfarin this AM - advised to wait to take until after ACC appts.           OBJECTIVE    INR Protime   Date Value Ref Range Status   11/16/2017 4.9 (A) 0.86 - 1.14 Final       ASSESSMENT / PLAN  INR assessment SUPRA hold dose tomorrow, reduce dose the next day. Then reduce overall maintenance dose 12%   Recheck INR In: 2 WEEKS    INR Location Clinic      Anticoagulation Summary as of 11/16/2017     INR goal 2.0-3.0   Today's INR 4.9!   Maintenance plan 12.5 mg (5 mg x 2.5) on Mon; 10 mg (5 mg x 2) all other days   Full instructions 11/17: Hold; 11/18: 5 mg; Otherwise 12.5 mg on Mon; 10 mg all other days   Weekly total 72.5 mg   Plan last modified Cony Pagan RN (11/16/2017)   Next INR check 11/30/2017   Priority INR   Target end date Indefinite    Indications   Lupus anticoagulant inhibitor syndrome (H) [D68.62]  Pulmonary embolism with infarction (H) [I26.99]  Long-term (current) use of anticoagulants [Z79.01] [Z79.01]  Embolism - blood clot [I74.9]         Anticoagulation Episode Summary     INR check location     Preferred lab     Send INR reminders to [facility] [place]    Comments * comes in electric WC. warfarin 5mg tablets. takes warfarin in the AM      Anticoagulation Care Providers     Provider Role Specialty Phone number    Zina Pruett PA-C Responsible Physician Assistant [phone]        "     See the Encounter Report to view Anticoagulation Flowsheet and Dosing Calendar (Go to Encounters tab in chart review, and find the Anticoagulation Therapy Visit)      Cony Pagan RN

## 2017-11-27 ENCOUNTER — TELEPHONE (OUTPATIENT)
Dept: SLEEP MEDICINE | Facility: CLINIC | Age: 62
End: 2017-11-27

## 2017-11-27 DIAGNOSIS — J44.9 CHRONIC OBSTRUCTIVE PULMONARY DISEASE, UNSPECIFIED COPD TYPE (H): ICD-10-CM

## 2017-11-27 DIAGNOSIS — G47.33 OSA (OBSTRUCTIVE SLEEP APNEA): ICD-10-CM

## 2017-11-27 LAB
BASE EXCESS BLDV CALC-SCNC: 3.3 MMOL/L
HCO3 BLDV-SCNC: 33 MMOL/L (ref 21–28)
O2/TOTAL GAS SETTING VFR VENT: ABNORMAL %
OXYHGB MFR BLDV: 27 %
PCO2 BLDV: 72 MM HG (ref 40–50)
PH BLDV: 7.27 PH (ref 7.32–7.43)
PO2 BLDV: 16 MM HG (ref 25–47)

## 2017-11-27 PROCEDURE — 36415 COLL VENOUS BLD VENIPUNCTURE: CPT | Performed by: FAMILY MEDICINE

## 2017-11-27 PROCEDURE — 82805 BLOOD GASES W/O2 SATURATION: CPT | Performed by: FAMILY MEDICINE

## 2017-11-27 NOTE — TELEPHONE ENCOUNTER
Can you place an order for her ASV titration? Did you do a P2P with her insurance to okay her study?

## 2017-11-28 ENCOUNTER — TELEPHONE (OUTPATIENT)
Dept: SLEEP MEDICINE | Facility: CLINIC | Age: 62
End: 2017-11-28

## 2017-11-28 ENCOUNTER — MEDICAL CORRESPONDENCE (OUTPATIENT)
Dept: HEALTH INFORMATION MANAGEMENT | Facility: CLINIC | Age: 62
End: 2017-11-28

## 2017-11-28 ENCOUNTER — THERAPY VISIT (OUTPATIENT)
Dept: SLEEP MEDICINE | Facility: CLINIC | Age: 62
End: 2017-11-28
Payer: COMMERCIAL

## 2017-11-28 DIAGNOSIS — G47.33 OSA (OBSTRUCTIVE SLEEP APNEA): ICD-10-CM

## 2017-11-28 DIAGNOSIS — I27.20 PULMONARY HYPERTENSION (H): ICD-10-CM

## 2017-11-28 DIAGNOSIS — J44.9 CHRONIC OBSTRUCTIVE PULMONARY DISEASE, UNSPECIFIED COPD TYPE (H): ICD-10-CM

## 2017-11-28 DIAGNOSIS — R53.82 CHRONIC FATIGUE: Primary | ICD-10-CM

## 2017-11-28 DIAGNOSIS — G89.4 CHRONIC PAIN SYNDROME: ICD-10-CM

## 2017-11-28 DIAGNOSIS — E87.29: ICD-10-CM

## 2017-11-28 DIAGNOSIS — I26.99 PULMONARY EMBOLISM WITH INFARCTION (H): ICD-10-CM

## 2017-11-28 DIAGNOSIS — R53.82 CHRONIC FATIGUE: ICD-10-CM

## 2017-11-28 PROCEDURE — 95811 POLYSOM 6/>YRS CPAP 4/> PARM: CPT | Performed by: FAMILY MEDICINE

## 2017-11-28 NOTE — TELEPHONE ENCOUNTER
Hello,   For the sleep study scheduled on 11/28/2017   Tere Ortiz   Female, 62 yrs, 1955 MRN:   [id number]   The PA has been denied per not meeting criteria, a P2P may be completed by calling   [phone]   Intake Id #[id number]   Let me know if there are any questions.   Thank you,   Danya Alarcon  Financial Securing Representative-Sleep Medicine   [facility]   [address] [place] [id number]   [email or web address]   Office: [phone]  Fax [phone]

## 2017-11-28 NOTE — PROGRESS NOTES
Entered order for titration PSG.      Most recent echocardiogram on 11/6/2017 with LVEF 60-65%, normal RV systolic function, trace aortic regurgitation, mild to moderate pulmonary HTN with RVSP 38 mmHg + RA.    Most recent blood gases: 11/27/2017 with pH 7.27, pCO2 72, HCO3 33.    Plan for PSG is all-night ASV with nocturnal supplemental oxygen titration, with TCM.  Start with current settings of ASV with EEP 7, min PS 4, max PS 15 and supplemental oxygen (bled in) at 4 LPM.  If significant residual hypoxemia or worsening hypercapnia, would likely benefit from increasing min PS.

## 2017-11-28 NOTE — TELEPHONE ENCOUNTER
Called and spoke with Vu to request a peer to peer review.  But per their records, she was approved for the study on 11/17/2017 with confirmation # of [id number].  I also requested they refax the approval.  So, I believe we should be safe to proceed with titration tonight.

## 2017-11-28 NOTE — MR AVS SNAPSHOT
After Visit Summary   11/28/2017    Tere Ortiz    MRN: [id number]           Patient Information     Date Of Birth          1955        Visit Information        Provider Department      11/28/2017 8:00 PM SLEEP LAB, BED FOUR [facility]        Today's Diagnoses     Chronic fatigue        Pulmonary embolism with infarction (H)        Chronic obstructive pulmonary disease, unspecified COPD type (H)        Chronic pain syndrome        Pulmonary hypertension        TITO (obstructive sleep apnea)        Uncompensated respiratory acidosis           Follow-ups after your visit        Your next 10 appointments already scheduled     Nov 30, 2017  2:00 PM CST   Anticoagulation Visit with NB ANTI COAG   [facility]    [address] [place] [address]   [phone]            Dec 01, 2017  1:00 PM CST   Return Sleep Patient with Mj Whitehead MD   [facility] ([facility])    [address]  [facility] [address]   [phone]            Dec 18, 2017  2:00 PM CST   New Visit with Surya Melendez   [facility] ([facility])    [address] [facility] [place] [address]   [phone]            Apr 03, 2018 11:00 AM CDT   DX HIP/PELVIS/SPINE with WYDX1   [facility] ([facility])    [address] [place] [address]   [phone]           Please do not take any of the following 24 hours prior to the day of your exam: vitamins, calcium tablets, antacids.  If possible, please wear clothes without metal (snaps, zippers). A sweatsuit works well.            Apr 13, 2018 11:30 AM CDT   (Arrive by 11:15 AM)   RETURN ENDOCRINE with Karol Simmons MD   [facility] ([facility])    [address] [place] [address] [phone]              Who to  contact     If you have questions or need follow up information about today's clinic visit or your schedule please contact [facility] directly at [phone].  Normal or non-critical lab and imaging results will be communicated to you by MyChart, letter or phone within 4 business days after the clinic has received the results. If you do not hear from us within 7 days, please contact the clinic through MyChart or phone. If you have a critical or abnormal lab result, we will notify you by phone as soon as possible.  Submit refill requests through [employer] or call your pharmacy and they will forward the refill request to us. Please allow 3 business days for your refill to be completed.          Additional Information About Your Visit        [employer] Information     [employer] gives you secure access to your electronic health record. If you see a primary care provider, you can also send messages to your care team and make appointments. If you have questions, please call your primary care clinic.  If you do not have a primary care provider, please call [phone] and they will assist you.        Care EveryWhere ID     This is your Care EveryWhere ID. This could be used by other organizations to access your [place] medical records  [id number]         Blood Pressure from Last 3 Encounters:   11/09/17 104/62   10/31/17 98/53   10/13/17 (P) 101/67    Weight from Last 3 Encounters:   10/31/17 64.9 kg (143 lb)   10/11/17 64.9 kg (143 lb)   09/11/17 72.6 kg (160 lb)              We Performed the Following     Comprehensive Sleep Study        Primary Care Provider Office Phone # Fax #    Zina Pruett PA-C [phone] [address] [place] [address]        Equal Access to Services     [facility] DEMETRIO : Nicola Macias, hermelinda fofana, qaybsalas zarate. So [facility] [phone].    ATENCIÓN: gage Irizarry  acosta disposición servicios gratuitos de asistencia lingüística. Bev chamberlain [phone].    We comply with applicable federal civil rights laws and [place] laws. We do not discriminate on the basis of race, color, national origin, age, disability, sex, sexual orientation, or gender identity.            Thank you!     Thank you for choosing [facility]  for your care. Our goal is always to provide you with excellent care. Hearing back from our patients is one way we can continue to improve our services. Please take a few minutes to complete the written survey that you may receive in the mail after your visit with us. Thank you!             Your Updated Medication List - Protect others around you: Learn how to safely use, store and throw away your medicines at www.disposemymeds.org.          This list is accurate as of: 11/28/17 11:59 PM.  Always use your most recent med list.                   Brand Name Dispense Instructions for use Diagnosis    aspirin 81 MG tablet      Take 1 tablet by mouth daily.        baclofen 10 MG tablet    LIORESAL     Take 10 mg by mouth daily 2 at bedtime        busPIRone 10 MG tablet    BUSPAR    90 tablet    Take 2-3 tablets (20-30 mg) by mouth At Bedtime    Anxiety       CLINDAMAX 1 % lotion   Generic drug:  clindamycin     60 mL    Apply topically 2 times daily        DULoxetine 60 MG EC capsule    CYMBALTA     Take 1 capsule by mouth daily.        DUONEB 0.5-2.5 (3) MG/3ML neb solution   Generic drug:  ipratropium - albuterol 0.5 mg/2.5 mg/3 mL      Take 1 ampule by nebulization every 4 hours as needed.        FLONASE 50 MCG/ACT spray   Generic drug:  fluticasone      Spray 1 spray into both nostrils daily.        ipratropium 0.03 % spray    ATROVENT    1 Box    Spray 2 sprays into both nostrils every 12 hours    Other acute sinusitis, recurrence not specified       naproxen 375 MG tablet    NAPROSYN    70 tablet    Take 1 tablet (375 mg) by mouth 2 times daily (with  meals) For 2 weeks, then 1 tablet daily for 2 weeks, then as needed    Episodic tension-type headache, not intractable       nystatin 339341 UNIT/GM Powd    MYCOSTATIN    60 g    Apply topically 3 times daily For 1-2 weeks until rash cleared    Candidiasis of skin       ondansetron 4 MG ODT tab    ZOFRAN ODT    18 tablet    Take 1 tablet (4 mg) by mouth every 8 hours as needed    Nausea       * order for DME     1 Device    BiPAP: IPAP 11 cm H2O EPAP 6 cm H2O Pt has her own BiPAP New CPAP supplies- try [place] mask if it comes in an extra small size.  Alternatively could try [place] with nasal prongs occluded. Lifetime need and heated humidity.    TITO (obstructive sleep apnea)       * order for DME      BiPAP: IPAP 12 cm H2O EPAP 7 cm H2O  Lifetime need and heated humidity.    TITO (obstructive sleep apnea)       * order for DME     1 Device    O2: During sleep 1.5 LPM via O2 Concentrator  Bled in through BiPAP    TITO (obstructive sleep apnea), Sleep related hypoventilation/hypoxemia in other disease       order for DME     4 Units    Compression stockings (Thigh High)- 2 pairs    Peripheral edema       OxyContin 80 MG 12 hr tablet   Generic drug:  oxyCODONE      Take  by mouth every 12 hours. Take at bedtime        potassium chloride 10 MEQ tablet    K-TAB,KLOR-CON    360 tablet    2 tablets twice daily    Low blood potassium       pramipexole 1 MG tablet    MIRAPEX     Take 1 mg by mouth At Bedtime        Pramoxine-Calamine 1-3 % Lotn    AVEENO ANTI-ITCH    118 mL    Pt to use twice daily    Dry skin       SOMA 350 MG tablet   Generic drug:  carisoprodol      Take 1 tablet by mouth. Take at bedtime        SPIRIVA HANDIHALER 18 MCG capsule   Generic drug:  tiotropium     90 capsule    Inhale 1 capsule into the lungs. Inhale contents of one capsule        SUMAtriptan 20 MG/ACT nasal spray    IMITREX    30 each    Give once, can repeat in 2 hour if not better    Episodic tension-type headache, not intractable        SYNTHROID 125 MCG tablet   Generic drug:  levothyroxine     90 tablet    Take 1 tablet (125 mcg) by mouth daily    Hypothyroidism, unspecified type       TOPAMAX 100 MG tablet   Generic drug:  topiramate      Take  by mouth 2 times daily. Take 1.5 pill in mornings Take 1.5 at bedtime        traZODone HCl 150 MG 24 hr tablet    OLEPTRO     Take 150 mg by mouth At Bedtime        VICODIN ES PO      Take 7.5 mg by mouth as needed        vitamin D3 2000 UNITS Caps      Take by mouth daily        warfarin 5 MG tablet    COUMADIN    200 tablet    Take 12.5mg by mouth Mon and 10mg all other days or as directed by Anticoagulation Clinic    Embolism (H), Lupus anticoagulant inhibitor syndrome (H)       * Notice:  This list has 3 medication(s) that are the same as other medications prescribed for you. Read the directions carefully, and ask your doctor or other care provider to review them with you.

## 2017-11-29 NOTE — PROGRESS NOTES
Pt arrived [facility] for sleep study.    Completed a all night titration PSG per provider order.    Preliminary AHI.  A final therapeutic PAP pressure was achieved.    Supine REM was seen on therapeutic pressure.    Patient reports feeling refreshed in AM.    O2 reduced to 1.5lpm for observation, REM not observed on 1.5lpm O2.

## 2017-11-30 ENCOUNTER — ANTICOAGULATION THERAPY VISIT (OUTPATIENT)
Dept: ANTICOAGULATION | Facility: CLINIC | Age: 62
End: 2017-11-30
Payer: COMMERCIAL

## 2017-11-30 DIAGNOSIS — Z79.01 LONG-TERM (CURRENT) USE OF ANTICOAGULANTS: ICD-10-CM

## 2017-11-30 DIAGNOSIS — I74.9 EMBOLISM (H): ICD-10-CM

## 2017-11-30 DIAGNOSIS — I26.99 PULMONARY EMBOLISM WITH INFARCTION (H): ICD-10-CM

## 2017-11-30 DIAGNOSIS — D68.62 LUPUS ANTICOAGULANT INHIBITOR SYNDROME (H): ICD-10-CM

## 2017-11-30 LAB — INR POINT OF CARE: 1.6 (ref 0.86–1.14)

## 2017-11-30 PROCEDURE — 36416 COLLJ CAPILLARY BLOOD SPEC: CPT

## 2017-11-30 PROCEDURE — 85610 PROTHROMBIN TIME: CPT | Mod: QW

## 2017-11-30 PROCEDURE — 99207 ZZC NO CHARGE NURSE ONLY: CPT

## 2017-11-30 RX ORDER — WARFARIN SODIUM 5 MG/1
TABLET ORAL
Qty: 200 TABLET | Refills: 0 | Status: SHIPPED | OUTPATIENT
Start: 2017-11-30 | End: 2018-03-14

## 2017-11-30 NOTE — PROGRESS NOTES
ANTICOAGULATION FOLLOW-UP CLINIC VISIT    Patient Name:  Tere Ortiz  Date:  11/30/2017  Contact Type:  Face to Face    SUBJECTIVE:     Patient Findings     Positives Unexplained INR or factor level change    Comments Pt states she followed the plan as instructed.            OBJECTIVE    INR Protime   Date Value Ref Range Status   11/30/2017 1.6 (A) 0.86 - 1.14 Final       ASSESSMENT / PLAN  No question data found.  Anticoagulation Summary as of 11/30/2017     INR goal 2.0-3.0   Today's INR 1.6!   Maintenance plan 12.5 mg (5 mg x 2.5) on Mon; 10 mg (5 mg x 2) all other days   Full instructions 11/30: 12.5 mg; Otherwise 12.5 mg on Mon; 10 mg all other days   Weekly total 72.5 mg   Plan last modified Cony Pagan RN (11/16/2017)   Next INR check 12/14/2017   Priority INR   Target end date Indefinite    Indications   Lupus anticoagulant inhibitor syndrome (H) [D68.62]  Pulmonary embolism with infarction (H) [I26.99]  Long-term (current) use of anticoagulants [Z79.01] [Z79.01]  Embolism - blood clot [I74.9]         Anticoagulation Episode Summary     INR check location     Preferred lab     Send INR reminders to [facility] CLINIC POOL    Comments * comes in electric WC. warfarin 5mg tablets. takes warfarin in the AM      Anticoagulation Care Providers     Provider Role Specialty Phone number    Zina Pruett PA-C Responsible Physician Assistant [phone]            See the Encounter Report to view Anticoagulation Flowsheet and Dosing Calendar (Go to Encounters tab in chart review, and find the Anticoagulation Therapy Visit)    Writer will increase her warfarin dose to 12.5mg today, then have her resume her maintenance dose.    Ara Kaplan RN

## 2017-11-30 NOTE — MR AVS SNAPSHOT
Tere Ortiz   11/30/2017 2:00 PM   Anticoagulation Therapy Visit    Description:  62 year old female   Provider:  NB ANTI COAG   Department:  Nb Anticoag           INR as of 11/30/2017     Today's INR 1.6!      Anticoagulation Summary as of 11/30/2017     INR goal 2.0-3.0   Today's INR 1.6!   Full instructions 11/30: 12.5 mg; Otherwise 12.5 mg on Mon; 10 mg all other days   Next INR check 12/14/2017    Indications   Lupus anticoagulant inhibitor syndrome (H) [D68.62]  Pulmonary embolism with infarction (H) [I26.99]  Long-term (current) use of anticoagulants [Z79.01] [Z79.01]  Embolism - blood clot [I74.9]         Your next Anticoagulation Clinic appointment(s)     Dec 14, 2017  2:00 PM CST   [facility] with [facility]TI COAG   [facility]    [address] [place] [address] [phone]              Contact Numbers     Please call [phone] to cancel and/or reschedule your appointment.  Please call [phone] with any problems or questions regarding your therapy          November 2017 Details    Sun Mon Tue Wed Thu Fri Sat        1               2               3               4                 5               6               7               8               9               10               11                 12               13               14               15               16               17               18                 19               20               21               22               23               24               25                 26               27               28               29               30      12.5 mg   See details         Date Details   11/30 This INR check               How to take your warfarin dose     To take:  12.5 mg Take 2.5 of the 5 mg tablets.           December 2017 Details    Sun Mon Tue Wed Thu Fri Sat          1      10 mg         2      10 mg           3      10 mg         4      12.5 mg          5      10 mg         6      10 mg         7      10 mg         8      10 mg         9      10 mg           10      10 mg         11      12.5 mg         12      10 mg         13      10 mg         14            15               16                 17               18               19               20               21               22               23                 24               25               26               27               28               29               30                 31                      Date Details   No additional details    Date of next INR:  12/14/2017         How to take your warfarin dose     To take:  10 mg Take 2 of the 5 mg tablets.    To take:  12.5 mg Take 2.5 of the 5 mg tablets.

## 2017-11-30 NOTE — PROCEDURES
"SLEEP STUDY INTERPRETATION  TITRATION STUDY      Patient: STUART SAINI  YOB: 1955  Study Date: 2017  MRN: [id number]  Referring Provider: Zina Pruett MD  Ordering Provider: Mj Whitehead MD    Indications for Polysomnography: The patient is a 62 y old Female who is 4' 11\" and weighs 143.0 lbs.  Her BMI is 29.2, [place] sleepiness scale is 24.0 and neck size is 36.0.  Relevant medical history includes lupus anti-coagulant syndrome, pulmonary embolism with infarct, chronic pain on high dose opiates (oxycontin 80mg PO Q12 hours), fibromyalgia, depression, COPD (unable to find PFT's for review), hypercapnia, TITO, presumed opiate related central sleep apnea, nocturnal hypoxemia requiring supplemental oxygen at 4 LPM.  A PAP titration was performed to find optimal ASV settings for treatment of complex sleep-disordered breathing and nocturnal hypoxemia.    Prior Sleep Testin2011 - Split-night PSG at [facility].  Weight 207 lbs, AHI 45.5, RDI 51.5, lizette SpO2 76.9%, time of SpO2 <= 88% of 46 minutes.    Polysomnogram Data:  A full night polysomnogram recorded the standard physiologic parameters including EEG, EOG, EMG, ECG, nasal and oral airflow.  Respiratory parameters of chest and abdominal movements were recorded with respiratory inductance plethysmography.  Oxygen saturation was recorded by pulse oximetry.      Treatment PSG:  Sleep Architecture:   The total recording time of the study was 357.6 minutes.  The total sleep time was 311.0 minutes.  Sleep latency was decreased at 3.9 minutes with usual QHS medications (Trazodone 150mg TB24, topiramate 150mg, carisoprodol 350mg, duloxetine 60mg, oxycodone 80mg 12 hour, baclofen 20mg, buspirone 20-30mg).  REM latency was 110.5 minutes.  Arousal index was normal at 4.4 arousals per hour.  Sleep efficiency was normal at 87.0%.  Wake after sleep onset was 35.5 minutes.   The patient spent 0.0% of total sleep time in Stage N1, 5.6% " in Stage N2, 73.8% in Stage N3 and 20.6% in REM.     Respiration: ASV with settings of EEP 7 cm H2O, min PS 6 cm H2O, max PS 15 cm H2O with supplemental oxygen at 4 LPM was optimal in supine NREM and supine REM.  In supine NREM, we were able to decrease oxygen to 1.5 LPM with SpO2 stable in supine NREM, but seen to have hypoxemia at ~86-87% during period of wakefulness.  TCM remained stable, range 57-63 mmHg.  Note that patient slept with head of bed elevated ~30-40 degrees.    The patient was titrated at pressures ranging from 7/5/15 O2:4 cmH2O up to 7/6/15 O2:4 cmH2O.  The optimal pressure achieved was 7/6/15 O2:1 1/2 cmH2O with a residual AHI of - events per hour.  Time in REM supine on final pressure was - minutes.   - This titration was considered optimal (residual AHI < 5 events per hour and including REM-supine sleep at final pressure).     Snoring - was reported as absent.    Respiratory rate and pattern - was notable for normal respiratory rate and pattern.    Sustained Sleep Associated Hypoventilation - Transcutaneous carbon dioxide monitoring was used, however significant hypoventilation was not present with a maximum change of ~6 mmHg.    Sleep Associated Hypoxemia - (Greater than 5 minutes O2 sat below 89%) was not present.  Baseline oxygen saturation was 94.0%. Lowest oxygen saturation was 85.3%.  Time spent less than or equal to 88% was < 5 minutes.  Time spent less than or equal to 89% was 7.1 minutes.         Movement Activity: Very rare PLM s.    Periodic Limb Activity - There were 4 PLMs during the entire study. The PLM index was 0.8 movements per hour.  The PLM Arousal Index was - per hour.    REM EMG Activity - Excessive transient / sustained muscle activity was not present.    Nocturnal Behavior - Abnormal sleep related behaviors were not noted during / arising out of NREM / REM sleep.      Bruxism - None apparent.    Cardiac Summary: Appears NSR  The average pulse rate was 66.0 bpm.  The  minimum pulse rate was 50.6 bpm while the maximum pulse rate was 93.1 bpm. The rhythm is normal sinus. Arrhythmias were not noted.    Assessment:     ASV with settings of EEP 7 cm H2O, min PS 6 cm H2O, max PS 15 cm H2O with supplemental oxygen at 4 LPM was optimal in supine NREM and supine REM.  In supine NREM, we were able to decrease oxygen to 1.5 LPM with SpO2 stable in supine NREM, but seen to have hypoxemia at ~86-87% during period of wakefulness.  TCM remained stable, range 57-63 mmHg.  Note that patient slept with head of bed elevated ~30-40 degrees.    Recommendations:    Treatment with ASV with EEP 7 cm H2O, min PS 6 cm H2O, max PS 15 cm H2O with supplemental oxygen at 4 LPM.    Advise regarding the risks of drowsy driving.    Suggest optimizing sleep schedule and avoiding sleep deprivation.    Diagnostic Codes:      Obstructive Sleep Apnea G47.33    Primary Central Sleep Apnea G47.31    Unspecified Sleep Disturbance G47.9              Table of Oximetry Distribution    Range(%) Time in range (min) Time in range (%) Time in or below range (min) Time in or below range (%)   0.0 - 88.0 5.8 1.7% 5.8 1.7%   0.0 - 89.0 7.1 2.0% 7.1 2.0%

## 2017-12-01 NOTE — TELEPHONE ENCOUNTER
Patient was unable to make it to her follow up . Can you please review study and let us know if you want to change the settings. If so please place an order and send back to us and we will contact her DME. Patients Oxygen and machine are from [facility]. She said her machine is over 5 years old and she would like you to place the order as a replacement machine please.

## 2017-12-04 DIAGNOSIS — I26.99 PULMONARY EMBOLISM WITH INFARCTION (H): ICD-10-CM

## 2017-12-04 DIAGNOSIS — I27.20 PULMONARY HYPERTENSION (H): Primary | ICD-10-CM

## 2017-12-04 DIAGNOSIS — G47.33 OSA (OBSTRUCTIVE SLEEP APNEA): ICD-10-CM

## 2017-12-04 DIAGNOSIS — G89.4 CHRONIC PAIN SYNDROME: ICD-10-CM

## 2017-12-04 DIAGNOSIS — J44.9 CHRONIC OBSTRUCTIVE PULMONARY DISEASE, UNSPECIFIED COPD TYPE (H): ICD-10-CM

## 2017-12-04 DIAGNOSIS — G47.39 COMPLEX SLEEP APNEA SYNDROME: ICD-10-CM

## 2017-12-04 NOTE — PROGRESS NOTES
Will place orders for ASV at EEP 7, min PS 6, max PS 15 with nocturnal supplemental oxygen at 4 LPM.

## 2017-12-04 NOTE — TELEPHONE ENCOUNTER
Orders faxed to [employer] in [place]. Called and notified patient we were faxing order for a replacement machine.

## 2017-12-14 ENCOUNTER — ANTICOAGULATION THERAPY VISIT (OUTPATIENT)
Dept: ANTICOAGULATION | Facility: CLINIC | Age: 62
End: 2017-12-14
Payer: COMMERCIAL

## 2017-12-14 DIAGNOSIS — Z79.01 LONG-TERM (CURRENT) USE OF ANTICOAGULANTS: ICD-10-CM

## 2017-12-14 DIAGNOSIS — I74.9 EMBOLISM (H): ICD-10-CM

## 2017-12-14 DIAGNOSIS — D68.62 LUPUS ANTICOAGULANT INHIBITOR SYNDROME (H): ICD-10-CM

## 2017-12-14 DIAGNOSIS — I26.99 PULMONARY EMBOLISM WITH INFARCTION (H): ICD-10-CM

## 2017-12-14 LAB — INR POINT OF CARE: 2.5 (ref 0.86–1.14)

## 2017-12-14 PROCEDURE — 85610 PROTHROMBIN TIME: CPT | Mod: QW

## 2017-12-14 PROCEDURE — 36416 COLLJ CAPILLARY BLOOD SPEC: CPT

## 2017-12-14 PROCEDURE — 99207 ZZC NO CHARGE NURSE ONLY: CPT

## 2017-12-14 NOTE — PROGRESS NOTES
"  ANTICOAGULATION FOLLOW-UP CLINIC VISIT    Patient Name:  Tere Ortiz  Date:  12/14/2017  Contact Type:  Face to Face    SUBJECTIVE:     Patient Findings     Positives Nose bleeds (has been having them off and on, does have a wood burning stove at home), Other complaints (has a knick on her left hand that bled for a bit. About 1/4\" long.), No Problem Findings           OBJECTIVE    INR Protime   Date Value Ref Range Status   12/14/2017 2.5 (A) 0.86 - 1.14 Final       ASSESSMENT / PLAN  INR assessment THER    Recheck INR In: 4 WEEKS    INR Location Clinic      Anticoagulation Summary as of 12/14/2017     INR goal 2.0-3.0   Today's INR 2.5   Maintenance plan 12.5 mg (5 mg x 2.5) on Mon; 10 mg (5 mg x 2) all other days   Full instructions 12.5 mg on Mon; 10 mg all other days   Weekly total 72.5 mg   No change documented Cony Pagan RN   Plan last modified Cony Pagan RN (11/16/2017)   Next INR check 1/15/2018   Priority INR   Target end date Indefinite    Indications   Lupus anticoagulant inhibitor syndrome (H) [D68.62]  Pulmonary embolism with infarction (H) [I26.99]  Long-term (current) use of anticoagulants [Z79.01] [Z79.01]  Embolism - blood clot [I74.9]         Anticoagulation Episode Summary     INR check location     Preferred lab     Send INR reminders to [facility]    Comments * comes in electric WC. warfarin 5mg tablets. takes warfarin in the AM      Anticoagulation Care Providers     Provider Role Specialty Phone number    Zina Pruett PA-C Responsible Physician Assistant [phone]            See the Encounter Report to view Anticoagulation Flowsheet and Dosing Calendar (Go to Encounters tab in chart review, and find the Anticoagulation Therapy Visit)    Cony Pagan RN               "

## 2017-12-14 NOTE — MR AVS SNAPSHOT
Tere CARMINA Ortiz   12/14/2017 2:00 PM   Anticoagulation Therapy Visit    Description:  62 year old female   Provider:  NB ANTI COAG   Department:  Nb Anticoag           INR as of 12/14/2017     Today's INR 2.5      Anticoagulation Summary as of 12/14/2017     INR goal 2.0-3.0   Today's INR 2.5   Full instructions 12.5 mg on Mon; 10 mg all other days   Next INR check 1/15/2018    Indications   Lupus anticoagulant inhibitor syndrome (H) [D68.62]  Pulmonary embolism with infarction (H) [I26.99]  Long-term (current) use of anticoagulants [Z79.01] [Z79.01]  Embolism - blood clot [I74.9]         Description     No change, recheck INR in ~ 4 weeks, on Monday 1/15/18.      Your next Anticoagulation Clinic appointment(s)     David 15, 2018  2:00 PM CST   Anticoagulation Visit with NB ANTI COAG   [facility]    [address] [place] [address]   [phone]              December 2017 Details    Sun Mon Tue Wed Thu Fri Sat          1               2                 3               4               5               6               7               8               9                 10               11               12               13               14      10 mg   See details      15      10 mg         16      10 mg           17      10 mg         18      12.5 mg         19      10 mg         20      10 mg         21      10 mg         22      10 mg         23      10 mg           24      10 mg         25      12.5 mg         26      10 mg         27      10 mg         28      10 mg         29      10 mg         30      10 mg           31      10 mg                Date Details   12/14 This INR check               How to take your warfarin dose     To take:  10 mg Take 2 of the 5 mg tablets.    To take:  12.5 mg Take 2.5 of the 5 mg tablets.           January 2018 Details    Sun Mon Tue Wed Thu Fri Sat      1      12.5 mg         2      10 mg         3      10 mg          4      10 mg         5      10 mg         6      10 mg           7      10 mg         8      12.5 mg         9      10 mg         10      10 mg         11      10 mg         12      10 mg         13      10 mg           14      10 mg         15            16               17               18               19               20                 21               22               23               24               25               26               27                 28               29               30               31                   Date Details   No additional details    Date of next INR:  1/15/2018         How to take your warfarin dose     To take:  10 mg Take 2 of the 5 mg tablets.    To take:  12.5 mg Take 2.5 of the 5 mg tablets.

## 2017-12-18 ENCOUNTER — OFFICE VISIT (OUTPATIENT)
Dept: PSYCHOLOGY | Facility: CLINIC | Age: 62
End: 2017-12-18
Attending: PHYSICIAN ASSISTANT
Payer: COMMERCIAL

## 2017-12-18 DIAGNOSIS — F32.0 MILD MAJOR DEPRESSION (H): Primary | ICD-10-CM

## 2017-12-18 PROCEDURE — 90834 PSYTX W PT 45 MINUTES: CPT | Performed by: PSYCHOLOGIST

## 2017-12-18 NOTE — MR AVS SNAPSHOT
MRN:[id number]                      After Visit Summary   12/18/2017    Tere Ortiz    MRN: [id number]           Visit Information        Provider Department      12/18/2017 2:00 PM Al Surya GREWAL [facility]      Your next 10 appointments already scheduled     Dec 26, 2017  3:00 PM CST   Return Visit with Surya Melendez   [facility] ([facility])    [address] [address]   [phone]            Jan 02, 2018  3:00 PM CST   Return Visit with Surya Melendez   [facility] ([facility])    [address] [address]   [phone]            David 15, 2018  2:00 PM CST   Anticoagulation Visit with NB ANTI COAG   [facility] ([facility])    [address] [place] [address]   [phone]            Apr 03, 2018 11:00 AM CDT   DX HIP/PELVIS/SPINE with WYDX1   [facility] ([facility])    [address] [address]   [phone]           Please do not take any of the following 24 hours prior to the day of your exam: vitamins, calcium tablets, antacids.  If possible, please wear clothes without metal (snaps, zippers). A sweatsuit works well.            Apr 13, 2018 11:30 AM CDT   (Arrive by 11:15 AM)   RETURN ENDOCRINE with Karol Simmons MD   [facility] ([facility])    [address] [place] [address] [phone]              MyChart Information     [employer] gives you secure access to your electronic health record. If you see a primary care provider, you can also send messages to your care team and make appointments. If you have questions, please call your primary care clinic.  If you do not have a primary care provider, please call [phone] and they will assist you.        Care EveryWhere ID     This is  your Care EveryWhere ID. This could be used by other organizations to access your [place] medical records  [id number]        Equal Access to Services     SHANE ATKINSON : Nicola Macias, hermelinda fofana, nicky mckenzie, salas cifuentes. So [facility] [phone].    ATENCIÓN: Si habla español, tiene a acosta disposición servicios gratuitos de asistencia lingüística. Llame al [phone].    We comply with applicable federal civil rights laws and [place] laws. We do not discriminate on the basis of race, color, national origin, age, disability, sex, sexual orientation, or gender identity.

## 2017-12-19 DIAGNOSIS — R11.0 NAUSEA: ICD-10-CM

## 2017-12-19 DIAGNOSIS — B37.2 CANDIDIASIS OF SKIN: ICD-10-CM

## 2017-12-20 ENCOUNTER — TELEPHONE (OUTPATIENT)
Dept: FAMILY MEDICINE | Facility: CLINIC | Age: 62
End: 2017-12-20

## 2017-12-20 NOTE — TELEPHONE ENCOUNTER
Tere says Zina will be getting several Rx refill requests on meds that have been ordered from various doctors. She says the reason she is ordering them is that she has met her deductable so would have Zero Co-pay.

## 2017-12-20 NOTE — TELEPHONE ENCOUNTER
Pharmacy sent fax asking if Zina could refill this Rx for Nystatin powder. They sent it to Dr Ceci Barger but said it should be sent to Zina.

## 2017-12-21 RX ORDER — ONDANSETRON 4 MG/1
TABLET, ORALLY DISINTEGRATING ORAL
Qty: 18 TABLET | Refills: 0 | Status: SHIPPED | OUTPATIENT
Start: 2017-12-21 | End: 2018-01-01

## 2017-12-21 RX ORDER — NYSTATIN 100000 [USP'U]/G
POWDER TOPICAL
Qty: 60 G | Refills: 0 | Status: SHIPPED | OUTPATIENT
Start: 2017-12-21 | End: 2018-01-01

## 2017-12-21 NOTE — PROGRESS NOTES
"                 Progress Note - Initial Session  Disclaimer: This note consists of symbols derived from keyboarding, dictation and/or voice recognition software. As a result, there may be errors in the script that have gone undetected. Please consider this when interpreting information found in this chart.    Client Name:  Tere Ortiz Date: 12/18/2017         Service Type: Individual      Session Start Time: 2:00 PM  Session End Time: 2:45 PM      Session Length: 38 - 52      Session #: 1     Attendees: Client attended alone         Diagnostic Assessment in progress.  Unable to complete documentation at the conclusion of the first session due to needing more information.  Plan presents with concerns of anxiety and grief.  She lost her daughter 12/28/2013 at the age of 36 due to an unrecognized heart defect.  She also lost her mother-in-law recently.  She has had a number of other deaths in her extended [place] including her only brother just this last year.  Multiple medical problems many of which arose within the same 6 month period.  Suddenly went from \"breadwinner to burden.\"  Multiple alcoholics in the family as well.  Client also reports difficulty with daytime sleepiness and has just completed a sleep study but has not obtained the results yet.  Limited exercise due to physical problems.      Mental Status Assessment:  Appearance:   Appropriate   Eye Contact:   Good   Psychomotor Behavior: Normal   Attitude:   Cooperative   Orientation:   All  Speech   Rate / Production: Normal    Volume:  Normal   Mood:    Normal  Affect:    Appropriate   Thought Content:  Clear   Thought Form:  Coherent  Logical   Insight:    Good       Safety Issues and Plan for Safety and Risk Management:  Client denies current fears or concerns for personal safety.  Client denies current or recent suicidal ideation or behaviors.  Client denies current or recent homicidal ideation or behaviors.  Client denies current or recent self " injurious behavior or ideation.  Client denies other safety concerns.  A safety and risk management plan has not been developed at this time, however client was given the after-hours number / 911 should there be a change in any of these risk factors.  Client reports there are no firearms in the house.      Diagnostic Criteria:  A) Recurrent episode(s) - symptoms have been present during the same 2-week period and represent a change from previous functioning 5 or more symptoms (required for diagnosis)   - Depressed mood. Note: In children and adolescents, can be irritable mood.     - Diminished interest or pleasure in all, or almost all, activities.    - Increased sleep.    - Fatigue or loss of energy.    - Feelings of worthlessness or inappropriate and excessive guilt.    - Diminished ability to think or concentrate, or indecisiveness.   B) The symptoms cause clinically significant distress or impairment in social, occupational, or other important areas of functioning  C) The episode is not attributable to the physiological effects of a substance or to another medical condition  D) The occurence of major depressive episode is not better explained by other thought / psychotic disorders  E) There has never been a manic episode or hypomanic episode        DSM5 Diagnoses: (Sustained by DSM5 Criteria Listed Above)  Diagnoses: 296.31 (F33.0) Major Depressive Disorder, Recurrent Episode, Mild _  Psychosocial & Contextual Factors: Grief from multiple losses  WHODAS 2.0 (12 item)            This questionnaire asks about difficulties due to health conditions. Health conditions  include  disease or illnesses, other health problems that may be short or long lasting,  injuries, mental health or emotional problems, and problems with alcohol or drugs.                     Think back over the past 30 days and answer these questions, thinking about how much  difficulty you had doing the following activities. For each question, please  [place] only  one response.    S1 Standing for long periods such as 30 minutes? Extreme / or cannot do = 5   S2 Taking care of household responsibilities? Extreme / or cannot do = 5   S3 Learning a new task, for example, learning how to get to a new place? Moderate =   3   S4 How much of a problem do you have joining community activities (for example, festivals, Gn[employer] or other activities) in the same way as anyone else can? Mild =           2   S5 How much have you been emotionally affected by your health problems? Severe =       4     In the past 30 days, how much difficulty did you have in:   S6 Concentrating on doing something for ten minutes? Severe =       4   S7 Walking a long distance such as a kilometer (or equivalent)? Extreme / or cannot do = 5   S8 Washing your whole body? Moderate =   3   S9 Getting dressed? Moderate =   3   S10 Dealing with people you do not know? None =         1   S11 Maintaining a friendship? None =         1   S12 Your day to day work? None =         1     H1 Overall, in the past 30 days, how many days were these difficulties present? Record number of days 30   H2 In the past 30 days, for how many days were you totally unable to carry out your usual activities or work because of any health condition? Record number of days 30   H3 In the past 30 days, not counting the days that you were totally unable, for how many days did you cut back or reduce your usual activities or work because of any health condition? Record number of days 30       Collateral Reports Completed:  Not Applicable      PLAN: (Homework, other):  Client stated that [facility]  Future appointments set.      Surya Melendez

## 2017-12-22 NOTE — TELEPHONE ENCOUNTER
Patient called and spoke with Leni. She stated she did has not heard anything about her replacement machine she was suppose to get from [employer]. I called [employer] at [phone] and was told Opal who is in charge of setting her up is on vacation until Tuesday. I left a detailed message for her to contact Tere MELGAR. This should have been taken care of 3 weeks ago.  Also called Tere and told her if she was not contacted on Tuesday to call [employer]

## 2017-12-26 ENCOUNTER — OFFICE VISIT (OUTPATIENT)
Dept: PSYCHOLOGY | Facility: CLINIC | Age: 62
End: 2017-12-26
Payer: COMMERCIAL

## 2017-12-26 DIAGNOSIS — F32.0 MILD MAJOR DEPRESSION (H): Primary | ICD-10-CM

## 2017-12-26 PROCEDURE — 90791 PSYCH DIAGNOSTIC EVALUATION: CPT | Performed by: PSYCHOLOGIST

## 2017-12-26 NOTE — Clinical Note
I have seen Tere twice now, I like her feistiness.  I think we can do some good work around the trauma which is most likely adding to her multiple pain issues.  Thanks for the referral.

## 2017-12-26 NOTE — MR AVS SNAPSHOT
MRN:[id number]                      After Visit Summary   12/26/2017    Tere Ortiz    MRN: [id number]           Visit Information        Provider Department      12/26/2017 3:00 PM Surya [facility]endez [facility]      Your next 10 appointments already scheduled     Jan 02, 2018  3:00 PM CST   Return Visit with Surya Melendez   [facility] ([facility])    [address] [address]   [phone]            David 15, 2018  2:00 PM CST   Anticoagulation Visit with NB ANTI COAG   [facility]    [address] [place] [address]   [phone]            David 15, 2018  5:00 PM CST   Return Visit with Surya CARMINA Hung[employer]   [facility] ([facility])    [address] [address]   [phone]            Apr 03, 2018 11:00 AM CDT   DX HIP/PELVIS/SPINE with WYDX1   [facility] ([facility])    [address] [address]   [phone]           Please do not take any of the following 24 hours prior to the day of your exam: vitamins, calcium tablets, antacids.  If possible, please wear clothes without metal (snaps, zippers). A sweatsuit works well.            Apr 13, 2018 11:30 AM CDT   (Arrive by 11:15 AM)   RETURN ENDOCRINE with Karol Simmons MD   [facility] ([facility])    [address] [place] [address] [phone]              MyChart Information     [employer] gives you secure access to your electronic health record. If you see a primary care provider, you can also send messages to your care team and make appointments. If you have questions, please call your primary care clinic.  If you do not have a primary care provider, please call [phone] and they will assist you.        Care EveryWhere ID     This is  your Care EveryWhere ID. This could be used by other organizations to access your [place] medical records  [id number]        Equal Access to Services     SHANE ATKINSON : Nicola Macias, hermelinda fofana, nicky mckenzie, salas cifuentes. So [facility] [phone].    ATENCIÓN: Si habla español, tiene a acosta disposición servicios gratuitos de asistencia lingüística. Llame al [phone].    We comply with applicable federal civil rights laws and [place] laws. We do not discriminate on the basis of race, color, national origin, age, disability, sex, sexual orientation, or gender identity.
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
## 2017-12-31 DIAGNOSIS — J44.9 CHRONIC OBSTRUCTIVE PULMONARY DISEASE, UNSPECIFIED COPD TYPE (H): Primary | ICD-10-CM

## 2018-01-01 ENCOUNTER — APPOINTMENT (OUTPATIENT)
Dept: ULTRASOUND IMAGING | Facility: CLINIC | Age: 63
DRG: 917 | End: 2018-01-01
Attending: FAMILY MEDICINE
Payer: MEDICARE

## 2018-01-01 ENCOUNTER — TELEPHONE (OUTPATIENT)
Dept: ANTICOAGULATION | Facility: CLINIC | Age: 63
End: 2018-01-01

## 2018-01-01 ENCOUNTER — ANTICOAGULATION THERAPY VISIT (OUTPATIENT)
Dept: ANTICOAGULATION | Facility: CLINIC | Age: 63
End: 2018-01-01
Payer: COMMERCIAL

## 2018-01-01 ENCOUNTER — ANTICOAGULATION THERAPY VISIT (OUTPATIENT)
Dept: ANTICOAGULATION | Facility: CLINIC | Age: 63
End: 2018-01-01
Payer: MEDICARE

## 2018-01-01 ENCOUNTER — MEDICAL CORRESPONDENCE (OUTPATIENT)
Dept: HEALTH INFORMATION MANAGEMENT | Facility: CLINIC | Age: 63
End: 2018-01-01

## 2018-01-01 ENCOUNTER — TELEPHONE (OUTPATIENT)
Dept: ENDOCRINOLOGY | Facility: CLINIC | Age: 63
End: 2018-01-01

## 2018-01-01 ENCOUNTER — PATIENT OUTREACH (OUTPATIENT)
Dept: CARE COORDINATION | Facility: CLINIC | Age: 63
End: 2018-01-01

## 2018-01-01 ENCOUNTER — TELEPHONE (OUTPATIENT)
Dept: FAMILY MEDICINE | Facility: CLINIC | Age: 63
End: 2018-01-01

## 2018-01-01 ENCOUNTER — OFFICE VISIT (OUTPATIENT)
Dept: FAMILY MEDICINE | Facility: CLINIC | Age: 63
End: 2018-01-01
Payer: MEDICARE

## 2018-01-01 ENCOUNTER — DOCUMENTATION ONLY (OUTPATIENT)
Dept: CARE COORDINATION | Facility: CLINIC | Age: 63
End: 2018-01-01

## 2018-01-01 ENCOUNTER — MYC MEDICAL ADVICE (OUTPATIENT)
Dept: FAMILY MEDICINE | Facility: CLINIC | Age: 63
End: 2018-01-01

## 2018-01-01 ENCOUNTER — HOSPITAL ENCOUNTER (OUTPATIENT)
Dept: LAB | Facility: CLINIC | Age: 63
Discharge: HOME | End: 2018-11-04
Attending: EMERGENCY MEDICINE | Admitting: EMERGENCY MEDICINE
Payer: MEDICARE

## 2018-01-01 ENCOUNTER — NURSE TRIAGE (OUTPATIENT)
Dept: NURSING | Facility: CLINIC | Age: 63
End: 2018-01-01

## 2018-01-01 ENCOUNTER — ANTICOAGULATION THERAPY VISIT (OUTPATIENT)
Dept: ANTICOAGULATION | Facility: CLINIC | Age: 63
End: 2018-01-01

## 2018-01-01 ENCOUNTER — TELEPHONE (OUTPATIENT)
Dept: CARE COORDINATION | Facility: CLINIC | Age: 63
End: 2018-01-01

## 2018-01-01 ENCOUNTER — OFFICE VISIT (OUTPATIENT)
Dept: FAMILY MEDICINE | Facility: CLINIC | Age: 63
End: 2018-01-01
Payer: COMMERCIAL

## 2018-01-01 ENCOUNTER — APPOINTMENT (OUTPATIENT)
Dept: CT IMAGING | Facility: CLINIC | Age: 63
DRG: 917 | End: 2018-01-01
Attending: FAMILY MEDICINE
Payer: MEDICARE

## 2018-01-01 ENCOUNTER — HOSPITAL ENCOUNTER (INPATIENT)
Facility: CLINIC | Age: 63
LOS: 6 days | Discharge: HOME | DRG: 917 | End: 2018-10-31
Attending: FAMILY MEDICINE | Admitting: FAMILY MEDICINE
Payer: MEDICARE

## 2018-01-01 ENCOUNTER — TRANSFERRED RECORDS (OUTPATIENT)
Dept: HEALTH INFORMATION MANAGEMENT | Facility: CLINIC | Age: 63
End: 2018-01-01

## 2018-01-01 ENCOUNTER — RADIANT APPOINTMENT (OUTPATIENT)
Dept: MAMMOGRAPHY | Facility: CLINIC | Age: 63
End: 2018-01-01
Attending: PHYSICIAN ASSISTANT
Payer: COMMERCIAL

## 2018-01-01 ENCOUNTER — OFFICE VISIT (OUTPATIENT)
Dept: URGENT CARE | Facility: URGENT CARE | Age: 63
End: 2018-01-01
Payer: COMMERCIAL

## 2018-01-01 ENCOUNTER — HOSPITAL ENCOUNTER (EMERGENCY)
Facility: CLINIC | Age: 63
Discharge: HOME | End: 2018-11-03
Attending: EMERGENCY MEDICINE | Admitting: EMERGENCY MEDICINE
Payer: MEDICARE

## 2018-01-01 ENCOUNTER — OFFICE VISIT (OUTPATIENT)
Dept: ENDOCRINOLOGY | Facility: CLINIC | Age: 63
End: 2018-01-01
Payer: MEDICARE

## 2018-01-01 ENCOUNTER — ALLIED HEALTH/NURSE VISIT (OUTPATIENT)
Dept: FAMILY MEDICINE | Facility: CLINIC | Age: 63
End: 2018-01-01
Payer: COMMERCIAL

## 2018-01-01 ENCOUNTER — TELEPHONE (OUTPATIENT)
Dept: PALLIATIVE MEDICINE | Facility: CLINIC | Age: 63
End: 2018-01-01

## 2018-01-01 ENCOUNTER — APPOINTMENT (OUTPATIENT)
Dept: CARDIOLOGY | Facility: CLINIC | Age: 63
DRG: 917 | End: 2018-01-01
Attending: INTERNAL MEDICINE
Payer: MEDICARE

## 2018-01-01 ENCOUNTER — APPOINTMENT (OUTPATIENT)
Dept: GENERAL RADIOLOGY | Facility: CLINIC | Age: 63
DRG: 917 | End: 2018-01-01
Attending: FAMILY MEDICINE
Payer: MEDICARE

## 2018-01-01 ENCOUNTER — ANCILLARY PROCEDURE (OUTPATIENT)
Dept: GENERAL RADIOLOGY | Facility: CLINIC | Age: 63
End: 2018-01-01
Attending: PHYSICIAN ASSISTANT
Payer: COMMERCIAL

## 2018-01-01 VITALS
HEART RATE: 68 BPM | TEMPERATURE: 98.1 F | DIASTOLIC BLOOD PRESSURE: 56 MMHG | OXYGEN SATURATION: 92 % | BODY MASS INDEX: 25.45 KG/M2 | RESPIRATION RATE: 16 BRPM | WEIGHT: 126 LBS | SYSTOLIC BLOOD PRESSURE: 99 MMHG

## 2018-01-01 VITALS
OXYGEN SATURATION: 89 % | BODY MASS INDEX: 26.51 KG/M2 | SYSTOLIC BLOOD PRESSURE: 84 MMHG | DIASTOLIC BLOOD PRESSURE: 58 MMHG | WEIGHT: 131.5 LBS | HEART RATE: 84 BPM | HEIGHT: 59 IN

## 2018-01-01 VITALS
RESPIRATION RATE: 18 BRPM | WEIGHT: 115.08 LBS | SYSTOLIC BLOOD PRESSURE: 122 MMHG | TEMPERATURE: 98.8 F | OXYGEN SATURATION: 96 % | DIASTOLIC BLOOD PRESSURE: 68 MMHG | HEART RATE: 92 BPM | BODY MASS INDEX: 23.2 KG/M2 | HEIGHT: 59 IN

## 2018-01-01 VITALS
HEART RATE: 94 BPM | TEMPERATURE: 97.9 F | DIASTOLIC BLOOD PRESSURE: 70 MMHG | OXYGEN SATURATION: 99 % | SYSTOLIC BLOOD PRESSURE: 103 MMHG | RESPIRATION RATE: 22 BRPM | BODY MASS INDEX: 24.14 KG/M2 | HEIGHT: 58 IN | WEIGHT: 115 LBS

## 2018-01-01 VITALS — WEIGHT: 128 LBS | BODY MASS INDEX: 25.85 KG/M2

## 2018-01-01 VITALS
SYSTOLIC BLOOD PRESSURE: 100 MMHG | WEIGHT: 126 LBS | TEMPERATURE: 98.2 F | HEIGHT: 59 IN | HEART RATE: 104 BPM | RESPIRATION RATE: 18 BRPM | OXYGEN SATURATION: 92 % | BODY MASS INDEX: 25.4 KG/M2 | DIASTOLIC BLOOD PRESSURE: 60 MMHG

## 2018-01-01 VITALS
BODY MASS INDEX: 24.14 KG/M2 | TEMPERATURE: 98 F | SYSTOLIC BLOOD PRESSURE: 95 MMHG | OXYGEN SATURATION: 91 % | WEIGHT: 115 LBS | HEIGHT: 58 IN | RESPIRATION RATE: 18 BRPM | HEART RATE: 87 BPM | DIASTOLIC BLOOD PRESSURE: 61 MMHG

## 2018-01-01 VITALS
WEIGHT: 134.4 LBS | BODY MASS INDEX: 27.15 KG/M2 | HEART RATE: 91 BPM | DIASTOLIC BLOOD PRESSURE: 69 MMHG | SYSTOLIC BLOOD PRESSURE: 108 MMHG

## 2018-01-01 VITALS
HEIGHT: 58 IN | OXYGEN SATURATION: 90 % | TEMPERATURE: 100.6 F | SYSTOLIC BLOOD PRESSURE: 125 MMHG | HEART RATE: 95 BPM | BODY MASS INDEX: 24.04 KG/M2 | DIASTOLIC BLOOD PRESSURE: 76 MMHG | RESPIRATION RATE: 20 BRPM

## 2018-01-01 VITALS
HEIGHT: 59 IN | DIASTOLIC BLOOD PRESSURE: 65 MMHG | HEART RATE: 91 BPM | RESPIRATION RATE: 18 BRPM | WEIGHT: 115 LBS | BODY MASS INDEX: 23.18 KG/M2 | SYSTOLIC BLOOD PRESSURE: 96 MMHG | OXYGEN SATURATION: 96 %

## 2018-01-01 DIAGNOSIS — E03.9 HYPOTHYROIDISM, UNSPECIFIED TYPE: ICD-10-CM

## 2018-01-01 DIAGNOSIS — T88.7XXA MEDICATION SIDE EFFECTS: ICD-10-CM

## 2018-01-01 DIAGNOSIS — R53.83 OTHER FATIGUE: Primary | ICD-10-CM

## 2018-01-01 DIAGNOSIS — M51.26 RUPTURED LUMBAR DISC: ICD-10-CM

## 2018-01-01 DIAGNOSIS — R11.0 NAUSEA: ICD-10-CM

## 2018-01-01 DIAGNOSIS — Z79.01 LONG-TERM (CURRENT) USE OF ANTICOAGULANTS: ICD-10-CM

## 2018-01-01 DIAGNOSIS — D68.62 LUPUS ANTICOAGULANT INHIBITOR SYNDROME (H): ICD-10-CM

## 2018-01-01 DIAGNOSIS — R63.4 WEIGHT LOSS: Primary | ICD-10-CM

## 2018-01-01 DIAGNOSIS — I26.99 PULMONARY EMBOLISM WITH INFARCTION (H): ICD-10-CM

## 2018-01-01 DIAGNOSIS — M79.18 MYOFASCIAL PAIN SYNDROME: ICD-10-CM

## 2018-01-01 DIAGNOSIS — R79.9 ABNORMAL FINDING OF BLOOD CHEMISTRY: ICD-10-CM

## 2018-01-01 DIAGNOSIS — A41.9 UNSPECIFIED SEPTICEMIA(038.9) (H): ICD-10-CM

## 2018-01-01 DIAGNOSIS — J44.9 CHRONIC OBSTRUCTIVE PULMONARY DISEASE, UNSPECIFIED COPD TYPE (H): ICD-10-CM

## 2018-01-01 DIAGNOSIS — I74.9 EMBOLISM (H): ICD-10-CM

## 2018-01-01 DIAGNOSIS — Z12.31 VISIT FOR SCREENING MAMMOGRAM: ICD-10-CM

## 2018-01-01 DIAGNOSIS — L85.3 DRY SKIN: ICD-10-CM

## 2018-01-01 DIAGNOSIS — M53.3 SI (SACROILIAC) JOINT DYSFUNCTION: ICD-10-CM

## 2018-01-01 DIAGNOSIS — J30.2 SEASONAL ALLERGIC RHINITIS, UNSPECIFIED CHRONICITY, UNSPECIFIED TRIGGER: ICD-10-CM

## 2018-01-01 DIAGNOSIS — F41.9 ANXIETY: ICD-10-CM

## 2018-01-01 DIAGNOSIS — D75.89 MACROCYTOSIS WITHOUT ANEMIA: ICD-10-CM

## 2018-01-01 DIAGNOSIS — J96.02 ACUTE HYPERCAPNIC RESPIRATORY FAILURE (H): ICD-10-CM

## 2018-01-01 DIAGNOSIS — J20.9 ACUTE BRONCHITIS, UNSPECIFIED ORGANISM: ICD-10-CM

## 2018-01-01 DIAGNOSIS — J44.9 COPD (CHRONIC OBSTRUCTIVE PULMONARY DISEASE) (H): Primary | ICD-10-CM

## 2018-01-01 DIAGNOSIS — J01.80 OTHER ACUTE SINUSITIS, RECURRENCE NOT SPECIFIED: ICD-10-CM

## 2018-01-01 DIAGNOSIS — E55.9 VITAMIN D DEFICIENCY: ICD-10-CM

## 2018-01-01 DIAGNOSIS — G43.909 MIGRAINES: ICD-10-CM

## 2018-01-01 DIAGNOSIS — T40.601A NARCOTIC OVERDOSE, ACCIDENTAL OR UNINTENTIONAL, INITIAL ENCOUNTER (H): ICD-10-CM

## 2018-01-01 DIAGNOSIS — R93.89 ABNORMAL FINDINGS ON DIAGNOSTIC IMAGING OF OTHER SPECIFIED BODY STRUCTURES: ICD-10-CM

## 2018-01-01 DIAGNOSIS — K21.00 GASTROESOPHAGEAL REFLUX DISEASE WITH ESOPHAGITIS: Primary | ICD-10-CM

## 2018-01-01 DIAGNOSIS — R19.7 DIARRHEA, UNSPECIFIED TYPE: ICD-10-CM

## 2018-01-01 DIAGNOSIS — K92.89 GASTROINTESTINAL IRRITATION: Primary | ICD-10-CM

## 2018-01-01 DIAGNOSIS — E87.6 HYPOKALEMIA: ICD-10-CM

## 2018-01-01 DIAGNOSIS — J96.21 ACUTE AND CHRONIC RESPIRATORY FAILURE WITH HYPOXIA (H): Primary | ICD-10-CM

## 2018-01-01 DIAGNOSIS — J44.1 COPD EXACERBATION (H): ICD-10-CM

## 2018-01-01 DIAGNOSIS — L30.4 INTERTRIGO: ICD-10-CM

## 2018-01-01 DIAGNOSIS — R50.9 FEVER IN ADULT: ICD-10-CM

## 2018-01-01 DIAGNOSIS — G44.219 EPISODIC TENSION-TYPE HEADACHE, NOT INTRACTABLE: ICD-10-CM

## 2018-01-01 DIAGNOSIS — J12.9 VIRAL PNEUMONIA: ICD-10-CM

## 2018-01-01 DIAGNOSIS — J96.02 ACUTE RESPIRATORY FAILURE WITH HYPERCAPNIA (H): ICD-10-CM

## 2018-01-01 DIAGNOSIS — E87.6 LOW BLOOD POTASSIUM: ICD-10-CM

## 2018-01-01 DIAGNOSIS — M79.18 MYOFASCIAL PAIN SYNDROME: Primary | ICD-10-CM

## 2018-01-01 DIAGNOSIS — J44.9 CHRONIC OBSTRUCTIVE PULMONARY DISEASE, UNSPECIFIED COPD TYPE (H): Primary | ICD-10-CM

## 2018-01-01 DIAGNOSIS — R50.9 FEVER IN ADULT: Primary | ICD-10-CM

## 2018-01-01 DIAGNOSIS — H00.011 HORDEOLUM EXTERNUM OF RIGHT UPPER EYELID: Primary | ICD-10-CM

## 2018-01-01 DIAGNOSIS — G47.33 OSA (OBSTRUCTIVE SLEEP APNEA): Primary | ICD-10-CM

## 2018-01-01 DIAGNOSIS — G89.29 OTHER CHRONIC PAIN: Primary | ICD-10-CM

## 2018-01-01 DIAGNOSIS — M79.7 FIBROMYALGIA: ICD-10-CM

## 2018-01-01 DIAGNOSIS — M19.90 OSTEOARTHRITIS, UNSPECIFIED OSTEOARTHRITIS TYPE, UNSPECIFIED SITE: ICD-10-CM

## 2018-01-01 DIAGNOSIS — F17.210 CIGARETTE SMOKER: ICD-10-CM

## 2018-01-01 DIAGNOSIS — A41.9 SEPSIS, DUE TO UNSPECIFIED ORGANISM: ICD-10-CM

## 2018-01-01 DIAGNOSIS — S16.1XXA CERVICAL STRAIN: ICD-10-CM

## 2018-01-01 DIAGNOSIS — R19.7 DIARRHEA OF PRESUMED INFECTIOUS ORIGIN: ICD-10-CM

## 2018-01-01 DIAGNOSIS — G89.29 OTHER CHRONIC PAIN: ICD-10-CM

## 2018-01-01 DIAGNOSIS — H60.391 SKIN OF RIGHT EARLOBE WITH INFECTION: Primary | ICD-10-CM

## 2018-01-01 DIAGNOSIS — E55.9 VITAMIN D DEFICIENCY: Primary | ICD-10-CM

## 2018-01-01 DIAGNOSIS — K21.00 GASTROESOPHAGEAL REFLUX DISEASE WITH ESOPHAGITIS: ICD-10-CM

## 2018-01-01 DIAGNOSIS — Z78.0 ASYMPTOMATIC POSTMENOPAUSAL STATUS: ICD-10-CM

## 2018-01-01 DIAGNOSIS — S16.1XXA CERVICAL STRAIN: Primary | ICD-10-CM

## 2018-01-01 DIAGNOSIS — G43.909 MIGRAINES: Primary | ICD-10-CM

## 2018-01-01 DIAGNOSIS — K92.9 GASTROINTESTINAL DISORDER: ICD-10-CM

## 2018-01-01 DIAGNOSIS — J06.9 UPPER RESPIRATORY TRACT INFECTION, UNSPECIFIED TYPE: ICD-10-CM

## 2018-01-01 DIAGNOSIS — R53.82 CHRONIC FATIGUE: Primary | ICD-10-CM

## 2018-01-01 DIAGNOSIS — R53.83 OTHER FATIGUE: ICD-10-CM

## 2018-01-01 DIAGNOSIS — Z79.899 POLYPHARMACY: ICD-10-CM

## 2018-01-01 DIAGNOSIS — R79.1 ABNORMAL COAGULATION PROFILE: ICD-10-CM

## 2018-01-01 DIAGNOSIS — M62.81 MUSCLE WEAKNESS (GENERALIZED): ICD-10-CM

## 2018-01-01 LAB
ALBUMIN SERPL-MCNC: 2.4 G/DL (ref 3.4–5)
ALBUMIN SERPL-MCNC: 2.5 G/DL (ref 3.4–5)
ALBUMIN SERPL-MCNC: 3.2 G/DL (ref 3.4–5)
ALBUMIN SERPL-MCNC: 3.5 G/DL (ref 3.4–5)
ALBUMIN SERPL-MCNC: 3.6 G/DL (ref 3.4–5)
ALBUMIN UR-MCNC: 100 MG/DL
ALP SERPL-CCNC: 45 U/L (ref 40–150)
ALP SERPL-CCNC: 50 U/L (ref 40–150)
ALP SERPL-CCNC: 73 U/L (ref 40–150)
ALP SERPL-CCNC: 76 U/L (ref 40–150)
ALP SERPL-CCNC: 78 U/L (ref 40–150)
ALT SERPL W P-5'-P-CCNC: 13 U/L (ref 0–50)
ALT SERPL W P-5'-P-CCNC: 20 U/L (ref 0–50)
ALT SERPL W P-5'-P-CCNC: 21 U/L (ref 0–50)
ALT SERPL W P-5'-P-CCNC: 23 U/L (ref 0–50)
ALT SERPL W P-5'-P-CCNC: 29 U/L (ref 0–50)
AMMONIA PLAS-SCNC: 42 UMOL/L (ref 10–50)
ANION GAP SERPL CALCULATED.3IONS-SCNC: 4 MMOL/L (ref 3–14)
ANION GAP SERPL CALCULATED.3IONS-SCNC: 5 MMOL/L (ref 3–14)
ANION GAP SERPL CALCULATED.3IONS-SCNC: 6 MMOL/L (ref 3–14)
ANION GAP SERPL CALCULATED.3IONS-SCNC: 7 MMOL/L (ref 3–14)
ANION GAP SERPL CALCULATED.3IONS-SCNC: 8 MMOL/L (ref 3–14)
ANION GAP SERPL CALCULATED.3IONS-SCNC: 9 MMOL/L (ref 3–14)
ANION GAP SERPL CALCULATED.3IONS-SCNC: 9 MMOL/L (ref 3–14)
APPEARANCE UR: ABNORMAL
AST SERPL W P-5'-P-CCNC: 14 U/L (ref 0–45)
AST SERPL W P-5'-P-CCNC: 15 U/L (ref 0–45)
AST SERPL W P-5'-P-CCNC: 22 U/L (ref 0–45)
AST SERPL W P-5'-P-CCNC: 32 U/L (ref 0–45)
AST SERPL W P-5'-P-CCNC: 35 U/L (ref 0–45)
BACTERIA #/AREA URNS HPF: ABNORMAL /HPF
BACTERIA SPEC CULT: NO GROWTH
BASE DEFICIT BLDV-SCNC: 0.5 MMOL/L
BASE DEFICIT BLDV-SCNC: 1.9 MMOL/L
BASE DEFICIT BLDV-SCNC: 2.9 MMOL/L
BASE DEFICIT BLDV-SCNC: 3.3 MMOL/L
BASE DEFICIT BLDV-SCNC: 4 MMOL/L
BASE DEFICIT BLDV-SCNC: 5 MMOL/L
BASE EXCESS BLDV CALC-SCNC: 4.1 MMOL/L
BASOPHILS # BLD AUTO: 0 10E9/L (ref 0–0.2)
BASOPHILS # BLD AUTO: 0 10E9/L (ref 0–0.2)
BASOPHILS NFR BLD AUTO: 0 %
BASOPHILS NFR BLD AUTO: 0.1 %
BILIRUB SERPL-MCNC: 0.2 MG/DL (ref 0.2–1.3)
BILIRUB SERPL-MCNC: 0.3 MG/DL (ref 0.2–1.3)
BILIRUB SERPL-MCNC: 0.4 MG/DL (ref 0.2–1.3)
BILIRUB UR QL STRIP: NEGATIVE
BUN SERPL-MCNC: 10 MG/DL (ref 7–30)
BUN SERPL-MCNC: 11 MG/DL (ref 7–30)
BUN SERPL-MCNC: 12 MG/DL (ref 7–30)
BUN SERPL-MCNC: 15 MG/DL (ref 7–30)
BUN SERPL-MCNC: 19 MG/DL (ref 7–30)
BUN SERPL-MCNC: 5 MG/DL (ref 7–30)
BUN SERPL-MCNC: 6 MG/DL (ref 7–30)
C COLI+JEJUNI+LARI FUSA STL QL NAA+PROBE: NOT DETECTED
C DIFF TOX B STL QL: NEGATIVE
CALCIUM SERPL-MCNC: 7.6 MG/DL (ref 8.5–10.1)
CALCIUM SERPL-MCNC: 8.1 MG/DL (ref 8.5–10.1)
CALCIUM SERPL-MCNC: 8.3 MG/DL (ref 8.5–10.1)
CALCIUM SERPL-MCNC: 8.6 MG/DL (ref 8.5–10.1)
CALCIUM SERPL-MCNC: 9 MG/DL (ref 8.5–10.1)
CHLORIDE SERPL-SCNC: 107 MMOL/L (ref 94–109)
CHLORIDE SERPL-SCNC: 107 MMOL/L (ref 94–109)
CHLORIDE SERPL-SCNC: 108 MMOL/L (ref 94–109)
CHLORIDE SERPL-SCNC: 109 MMOL/L (ref 94–109)
CHLORIDE SERPL-SCNC: 110 MMOL/L (ref 94–109)
CHLORIDE SERPL-SCNC: 110 MMOL/L (ref 94–109)
CHLORIDE SERPL-SCNC: 112 MMOL/L (ref 94–109)
CO2 SERPL-SCNC: 23 MMOL/L (ref 20–32)
CO2 SERPL-SCNC: 26 MMOL/L (ref 20–32)
CO2 SERPL-SCNC: 27 MMOL/L (ref 20–32)
CO2 SERPL-SCNC: 28 MMOL/L (ref 20–32)
CO2 SERPL-SCNC: 28 MMOL/L (ref 20–32)
CO2 SERPL-SCNC: 29 MMOL/L (ref 20–32)
CO2 SERPL-SCNC: 30 MMOL/L (ref 20–32)
COLOR UR AUTO: YELLOW
CREAT SERPL-MCNC: 0.52 MG/DL (ref 0.52–1.04)
CREAT SERPL-MCNC: 0.61 MG/DL (ref 0.52–1.04)
CREAT SERPL-MCNC: 0.65 MG/DL (ref 0.52–1.04)
CREAT SERPL-MCNC: 0.7 MG/DL (ref 0.52–1.04)
CREAT SERPL-MCNC: 0.76 MG/DL (ref 0.52–1.04)
CREAT SERPL-MCNC: 0.77 MG/DL (ref 0.52–1.04)
CREAT SERPL-MCNC: 1.37 MG/DL (ref 0.52–1.04)
DEPRECATED CALCIDIOL+CALCIFEROL SERPL-MC: 56 UG/L (ref 20–75)
DHEA-S SERPL-MCNC: 39 UG/DL (ref 35–430)
DHEA-S SERPL-MCNC: 54 UG/DL (ref 35–430)
DIFFERENTIAL METHOD BLD: ABNORMAL
DIFFERENTIAL METHOD BLD: ABNORMAL
EC STX1 GENE STL QL NAA+PROBE: NOT DETECTED
EC STX2 GENE STL QL NAA+PROBE: NOT DETECTED
ENTERIC PATHOGEN COMMENT: NORMAL
EOSINOPHIL # BLD AUTO: 0 10E9/L (ref 0–0.7)
EOSINOPHIL # BLD AUTO: 0.1 10E9/L (ref 0–0.7)
EOSINOPHIL NFR BLD AUTO: 0 %
EOSINOPHIL NFR BLD AUTO: 1.5 %
ERYTHROCYTE [DISTWIDTH] IN BLOOD BY AUTOMATED COUNT: 12.9 % (ref 10–15)
ERYTHROCYTE [DISTWIDTH] IN BLOOD BY AUTOMATED COUNT: 12.9 % (ref 10–15)
ERYTHROCYTE [DISTWIDTH] IN BLOOD BY AUTOMATED COUNT: 13 % (ref 10–15)
ERYTHROCYTE [DISTWIDTH] IN BLOOD BY AUTOMATED COUNT: 13.2 % (ref 10–15)
ERYTHROCYTE [DISTWIDTH] IN BLOOD BY AUTOMATED COUNT: 14.8 % (ref 10–15)
FERRITIN SERPL-MCNC: 20 NG/ML (ref 8–252)
FLUAV+FLUBV AG SPEC QL: NEGATIVE
G LAMBLIA AG STL QL IA: NORMAL
GFR SERPL CREATININE-BSD FRML MDRD: 39 ML/MIN/1.7M2
GFR SERPL CREATININE-BSD FRML MDRD: 76 ML/MIN/1.7M2
GFR SERPL CREATININE-BSD FRML MDRD: 76 ML/MIN/1.7M2
GFR SERPL CREATININE-BSD FRML MDRD: 84 ML/MIN/1.7M2
GFR SERPL CREATININE-BSD FRML MDRD: >90 ML/MIN/1.7M2
GLUCOSE SERPL-MCNC: 107 MG/DL (ref 70–99)
GLUCOSE SERPL-MCNC: 144 MG/DL (ref 70–99)
GLUCOSE SERPL-MCNC: 74 MG/DL (ref 70–99)
GLUCOSE SERPL-MCNC: 78 MG/DL (ref 70–99)
GLUCOSE SERPL-MCNC: 80 MG/DL (ref 70–99)
GLUCOSE SERPL-MCNC: 84 MG/DL (ref 70–99)
GLUCOSE SERPL-MCNC: 99 MG/DL (ref 70–99)
GLUCOSE UR STRIP-MCNC: NEGATIVE MG/DL
HCO3 BLDV-SCNC: 26 MMOL/L (ref 21–28)
HCO3 BLDV-SCNC: 27 MMOL/L (ref 21–28)
HCO3 BLDV-SCNC: 27 MMOL/L (ref 21–28)
HCO3 BLDV-SCNC: 28 MMOL/L (ref 21–28)
HCO3 BLDV-SCNC: 29 MMOL/L (ref 21–28)
HCO3 BLDV-SCNC: 29 MMOL/L (ref 21–28)
HCO3 BLDV-SCNC: 31 MMOL/L (ref 21–28)
HCT VFR BLD AUTO: 37.8 % (ref 35–47)
HCT VFR BLD AUTO: 39.5 % (ref 35–47)
HCT VFR BLD AUTO: 40.5 % (ref 35–47)
HCT VFR BLD AUTO: 43.4 % (ref 35–47)
HCT VFR BLD AUTO: 49.8 % (ref 35–47)
HGB BLD-MCNC: 12.3 G/DL (ref 11.7–15.7)
HGB BLD-MCNC: 12.9 G/DL (ref 11.7–15.7)
HGB BLD-MCNC: 13.2 G/DL (ref 11.7–15.7)
HGB BLD-MCNC: 14.6 G/DL (ref 11.7–15.7)
HGB BLD-MCNC: 15.1 G/DL (ref 11.7–15.7)
HGB UR QL STRIP: NEGATIVE
HYALINE CASTS #/AREA URNS LPF: 9 /LPF (ref 0–2)
IMM GRANULOCYTES # BLD: 0.1 10E9/L (ref 0–0.4)
IMM GRANULOCYTES NFR BLD: 0.5 %
INR POINT OF CARE: 1.2 (ref 0.86–1.14)
INR POINT OF CARE: 1.9 (ref 0.86–1.14)
INR POINT OF CARE: 2.2 (ref 0.86–1.14)
INR POINT OF CARE: 2.3 (ref 0.86–1.14)
INR POINT OF CARE: 2.4 (ref 0.86–1.14)
INR POINT OF CARE: 3.4 (ref 0.86–1.14)
INR POINT OF CARE: 3.5 (ref 0.86–1.14)
INR POINT OF CARE: 4.2 (ref 0.86–1.14)
INR PPP: 1.34 (ref 0.86–1.14)
INR PPP: 1.5
INR PPP: 1.69 (ref 0.86–1.14)
INR PPP: 1.8
INR PPP: 1.86 (ref 0.86–1.14)
INR PPP: 2
INR PPP: 2.1
INR PPP: 2.1
INR PPP: 2.2
INR PPP: 2.32 (ref 0.86–1.14)
INR PPP: 2.51 (ref 0.86–1.14)
INR PPP: 2.8
INR PPP: 3
INR PPP: 3
INR PPP: 3.1
INR PPP: 3.27 (ref 0.86–1.14)
INR PPP: 3.4 (ref 0.86–1.14)
INR PPP: 3.5
INR PPP: 3.5
INR PPP: 3.92 (ref 0.86–1.14)
KETONES UR STRIP-MCNC: NEGATIVE MG/DL
L PNEUMO1 AG UR QL IA: NORMAL
LACTATE BLD-SCNC: 0.7 MMOL/L (ref 0.7–2)
LACTATE BLD-SCNC: 0.9 MMOL/L (ref 0.7–2)
LACTATE BLD-SCNC: 1.2 MMOL/L (ref 0.7–2)
LACTATE BLD-SCNC: 1.6 MMOL/L (ref 0.7–2)
LACTATE BLD-SCNC: 2.1 MMOL/L (ref 0.7–2)
LACTATE BLD-SCNC: 4.2 MMOL/L (ref 0.7–2)
LEUKOCYTE ESTERASE UR QL STRIP: NEGATIVE
LMWH PPP CHRO-ACNC: 0.4 IU/ML
LMWH PPP CHRO-ACNC: 0.7 IU/ML
LMWH PPP CHRO-ACNC: <0.1 IU/ML
LYMPHOCYTES # BLD AUTO: 0.4 10E9/L (ref 0.8–5.3)
LYMPHOCYTES # BLD AUTO: 0.9 10E9/L (ref 0.8–5.3)
LYMPHOCYTES NFR BLD AUTO: 22.8 %
LYMPHOCYTES NFR BLD AUTO: 3.2 %
Lab: NORMAL
MCH RBC QN AUTO: 34.5 PG (ref 26.5–33)
MCH RBC QN AUTO: 34.8 PG (ref 26.5–33)
MCH RBC QN AUTO: 35 PG (ref 26.5–33)
MCH RBC QN AUTO: 35.2 PG (ref 26.5–33)
MCH RBC QN AUTO: 35.3 PG (ref 26.5–33)
MCHC RBC AUTO-ENTMCNC: 30.3 G/DL (ref 31.5–36.5)
MCHC RBC AUTO-ENTMCNC: 32.5 G/DL (ref 31.5–36.5)
MCHC RBC AUTO-ENTMCNC: 32.6 G/DL (ref 31.5–36.5)
MCHC RBC AUTO-ENTMCNC: 32.7 G/DL (ref 31.5–36.5)
MCHC RBC AUTO-ENTMCNC: 33.6 G/DL (ref 31.5–36.5)
MCV RBC AUTO: 103 FL (ref 78–100)
MCV RBC AUTO: 107 FL (ref 78–100)
MCV RBC AUTO: 108 FL (ref 78–100)
MCV RBC AUTO: 109 FL (ref 78–100)
MCV RBC AUTO: 116 FL (ref 78–100)
MONOCYTES # BLD AUTO: 0.6 10E9/L (ref 0–1.3)
MONOCYTES # BLD AUTO: 1.1 10E9/L (ref 0–1.3)
MONOCYTES NFR BLD AUTO: 16.2 %
MONOCYTES NFR BLD AUTO: 8.7 %
MRSA DNA SPEC QL NAA+PROBE: NEGATIVE
MUCOUS THREADS #/AREA URNS LPF: PRESENT /LPF
NEUTROPHILS # BLD AUTO: 10.5 10E9/L (ref 1.6–8.3)
NEUTROPHILS # BLD AUTO: 2.4 10E9/L (ref 1.6–8.3)
NEUTROPHILS NFR BLD AUTO: 59.5 %
NEUTROPHILS NFR BLD AUTO: 87.5 %
NITRATE UR QL: NEGATIVE
NOROV GI+II ORF1-ORF2 JNC STL QL NAA+PR: NOT DETECTED
NRBC # BLD AUTO: 0 10*3/UL
NRBC BLD AUTO-RTO: 0 /100
NT-PROBNP SERPL-MCNC: 1757 PG/ML (ref 0–900)
O+P STL MICRO: NORMAL
O+P STL MICRO: NORMAL
O2/TOTAL GAS SETTING VFR VENT: 40 %
O2/TOTAL GAS SETTING VFR VENT: ABNORMAL %
PCO2 BLDV: 102 MM HG (ref 40–50)
PCO2 BLDV: 49 MM HG (ref 40–50)
PCO2 BLDV: 57 MM HG (ref 40–50)
PCO2 BLDV: 65 MM HG (ref 40–50)
PCO2 BLDV: 74 MM HG (ref 40–50)
PCO2 BLDV: 88 MM HG (ref 40–50)
PCO2 BLDV: 90 MM HG (ref 40–50)
PH BLDV: 7.06 PH (ref 7.32–7.43)
PH BLDV: 7.11 PH (ref 7.32–7.43)
PH BLDV: 7.12 PH (ref 7.32–7.43)
PH BLDV: 7.17 PH (ref 7.32–7.43)
PH BLDV: 7.23 PH (ref 7.32–7.43)
PH BLDV: 7.33 PH (ref 7.32–7.43)
PH BLDV: 7.35 PH (ref 7.32–7.43)
PH UR STRIP: 5 PH (ref 5–7)
PLATELET # BLD AUTO: 155 10E9/L (ref 150–450)
PLATELET # BLD AUTO: 164 10E9/L (ref 150–450)
PLATELET # BLD AUTO: 183 10E9/L (ref 150–450)
PLATELET # BLD AUTO: 194 10E9/L (ref 150–450)
PLATELET # BLD AUTO: 199 10E9/L (ref 150–450)
PO2 BLDV: 18 MM HG (ref 25–47)
PO2 BLDV: 22 MM HG (ref 25–47)
PO2 BLDV: 24 MM HG (ref 25–47)
PO2 BLDV: 28 MM HG (ref 25–47)
PO2 BLDV: 31 MM HG (ref 25–47)
PO2 BLDV: 41 MM HG (ref 25–47)
PO2 BLDV: 57 MM HG (ref 25–47)
POTASSIUM SERPL-SCNC: 3 MMOL/L (ref 3.4–5.3)
POTASSIUM SERPL-SCNC: 3.2 MMOL/L (ref 3.4–5.3)
POTASSIUM SERPL-SCNC: 3.2 MMOL/L (ref 3.4–5.3)
POTASSIUM SERPL-SCNC: 3.3 MMOL/L (ref 3.4–5.3)
POTASSIUM SERPL-SCNC: 3.4 MMOL/L (ref 3.4–5.3)
POTASSIUM SERPL-SCNC: 3.5 MMOL/L (ref 3.4–5.3)
POTASSIUM SERPL-SCNC: 3.6 MMOL/L (ref 3.4–5.3)
POTASSIUM SERPL-SCNC: 3.7 MMOL/L (ref 3.4–5.3)
POTASSIUM SERPL-SCNC: 3.7 MMOL/L (ref 3.4–5.3)
POTASSIUM SERPL-SCNC: 4 MMOL/L (ref 3.4–5.3)
POTASSIUM SERPL-SCNC: 4.2 MMOL/L (ref 3.4–5.3)
POTASSIUM SERPL-SCNC: 4.3 MMOL/L (ref 3.4–5.3)
PROCALCITONIN SERPL-MCNC: 0.13 NG/ML
PROCALCITONIN SERPL-MCNC: 0.35 NG/ML
PROT SERPL-MCNC: 5.4 G/DL (ref 6.8–8.8)
PROT SERPL-MCNC: 5.6 G/DL (ref 6.8–8.8)
PROT SERPL-MCNC: 7.1 G/DL (ref 6.8–8.8)
PROT SERPL-MCNC: 7.3 G/DL (ref 6.8–8.8)
PROT SERPL-MCNC: 7.4 G/DL (ref 6.8–8.8)
RBC # BLD AUTO: 3.48 10E12/L (ref 3.8–5.2)
RBC # BLD AUTO: 3.71 10E12/L (ref 3.8–5.2)
RBC # BLD AUTO: 3.75 10E12/L (ref 3.8–5.2)
RBC # BLD AUTO: 4.23 10E12/L (ref 3.8–5.2)
RBC # BLD AUTO: 4.31 10E12/L (ref 3.8–5.2)
RBC #/AREA URNS AUTO: 4 /HPF (ref 0–2)
RSV AG SPEC QL: POSITIVE
RVA NSP5 STL QL NAA+PROBE: NOT DETECTED
SALMONELLA SP RPOD STL QL NAA+PROBE: NOT DETECTED
SHIGELLA SP+EIEC IPAH STL QL NAA+PROBE: NOT DETECTED
SODIUM SERPL-SCNC: 138 MMOL/L (ref 133–144)
SODIUM SERPL-SCNC: 142 MMOL/L (ref 133–144)
SODIUM SERPL-SCNC: 142 MMOL/L (ref 133–144)
SODIUM SERPL-SCNC: 144 MMOL/L (ref 133–144)
SODIUM SERPL-SCNC: 144 MMOL/L (ref 133–144)
SODIUM SERPL-SCNC: 145 MMOL/L (ref 133–144)
SODIUM SERPL-SCNC: 147 MMOL/L (ref 133–144)
SOURCE: ABNORMAL
SP GR UR STRIP: 1.02 (ref 1–1.03)
SPECIMEN SOURCE: ABNORMAL
SPECIMEN SOURCE: NORMAL
SQUAMOUS #/AREA URNS AUTO: 1 /HPF (ref 0–1)
T3 SERPL-MCNC: 79 NG/DL (ref 60–181)
T4 FREE SERPL-MCNC: 1.06 NG/DL (ref 0.76–1.46)
T4 FREE SERPL-MCNC: 1.08 NG/DL (ref 0.76–1.46)
TROPONIN I SERPL-MCNC: 0.45 UG/L (ref 0–0.04)
TROPONIN I SERPL-MCNC: 0.6 UG/L (ref 0–0.04)
TROPONIN I SERPL-MCNC: 1.04 UG/L (ref 0–0.04)
TSH SERPL DL<=0.005 MIU/L-ACNC: 0.17 MU/L (ref 0.4–4)
TSH SERPL DL<=0.005 MIU/L-ACNC: 1.79 MU/L (ref 0.4–4)
UROBILINOGEN UR STRIP-MCNC: 2 MG/DL (ref 0–2)
V CHOL+PARA RFBL+TRKH+TNAA STL QL NAA+PR: NOT DETECTED
VIT B1 BLD-MCNC: 178 NMOL/L (ref 70–180)
VITAMIN D2 SERPL-MCNC: 7 UG/L
VITAMIN D3 SERPL-MCNC: 49 UG/L
WBC # BLD AUTO: 12.1 10E9/L (ref 4–11)
WBC # BLD AUTO: 4 10E9/L (ref 4–11)
WBC # BLD AUTO: 7.5 10E9/L (ref 4–11)
WBC # BLD AUTO: 9 10E9/L (ref 4–11)
WBC # BLD AUTO: 9.1 10E9/L (ref 4–11)
WBC #/AREA URNS AUTO: 2 /HPF (ref 0–5)
Y ENTERO RECN STL QL NAA+PROBE: NOT DETECTED

## 2018-01-01 PROCEDURE — 85520 HEPARIN ASSAY: CPT | Performed by: FAMILY MEDICINE

## 2018-01-01 PROCEDURE — 99239 HOSP IP/OBS DSCHRG MGMT >30: CPT | Performed by: INTERNAL MEDICINE

## 2018-01-01 PROCEDURE — 36416 COLLJ CAPILLARY BLOOD SPEC: CPT

## 2018-01-01 PROCEDURE — 84484 ASSAY OF TROPONIN QUANT: CPT | Performed by: FAMILY MEDICINE

## 2018-01-01 PROCEDURE — A9270 NON-COVERED ITEM OR SERVICE: HCPCS | Mod: GY | Performed by: FAMILY MEDICINE

## 2018-01-01 PROCEDURE — 36415 COLL VENOUS BLD VENIPUNCTURE: CPT | Performed by: PHYSICIAN ASSISTANT

## 2018-01-01 PROCEDURE — 25000128 H RX IP 250 OP 636: Performed by: FAMILY MEDICINE

## 2018-01-01 PROCEDURE — 36415 COLL VENOUS BLD VENIPUNCTURE: CPT | Performed by: FAMILY MEDICINE

## 2018-01-01 PROCEDURE — 94640 AIRWAY INHALATION TREATMENT: CPT | Mod: 76

## 2018-01-01 PROCEDURE — 87804 INFLUENZA ASSAY W/OPTIC: CPT | Performed by: FAMILY MEDICINE

## 2018-01-01 PROCEDURE — 25000125 ZZHC RX 250: Performed by: INTERNAL MEDICINE

## 2018-01-01 PROCEDURE — 80048 BASIC METABOLIC PNL TOTAL CA: CPT | Performed by: FAMILY MEDICINE

## 2018-01-01 PROCEDURE — 85610 PROTHROMBIN TIME: CPT | Performed by: PHYSICIAN ASSISTANT

## 2018-01-01 PROCEDURE — C9113 INJ PANTOPRAZOLE SODIUM, VIA: HCPCS | Performed by: FAMILY MEDICINE

## 2018-01-01 PROCEDURE — 99214 OFFICE O/P EST MOD 30 MIN: CPT | Performed by: PHYSICIAN ASSISTANT

## 2018-01-01 PROCEDURE — 93306 TTE W/DOPPLER COMPLETE: CPT

## 2018-01-01 PROCEDURE — 82803 BLOOD GASES ANY COMBINATION: CPT | Performed by: FAMILY MEDICINE

## 2018-01-01 PROCEDURE — 83880 ASSAY OF NATRIURETIC PEPTIDE: CPT | Performed by: FAMILY MEDICINE

## 2018-01-01 PROCEDURE — 93306 TTE W/DOPPLER COMPLETE: CPT | Mod: 26 | Performed by: INTERNAL MEDICINE

## 2018-01-01 PROCEDURE — 94660 CPAP INITIATION&MGMT: CPT

## 2018-01-01 PROCEDURE — 25000132 ZZH RX MED GY IP 250 OP 250 PS 637: Mod: GY | Performed by: INTERNAL MEDICINE

## 2018-01-01 PROCEDURE — 94640 AIRWAY INHALATION TREATMENT: CPT

## 2018-01-01 PROCEDURE — 36415 COLL VENOUS BLD VENIPUNCTURE: CPT | Performed by: INTERNAL MEDICINE

## 2018-01-01 PROCEDURE — 96374 THER/PROPH/DIAG INJ IV PUSH: CPT | Performed by: FAMILY MEDICINE

## 2018-01-01 PROCEDURE — 99233 SBSQ HOSP IP/OBS HIGH 50: CPT | Performed by: FAMILY MEDICINE

## 2018-01-01 PROCEDURE — 40000275 ZZH STATISTIC RCP TIME EA 10 MIN

## 2018-01-01 PROCEDURE — 99291 CRITICAL CARE FIRST HOUR: CPT | Mod: 25 | Performed by: FAMILY MEDICINE

## 2018-01-01 PROCEDURE — 85610 PROTHROMBIN TIME: CPT | Performed by: FAMILY MEDICINE

## 2018-01-01 PROCEDURE — 99207 ZZC NO CHARGE NURSE ONLY: CPT

## 2018-01-01 PROCEDURE — 99213 OFFICE O/P EST LOW 20 MIN: CPT | Performed by: NURSE PRACTITIONER

## 2018-01-01 PROCEDURE — 84439 ASSAY OF FREE THYROXINE: CPT | Performed by: PHYSICIAN ASSISTANT

## 2018-01-01 PROCEDURE — 40000274 ZZH STATISTIC RCP CONSULT EA 30 MIN

## 2018-01-01 PROCEDURE — 87209 SMEAR COMPLEX STAIN: CPT | Performed by: EMERGENCY MEDICINE

## 2018-01-01 PROCEDURE — 85610 PROTHROMBIN TIME: CPT | Mod: QW

## 2018-01-01 PROCEDURE — G0250 MD INR TEST REVIE INTER MGMT: HCPCS

## 2018-01-01 PROCEDURE — 25000132 ZZH RX MED GY IP 250 OP 250 PS 637: Mod: GY | Performed by: FAMILY MEDICINE

## 2018-01-01 PROCEDURE — 84425 ASSAY OF VITAMIN B-1: CPT | Mod: 90 | Performed by: PHYSICIAN ASSISTANT

## 2018-01-01 PROCEDURE — 25500064 ZZH RX 255 OP 636: Performed by: INTERNAL MEDICINE

## 2018-01-01 PROCEDURE — 81001 URINALYSIS AUTO W/SCOPE: CPT | Performed by: FAMILY MEDICINE

## 2018-01-01 PROCEDURE — 83605 ASSAY OF LACTIC ACID: CPT | Performed by: FAMILY MEDICINE

## 2018-01-01 PROCEDURE — 25000125 ZZHC RX 250: Performed by: FAMILY MEDICINE

## 2018-01-01 PROCEDURE — 87493 C DIFF AMPLIFIED PROBE: CPT | Mod: XU | Performed by: EMERGENCY MEDICINE

## 2018-01-01 PROCEDURE — 93308 TTE F-UP OR LMTD: CPT | Performed by: FAMILY MEDICINE

## 2018-01-01 PROCEDURE — 87804 INFLUENZA ASSAY W/OPTIC: CPT | Performed by: PHYSICIAN ASSISTANT

## 2018-01-01 PROCEDURE — 84145 PROCALCITONIN (PCT): CPT | Performed by: FAMILY MEDICINE

## 2018-01-01 PROCEDURE — 80053 COMPREHEN METABOLIC PANEL: CPT | Performed by: PHYSICIAN ASSISTANT

## 2018-01-01 PROCEDURE — 20000003 ZZH R&B ICU

## 2018-01-01 PROCEDURE — 80053 COMPREHEN METABOLIC PANEL: CPT | Performed by: INTERNAL MEDICINE

## 2018-01-01 PROCEDURE — 90682 RIV4 VACC RECOMBINANT DNA IM: CPT | Performed by: FAMILY MEDICINE

## 2018-01-01 PROCEDURE — 87807 RSV ASSAY W/OPTIC: CPT | Performed by: FAMILY MEDICINE

## 2018-01-01 PROCEDURE — 99291 CRITICAL CARE FIRST HOUR: CPT | Performed by: FAMILY MEDICINE

## 2018-01-01 PROCEDURE — 99233 SBSQ HOSP IP/OBS HIGH 50: CPT | Performed by: INTERNAL MEDICINE

## 2018-01-01 PROCEDURE — 87506 IADNA-DNA/RNA PROBE TQ 6-11: CPT | Performed by: EMERGENCY MEDICINE

## 2018-01-01 PROCEDURE — 82306 VITAMIN D 25 HYDROXY: CPT | Performed by: INTERNAL MEDICINE

## 2018-01-01 PROCEDURE — 85025 COMPLETE CBC W/AUTO DIFF WBC: CPT | Performed by: FAMILY MEDICINE

## 2018-01-01 PROCEDURE — 99207 ZZC NO CHARGE NURSE ONLY: CPT | Performed by: REGISTERED NURSE

## 2018-01-01 PROCEDURE — 96375 TX/PRO/DX INJ NEW DRUG ADDON: CPT | Performed by: FAMILY MEDICINE

## 2018-01-01 PROCEDURE — 99213 OFFICE O/P EST LOW 20 MIN: CPT | Performed by: FAMILY MEDICINE

## 2018-01-01 PROCEDURE — 87329 GIARDIA AG IA: CPT | Performed by: EMERGENCY MEDICINE

## 2018-01-01 PROCEDURE — 84132 ASSAY OF SERUM POTASSIUM: CPT | Performed by: INTERNAL MEDICINE

## 2018-01-01 PROCEDURE — 40000270 ZZH STATISTIC OXYGEN  O2DAILY TECH TIME

## 2018-01-01 PROCEDURE — 93005 ELECTROCARDIOGRAM TRACING: CPT

## 2018-01-01 PROCEDURE — 82627 DEHYDROEPIANDROSTERONE: CPT | Performed by: INTERNAL MEDICINE

## 2018-01-01 PROCEDURE — 87040 BLOOD CULTURE FOR BACTERIA: CPT | Performed by: FAMILY MEDICINE

## 2018-01-01 PROCEDURE — 85027 COMPLETE CBC AUTOMATED: CPT | Performed by: FAMILY MEDICINE

## 2018-01-01 PROCEDURE — 85025 COMPLETE CBC W/AUTO DIFF WBC: CPT | Performed by: PHYSICIAN ASSISTANT

## 2018-01-01 PROCEDURE — 99495 TRANSJ CARE MGMT MOD F2F 14D: CPT | Performed by: PHYSICIAN ASSISTANT

## 2018-01-01 PROCEDURE — 84480 ASSAY TRIIODOTHYRONINE (T3): CPT | Performed by: INTERNAL MEDICINE

## 2018-01-01 PROCEDURE — 12000010 ZZH R&B MS INTERMEDIATE OVERFLOW

## 2018-01-01 PROCEDURE — 96361 HYDRATE IV INFUSION ADD-ON: CPT | Performed by: FAMILY MEDICINE

## 2018-01-01 PROCEDURE — 87640 STAPH A DNA AMP PROBE: CPT | Performed by: FAMILY MEDICINE

## 2018-01-01 PROCEDURE — 99284 EMERGENCY DEPT VISIT MOD MDM: CPT | Mod: Z6 | Performed by: EMERGENCY MEDICINE

## 2018-01-01 PROCEDURE — 25000128 H RX IP 250 OP 636: Performed by: INTERNAL MEDICINE

## 2018-01-01 PROCEDURE — 93970 EXTREMITY STUDY: CPT

## 2018-01-01 PROCEDURE — 96365 THER/PROPH/DIAG IV INF INIT: CPT

## 2018-01-01 PROCEDURE — 84443 ASSAY THYROID STIM HORMONE: CPT | Performed by: PHYSICIAN ASSISTANT

## 2018-01-01 PROCEDURE — 99292 CRITICAL CARE ADDL 30 MIN: CPT | Mod: 25 | Performed by: FAMILY MEDICINE

## 2018-01-01 PROCEDURE — 25000125 ZZHC RX 250

## 2018-01-01 PROCEDURE — 71250 CT THORAX DX C-: CPT

## 2018-01-01 PROCEDURE — 93308 TTE F-UP OR LMTD: CPT | Mod: 26 | Performed by: FAMILY MEDICINE

## 2018-01-01 PROCEDURE — 71045 X-RAY EXAM CHEST 1 VIEW: CPT

## 2018-01-01 PROCEDURE — A9270 NON-COVERED ITEM OR SERVICE: HCPCS | Mod: GY | Performed by: INTERNAL MEDICINE

## 2018-01-01 PROCEDURE — 99214 OFFICE O/P EST MOD 30 MIN: CPT | Performed by: NURSE PRACTITIONER

## 2018-01-01 PROCEDURE — 87633 RESP VIRUS 12-25 TARGETS: CPT | Performed by: FAMILY MEDICINE

## 2018-01-01 PROCEDURE — 80053 COMPREHEN METABOLIC PANEL: CPT | Performed by: FAMILY MEDICINE

## 2018-01-01 PROCEDURE — 99000 SPECIMEN HANDLING OFFICE-LAB: CPT | Performed by: PHYSICIAN ASSISTANT

## 2018-01-01 PROCEDURE — 82728 ASSAY OF FERRITIN: CPT | Performed by: INTERNAL MEDICINE

## 2018-01-01 PROCEDURE — 83605 ASSAY OF LACTIC ACID: CPT | Performed by: INTERNAL MEDICINE

## 2018-01-01 PROCEDURE — 87899 AGENT NOS ASSAY W/OPTIC: CPT | Performed by: FAMILY MEDICINE

## 2018-01-01 PROCEDURE — 87177 OVA AND PARASITES SMEARS: CPT | Performed by: EMERGENCY MEDICINE

## 2018-01-01 PROCEDURE — 99285 EMERGENCY DEPT VISIT HI MDM: CPT | Mod: 25 | Performed by: FAMILY MEDICINE

## 2018-01-01 PROCEDURE — 84145 PROCALCITONIN (PCT): CPT | Performed by: INTERNAL MEDICINE

## 2018-01-01 PROCEDURE — 25000131 ZZH RX MED GY IP 250 OP 636 PS 637: Mod: GY | Performed by: FAMILY MEDICINE

## 2018-01-01 PROCEDURE — 77067 SCR MAMMO BI INCL CAD: CPT | Mod: TC

## 2018-01-01 PROCEDURE — 84439 ASSAY OF FREE THYROXINE: CPT | Performed by: INTERNAL MEDICINE

## 2018-01-01 PROCEDURE — 82140 ASSAY OF AMMONIA: CPT | Performed by: FAMILY MEDICINE

## 2018-01-01 PROCEDURE — 87641 MR-STAPH DNA AMP PROBE: CPT | Performed by: FAMILY MEDICINE

## 2018-01-01 PROCEDURE — 94799 UNLISTED PULMONARY SVC/PX: CPT

## 2018-01-01 PROCEDURE — 85610 PROTHROMBIN TIME: CPT | Performed by: INTERNAL MEDICINE

## 2018-01-01 PROCEDURE — 87086 URINE CULTURE/COLONY COUNT: CPT | Performed by: FAMILY MEDICINE

## 2018-01-01 PROCEDURE — 99283 EMERGENCY DEPT VISIT LOW MDM: CPT | Performed by: EMERGENCY MEDICINE

## 2018-01-01 PROCEDURE — 84443 ASSAY THYROID STIM HORMONE: CPT | Performed by: INTERNAL MEDICINE

## 2018-01-01 PROCEDURE — 71046 X-RAY EXAM CHEST 2 VIEWS: CPT | Mod: FY

## 2018-01-01 RX ORDER — NALOXONE HYDROCHLORIDE 0.4 MG/ML
.1-.4 INJECTION, SOLUTION INTRAMUSCULAR; INTRAVENOUS; SUBCUTANEOUS
Status: DISCONTINUED | OUTPATIENT
Start: 2018-01-01 | End: 2018-01-01

## 2018-01-01 RX ORDER — NYSTATIN 100000 [USP'U]/G
POWDER TOPICAL
Qty: 60 G | Refills: 11 | Status: SHIPPED | OUTPATIENT
Start: 2018-01-01

## 2018-01-01 RX ORDER — ONDANSETRON 4 MG/1
TABLET, ORALLY DISINTEGRATING ORAL
Qty: 18 TABLET | Refills: 0 | Status: SHIPPED | OUTPATIENT
Start: 2018-01-01

## 2018-01-01 RX ORDER — AMMONIUM LACTATE 12 G/100G
CREAM TOPICAL 2 TIMES DAILY
Qty: 385 G | Refills: 1 | Status: SHIPPED | OUTPATIENT
Start: 2018-01-01 | End: 2019-01-01

## 2018-01-01 RX ORDER — WARFARIN SODIUM 7.5 MG/1
15 TABLET ORAL
Status: COMPLETED | OUTPATIENT
Start: 2018-01-01 | End: 2018-01-01

## 2018-01-01 RX ORDER — LIDOCAINE/PRILOCAINE 2.5 %-2.5%
CREAM (GRAM) TOPICAL PRN
Status: CANCELLED | OUTPATIENT
Start: 2018-01-01

## 2018-01-01 RX ORDER — PENICILLIN V POTASSIUM 500 MG/1
500 TABLET, FILM COATED ORAL 3 TIMES DAILY
Qty: 21 TABLET | Refills: 0 | Status: SHIPPED | OUTPATIENT
Start: 2018-01-01 | End: 2018-01-01

## 2018-01-01 RX ORDER — LEVOTHYROXINE SODIUM 125 MCG
125 TABLET ORAL DAILY
Qty: 90 TABLET | Refills: 3 | Status: SHIPPED | OUTPATIENT
Start: 2018-01-01 | End: 2018-01-01

## 2018-01-01 RX ORDER — LORAZEPAM 2 MG/ML
0.5 INJECTION INTRAMUSCULAR EVERY 4 HOURS PRN
Status: DISCONTINUED | OUTPATIENT
Start: 2018-01-01 | End: 2018-01-01 | Stop reason: HOSPADM

## 2018-01-01 RX ORDER — IPRATROPIUM BROMIDE AND ALBUTEROL SULFATE 2.5; .5 MG/3ML; MG/3ML
3 SOLUTION RESPIRATORY (INHALATION) ONCE
Status: COMPLETED | OUTPATIENT
Start: 2018-01-01 | End: 2018-01-01

## 2018-01-01 RX ORDER — LEVOFLOXACIN 500 MG/1
500 TABLET, FILM COATED ORAL DAILY
Qty: 4 TABLET | Refills: 0 | Status: SHIPPED | OUTPATIENT
Start: 2018-01-01 | End: 2019-01-01

## 2018-01-01 RX ORDER — DOXYCYCLINE 100 MG/1
100 TABLET ORAL 2 TIMES DAILY
Qty: 14 TABLET | Refills: 0 | Status: SHIPPED | OUTPATIENT
Start: 2018-01-01 | End: 2019-01-01

## 2018-01-01 RX ORDER — IPRATROPIUM BROMIDE 21 UG/1
2 SPRAY, METERED NASAL EVERY 12 HOURS
Qty: 30 ML | Refills: 1 | Status: SHIPPED | OUTPATIENT
Start: 2018-01-01 | End: 2018-01-01 | Stop reason: ALTCHOICE

## 2018-01-01 RX ORDER — OXYCODONE HCL 80 MG/1
80 TABLET, FILM COATED, EXTENDED RELEASE ORAL EVERY 8 HOURS
Status: ON HOLD | COMMUNITY
End: 2018-01-01

## 2018-01-01 RX ORDER — WARFARIN SODIUM 5 MG/1
TABLET ORAL
Qty: 140 TABLET | Refills: 0 | Status: SHIPPED | OUTPATIENT
Start: 2018-01-01 | End: 2018-01-01

## 2018-01-01 RX ORDER — POTASSIUM CHLORIDE 29.8 MG/ML
20 INJECTION INTRAVENOUS
Status: DISCONTINUED | OUTPATIENT
Start: 2018-01-01 | End: 2018-01-01 | Stop reason: ALTCHOICE

## 2018-01-01 RX ORDER — WARFARIN SODIUM 5 MG/1
TABLET ORAL
Qty: 140 TABLET | Refills: 0 | COMMUNITY
Start: 2018-01-01 | End: 2019-01-01

## 2018-01-01 RX ORDER — FUROSEMIDE 10 MG/ML
20 INJECTION INTRAMUSCULAR; INTRAVENOUS ONCE
Status: COMPLETED | OUTPATIENT
Start: 2018-01-01 | End: 2018-01-01

## 2018-01-01 RX ORDER — ONDANSETRON 4 MG/1
TABLET, ORALLY DISINTEGRATING ORAL
Qty: 18 TABLET | Refills: 0 | Status: SHIPPED | OUTPATIENT
Start: 2018-01-01 | End: 2018-01-01

## 2018-01-01 RX ORDER — PANTOPRAZOLE SODIUM 40 MG/1
40 TABLET, DELAYED RELEASE ORAL DAILY
Qty: 30 TABLET | Refills: 1 | Status: SHIPPED | OUTPATIENT
Start: 2018-01-01 | End: 2019-01-01

## 2018-01-01 RX ORDER — DULOXETIN HYDROCHLORIDE 30 MG/1
60 CAPSULE, DELAYED RELEASE ORAL DAILY
Status: DISCONTINUED | OUTPATIENT
Start: 2018-01-01 | End: 2018-01-01 | Stop reason: HOSPADM

## 2018-01-01 RX ORDER — METHYLPREDNISOLONE SODIUM SUCCINATE 125 MG/2ML
60 INJECTION, POWDER, LYOPHILIZED, FOR SOLUTION INTRAMUSCULAR; INTRAVENOUS DAILY
Status: DISCONTINUED | OUTPATIENT
Start: 2018-01-01 | End: 2018-01-01

## 2018-01-01 RX ORDER — AZELASTINE 1 MG/ML
1 SPRAY, METERED NASAL 2 TIMES DAILY
Qty: 1 BOTTLE | Refills: 11 | Status: SHIPPED | OUTPATIENT
Start: 2018-01-01

## 2018-01-01 RX ORDER — PREDNISONE 20 MG/1
20 TABLET ORAL DAILY
Qty: 5 TABLET | Refills: 0 | Status: SHIPPED | OUTPATIENT
Start: 2018-01-01 | End: 2019-01-01

## 2018-01-01 RX ORDER — PREDNISONE 20 MG/1
40 TABLET ORAL DAILY
Qty: 6 TABLET | Refills: 0 | Status: SHIPPED | OUTPATIENT
Start: 2018-01-01 | End: 2019-01-01

## 2018-01-01 RX ORDER — PANTOPRAZOLE SODIUM 40 MG/1
40 TABLET, DELAYED RELEASE ORAL DAILY
Qty: 30 TABLET | Refills: 1 | Status: SHIPPED | OUTPATIENT
Start: 2018-01-01 | End: 2018-01-01

## 2018-01-01 RX ORDER — MELOXICAM 7.5 MG/1
7.5 TABLET ORAL DAILY
Qty: 30 TABLET | Refills: 0 | Status: SHIPPED | OUTPATIENT
Start: 2018-01-01 | End: 2019-01-01

## 2018-01-01 RX ORDER — WARFARIN SODIUM 5 MG/1
TABLET ORAL
Qty: 160 TABLET | Refills: 0 | COMMUNITY
Start: 2018-01-01 | End: 2018-01-01

## 2018-01-01 RX ORDER — BUSPIRONE HYDROCHLORIDE 10 MG/1
TABLET ORAL
Qty: 90 TABLET | Refills: 0 | Status: SHIPPED | OUTPATIENT
Start: 2018-01-01 | End: 2018-01-01

## 2018-01-01 RX ORDER — DULOXETIN HYDROCHLORIDE 30 MG/1
60 CAPSULE, DELAYED RELEASE ORAL 2 TIMES DAILY
Status: DISCONTINUED | OUTPATIENT
Start: 2018-01-01 | End: 2018-01-01

## 2018-01-01 RX ORDER — NALOXONE HYDROCHLORIDE 0.4 MG/ML
0.4 INJECTION, SOLUTION INTRAMUSCULAR; INTRAVENOUS; SUBCUTANEOUS
Status: DISCONTINUED | OUTPATIENT
Start: 2018-01-01 | End: 2018-01-01 | Stop reason: HOSPADM

## 2018-01-01 RX ORDER — BACLOFEN 10 MG/1
10 TABLET ORAL DAILY
Qty: 90 TABLET | Status: CANCELLED | OUTPATIENT
Start: 2018-01-01

## 2018-01-01 RX ORDER — LEVOTHYROXINE SODIUM 125 MCG
TABLET ORAL
Qty: 90 TABLET | Refills: 3 | COMMUNITY
Start: 2018-01-01 | End: 2019-01-01

## 2018-01-01 RX ORDER — LORAZEPAM 2 MG/ML
1 INJECTION INTRAMUSCULAR ONCE
Status: COMPLETED | OUTPATIENT
Start: 2018-01-01 | End: 2018-01-01

## 2018-01-01 RX ORDER — POTASSIUM CHLORIDE 7.45 MG/ML
10 INJECTION INTRAVENOUS
Status: DISCONTINUED | OUTPATIENT
Start: 2018-01-01 | End: 2018-01-01 | Stop reason: HOSPADM

## 2018-01-01 RX ORDER — SODIUM CHLORIDE 9 MG/ML
INJECTION, SOLUTION INTRAVENOUS CONTINUOUS
Status: DISCONTINUED | OUTPATIENT
Start: 2018-01-01 | End: 2018-01-01

## 2018-01-01 RX ORDER — LEVOTHYROXINE SODIUM 125 UG/1
125 TABLET ORAL DAILY
Status: DISCONTINUED | OUTPATIENT
Start: 2018-01-01 | End: 2018-01-01 | Stop reason: HOSPADM

## 2018-01-01 RX ORDER — POTASSIUM CHLORIDE 1500 MG/1
20-40 TABLET, EXTENDED RELEASE ORAL
Status: DISCONTINUED | OUTPATIENT
Start: 2018-01-01 | End: 2018-01-01 | Stop reason: HOSPADM

## 2018-01-01 RX ORDER — WARFARIN SODIUM 5 MG/1
TABLET ORAL
Qty: 185 TABLET | Refills: 0 | COMMUNITY
Start: 2018-01-01 | End: 2018-01-01

## 2018-01-01 RX ORDER — CEPHALEXIN 500 MG/1
500 CAPSULE ORAL 4 TIMES DAILY
Qty: 40 CAPSULE | Refills: 0 | Status: SHIPPED | OUTPATIENT
Start: 2018-01-01 | End: 2018-01-01

## 2018-01-01 RX ORDER — MENTHOL AND ZINC OXIDE .44; 20.625 G/100G; G/100G
OINTMENT TOPICAL
Status: DISCONTINUED | OUTPATIENT
Start: 2018-01-01 | End: 2018-01-01 | Stop reason: HOSPADM

## 2018-01-01 RX ORDER — IPRATROPIUM BROMIDE AND ALBUTEROL SULFATE 2.5; .5 MG/3ML; MG/3ML
SOLUTION RESPIRATORY (INHALATION)
Status: COMPLETED
Start: 2018-01-01 | End: 2018-01-01

## 2018-01-01 RX ORDER — HYDROMORPHONE HYDROCHLORIDE 1 MG/ML
.5-1 INJECTION, SOLUTION INTRAMUSCULAR; INTRAVENOUS; SUBCUTANEOUS
Status: DISCONTINUED | OUTPATIENT
Start: 2018-01-01 | End: 2018-01-01

## 2018-01-01 RX ORDER — METOCLOPRAMIDE HYDROCHLORIDE 5 MG/ML
5 INJECTION INTRAMUSCULAR; INTRAVENOUS EVERY 6 HOURS PRN
Status: DISCONTINUED | OUTPATIENT
Start: 2018-01-01 | End: 2018-01-01 | Stop reason: HOSPADM

## 2018-01-01 RX ORDER — POTASSIUM CHLORIDE 750 MG/1
TABLET, EXTENDED RELEASE ORAL
Qty: 360 TABLET | Refills: 0 | Status: SHIPPED | OUTPATIENT
Start: 2018-01-01 | End: 2018-01-01

## 2018-01-01 RX ORDER — ALBUTEROL SULFATE 0.83 MG/ML
2.5 SOLUTION RESPIRATORY (INHALATION)
Status: DISCONTINUED | OUTPATIENT
Start: 2018-01-01 | End: 2018-01-01 | Stop reason: HOSPADM

## 2018-01-01 RX ORDER — LANOLIN ALCOHOL/MO/W.PET/CERES
1 CREAM (GRAM) TOPICAL
COMMUNITY
End: 2019-01-01

## 2018-01-01 RX ORDER — NABUMETONE 500 MG/1
TABLET, FILM COATED ORAL
Qty: 90 TABLET | Refills: 1 | Status: SHIPPED | OUTPATIENT
Start: 2018-01-01 | End: 2018-01-01 | Stop reason: ALTCHOICE

## 2018-01-01 RX ORDER — WARFARIN SODIUM 5 MG/1
TABLET ORAL
Qty: 185 TABLET | Refills: 0 | Status: SHIPPED | OUTPATIENT
Start: 2018-01-01 | End: 2018-01-01

## 2018-01-01 RX ORDER — BUSPIRONE HYDROCHLORIDE 10 MG/1
TABLET ORAL
Qty: 90 TABLET | Refills: 0 | Status: SHIPPED | OUTPATIENT
Start: 2018-01-01 | End: 2019-01-01

## 2018-01-01 RX ORDER — BUSPIRONE HYDROCHLORIDE 10 MG/1
10 TABLET ORAL 2 TIMES DAILY
Status: DISCONTINUED | OUTPATIENT
Start: 2018-01-01 | End: 2018-01-01 | Stop reason: HOSPADM

## 2018-01-01 RX ORDER — ACETAMINOPHEN 160 MG
1 TABLET,DISINTEGRATING ORAL DAILY
Qty: 90 CAPSULE | Refills: 3 | Status: SHIPPED | OUTPATIENT
Start: 2018-01-01

## 2018-01-01 RX ORDER — LIDOCAINE 50 MG/G
OINTMENT TOPICAL DAILY
Qty: 30 G | Refills: 0 | Status: SHIPPED | OUTPATIENT
Start: 2018-01-01 | End: 2019-01-01

## 2018-01-01 RX ORDER — BUSPIRONE HYDROCHLORIDE 10 MG/1
20-30 TABLET ORAL AT BEDTIME
Qty: 90 TABLET | Refills: 0 | Status: SHIPPED | OUTPATIENT
Start: 2018-01-01 | End: 2018-01-01

## 2018-01-01 RX ORDER — SUMATRIPTAN 20 MG/1
SPRAY NASAL
Qty: 1 EACH | Refills: 1 | Status: SHIPPED | OUTPATIENT
Start: 2018-01-01 | End: 2019-01-01

## 2018-01-01 RX ORDER — METOCLOPRAMIDE 5 MG/1
5 TABLET ORAL EVERY 6 HOURS PRN
Status: DISCONTINUED | OUTPATIENT
Start: 2018-01-01 | End: 2018-01-01 | Stop reason: HOSPADM

## 2018-01-01 RX ORDER — TIOTROPIUM BROMIDE 18 UG/1
CAPSULE ORAL; RESPIRATORY (INHALATION)
Qty: 30 CAPSULE | Refills: 5 | Status: SHIPPED | OUTPATIENT
Start: 2018-01-01 | End: 2019-01-01

## 2018-01-01 RX ORDER — WARFARIN SODIUM 5 MG/1
5 TABLET ORAL
Status: COMPLETED | OUTPATIENT
Start: 2018-01-01 | End: 2018-01-01

## 2018-01-01 RX ORDER — SODIUM CHLORIDE 9 MG/ML
INJECTION, SOLUTION INTRAVENOUS CONTINUOUS
Status: DISCONTINUED | OUTPATIENT
Start: 2018-01-01 | End: 2018-01-01 | Stop reason: HOSPADM

## 2018-01-01 RX ORDER — PREDNISONE 20 MG/1
40 TABLET ORAL DAILY
Status: DISCONTINUED | OUTPATIENT
Start: 2018-01-01 | End: 2018-01-01 | Stop reason: HOSPADM

## 2018-01-01 RX ORDER — ONDANSETRON 4 MG/1
4 TABLET, ORALLY DISINTEGRATING ORAL EVERY 6 HOURS PRN
Status: DISCONTINUED | OUTPATIENT
Start: 2018-01-01 | End: 2018-01-01 | Stop reason: HOSPADM

## 2018-01-01 RX ORDER — AMMONIUM LACTATE 12 G/100G
CREAM TOPICAL 2 TIMES DAILY
Qty: 385 G | Refills: 1 | Status: SHIPPED | OUTPATIENT
Start: 2018-01-01 | End: 2018-01-01

## 2018-01-01 RX ORDER — PROCHLORPERAZINE MALEATE 10 MG
10 TABLET ORAL EVERY 6 HOURS PRN
Status: DISCONTINUED | OUTPATIENT
Start: 2018-01-01 | End: 2018-01-01 | Stop reason: HOSPADM

## 2018-01-01 RX ORDER — PROCHLORPERAZINE 25 MG
25 SUPPOSITORY, RECTAL RECTAL EVERY 12 HOURS PRN
Status: DISCONTINUED | OUTPATIENT
Start: 2018-01-01 | End: 2018-01-01 | Stop reason: HOSPADM

## 2018-01-01 RX ORDER — ONDANSETRON 4 MG/1
4 TABLET, ORALLY DISINTEGRATING ORAL EVERY 8 HOURS PRN
Qty: 18 TABLET | Refills: 0 | Status: CANCELLED | OUTPATIENT
Start: 2018-01-01

## 2018-01-01 RX ORDER — POTASSIUM CL/LIDO/0.9 % NACL 10MEQ/0.1L
10 INTRAVENOUS SOLUTION, PIGGYBACK (ML) INTRAVENOUS
Status: DISCONTINUED | OUTPATIENT
Start: 2018-01-01 | End: 2018-01-01 | Stop reason: HOSPADM

## 2018-01-01 RX ORDER — ASPIRIN 325 MG
325 TABLET ORAL ONCE
Status: COMPLETED | OUTPATIENT
Start: 2018-01-01 | End: 2018-01-01

## 2018-01-01 RX ORDER — IBUPROFEN 400 MG/1
400 TABLET, FILM COATED ORAL EVERY 6 HOURS PRN
Status: DISCONTINUED | OUTPATIENT
Start: 2018-01-01 | End: 2018-01-01 | Stop reason: HOSPADM

## 2018-01-01 RX ORDER — ACETAMINOPHEN 325 MG/1
650 TABLET ORAL EVERY 4 HOURS PRN
Status: DISCONTINUED | OUTPATIENT
Start: 2018-01-01 | End: 2018-01-01 | Stop reason: HOSPADM

## 2018-01-01 RX ORDER — POTASSIUM CHLORIDE 750 MG/1
TABLET, EXTENDED RELEASE ORAL
Qty: 120 TABLET | Refills: 6 | Status: SHIPPED | OUTPATIENT
Start: 2018-01-01

## 2018-01-01 RX ORDER — NICOTINE 21 MG/24HR
1 PATCH, TRANSDERMAL 24 HOURS TRANSDERMAL DAILY
Status: DISCONTINUED | OUTPATIENT
Start: 2018-01-01 | End: 2018-01-01 | Stop reason: HOSPADM

## 2018-01-01 RX ORDER — ONDANSETRON 2 MG/ML
4 INJECTION INTRAMUSCULAR; INTRAVENOUS EVERY 6 HOURS PRN
Status: DISCONTINUED | OUTPATIENT
Start: 2018-01-01 | End: 2018-01-01 | Stop reason: HOSPADM

## 2018-01-01 RX ORDER — BACLOFEN 10 MG/1
10 TABLET ORAL DAILY
Qty: 180 TABLET | Refills: 0 | Status: SHIPPED | OUTPATIENT
Start: 2018-01-01 | End: 2019-01-01

## 2018-01-01 RX ORDER — POLYETHYLENE GLYCOL 3350 17 G/17G
17 POWDER, FOR SOLUTION ORAL DAILY PRN
Status: DISCONTINUED | OUTPATIENT
Start: 2018-01-01 | End: 2018-01-01 | Stop reason: HOSPADM

## 2018-01-01 RX ORDER — DULOXETIN HYDROCHLORIDE 60 MG/1
60 CAPSULE, DELAYED RELEASE ORAL DAILY
Qty: 30 CAPSULE | Refills: 1 | Status: SHIPPED | OUTPATIENT
Start: 2018-01-01 | End: 2019-01-01

## 2018-01-01 RX ORDER — PANTOPRAZOLE SODIUM 40 MG/1
40 TABLET, DELAYED RELEASE ORAL
Status: DISCONTINUED | OUTPATIENT
Start: 2018-01-01 | End: 2018-01-01 | Stop reason: HOSPADM

## 2018-01-01 RX ORDER — LIDOCAINE 40 MG/G
CREAM TOPICAL
Qty: 45 G | Refills: 0 | Status: SHIPPED | OUTPATIENT
Start: 2018-01-01 | End: 2019-01-01

## 2018-01-01 RX ORDER — IPRATROPIUM BROMIDE AND ALBUTEROL SULFATE 2.5; .5 MG/3ML; MG/3ML
3 SOLUTION RESPIRATORY (INHALATION)
Status: DISCONTINUED | OUTPATIENT
Start: 2018-01-01 | End: 2018-01-01 | Stop reason: HOSPADM

## 2018-01-01 RX ORDER — ERYTHROMYCIN 5 MG/G
1 OINTMENT OPHTHALMIC 3 TIMES DAILY
Qty: 3.5 G | Refills: 0 | Status: SHIPPED | OUTPATIENT
Start: 2018-01-01 | End: 2018-01-01

## 2018-01-01 RX ORDER — TOPIRAMATE 100 MG/1
100 TABLET, FILM COATED ORAL 2 TIMES DAILY
Status: DISCONTINUED | OUTPATIENT
Start: 2018-01-01 | End: 2018-01-01 | Stop reason: HOSPADM

## 2018-01-01 RX ORDER — WARFARIN SODIUM 5 MG/1
10 TABLET ORAL
Status: COMPLETED | OUTPATIENT
Start: 2018-01-01 | End: 2018-01-01

## 2018-01-01 RX ORDER — ANTIARTHRITIC COMBINATION NO.2 900 MG
25 TABLET ORAL DAILY
Qty: 90 TABLET | Refills: 3 | Status: SHIPPED | OUTPATIENT
Start: 2018-01-01 | End: 2018-01-01

## 2018-01-01 RX ORDER — ASPIRIN 81 MG/1
81 TABLET ORAL DAILY
Status: DISCONTINUED | OUTPATIENT
Start: 2018-01-01 | End: 2018-01-01 | Stop reason: HOSPADM

## 2018-01-01 RX ORDER — NABUMETONE 500 MG/1
500 TABLET, FILM COATED ORAL DAILY
Qty: 30 TABLET | Refills: 0 | Status: SHIPPED | OUTPATIENT
Start: 2018-01-01 | End: 2018-01-01

## 2018-01-01 RX ORDER — POTASSIUM CHLORIDE 1.5 G/1.58G
20-40 POWDER, FOR SOLUTION ORAL
Status: DISCONTINUED | OUTPATIENT
Start: 2018-01-01 | End: 2018-01-01 | Stop reason: HOSPADM

## 2018-01-01 RX ORDER — LEVOFLOXACIN 500 MG/1
500 TABLET, FILM COATED ORAL DAILY
Status: DISCONTINUED | OUTPATIENT
Start: 2018-01-01 | End: 2018-01-01 | Stop reason: HOSPADM

## 2018-01-01 RX ADMIN — LEVOFLOXACIN 500 MG: 500 TABLET, FILM COATED ORAL at 11:58

## 2018-01-01 RX ADMIN — IBUPROFEN 400 MG: 400 TABLET ORAL at 15:29

## 2018-01-01 RX ADMIN — IBUPROFEN 400 MG: 400 TABLET ORAL at 05:25

## 2018-01-01 RX ADMIN — BUSPIRONE HYDROCHLORIDE 10 MG: 10 TABLET ORAL at 09:11

## 2018-01-01 RX ADMIN — TOPIRAMATE 100 MG: 100 TABLET, FILM COATED ORAL at 19:00

## 2018-01-01 RX ADMIN — IPRATROPIUM BROMIDE AND ALBUTEROL SULFATE 3 ML: .5; 3 SOLUTION RESPIRATORY (INHALATION) at 15:46

## 2018-01-01 RX ADMIN — TOPIRAMATE 100 MG: 100 TABLET, FILM COATED ORAL at 09:26

## 2018-01-01 RX ADMIN — IPRATROPIUM BROMIDE AND ALBUTEROL SULFATE 3 ML: .5; 3 SOLUTION RESPIRATORY (INHALATION) at 12:05

## 2018-01-01 RX ADMIN — BUSPIRONE HYDROCHLORIDE 10 MG: 10 TABLET ORAL at 08:41

## 2018-01-01 RX ADMIN — LEVOFLOXACIN 500 MG: 500 TABLET, FILM COATED ORAL at 07:37

## 2018-01-01 RX ADMIN — IBUPROFEN 400 MG: 400 TABLET ORAL at 21:01

## 2018-01-01 RX ADMIN — IBUPROFEN 400 MG: 400 TABLET ORAL at 05:32

## 2018-01-01 RX ADMIN — TOPIRAMATE 100 MG: 100 TABLET, FILM COATED ORAL at 08:26

## 2018-01-01 RX ADMIN — BUSPIRONE HYDROCHLORIDE 10 MG: 10 TABLET ORAL at 07:53

## 2018-01-01 RX ADMIN — SODIUM CHLORIDE, POTASSIUM CHLORIDE, SODIUM LACTATE AND CALCIUM CHLORIDE 1000 ML: 600; 310; 30; 20 INJECTION, SOLUTION INTRAVENOUS at 10:33

## 2018-01-01 RX ADMIN — Medication 0.5 MG: at 02:15

## 2018-01-01 RX ADMIN — IPRATROPIUM BROMIDE AND ALBUTEROL SULFATE 3 ML: .5; 3 SOLUTION RESPIRATORY (INHALATION) at 16:32

## 2018-01-01 RX ADMIN — Medication 10 MEQ: at 17:37

## 2018-01-01 RX ADMIN — BUSPIRONE HYDROCHLORIDE 10 MG: 10 TABLET ORAL at 21:01

## 2018-01-01 RX ADMIN — NICOTINE 1 PATCH: 21 PATCH, EXTENDED RELEASE TRANSDERMAL at 08:45

## 2018-01-01 RX ADMIN — LORAZEPAM 0.5 MG: 2 INJECTION INTRAMUSCULAR; INTRAVENOUS at 05:25

## 2018-01-01 RX ADMIN — LEVOTHYROXINE SODIUM 125 MCG: 125 TABLET ORAL at 08:33

## 2018-01-01 RX ADMIN — LORAZEPAM 0.5 MG: 2 INJECTION INTRAMUSCULAR; INTRAVENOUS at 22:08

## 2018-01-01 RX ADMIN — HUMAN ALBUMIN MICROSPHERES AND PERFLUTREN 2 ML: 10; .22 INJECTION, SOLUTION INTRAVENOUS at 09:54

## 2018-01-01 RX ADMIN — SODIUM CHLORIDE: 9 INJECTION, SOLUTION INTRAVENOUS at 17:05

## 2018-01-01 RX ADMIN — Medication 1 MG: at 19:00

## 2018-01-01 RX ADMIN — IPRATROPIUM BROMIDE AND ALBUTEROL SULFATE 3 ML: .5; 3 SOLUTION RESPIRATORY (INHALATION) at 12:01

## 2018-01-01 RX ADMIN — WARFARIN SODIUM 10 MG: 5 TABLET ORAL at 20:43

## 2018-01-01 RX ADMIN — PANTOPRAZOLE SODIUM 40 MG: 40 INJECTION, POWDER, FOR SOLUTION INTRAVENOUS at 09:10

## 2018-01-01 RX ADMIN — IPRATROPIUM BROMIDE AND ALBUTEROL SULFATE 3 ML: .5; 3 SOLUTION RESPIRATORY (INHALATION) at 08:33

## 2018-01-01 RX ADMIN — FUROSEMIDE 20 MG: 10 INJECTION, SOLUTION INTRAVENOUS at 08:26

## 2018-01-01 RX ADMIN — DULOXETINE HYDROCHLORIDE 60 MG: 30 CAPSULE, DELAYED RELEASE ORAL at 08:46

## 2018-01-01 RX ADMIN — TAZOBACTAM SODIUM AND PIPERACILLIN SODIUM 3.38 G: 375; 3 INJECTION, SOLUTION INTRAVENOUS at 17:08

## 2018-01-01 RX ADMIN — TOPIRAMATE 100 MG: 100 TABLET, FILM COATED ORAL at 20:30

## 2018-01-01 RX ADMIN — LORAZEPAM 0.5 MG: 2 INJECTION INTRAMUSCULAR; INTRAVENOUS at 18:16

## 2018-01-01 RX ADMIN — LORAZEPAM 0.5 MG: 2 INJECTION INTRAMUSCULAR; INTRAVENOUS at 23:27

## 2018-01-01 RX ADMIN — IPRATROPIUM BROMIDE AND ALBUTEROL SULFATE 3 ML: .5; 3 SOLUTION RESPIRATORY (INHALATION) at 00:38

## 2018-01-01 RX ADMIN — IPRATROPIUM BROMIDE AND ALBUTEROL SULFATE 3 ML: .5; 3 SOLUTION RESPIRATORY (INHALATION) at 12:13

## 2018-01-01 RX ADMIN — METHYLPREDNISOLONE SODIUM SUCCINATE 62.5 MG: 125 INJECTION, POWDER, FOR SOLUTION INTRAMUSCULAR; INTRAVENOUS at 09:10

## 2018-01-01 RX ADMIN — IPRATROPIUM BROMIDE AND ALBUTEROL SULFATE 3 ML: .5; 3 SOLUTION RESPIRATORY (INHALATION) at 11:48

## 2018-01-01 RX ADMIN — PANTOPRAZOLE SODIUM 40 MG: 40 INJECTION, POWDER, FOR SOLUTION INTRAVENOUS at 08:30

## 2018-01-01 RX ADMIN — NALOXONE HYDROCHLORIDE 0.2 MG/HR: 1 INJECTION PARENTERAL at 14:05

## 2018-01-01 RX ADMIN — ACETAMINOPHEN 650 MG: 325 TABLET, FILM COATED ORAL at 04:51

## 2018-01-01 RX ADMIN — ACETAMINOPHEN 650 MG: 325 TABLET, FILM COATED ORAL at 12:54

## 2018-01-01 RX ADMIN — TOPIRAMATE 100 MG: 100 TABLET, FILM COATED ORAL at 21:01

## 2018-01-01 RX ADMIN — Medication 10 MEQ: at 05:51

## 2018-01-01 RX ADMIN — IPRATROPIUM BROMIDE AND ALBUTEROL SULFATE 3 ML: .5; 3 SOLUTION RESPIRATORY (INHALATION) at 11:40

## 2018-01-01 RX ADMIN — BUSPIRONE HYDROCHLORIDE 10 MG: 10 TABLET ORAL at 07:35

## 2018-01-01 RX ADMIN — ACETAMINOPHEN 650 MG: 325 TABLET, FILM COATED ORAL at 17:47

## 2018-01-01 RX ADMIN — SODIUM CHLORIDE: 9 INJECTION, SOLUTION INTRAVENOUS at 11:35

## 2018-01-01 RX ADMIN — LORAZEPAM 0.5 MG: 2 INJECTION INTRAMUSCULAR; INTRAVENOUS at 14:25

## 2018-01-01 RX ADMIN — POTASSIUM CHLORIDE 40 MEQ: 1500 TABLET, EXTENDED RELEASE ORAL at 17:05

## 2018-01-01 RX ADMIN — IPRATROPIUM BROMIDE AND ALBUTEROL SULFATE 3 ML: .5; 3 SOLUTION RESPIRATORY (INHALATION) at 16:22

## 2018-01-01 RX ADMIN — BUSPIRONE HYDROCHLORIDE 10 MG: 10 TABLET ORAL at 20:43

## 2018-01-01 RX ADMIN — IBUPROFEN 400 MG: 400 TABLET ORAL at 00:35

## 2018-01-01 RX ADMIN — SODIUM CHLORIDE: 9 INJECTION, SOLUTION INTRAVENOUS at 16:52

## 2018-01-01 RX ADMIN — Medication 10 MEQ: at 04:22

## 2018-01-01 RX ADMIN — Medication 10 MEQ: at 03:12

## 2018-01-01 RX ADMIN — TOPIRAMATE 100 MG: 100 TABLET, FILM COATED ORAL at 07:54

## 2018-01-01 RX ADMIN — PANTOPRAZOLE SODIUM 40 MG: 40 TABLET, DELAYED RELEASE ORAL at 06:20

## 2018-01-01 RX ADMIN — INFLUENZA A VIRUS A/MICHIGAN/45/2015 (H1N1) RECOMBINANT HEMAGGLUTININ ANTIGEN, INFLUENZA A VIRUS A/SINGAPORE/INFIMH-16-0019/2016 (H3N2) RECOMBINANT HEMAGGLUTININ ANTIGEN, INFLUENZA B VIRUS B/MARYLAND/15/2016 RECOMBINANT HEMAGGLUTININ ANTIGEN, AND INFLUENZA B VIRUS B/PHUKET/3073/2013 RECOMBINANT HEMAGGLUTININ ANTIGEN 0.5 ML: 45; 45; 45; 45 INJECTION INTRAMUSCULAR at 10:20

## 2018-01-01 RX ADMIN — Medication 0.5 MG: at 15:01

## 2018-01-01 RX ADMIN — SODIUM CHLORIDE, POTASSIUM CHLORIDE, SODIUM LACTATE AND CALCIUM CHLORIDE 1000 ML: 600; 310; 30; 20 INJECTION, SOLUTION INTRAVENOUS at 08:58

## 2018-01-01 RX ADMIN — IBUPROFEN 400 MG: 400 TABLET ORAL at 20:16

## 2018-01-01 RX ADMIN — BUSPIRONE HYDROCHLORIDE 10 MG: 10 TABLET ORAL at 09:27

## 2018-01-01 RX ADMIN — IPRATROPIUM BROMIDE AND ALBUTEROL SULFATE 3 ML: .5; 3 SOLUTION RESPIRATORY (INHALATION) at 08:00

## 2018-01-01 RX ADMIN — Medication 0.5 MG: at 04:51

## 2018-01-01 RX ADMIN — ONDANSETRON 4 MG: 4 TABLET, ORALLY DISINTEGRATING ORAL at 13:21

## 2018-01-01 RX ADMIN — TAZOBACTAM SODIUM AND PIPERACILLIN SODIUM 3.38 G: 375; 3 INJECTION, SOLUTION INTRAVENOUS at 17:24

## 2018-01-01 RX ADMIN — BUSPIRONE HYDROCHLORIDE 10 MG: 10 TABLET ORAL at 20:30

## 2018-01-01 RX ADMIN — TAZOBACTAM SODIUM AND PIPERACILLIN SODIUM 3.38 G: 375; 3 INJECTION, SOLUTION INTRAVENOUS at 16:52

## 2018-01-01 RX ADMIN — ASPIRIN 81 MG: 81 TABLET, COATED ORAL at 07:52

## 2018-01-01 RX ADMIN — TAZOBACTAM SODIUM AND PIPERACILLIN SODIUM 3.38 G: 375; 3 INJECTION, SOLUTION INTRAVENOUS at 23:12

## 2018-01-01 RX ADMIN — IBUPROFEN 400 MG: 400 TABLET ORAL at 16:52

## 2018-01-01 RX ADMIN — IPRATROPIUM BROMIDE AND ALBUTEROL SULFATE 3 ML: .5; 3 SOLUTION RESPIRATORY (INHALATION) at 15:28

## 2018-01-01 RX ADMIN — LORAZEPAM 0.5 MG: 2 INJECTION INTRAMUSCULAR; INTRAVENOUS at 23:55

## 2018-01-01 RX ADMIN — Medication 10 MEQ: at 18:44

## 2018-01-01 RX ADMIN — TAZOBACTAM SODIUM AND PIPERACILLIN SODIUM 4.5 G: 500; 4 INJECTION, SOLUTION INTRAVENOUS at 09:25

## 2018-01-01 RX ADMIN — POTASSIUM CHLORIDE 40 MEQ: 1500 TABLET, EXTENDED RELEASE ORAL at 12:08

## 2018-01-01 RX ADMIN — TAZOBACTAM SODIUM AND PIPERACILLIN SODIUM 3.38 G: 375; 3 INJECTION, SOLUTION INTRAVENOUS at 10:14

## 2018-01-01 RX ADMIN — AZITHROMYCIN MONOHYDRATE 500 MG: 500 INJECTION, POWDER, LYOPHILIZED, FOR SOLUTION INTRAVENOUS at 18:38

## 2018-01-01 RX ADMIN — Medication 4000 UNITS: at 18:08

## 2018-01-01 RX ADMIN — ACETAMINOPHEN 650 MG: 325 TABLET, FILM COATED ORAL at 22:42

## 2018-01-01 RX ADMIN — ASPIRIN 81 MG: 81 TABLET, COATED ORAL at 08:33

## 2018-01-01 RX ADMIN — IPRATROPIUM BROMIDE AND ALBUTEROL SULFATE 3 ML: .5; 3 SOLUTION RESPIRATORY (INHALATION) at 16:00

## 2018-01-01 RX ADMIN — IPRATROPIUM BROMIDE AND ALBUTEROL SULFATE 3 ML: .5; 3 SOLUTION RESPIRATORY (INHALATION) at 11:08

## 2018-01-01 RX ADMIN — LEVOTHYROXINE SODIUM 125 MCG: 125 TABLET ORAL at 07:53

## 2018-01-01 RX ADMIN — POTASSIUM CHLORIDE 20 MEQ: 1500 TABLET, EXTENDED RELEASE ORAL at 14:06

## 2018-01-01 RX ADMIN — IPRATROPIUM BROMIDE AND ALBUTEROL SULFATE 3 ML: .5; 3 SOLUTION RESPIRATORY (INHALATION) at 19:43

## 2018-01-01 RX ADMIN — IBUPROFEN 400 MG: 400 TABLET ORAL at 09:11

## 2018-01-01 RX ADMIN — ASPIRIN 81 MG: 81 TABLET, COATED ORAL at 07:36

## 2018-01-01 RX ADMIN — IPRATROPIUM BROMIDE AND ALBUTEROL SULFATE 3 ML: .5; 3 SOLUTION RESPIRATORY (INHALATION) at 08:38

## 2018-01-01 RX ADMIN — TOPIRAMATE 100 MG: 100 TABLET, FILM COATED ORAL at 09:12

## 2018-01-01 RX ADMIN — IPRATROPIUM BROMIDE AND ALBUTEROL SULFATE 3 ML: .5; 3 SOLUTION RESPIRATORY (INHALATION) at 19:18

## 2018-01-01 RX ADMIN — WARFARIN SODIUM 5 MG: 5 TABLET ORAL at 17:47

## 2018-01-01 RX ADMIN — ACETAMINOPHEN 650 MG: 325 TABLET, FILM COATED ORAL at 05:32

## 2018-01-01 RX ADMIN — DULOXETINE HYDROCHLORIDE 60 MG: 30 CAPSULE, DELAYED RELEASE ORAL at 08:26

## 2018-01-01 RX ADMIN — TOPIRAMATE 100 MG: 100 TABLET, FILM COATED ORAL at 07:36

## 2018-01-01 RX ADMIN — IPRATROPIUM BROMIDE AND ALBUTEROL SULFATE 3 ML: .5; 3 SOLUTION RESPIRATORY (INHALATION) at 20:05

## 2018-01-01 RX ADMIN — NICOTINE 1 PATCH: 21 PATCH, EXTENDED RELEASE TRANSDERMAL at 14:58

## 2018-01-01 RX ADMIN — ACETAMINOPHEN 650 MG: 325 TABLET, FILM COATED ORAL at 09:50

## 2018-01-01 RX ADMIN — LEVOTHYROXINE SODIUM 125 MCG: 125 TABLET ORAL at 09:11

## 2018-01-01 RX ADMIN — IBUPROFEN 400 MG: 400 TABLET ORAL at 17:23

## 2018-01-01 RX ADMIN — METHYLPREDNISOLONE SODIUM SUCCINATE 62.5 MG: 125 INJECTION, POWDER, FOR SOLUTION INTRAMUSCULAR; INTRAVENOUS at 08:31

## 2018-01-01 RX ADMIN — NALOXONE HYDROCHLORIDE 0.2 MG/HR: 1 INJECTION PARENTERAL at 11:56

## 2018-01-01 RX ADMIN — TAZOBACTAM SODIUM AND PIPERACILLIN SODIUM 3.38 G: 375; 3 INJECTION, SOLUTION INTRAVENOUS at 05:00

## 2018-01-01 RX ADMIN — NALOXONE HYDROCHLORIDE 0.2 MG/HR: 1 INJECTION PARENTERAL at 21:53

## 2018-01-01 RX ADMIN — LORAZEPAM 1 MG: 2 INJECTION INTRAMUSCULAR; INTRAVENOUS at 08:32

## 2018-01-01 RX ADMIN — IBUPROFEN 400 MG: 400 TABLET ORAL at 07:34

## 2018-01-01 RX ADMIN — ASPIRIN 81 MG: 81 TABLET, COATED ORAL at 08:45

## 2018-01-01 RX ADMIN — IPRATROPIUM BROMIDE AND ALBUTEROL SULFATE 3 ML: .5; 3 SOLUTION RESPIRATORY (INHALATION) at 15:56

## 2018-01-01 RX ADMIN — IPRATROPIUM BROMIDE AND ALBUTEROL SULFATE 3 ML: .5; 3 SOLUTION RESPIRATORY (INHALATION) at 23:57

## 2018-01-01 RX ADMIN — IPRATROPIUM BROMIDE AND ALBUTEROL SULFATE 3 ML: .5; 3 SOLUTION RESPIRATORY (INHALATION) at 07:49

## 2018-01-01 RX ADMIN — TOPIRAMATE 100 MG: 100 TABLET, FILM COATED ORAL at 20:14

## 2018-01-01 RX ADMIN — BUSPIRONE HYDROCHLORIDE 10 MG: 10 TABLET ORAL at 20:14

## 2018-01-01 RX ADMIN — TAZOBACTAM SODIUM AND PIPERACILLIN SODIUM 3.38 G: 375; 3 INJECTION, SOLUTION INTRAVENOUS at 04:43

## 2018-01-01 RX ADMIN — IPRATROPIUM BROMIDE AND ALBUTEROL SULFATE 3 ML: .5; 3 SOLUTION RESPIRATORY (INHALATION) at 12:08

## 2018-01-01 RX ADMIN — IPRATROPIUM BROMIDE AND ALBUTEROL SULFATE 3 ML: .5; 3 SOLUTION RESPIRATORY (INHALATION) at 08:36

## 2018-01-01 RX ADMIN — Medication 10 MEQ: at 19:47

## 2018-01-01 RX ADMIN — NICOTINE 1 PATCH: 21 PATCH, EXTENDED RELEASE TRANSDERMAL at 07:38

## 2018-01-01 RX ADMIN — NICOTINE 1 PATCH: 21 PATCH, EXTENDED RELEASE TRANSDERMAL at 08:34

## 2018-01-01 RX ADMIN — LORAZEPAM 0.5 MG: 2 INJECTION INTRAMUSCULAR; INTRAVENOUS at 10:27

## 2018-01-01 RX ADMIN — POTASSIUM CHLORIDE 20 MEQ: 1500 TABLET, EXTENDED RELEASE ORAL at 20:14

## 2018-01-01 RX ADMIN — ONDANSETRON 4 MG: 2 INJECTION INTRAMUSCULAR; INTRAVENOUS at 14:58

## 2018-01-01 RX ADMIN — DULOXETINE HYDROCHLORIDE 60 MG: 30 CAPSULE, DELAYED RELEASE ORAL at 07:53

## 2018-01-01 RX ADMIN — METHYLPREDNISOLONE SODIUM SUCCINATE 62.5 MG: 125 INJECTION, POWDER, FOR SOLUTION INTRAMUSCULAR; INTRAVENOUS at 08:18

## 2018-01-01 RX ADMIN — IBUPROFEN 400 MG: 400 TABLET ORAL at 02:59

## 2018-01-01 RX ADMIN — TOPIRAMATE 100 MG: 100 TABLET, FILM COATED ORAL at 08:41

## 2018-01-01 RX ADMIN — SODIUM CHLORIDE, POTASSIUM CHLORIDE, SODIUM LACTATE AND CALCIUM CHLORIDE 1000 ML: 600; 310; 30; 20 INJECTION, SOLUTION INTRAVENOUS at 15:45

## 2018-01-01 RX ADMIN — ALBUTEROL SULFATE 2.5 MG: 2.5 SOLUTION RESPIRATORY (INHALATION) at 04:13

## 2018-01-01 RX ADMIN — Medication 1 MG: at 21:01

## 2018-01-01 RX ADMIN — DULOXETINE HYDROCHLORIDE 60 MG: 30 CAPSULE, DELAYED RELEASE ORAL at 20:43

## 2018-01-01 RX ADMIN — BUSPIRONE HYDROCHLORIDE 10 MG: 10 TABLET ORAL at 08:26

## 2018-01-01 RX ADMIN — NALOXONE HYDROCHLORIDE 0.2 MG/HR: 1 INJECTION PARENTERAL at 09:57

## 2018-01-01 RX ADMIN — ACETAMINOPHEN 650 MG: 325 TABLET, FILM COATED ORAL at 17:23

## 2018-01-01 RX ADMIN — LORAZEPAM 0.5 MG: 2 INJECTION INTRAMUSCULAR; INTRAVENOUS at 21:52

## 2018-01-01 RX ADMIN — DULOXETINE HYDROCHLORIDE 60 MG: 30 CAPSULE, DELAYED RELEASE ORAL at 08:39

## 2018-01-01 RX ADMIN — PREDNISONE 40 MG: 20 TABLET ORAL at 08:46

## 2018-01-01 RX ADMIN — SODIUM CHLORIDE: 9 INJECTION, SOLUTION INTRAVENOUS at 03:28

## 2018-01-01 RX ADMIN — METHYLPREDNISOLONE SODIUM SUCCINATE 62.5 MG: 125 INJECTION, POWDER, FOR SOLUTION INTRAMUSCULAR; INTRAVENOUS at 07:51

## 2018-01-01 RX ADMIN — SODIUM CHLORIDE: 9 INJECTION, SOLUTION INTRAVENOUS at 09:18

## 2018-01-01 RX ADMIN — PREDNISONE 40 MG: 20 TABLET ORAL at 07:36

## 2018-01-01 RX ADMIN — ACETAMINOPHEN 650 MG: 325 TABLET, FILM COATED ORAL at 14:06

## 2018-01-01 RX ADMIN — AZITHROMYCIN MONOHYDRATE 500 MG: 500 INJECTION, POWDER, LYOPHILIZED, FOR SOLUTION INTRAVENOUS at 21:44

## 2018-01-01 RX ADMIN — DULOXETINE HYDROCHLORIDE 60 MG: 30 CAPSULE, DELAYED RELEASE ORAL at 09:10

## 2018-01-01 RX ADMIN — ASPIRIN 81 MG: 81 TABLET, COATED ORAL at 09:11

## 2018-01-01 RX ADMIN — DULOXETINE HYDROCHLORIDE 60 MG: 30 CAPSULE, DELAYED RELEASE ORAL at 07:36

## 2018-01-01 RX ADMIN — LORAZEPAM 0.5 MG: 2 INJECTION INTRAMUSCULAR; INTRAVENOUS at 01:54

## 2018-01-01 RX ADMIN — WARFARIN SODIUM 15 MG: 7.5 TABLET ORAL at 18:45

## 2018-01-01 RX ADMIN — IBUPROFEN 400 MG: 400 TABLET ORAL at 11:58

## 2018-01-01 RX ADMIN — HEPARIN SODIUM 900 UNITS/HR: 10000 INJECTION, SOLUTION INTRAVENOUS at 18:08

## 2018-01-01 RX ADMIN — DULOXETINE HYDROCHLORIDE 60 MG: 30 CAPSULE, DELAYED RELEASE ORAL at 20:30

## 2018-01-01 RX ADMIN — NALOXONE HYDROCHLORIDE 0.4 MG: 0.4 INJECTION, SOLUTION INTRAMUSCULAR; INTRAVENOUS; SUBCUTANEOUS at 13:53

## 2018-01-01 RX ADMIN — IPRATROPIUM BROMIDE AND ALBUTEROL SULFATE 3 ML: .5; 3 SOLUTION RESPIRATORY (INHALATION) at 19:27

## 2018-01-01 RX ADMIN — Medication 1 MG: at 21:52

## 2018-01-01 RX ADMIN — PANTOPRAZOLE SODIUM 40 MG: 40 INJECTION, POWDER, FOR SOLUTION INTRAVENOUS at 07:51

## 2018-01-01 RX ADMIN — Medication 0.5 MG: at 07:51

## 2018-01-01 RX ADMIN — TOPIRAMATE 100 MG: 100 TABLET, FILM COATED ORAL at 21:52

## 2018-01-01 RX ADMIN — LORAZEPAM 0.5 MG: 2 INJECTION INTRAMUSCULAR; INTRAVENOUS at 18:32

## 2018-01-01 RX ADMIN — LORAZEPAM 0.5 MG: 2 INJECTION INTRAMUSCULAR; INTRAVENOUS at 23:15

## 2018-01-01 RX ADMIN — BUSPIRONE HYDROCHLORIDE 10 MG: 10 TABLET ORAL at 19:00

## 2018-01-01 RX ADMIN — TAZOBACTAM SODIUM AND PIPERACILLIN SODIUM 3.38 G: 375; 3 INJECTION, SOLUTION INTRAVENOUS at 22:08

## 2018-01-01 RX ADMIN — LEVOTHYROXINE SODIUM 125 MCG: 125 TABLET ORAL at 05:32

## 2018-01-01 RX ADMIN — PANTOPRAZOLE SODIUM 40 MG: 40 TABLET, DELAYED RELEASE ORAL at 05:32

## 2018-01-01 RX ADMIN — AZITHROMYCIN MONOHYDRATE 500 MG: 500 INJECTION, POWDER, LYOPHILIZED, FOR SOLUTION INTRAVENOUS at 18:33

## 2018-01-01 RX ADMIN — TAZOBACTAM SODIUM AND PIPERACILLIN SODIUM 3.38 G: 375; 3 INJECTION, SOLUTION INTRAVENOUS at 10:26

## 2018-01-01 RX ADMIN — Medication 10 MEQ: at 20:49

## 2018-01-01 RX ADMIN — METHYLPREDNISOLONE SODIUM SUCCINATE 62.5 MG: 125 INJECTION, POWDER, FOR SOLUTION INTRAMUSCULAR; INTRAVENOUS at 17:12

## 2018-01-01 RX ADMIN — LORAZEPAM 0.5 MG: 2 INJECTION INTRAMUSCULAR; INTRAVENOUS at 20:19

## 2018-01-01 RX ADMIN — ACETAMINOPHEN 650 MG: 325 TABLET, FILM COATED ORAL at 23:27

## 2018-01-01 RX ADMIN — Medication 10 MEQ: at 06:59

## 2018-01-01 RX ADMIN — LEVOTHYROXINE SODIUM 125 MCG: 125 TABLET ORAL at 08:18

## 2018-01-01 RX ADMIN — IBUPROFEN 400 MG: 400 TABLET ORAL at 14:03

## 2018-01-01 RX ADMIN — PANTOPRAZOLE SODIUM 40 MG: 40 INJECTION, POWDER, FOR SOLUTION INTRAVENOUS at 08:18

## 2018-01-01 RX ADMIN — IPRATROPIUM BROMIDE AND ALBUTEROL SULFATE 3 ML: .5; 3 SOLUTION RESPIRATORY (INHALATION) at 21:18

## 2018-01-01 RX ADMIN — IPRATROPIUM BROMIDE AND ALBUTEROL SULFATE 3 ML: .5; 3 SOLUTION RESPIRATORY (INHALATION) at 07:50

## 2018-01-01 RX ADMIN — SODIUM CHLORIDE, POTASSIUM CHLORIDE, SODIUM LACTATE AND CALCIUM CHLORIDE 1000 ML: 600; 310; 30; 20 INJECTION, SOLUTION INTRAVENOUS at 09:48

## 2018-01-01 RX ADMIN — TAZOBACTAM SODIUM AND PIPERACILLIN SODIUM 3.38 G: 375; 3 INJECTION, SOLUTION INTRAVENOUS at 04:50

## 2018-01-01 RX ADMIN — ASPIRIN 325 MG ORAL TABLET 325 MG: 325 PILL ORAL at 06:36

## 2018-01-01 RX ADMIN — LORAZEPAM 0.5 MG: 2 INJECTION INTRAMUSCULAR; INTRAVENOUS at 06:12

## 2018-01-01 RX ADMIN — LEVOTHYROXINE SODIUM 125 MCG: 125 TABLET ORAL at 06:20

## 2018-01-01 RX ADMIN — TAZOBACTAM SODIUM AND PIPERACILLIN SODIUM 3.38 G: 375; 3 INJECTION, SOLUTION INTRAVENOUS at 21:33

## 2018-01-01 RX ADMIN — NALOXONE HYDROCHLORIDE 0.2 MG/HR: 1 INJECTION PARENTERAL at 23:27

## 2018-01-01 ASSESSMENT — ENCOUNTER SYMPTOMS
JOINT SWELLING: 1
SWOLLEN GLANDS: 1
SNORES LOUDLY: 0
FATIGUE: 1
MYALGIAS: 1
WHEEZING: 0
POSTURAL DYSPNEA: 1
WEIGHT GAIN: 1
INCREASED ENERGY: 1
SEIZURES: 0
POLYDIPSIA: 0
LOSS OF CONSCIOUSNESS: 0
COUGH: 1
SLEEP DISTURBANCES DUE TO BREATHING: 0
DOUBLE VISION: 1
SINUS PAIN: 1
MUSCLE WEAKNESS: 1
SINUS CONGESTION: 1
HYPERTENSION: 0
MUSCLE CRAMPS: 1
NECK MASS: 1
POLYPHAGIA: 0
SYNCOPE: 0
TASTE DISTURBANCE: 0
COUGH DISTURBING SLEEP: 0
TINGLING: 1
HALLUCINATIONS: 0
ORTHOPNEA: 1
POOR WOUND HEALING: 1
SPEECH CHANGE: 0
DIZZINESS: 1
PANIC: 0
DISTURBANCES IN COORDINATION: 1
HEMOPTYSIS: 0
HYPOTENSION: 1
WEIGHT LOSS: 1
EYE REDNESS: 0
NUMBNESS: 1
SPUTUM PRODUCTION: 1
NERVOUS/ANXIOUS: 1
TROUBLE SWALLOWING: 0
DECREASED APPETITE: 1
NECK PAIN: 0
HOARSE VOICE: 0
EYE REDNESS: 1
SKIN CHANGES: 0
HEADACHES: 1
DOUBLE VISION: 1
DECREASED CONCENTRATION: 1
NIGHT SWEATS: 0
EYE IRRITATION: 1
SMELL DISTURBANCE: 0
SORE THROAT: 0
INSOMNIA: 1
PARALYSIS: 0
TREMORS: 1
BRUISES/BLEEDS EASILY: 1
NAIL CHANGES: 1
EYE IRRITATION: 1
LIGHT-HEADEDNESS: 1
DYSPNEA ON EXERTION: 1
CHILLS: 1
EYE PAIN: 0
LEG PAIN: 1
STIFFNESS: 1
EYE WATERING: 1
EYE WATERING: 0
WEAKNESS: 1
BACK PAIN: 1
DEPRESSION: 1
EYE PAIN: 1
ARTHRALGIAS: 1
ALTERED TEMPERATURE REGULATION: 1
FEVER: 0
EXERCISE INTOLERANCE: 1
MEMORY LOSS: 1
PALPITATIONS: 0
SHORTNESS OF BREATH: 1

## 2018-01-01 ASSESSMENT — ANXIETY QUESTIONNAIRES
1. FEELING NERVOUS, ANXIOUS, OR ON EDGE: SEVERAL DAYS
GAD7 TOTAL SCORE: 7
3. WORRYING TOO MUCH ABOUT DIFFERENT THINGS: SEVERAL DAYS
GAD7 TOTAL SCORE: 7
7. FEELING AFRAID AS IF SOMETHING AWFUL MIGHT HAPPEN: SEVERAL DAYS
7. FEELING AFRAID AS IF SOMETHING AWFUL MIGHT HAPPEN: SEVERAL DAYS
6. BECOMING EASILY ANNOYED OR IRRITABLE: SEVERAL DAYS
5. BEING SO RESTLESS THAT IT IS HARD TO SIT STILL: SEVERAL DAYS
4. TROUBLE RELAXING: SEVERAL DAYS
2. NOT BEING ABLE TO STOP OR CONTROL WORRYING: SEVERAL DAYS

## 2018-01-01 ASSESSMENT — ACTIVITIES OF DAILY LIVING (ADL)
ADLS_ACUITY_SCORE: 19
ADLS_ACUITY_SCORE: 20
ADLS_ACUITY_SCORE: 20
ADLS_ACUITY_SCORE: 16
ADLS_ACUITY_SCORE: 21
ADLS_ACUITY_SCORE: 21
ADLS_ACUITY_SCORE: 20
ADLS_ACUITY_SCORE: 21
ADLS_ACUITY_SCORE: 16
ADLS_ACUITY_SCORE: 13
DEPENDENT_IADLS:: MEDICATION MANAGEMENT;TRANSPORTATION
ADLS_ACUITY_SCORE: 17
ADLS_ACUITY_SCORE: 19
ADLS_ACUITY_SCORE: 20
ADLS_ACUITY_SCORE: 21
ADLS_ACUITY_SCORE: 16
ADLS_ACUITY_SCORE: 17
ADLS_ACUITY_SCORE: 21
ADLS_ACUITY_SCORE: 21
DEPENDENT_IADLS:: MEDICATION MANAGEMENT;TRANSPORTATION
ADLS_ACUITY_SCORE: 20
ADLS_ACUITY_SCORE: 16
ADLS_ACUITY_SCORE: 21
ADLS_ACUITY_SCORE: 16
DEPENDENT_IADLS:: INDEPENDENT
ADLS_ACUITY_SCORE: 16
ADLS_ACUITY_SCORE: 16
ADLS_ACUITY_SCORE: 20
ADLS_ACUITY_SCORE: 21
ADLS_ACUITY_SCORE: 16
ADLS_ACUITY_SCORE: 20
ADLS_ACUITY_SCORE: 21
DEPENDENT_IADLS:: MEDICATION MANAGEMENT;TRANSPORTATION
ADLS_ACUITY_SCORE: 21
ADLS_ACUITY_SCORE: 16
DEPENDENT_IADLS:: MEDICATION MANAGEMENT;TRANSPORTATION
ADLS_ACUITY_SCORE: 16
DEPENDENT_IADLS:: TRANSPORTATION

## 2018-01-01 ASSESSMENT — PAIN DESCRIPTION - DESCRIPTORS
DESCRIPTORS: ACHING

## 2018-01-01 ASSESSMENT — PATIENT HEALTH QUESTIONNAIRE - PHQ9
SUM OF ALL RESPONSES TO PHQ QUESTIONS 1-9: 7
10. IF YOU CHECKED OFF ANY PROBLEMS, HOW DIFFICULT HAVE THESE PROBLEMS MADE IT FOR YOU TO DO YOUR WORK, TAKE CARE OF THINGS AT HOME, OR GET ALONG WITH OTHER PEOPLE: SOMEWHAT DIFFICULT

## 2018-01-01 ASSESSMENT — PAIN SCALES - GENERAL
PAINLEVEL: SEVERE PAIN (7)
PAINLEVEL: NO PAIN (0)

## 2018-01-02 RX ORDER — TIOTROPIUM BROMIDE 18 UG/1
CAPSULE ORAL; RESPIRATORY (INHALATION)
Qty: 90 CAPSULE | Refills: 0 | Status: SHIPPED | OUTPATIENT
Start: 2018-01-02 | End: 2018-01-01

## 2018-01-08 ENCOUNTER — TELEPHONE (OUTPATIENT)
Dept: SLEEP MEDICINE | Facility: CLINIC | Age: 63
End: 2018-01-08

## 2018-01-08 DIAGNOSIS — J96.12 CHRONIC RESPIRATORY FAILURE WITH HYPOXIA AND HYPERCAPNIA (H): ICD-10-CM

## 2018-01-08 DIAGNOSIS — J44.9 CHRONIC OBSTRUCTIVE PULMONARY DISEASE, UNSPECIFIED COPD TYPE (H): Primary | ICD-10-CM

## 2018-01-08 DIAGNOSIS — G47.33 OSA (OBSTRUCTIVE SLEEP APNEA): ICD-10-CM

## 2018-01-08 DIAGNOSIS — J96.11 CHRONIC RESPIRATORY FAILURE WITH HYPOXIA AND HYPERCAPNIA (H): ICD-10-CM

## 2018-01-08 NOTE — TELEPHONE ENCOUNTER
SUBJECTIVE:  Chief Complaint   Patient presents with     Sleep Problem     Outside vendor set up request      OBJECTIVE:  Received an incoming call from Tere requesting orders for replacement machine and nocturnal supplemental oxygen be faxed to [employer].   Per telephone encounter dated 11/27/2017: Orders were faxed to [facility]     ASSESSMENT/PLAN:  I contacted [employer] tel:[phone] to check on status. Per Sanjana; order was never received.   Insurance face sheet, Sleep Study, Office Visit Note, and Epic Order faxed to [employer] at [phone]   Fax was marked URGENT    ASSESSMENT/PLAN:  Will wait to get set up information to update chart.

## 2018-01-09 NOTE — TELEPHONE ENCOUNTER
I contacted [employer] tel:[phone] and was on hold for 12 minutes. I left a message for the [employer] office to return call to verify that fax was received.

## 2018-01-09 NOTE — TELEPHONE ENCOUNTER
Lo from [employer] [place] returned call and informed me that fax was received. She also needed clarification on order and copy of Tere's study from April 2011 because Tere doesn't currently qualify with Medicare's current guidelines.     I contacted Tere to let her know that [employer] did get the order as requested by Dr. Whitehead.  Also reminded Tere to schedule a compliance follow up between 30-90 days after receiving equipment. She plans to call and schedule after set up.     Plan to postpone encounter x 1 week to make sure Tere received equipment and add her to STM.

## 2018-01-15 ENCOUNTER — OFFICE VISIT (OUTPATIENT)
Dept: PSYCHOLOGY | Facility: CLINIC | Age: 63
End: 2018-01-15
Payer: COMMERCIAL

## 2018-01-15 DIAGNOSIS — F32.0 MILD MAJOR DEPRESSION (H): Primary | ICD-10-CM

## 2018-01-15 PROCEDURE — 90834 PSYTX W PT 45 MINUTES: CPT | Performed by: PSYCHOLOGIST

## 2018-01-15 NOTE — MR AVS SNAPSHOT
MRN:[id number]                      After Visit Summary   1/15/2018    Tere Ortiz    MRN: [id number]           Visit Information        Provider Department      1/15/2018 5:00 PM Surya Melendez [facility] Generic      Your next 10 appointments already scheduled     Feb 02, 2018 12:00 PM CST   Return Visit with Surya Melendez   [facility] ([facility])    [address] [address]   [phone]            Feb 12, 2018 10:30 AM CST   Return Visit with Surya Melendez   [facility] ([facility])    [address] [address]   [phone]            Apr 03, 2018 11:00 AM CDT   DX HIP/PELVIS/SPINE with [id number]   [facility] ([facility])    [address] [address]   [phone]           Please do not take any of the following 24 hours prior to the day of your exam: vitamins, calcium tablets, antacids.  If possible, please wear clothes without metal (snaps, zippers). A sweatsuit works well.            Apr 13, 2018 11:30 AM CDT   (Arrive by 11:15 AM)   RETURN ENDOCRINE with Karol Simmons MD   [facility] ([facility])    [address] [place] [address] [phone]              MyChart Information     [employer] gives you secure access to your electronic health record. If you see a primary care provider, you can also send messages to your care team and make appointments. If you have questions, please call your primary care clinic.  If you do not have a primary care provider, please call [phone] and they will assist you.        Care EveryWhere ID     This is your Care EveryWhere ID. This could be used by other organizations to access your [place] medical records  [id number]        Equal Access to Services     SHANE ATKINSON : hermelinda Singh  nicky fofana waxay idiin hayaan adeeg kharash la'aan ah. So [facility] [phone].    ATENCIÓN: Si habla español, tiene a acosta disposición servicios gratuitos de asistencia lingüística. Llame al [phone].    We comply with applicable federal civil rights laws and [place] laws. We do not discriminate on the basis of race, color, national origin, age, disability, sex, sexual orientation, or gender identity.

## 2018-01-16 NOTE — TELEPHONE ENCOUNTER
I contacted Tere for an update. She informed me that she has not heard from [employer] and has not received her equipment.  I called [employer] to check on status request. Left message on answering machine for [employer] office to return call.

## 2018-01-16 NOTE — TELEPHONE ENCOUNTER
I called [employer] and spoke with [facility]. She has ASV order. It appears that she doesn't qualify for an ASV based on Medicare's guidelines. She is checking with the documentation department to see if there is a different way to qualify her.     Criteria that Tere isn't meeting: Her centrals are not greater than 50 % during the study.  Medicare requires the sum total of central apneas and central hypopneas is greater than 50 % of the total apneas and hypopneas. From her study dated 2011 she only had one central event.    Diagnoses on file do meet criteria.    Not previously on Medicare.   [employer] is Primary insurance and Medicare is Secondary.     I informed Tere of the delay and she is very appreciative that we are working on this but doesn't understand Medicare requirements. She is open to transferring her care to [facility] if they are able to find compliance with Medicare guidelines.     Dr. Whitehead, Can you please review/advise?

## 2018-01-16 NOTE — TELEPHONE ENCOUNTER
I contacted [employer] tel: [phone] and was transferred to release of information. I spoke with Aline.  I requested any information regarding previous sleep studies and notes. Expect fax to be received 01/17/2018.

## 2018-01-17 NOTE — TELEPHONE ENCOUNTER
Records received from study done in 2013. There are office notes included stating patient had additional studies done in 2007 and 2008. I called [facility] again to have them fax me those studies. They asked me to call the [facility] at [phone] as they have records for all [facility] facilities.     I spoke with an [facility] and they will fax the records they have to our office.

## 2018-01-18 NOTE — TELEPHONE ENCOUNTER
Reviewed the PSG reports from [facility].  4 PSG's, first was split-night 1/2007 with AHI ~11 and seems primarily obstructive, though challenging since report does not differentiate obstructive from central hypopneas.  Reportedly recommended CPAP 16, was poorly tolerated.  This led to bilevel PAP titration PSG on 1/2008, reports lists increasing central events on EPAP over 11 cm H2O, though challenging since report does not differentiate obstructive from central hypopneas with majority of events listed as hypopneas.  Due to the bilevel intolerance and this report of some centrals (though does not appear to fit medicare criteria) led to ASV titration on 10/2009 and was well tolerated and effective with nocturnal O2 at 4 LPM.    Unfortunately, due to reports not differentiating between obstructive and central hypopneas, I don't believe she would qualify for ASV based on these studies under current medicare guidelines.  So, we will have to see if we can get coverage under the COPD pathway.  She does have a VBG from 11/27/2017 with pCO2 of 72 and almost guaranteed that she would meet criteria for SpO2 <= 88% for 5+ minutes if overnight oximetry performed on room air.

## 2018-01-18 NOTE — PROGRESS NOTES
Progress Note    Client Name: Tere Ortiz  Date: 1/15/2018         Service Type: Individual      Session Start Time: 5:00 PM  Session End Time: 5:45 PM      Session Length: 45     Session #: 3     Attendees: Client attended alone    Treatment Plan Last Reviewed: 1/15/2018  PHQ-9  : 14     DATA   Client to time to process some frustrations with her oldest son who is living with them while going through a divorce and home sale.  He got a DWI couple of months ago and transportation has become a challenge.  Client also notes that she is sleeping much more than usual sometimes more than 12 hours per day.  She has done a sleep study but does not have the results yet she also noted a long-term history of sleepwalking.   Progress Since Last Session (Related to Symptoms / Goals / Homework):   Symptoms: Stable    Homework: Achieved / completed to satisfaction      Episode of Care Goals: Satisfactory progress - PREPARATION (Decided to change - considering how); Intervened by negotiating a change plan and determining options / strategies for behavior change, identifying triggers, exploring social supports, and working towards setting a date to begin behavior change     Current / Ongoing Stressors and Concerns:   Multiple chronic medical conditions, chronic pain, family stresses, financial stresses     Treatment Objective(s) Addressed in This Session:   Gratitude practice       Intervention:   Introduced 3 good things and visualizing 5 people techniques        ASSESSMENT: Current Emotional / Mental Status (status of significant symptoms):   Risk status (Self / Other harm or suicidal ideation)   Client denies current fears or concerns for personal safety.   Client denies current or recent suicidal ideation or behaviors.   Client denies current or recent homicidal ideation or behaviors.   Client denies current or recent self injurious behavior or ideation.   Client denies other  safety concerns.   A safety and risk management plan has not been developed at this time, however client was given the after-hours number / 911 should there be a change in any of these risk factors.     Appearance:   Appropriate    Eye Contact:   Good    Psychomotor Behavior: Normal    Attitude:   Cooperative    Orientation:   All   Speech    Rate / Production: Normal     Volume:  Normal    Mood:    Normal   Affect:    Appropriate    Thought Content:  Clear    Thought Form:  Coherent  Logical    Insight:    Good      Medication Review:   No changes to current psychiatric medication(s)     Medication Compliance:   Yes     Changes in Health Issues:   None reported     Chemical Use Review:   Substance Use: Chemical use reviewed, no active concerns identified      Tobacco Use: No current tobacco use.       Collateral Reports Completed:   Not Applicable    PLAN: (Client Tasks / Therapist Tasks / Other)  Client will note 3 good things that happen each day, no matter how small.  Client will visualize 5 people she is grateful for and imagine telling them each day.        Surya Melendez                                                         ________________________________________________________________________    Treatment Plan    Client's Name: Tere Ortiz  YOB: 1955    Date: 1/15/2018    DSM5 Diagnoses: (Sustained by DSM5 Criteria Listed Above)  Diagnoses:            296.31 (F33.0) Major Depressive Disorder, Recurrent Episode, Mild _  Psychosocial & Contextual Factors: Multiple medical problems  WHODAS 2.0 (12 item)                          This questionnaire asks about difficulties due to health conditions. Health conditions                   include                        disease or illnesses, other health problems that may be short or long lasting,                    injuries, mental health or emotional problems, and problems with alcohol or drugs.                              Think back over the  past 30 days and answer these questions, thinking about how much              difficulty you had doing the following activities. For each question, please [place] only                   one response.      S1 Standing for long periods such as 30 minutes? Extreme / or cannot do = 5   S2 Taking care of household responsibilities? Extreme / or cannot do = 5   S3 Learning a new task, for example, learning how to get to a new place? Moderate =   3   S4 How much of a problem do you have joining community activities (for example, festivals, Baptism or other activities) in the same way as anyone else can? Mild =           2   S5 How much have you been emotionally affected by your health problems? Severe =       4               In the past 30 days, how much difficulty did you have in:   S6 Concentrating on doing something for ten minutes? Severe =       4   S7 Walking a long distance such as a kilometer (or equivalent)? Extreme / or cannot do = 5   S8 Washing your whole body? Moderate =   3   S9 Getting dressed? Moderate =   3   S10 Dealing with people you do not know? None =         1   S11 Maintaining a friendship? None =         1   S12 Your day to day work? None =         1       H1 Overall, in the past 30 days, how many days were these difficulties present? Record number of days 30   H2 In the past 30 days, for how many days were you totally unable to carry out your usual activities or work because of any health condition? Record number of days 30   H3 In the past 30 days, not counting the days that you were totally unable, for how many days did you cut back or reduce your usual activities or work because of any health condition? Record number of days 30                  Referral / Collaboration:  Referral to another professional/service is not indicated at this time..    Anticipated number of session or this episode of care: 15      Goals  1. Education- the Biopsychosocial model of depression  a. Client will be able to  describe how depression is effecting them physically, emotionally and socially  2. Behavioral Activation  a. Client will learn to assess their depression on a day to day basis  b. Client will Identify two forms of exercise/activity and engage in them 3 times per week  c. Client will Identify 3 things that make them laugh, and engage in these 5 times per week.  d. Client will Identify 1-2Creative activities or hobbies  and engage in them 2 times per week  e. Client will identify music, movies, books that make them feel good and use them 3-4 times per week  3. Self-care  a. Client will identify 5 things they can do just for themselves  b. Client will take time for quiet, reflection, meditation 5 times per week  c. Client will Learn to set boundaries when appropriate  d. Client will Identify 2 individuals they can call on for support, distraction  4. Assessment of progress  a. Client will engage in assessment of their progress on a regular basis    Client has reviewed and agreed to the above plan.      Surya Melendez  January 18, 2018

## 2018-01-18 NOTE — TELEPHONE ENCOUNTER
Tere informed of message via telephone call.    She would like to pursue ABG blood work at [place] and do an overnight oximeter.     Please place orders.

## 2018-01-22 NOTE — TELEPHONE ENCOUNTER
Will place order for overnight oximeter.  Given the goal of hopeful qualification for bilevel with back-up rate, will plan to perform with her using only her nocturnal supplemental oxygen (currently 4 LPM) without use of ASV that night.

## 2018-01-29 ENCOUNTER — HOSPITAL ENCOUNTER (OUTPATIENT)
Dept: RESPIRATORY THERAPY | Facility: CLINIC | Age: 63
Discharge: HOME | End: 2018-01-29
Attending: PHYSICIAN ASSISTANT | Admitting: PHYSICIAN ASSISTANT
Payer: COMMERCIAL

## 2018-01-29 ENCOUNTER — ANTICOAGULATION THERAPY VISIT (OUTPATIENT)
Dept: ANTICOAGULATION | Facility: CLINIC | Age: 63
End: 2018-01-29
Payer: COMMERCIAL

## 2018-01-29 DIAGNOSIS — Z79.01 LONG-TERM (CURRENT) USE OF ANTICOAGULANTS: ICD-10-CM

## 2018-01-29 DIAGNOSIS — I26.99 PULMONARY EMBOLISM WITH INFARCTION (H): ICD-10-CM

## 2018-01-29 DIAGNOSIS — D68.62 LUPUS ANTICOAGULANT INHIBITOR SYNDROME (H): ICD-10-CM

## 2018-01-29 DIAGNOSIS — I74.9 EMBOLISM (H): ICD-10-CM

## 2018-01-29 LAB
BASE EXCESS BLDA CALC-SCNC: 2.2 MMOL/L
HCO3 BLD-SCNC: 30 MMOL/L (ref 21–28)
INR POINT OF CARE: 2.3 (ref 0.86–1.14)
O2/TOTAL GAS SETTING VFR VENT: ABNORMAL %
PCO2 BLD: 56 MM HG (ref 35–45)
PH BLD: 7.33 PH (ref 7.35–7.45)
PO2 BLD: 50 MM HG (ref 80–105)

## 2018-01-29 PROCEDURE — 36416 COLLJ CAPILLARY BLOOD SPEC: CPT

## 2018-01-29 PROCEDURE — 99207 ZZC NO CHARGE NURSE ONLY: CPT

## 2018-01-29 PROCEDURE — 85610 PROTHROMBIN TIME: CPT | Mod: QW

## 2018-01-29 PROCEDURE — 82803 BLOOD GASES ANY COMBINATION: CPT | Performed by: PHYSICIAN ASSISTANT

## 2018-01-29 PROCEDURE — 36600 WITHDRAWAL OF ARTERIAL BLOOD: CPT

## 2018-01-29 NOTE — PROGRESS NOTES
ANTICOAGULATION FOLLOW-UP CLINIC VISIT    Patient Name:  Tere Ortiz  Date:  1/29/2018  Contact Type:  Face to Face    SUBJECTIVE:     Patient Findings     Positives No Problem Findings           OBJECTIVE    INR Protime   Date Value Ref Range Status   01/29/2018 2.3 (A) 0.86 - 1.14 Final       ASSESSMENT / PLAN  INR assessment THER    Recheck INR In: 5 WEEKS    INR Location Clinic      Anticoagulation Summary as of 1/29/2018     INR goal 2.0-3.0   Today's INR 2.3   Maintenance plan 12.5 mg (5 mg x 2.5) on Mon; 10 mg (5 mg x 2) all other days   Full instructions 12.5 mg on Mon; 10 mg all other days   Weekly total 72.5 mg   Plan last modified Cony Pagan RN (11/16/2017)   Next INR check 3/5/2018   Priority INR   Target end date Indefinite    Indications   Lupus anticoagulant inhibitor syndrome (H) [D68.62]  Pulmonary embolism with infarction (H) [I26.99]  Long-term (current) use of anticoagulants [Z79.01] [Z79.01]  Embolism - blood clot [I74.9]         Anticoagulation Episode Summary     INR check location     Preferred lab     Send INR reminders to [facility]    Comments * comes in electric WC. warfarin 5mg tablets. takes warfarin in the AM      Anticoagulation Care Providers     Provider Role Specialty Phone number    Zina Pruett PA-C Responsible Physician Assistant [phone]            See the Encounter Report to view Anticoagulation Flowsheet and Dosing Calendar (Go to Encounters tab in chart review, and find the Anticoagulation Therapy Visit)    Patient advised to continue the same warfarin maintenance dose, INR therapeutic.        Ara Kaplan RN

## 2018-01-29 NOTE — MR AVS SNAPSHOT
Tere GREWAL Diana   1/29/2018 3:30 PM   Anticoagulation Therapy Visit    Description:  62 year old female   Provider:  NB ANTI COAG   Department:  [facility] Anticoag           INR as of 1/29/2018     Today's INR 2.3      Anticoagulation Summary as of 1/29/2018     INR goal 2.0-3.0   Today's INR 2.3   Full instructions 12.5 mg on Mon; 10 mg all other days   Next INR check 3/5/2018    Indications   Lupus anticoagulant inhibitor syndrome (H) [D68.62]  Pulmonary embolism with infarction (H) [I26.99]  Long-term (current) use of anticoagulants [Z79.01] [Z79.01]  Embolism - blood clot [I74.9]         Your next Anticoagulation Clinic appointment(s)     Mar 05, 2018  2:00 PM CST   Anticoagulation Visit with NB ANTI COAG   [facility]    [address] [place] [address] [phone]              Contact Numbers     Please call [phone] with any problems or questions regarding your therapy.    If you need to cancel and/or reschedule your appointment please call one of the following numbers:  [facility] - [phone]  [facility] [phone]  [facility] - [phone]  [facility] [phone]  [place] - [phone]            January 2018 Details    Sun Mon Tue Wed Thu Fri Sat      1               2               3               4               5               6                 7               8               9               10               11               12               13                 14               15               16               17               18               19               20                 21               22               23               24               25               26               27                 28               29      12.5 mg   See details      30      10 mg         31      10 mg             Date Details   01/29 This INR check               How to take your warfarin dose     To take:  10 mg Take 2 of the 5 mg tablets.     To take:  12.5 mg Take 2.5 of the 5 mg tablets.           February 2018 Details    Sun Mon Tue Wed Thu Fri Sat         1      10 mg         2      10 mg         3      10 mg           4      10 mg         5      12.5 mg         6      10 mg         7      10 mg         8      10 mg         9      10 mg         10      10 mg           11      10 mg         12      12.5 mg         13      10 mg         14      10 mg         15      10 mg         16      10 mg         17      10 mg           18      10 mg         19      12.5 mg         20      10 mg         21      10 mg         22      10 mg         23      10 mg         24      10 mg           25      10 mg         26      12.5 mg         27      10 mg         28      10 mg             Date Details   No additional details            How to take your warfarin dose     To take:  10 mg Take 2 of the 5 mg tablets.    To take:  12.5 mg Take 2.5 of the 5 mg tablets.           March 2018 Details    Sun Mon Tue Wed Thu Fri Sat         1      10 mg         2      10 mg         3      10 mg           4      10 mg         5            6               7               8               9               10                 11               12               13               14               15               16               17                 18               19               20               21               22               23               24                 25               26               27               28               29               30               31                Date Details   No additional details    Date of next INR:  3/5/2018         How to take your warfarin dose     To take:  10 mg Take 2 of the 5 mg tablets.    To take:  12.5 mg Take 2.5 of the 5 mg tablets.

## 2018-02-28 DIAGNOSIS — J01.80 OTHER ACUTE SINUSITIS, RECURRENCE NOT SPECIFIED: ICD-10-CM

## 2018-03-01 NOTE — TELEPHONE ENCOUNTER
ipratropium (ATROVENT) 0.03 %   Last Written Prescription Date:  9/23/16  Last Fill Quantity: 1BOX,   # refills: 1  Last Office Visit : 10/13/17  Future Office visit:  4/13/18    Routing refill request to provider for review/approval because:  Drug not on the refill protocol or controlled substance

## 2018-03-02 RX ORDER — IPRATROPIUM BROMIDE 21 UG/1
2 SPRAY, METERED NASAL EVERY 12 HOURS
Qty: 30 ML | Refills: 1 | Status: SHIPPED | OUTPATIENT
Start: 2018-03-02 | End: 2018-01-01

## 2018-03-05 ENCOUNTER — ANTICOAGULATION THERAPY VISIT (OUTPATIENT)
Dept: ANTICOAGULATION | Facility: CLINIC | Age: 63
End: 2018-03-05
Payer: COMMERCIAL

## 2018-03-05 DIAGNOSIS — D68.62 LUPUS ANTICOAGULANT INHIBITOR SYNDROME (H): ICD-10-CM

## 2018-03-05 DIAGNOSIS — I74.9 EMBOLISM (H): ICD-10-CM

## 2018-03-05 DIAGNOSIS — Z79.01 LONG-TERM (CURRENT) USE OF ANTICOAGULANTS: ICD-10-CM

## 2018-03-05 DIAGNOSIS — I26.99 PULMONARY EMBOLISM WITH INFARCTION (H): ICD-10-CM

## 2018-03-05 LAB — INR POINT OF CARE: 2 (ref 0.86–1.14)

## 2018-03-05 PROCEDURE — 99207 ZZC NO CHARGE NURSE ONLY: CPT

## 2018-03-05 PROCEDURE — 85610 PROTHROMBIN TIME: CPT | Mod: QW

## 2018-03-05 PROCEDURE — 36416 COLLJ CAPILLARY BLOOD SPEC: CPT

## 2018-03-05 NOTE — PROGRESS NOTES
ANTICOAGULATION FOLLOW-UP CLINIC VISIT    Patient Name:  Tere Ortiz  Date:  3/5/2018  Contact Type:  Face to Face    SUBJECTIVE:     Patient Findings     Positives No Problem Findings    Comments No changes in medications, activity, or diet noted. No bleeding or increased bruising noted. Took warfarin as prescribed.           OBJECTIVE    INR Protime   Date Value Ref Range Status   03/05/2018 2.0 (A) 0.86 - 1.14 Final       ASSESSMENT / PLAN  INR assessment THER    Recheck INR In: 6 WEEKS    INR Location Clinic      Anticoagulation Summary as of 3/5/2018     INR goal 2.0-3.0   Today's INR 2.0   Maintenance plan 12.5 mg (5 mg x 2.5) on Mon; 10 mg (5 mg x 2) all other days   Full instructions 12.5 mg on Mon; 10 mg all other days   Weekly total 72.5 mg   No change documented Kenyetta Cooper, RN   Plan last modified Cony Pagan RN (11/16/2017)   Next INR check 4/16/2018   Priority INR   Target end date Indefinite    Indications   Lupus anticoagulant inhibitor syndrome (H) [D68.62]  Pulmonary embolism with infarction (H) [I26.99]  Long-term (current) use of anticoagulants [Z79.01] [Z79.01]  Embolism - blood clot [I74.9]         Anticoagulation Episode Summary     INR check location     Preferred lab     Send INR reminders to [facility] [place]    Comments * comes in electric WC. warfarin 5mg tablets. takes warfarin in the AM      Anticoagulation Care Providers     Provider Role Specialty Phone number    Zina Pruett PA-C Responsible Physician Assistant [phone]            See the Encounter Report to view Anticoagulation Flowsheet and Dosing Calendar (Go to Encounters tab in chart review, and find the Anticoagulation Therapy Visit)      Patient was within therapeutic range. Will continue warfarin maintenance dose, see anticoag tracker.  Patient verbalizes understanding and agrees to plan.   Kenyetta Cooper, DAWSON

## 2018-03-05 NOTE — MR AVS SNAPSHOT
Tere Ortiz   3/5/2018 2:00 PM   Anticoagulation Therapy Visit    Description:  63 year old female   Provider:  NB ANTI DANETTE   Department:  [facility] Anticoag           INR as of 3/5/2018     Today's INR 2.0      Anticoagulation Summary as of 3/5/2018     INR goal 2.0-3.0   Today's INR 2.0   Full instructions 12.5 mg on Mon; 10 mg all other days   Next INR check 4/16/2018    Indications   Lupus anticoagulant inhibitor syndrome (H) [D68.62]  Pulmonary embolism with infarction (H) [I26.99]  Long-term (current) use of anticoagulants [Z79.01] [Z79.01]  Embolism - blood clot [I74.9]         Your next Anticoagulation Clinic appointment(s)     Apr 16, 2018  2:00 PM CDT   Anticoagulation Visit with NB ANTI COAG   [facility] ([facility]    [address] [place] [address] [phone]              Contact Numbers     Please call [phone] with any problems or questions regarding your therapy.    If you need to cancel and/or reschedule your appointment please call one of the following numbers:  [facility] - [phone]  [place] - [phone]  [facility] [phone]  [facility] [phone]  [place] - [phone]            March 2018 Details    Sun Mon Tue Wed Thu Fri Sat         1               2               3                 4               5      12.5 mg   See details      6      10 mg         7      10 mg         8      10 mg         9      10 mg         10      10 mg           11      10 mg         12      12.5 mg         13      10 mg         14      10 mg         15      10 mg         16      10 mg         17      10 mg           18      10 mg         19      12.5 mg         20      10 mg         21      10 mg         22      10 mg         23      10 mg         24      10 mg           25      10 mg         26      12.5 mg         27      10 mg         28      10 mg         29      10 mg         30      10 mg         31      10 mg          Date  Details   03/05 This INR check               How to take your warfarin dose     To take:  10 mg Take 2 of the 5 mg tablets.    To take:  12.5 mg Take 2.5 of the 5 mg tablets.           April 2018 Details    Sun Mon Tue Wed Thu Fri Sat     1      10 mg         2      12.5 mg         3      10 mg         4      10 mg         5      10 mg         6      10 mg         7      10 mg           8      10 mg         9      12.5 mg         10      10 mg         11      10 mg         12      10 mg         13      10 mg         14      10 mg           15      10 mg         16            17               18               19               20               21                 22               23               24               25               26               27               28                 29               30                     Date Details   No additional details    Date of next INR:  4/16/2018         How to take your warfarin dose     To take:  10 mg Take 2 of the 5 mg tablets.    To take:  12.5 mg Take 2.5 of the 5 mg tablets.

## 2018-03-14 DIAGNOSIS — I74.9 EMBOLISM (H): ICD-10-CM

## 2018-03-14 DIAGNOSIS — D68.62 LUPUS ANTICOAGULANT INHIBITOR SYNDROME (H): ICD-10-CM

## 2018-03-14 RX ORDER — WARFARIN SODIUM 5 MG/1
TABLET ORAL
Qty: 20 TABLET | Refills: 0 | Status: SHIPPED | OUTPATIENT
Start: 2018-03-14 | End: 2018-03-16

## 2018-03-14 NOTE — TELEPHONE ENCOUNTER
"Requested Prescriptions   Pending Prescriptions Disp Refills     warfarin (COUMADIN) 5 MG tablet [Pharmacy Med Name: WARFARIN 5 MG       TAB TEVA] 20 tablet 0     Sig: TAKE 2 TABLETS BY MOUTH DAILY-TAKE EXTRA 1/2 TAB ON MONDAY OR AS DIRECTED BY INR CLINIC    Vitamin K Antagonists Failed    3/14/2018 10:54 AM       Failed - INR is within goal in the past 6 weeks    Confirm INR is within goal in the past 6 weeks.     Recent Labs   Lab Test 03/05/18   INR  2.0*                      Passed - Recent (12 mo) or future (30 days) visit within the authorizing provider's specialty    Patient had office visit in the last 12 months or has a visit in the next 30 days with authorizing provider or within the authorizing provider's specialty.  See \"Patient Info\" tab in inbasket, or \"Choose Columns\" in Meds & Orders section of the refill encounter.           Passed - Patient is 18 years of age or older       Passed - Patient is not pregnant       Passed - No positive pregnancy on file in past 12 months        Last Written Prescription Date:  11/30/17  Last Fill Quantity: 200,  # refills: 0   Last office visit: 11/9/2017 with prescribing provider:  11/30/17   Future Office Visit:      "

## 2018-03-16 RX ORDER — WARFARIN SODIUM 5 MG/1
TABLET ORAL
Qty: 185 TABLET | Refills: 0 | Status: SHIPPED | OUTPATIENT
Start: 2018-03-16 | End: 2018-01-01

## 2018-03-22 ENCOUNTER — TELEPHONE (OUTPATIENT)
Dept: FAMILY MEDICINE | Facility: CLINIC | Age: 63
End: 2018-03-22

## 2018-03-22 NOTE — LETTER
March 22, 2018      Tere Ortiz  [address] [facility] [address]        Dear Tere,       Your clinic record indicates that you are due for:   complete physical exam and fasting labs   Need to be fasting for 10 hrs so just coming in before eating breakfast is the easiest.     Please schedule an appointment with your preferred provider.    Sincerely,        David Arias MD/donna

## 2018-03-22 NOTE — TELEPHONE ENCOUNTER
Panel Management Review      Patient has the following on her problem list: None      Composite cancer screening  Chart review shows that this patient is due/due soon for the following None  Summary:    Patient is due/failing the following:   LDL    Action needed:   Patient needs office visit for physical and fasting labs.    Type of outreach:    Sent letter.    Questions for provider review:    None                                                                                                                                    Saige GOODMAN [facility]     Chart routed to Care Team .

## 2018-04-06 NOTE — TELEPHONE ENCOUNTER
"Requested Prescriptions   Pending Prescriptions Disp Refills     potassium chloride (K-TAB,KLOR-CON) 10 MEQ tablet 360 tablet 0     Si tablets twice daily    Potassium Supplements Protocol Passed    2018  2:58 PM       Passed - Recent (12 mo) or future (30 days) visit within the authorizing provider's specialty    Patient had office visit in the last 12 months or has a visit in the next 30 days with authorizing provider or within the authorizing provider's specialty.  See \"Patient Info\" tab in inbasket, or \"Choose Columns\" in Meds & Orders section of the refill encounter.           Passed - Patient is age 18 or older       Passed - Normal serum potassium in past 12 months    Recent Labs   Lab Test  17   1606   POTASSIUM  4.2                    Eryn HODGES (R) (M)    "

## 2018-04-06 NOTE — TELEPHONE ENCOUNTER
Received fax from pharmacy.     potassium chloride (K-DUR) 10 MEQ tablet   Last Written Prescription Date:  11/2/12  Last Fill Quantity: 360,   # refills: 0  Last Office Visit: 11/9/17  Future Office visit:
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
## 2018-04-13 NOTE — TELEPHONE ENCOUNTER
----- Message from Karol Simmons MD sent at 4/13/2018 12:03 PM CDT -----  How is pt doing on nabumetone?

## 2018-04-13 NOTE — NURSING NOTE
"Chief Complaint   Patient presents with     RECHECK     THYROID       Initial /69  Pulse 91  Wt 61 kg (134 lb 6.4 oz)  BMI 27.15 kg/m2 Estimated body mass index is 27.15 kg/(m^2) as calculated from the following:    Height as of 10/31/17: 1.499 m (4' 11\").    Weight as of this encounter: 61 kg (134 lb 6.4 oz).  Medication Reconciliation: complete    "
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
## 2018-04-13 NOTE — PATIENT INSTRUCTIONS
Can stop glucoasamine chondroitin    Pt to take nabumetone 500 mg prior ot bedtime    amlactin for dry skin    [facility] Scheduling: [phone]    To expedite your medication refill(s), please contact your pharmacy and have them fax a refill request to: [phone].  *Please allow 3 business days for routine medication refills.  *Please allow 5 business days for controlled substance medication refills.  --------------------  For scheduling appointments (including lab work), please request an appointment through [employer], or call: [phone].    For questions for your provider or the endocrine nurse, please send a Support Your [employer] message.  For after-hours urgent issues, please dial [phone], and ask to speak with the Endocrinologist On-Call.  --------------------  Please Note: If you are active on [employer], all future test results will be sent by [employer] message only and will no longer be sent by mail. You may also receive communication directly from your physician.

## 2018-04-13 NOTE — MR AVS SNAPSHOT
After Visit Summary   4/13/2018    Tere Ortiz    MRN: [id number]           Patient Information     Date Of Birth          1955        Visit Information        Provider Department      4/13/2018 11:30 AM Karol Simmons MD [facility]        Today's Diagnoses     Chronic obstructive pulmonary disease, unspecified COPD type (H)    -  1    Other acute sinusitis, recurrence not specified        Abnormal findings on diagnostic imaging of other specified body structures         Vitamin D deficiency         Abnormal finding of blood chemistry         Dry skin        Asymptomatic postmenopausal status          Care Instructions    Can stop glucoasamine chondroitin    Pt to take nabumetone 500 mg prior ot bedtime    amlactin for dry skin    [facility] Scheduling: [phone]    To expedite your medication refill(s), please contact your pharmacy and have them fax a refill request to: [phone].  *Please allow 3 business days for routine medication refills.  *Please allow 5 business days for controlled substance medication refills.  --------------------  For scheduling appointments (including lab work), please request an appointment through [employer], or call: [phone].    For questions for your provider or the endocrine nurse, please send a [employer] message.  For after-hours urgent issues, please dial [phone], and ask to speak with the Endocrinologist On-Call.  --------------------  Please Note: If you are active on [employer], all future test results will be sent by [employer] message only and will no longer be sent by mail. You may also receive communication directly from your physician.            Follow-ups after your visit        Additional Services     PULMONARY MEDICINE REFERRAL       Pt to see pulmonary at [facility]                  Follow-up notes from your care team     Return in about 6 months (around 10/13/2018).      Your next 10 appointments already  scheduled     Apr 16, 2018  2:00 PM CDT   Anticoagulation Visit with NB ANTI COAG   [facility]    [address] [place] [id number] [phone]            Oct 12, 2018 11:30 AM CDT   (Arrive by 11:15 AM)   RETURN ENDOCRINE with Karol Simmons MD   [facility] ([facility])    [address] [facility]  [address] [facility] [place] [address] [phone]              Future tests that were ordered for you today     Open Future Orders        Priority Expected Expires Ordered    Dexa hip/pelvis/spine Routine  11/9/2018 4/13/2018    TSH Routine 4/13/2018 4/20/2018 4/13/2018    T4 free Routine 4/13/2018 4/20/2018 4/13/2018    T3 total Routine 4/13/2018 4/20/2018 4/13/2018    25 Hydroxyvitamin D2 and D3 Routine 4/13/2018 4/20/2018 4/13/2018    Basic metabolic panel Routine 4/13/2018 4/20/2018 4/13/2018    Ferritin Routine 4/13/2018 4/20/2018 4/13/2018    DHEA sulfate Routine 4/13/2018 4/20/2018 4/13/2018    Comprehensive metabolic panel Routine 4/13/2018 4/20/2018 4/13/2018            Who to contact     Please call your clinic at [phone] to:    Ask questions about your health    Make or cancel appointments    Discuss your medicines    Learn about your test results    Speak to your doctor            Additional Information About Your Visit        [employer] Information     [employer] gives you secure access to your electronic health record. If you see a primary care provider, you can also send messages to your care team and make appointments. If you have questions, please call your primary care clinic.  If you do not have a primary care provider, please call [phone] and they will assist you.      [employer] is an electronic gateway that provides easy, online access to your medical records. With [employer], you can request a clinic appointment, read your test results, renew a prescription or communicate with your care team.     To  access your existing account, please contact your [facility] or call [phone] for assistance.        Care EveryWhere ID     This is your Care EveryWhere ID. This could be used by other organizations to access your [facility] medical records  [id number]        Your Vitals Were     Pulse BMI (Body Mass Index)                91 27.15 kg/m2           Blood Pressure from Last 3 Encounters:   04/13/18 108/69   11/09/17 104/62   10/31/17 98/53    Weight from Last 3 Encounters:   04/13/18 61 kg (134 lb 6.4 oz)   10/31/17 64.9 kg (143 lb)   10/11/17 64.9 kg (143 lb)              We Performed the Following     PULMONARY MEDICINE REFERRAL          Today's Medication Changes          These changes are accurate as of 4/13/18 12:09 PM.  If you have any questions, ask your nurse or doctor.               Start taking these medicines.        Dose/Directions    ammonium lactate 12 % cream   Commonly known as:  AMLACTIN   Used for:  Other acute sinusitis, recurrence not specified, Chronic obstructive pulmonary disease, unspecified COPD type (H), Abnormal findings on diagnostic imaging of other specified body structures, Vitamin D deficiency, Abnormal finding of blood chemistry, Dry skin   Started by:  Karol Simmons MD        Apply topically 2 times daily   Quantity:  385 g   Refills:  1       nabumetone 500 MG tablet   Commonly known as:  RELAFEN   Used for:  Other acute sinusitis, recurrence not specified, Chronic obstructive pulmonary disease, unspecified COPD type (H), Abnormal findings on diagnostic imaging of other specified body structures, Vitamin D deficiency, Abnormal finding of blood chemistry, Dry skin   Started by:  Karol Simmons MD        Dose:  500 mg   Take 1 tablet (500 mg) by mouth daily   Quantity:  30 tablet   Refills:  0         Stop taking these medicines if you haven't already. Please contact your care team if you have questions.     naproxen 375 MG tablet   Commonly  known as:  AYAD   Stopped by:  Karol Simmons MD                Where to get your medicines      These medications were sent to [id number] - [place], [place] - [address] [facility]  [address] [facility] [address]    Hours:  Closed 10-16-08 business to [facility] Phone:  [phone]     ipratropium 0.03 % spray    nabumetone 500 MG tablet         Some of these will need a paper prescription and others can be bought over the counter.  Ask your nurse if you have questions.     Bring a paper prescription for each of these medications     ammonium lactate 12 % cream                Primary Care Provider Office Phone # Fax #    Zina Pruett PA-C [phone] [address] [facility] [address]        Equal Access to Services     SHANE ATKINSON : Nicola flower Sotena, waaxda luqadaha, qaybta kaalmada jonah, salas callahan . So [facility] [phone].    ATENCIÓN: Si habla español, tiene a acosta disposición servicios gratuitos de asistencia lingüística. Bev al [phone].    We comply with applicable federal civil rights laws and [place] laws. We do not discriminate on the basis of race, color, national origin, age, disability, sex, sexual orientation, or gender identity.            Thank you!     Thank you for choosing [facility]  for your care. Our goal is always to provide you with excellent care. Hearing back from our patients is one way we can continue to improve our services. Please take a few minutes to complete the written survey that you may receive in the mail after your visit with us. Thank you!             Your Updated Medication List - Protect others around you: Learn how to safely use, store and throw away your medicines at www.disposemymeds.org.          This list is accurate as of 4/13/18 12:09 PM.  Always use your most recent med list.                   Brand Name Dispense Instructions for use Diagnosis     ammonium lactate 12 % cream    AMLACTIN    385 g    Apply topically 2 times daily    Other acute sinusitis, recurrence not specified, Chronic obstructive pulmonary disease, unspecified COPD type (H), Abnormal findings on diagnostic imaging of other specified body structures, Vitamin D deficiency, Abnormal finding of blood chemistry, Dry skin       aspirin 81 MG tablet      Take 1 tablet by mouth daily.        baclofen 10 MG tablet    LIORESAL     Take 10 mg by mouth daily 2 at bedtime        busPIRone 10 MG tablet    BUSPAR    90 tablet    Take 2-3 tablets (20-30 mg) by mouth At Bedtime    Anxiety       CLINDAMAX 1 % lotion   Generic drug:  clindamycin     60 mL    Apply topically 2 times daily        DULoxetine 60 MG EC capsule    CYMBALTA     Take 1 capsule by mouth daily.        DUONEB 0.5-2.5 (3) MG/3ML neb solution   Generic drug:  ipratropium - albuterol 0.5 mg/2.5 mg/3 mL      Take 1 ampule by nebulization every 4 hours as needed.        FLONASE 50 MCG/ACT spray   Generic drug:  fluticasone      Spray 1 spray into both nostrils daily.        ipratropium 0.03 % spray    ATROVENT    30 mL    Spray 2 sprays into both nostrils every 12 hours    Other acute sinusitis, recurrence not specified       nabumetone 500 MG tablet    RELAFEN    30 tablet    Take 1 tablet (500 mg) by mouth daily    Other acute sinusitis, recurrence not specified, Chronic obstructive pulmonary disease, unspecified COPD type (H), Abnormal findings on diagnostic imaging of other specified body structures, Vitamin D deficiency, Abnormal finding of blood chemistry, Dry skin       NYSTOP 103805 UNIT/GM Powd   Generic drug:  nystatin     60 g    APPLY TOPICALLY 3 TIMES DAILY FOR 1-2 WEEKS UNTIL RASH CLEARED    Candidiasis of skin       ondansetron 4 MG ODT tab    ZOFRAN-ODT    18 tablet    TAKE 1 TABLET (4 MG) BY MOUTH EVERY 8 HOURS AS NEEDED    Nausea       * order for DME     1 Device    BiPAP: IPAP 11 cm H2O EPAP 6 cm H2O Pt has her own  BiPAP New CPAP supplies- try [place] mask if it comes in an extra small size.  Alternatively could try [place] with nasal prongs occluded. Lifetime need and heated humidity.    TITO (obstructive sleep apnea)       * order for DME      BiPAP: IPAP 12 cm H2O EPAP 7 cm H2O  Lifetime need and heated humidity.    TITO (obstructive sleep apnea)       * order for DME     1 Device    O2: During sleep 1.5 LPM via O2 Concentrator  Bled in through BiPAP    TITO (obstructive sleep apnea), Sleep related hypoventilation/hypoxemia in other disease       order for DME     4 Units    Compression stockings (Thigh High)- 2 pairs    Peripheral edema       OxyContin 80 MG 12 hr tablet   Generic drug:  oxyCODONE      Take  by mouth every 12 hours. Take at bedtime        potassium chloride 10 MEQ tablet    K-TAB,KLOR-CON    360 tablet    2 tablets twice daily    Low blood potassium       pramipexole 1 MG tablet    MIRAPEX     Take 1 mg by mouth At Bedtime        Pramoxine-Calamine 1-3 % Lotn    AVEENO ANTI-ITCH    118 mL    Pt to use twice daily    Dry skin       SOMA 350 MG tablet   Generic drug:  carisoprodol      Take 1 tablet by mouth. Take at bedtime        SUMAtriptan 20 MG/ACT nasal spray    IMITREX    30 each    Give once, can repeat in 2 hour if not better    Episodic tension-type headache, not intractable       SYNTHROID 125 MCG tablet   Generic drug:  levothyroxine     90 tablet    Take 1 tablet (125 mcg) by mouth daily    Hypothyroidism, unspecified type       tiotropium 18 MCG capsule    SPIRIVA HANDIHALER    90 capsule    Inhale 1 capsule into the lungs. Inhale contents of one capsule    Chronic obstructive pulmonary disease, unspecified COPD type (H)       TOPAMAX 100 MG tablet   Generic drug:  topiramate      Take  by mouth 2 times daily. Take 1.5 pill in mornings Take 1.5 at bedtime        traZODone HCl 150 MG 24 hr tablet    OLEPTRO     Take 150 mg by mouth At Bedtime        VICODIN ES PO      Take 7.5 mg by mouth as needed         vitamin D3 2000 units Caps      Take by mouth daily        warfarin 5 MG tablet    COUMADIN    185 tablet    Take 12.5 mg Mon and 10 mg the rest of the week or as directed by Anticoagulation clinic.    Embolism (H), Lupus anticoagulant inhibitor syndrome (H)       * Notice:  This list has 3 medication(s) that are the same as other medications prescribed for you. Read the directions carefully, and ask your doctor or other care provider to review them with you.

## 2018-04-16 NOTE — TELEPHONE ENCOUNTER
"Spoke w/ Pt states: \"it seems to be helping, not waking up with headache so that helps in a major way\", states \"still having pain in hip and knee\" and is using ice for 20 minutes at a time for relief. Message forwarded to Dr Simmons.   "

## 2018-04-16 NOTE — TELEPHONE ENCOUNTER
"----- Message from Brenda Kruger RN sent at 4/16/2018  8:36 AM CDT -----  Spoke w/ Pt states: \"it seems to be helping, not waking up with headache so that helps in a major way\", states \"still having pain in hip and knee\" and is using ice for 20 minutes at a time for relief.  ----- Message -----     From: Karol Simmons MD     Sent: 4/13/2018  12:03 PM       To: [facility]    How is pt doing on nabumetone?     "

## 2018-04-23 NOTE — LETTER
May 1, 2018      Tere Ortiz  [address] [facility] [address]        Dear ,    We are writing to inform you of your test results. These results can also be viewed on [employer].    Your B1 lab is normal.  You should be hearing from endocrine regarding the labs they had ordered.     Resulted Orders   Vitamin B1 whole blood   Result Value Ref Range    Vitamin B1 Whole Blood Level 178 70 - 180 nmol/L      Comment:      (Note)  INTERPRETIVE INFORMATION: Vitamin B1, Whole Blood  This assay measures the concentration of thiamine   diphosphate (TDP), the primary active form of vitamin B1.   Approximately 90 percent of vitamin B1 present in whole   blood is TDP. Thiamine and thiamine monophosphate, which   comprise the remaining 10 percent, are not measured.  Test developed and characteristics determined by [facility]. See Compliance Statement B: [employer]  Performed by [facility],  [address] [place] [address] [phone]  [email or web address], Silas Bills MD, Lab. Director         If you have any questions or concerns, please call the clinic at the number listed above.       Sincerely,        Zina Pruett PA-C/octavio

## 2018-04-23 NOTE — LETTER
Patient:  Tere Ortiz  :   1955  MRN:     [id number]        Ms.Sheryl CARMINA Ortiz  [address] [facility] [address]        May 2, 2018    Dear Tere    Thank you for your patience.  I was waiting on your vitamin D which came back normal.  Your labs actually look pretty good.  If you want, you could consider taking DHEA at 25 mg daily.  This is an over-the-counter medication.  Your thyroid was completely normal.  Your glucose is reasonable at 107, if you are nonfasting.    My recommendation is that we continue with your current thyroid plan you can consider adding 25 mg of DHEA to your program to see if this might improve your energy.  If so, we will recheck the DHEAS at your return visit.    If you have any questions, please feel free to contact my nurse at [phone] select option #3 for triage nurse  or  option #1 for scheduling related questions.    Regards    Karol Simmons MD        Resulted Orders   TSH   Result Value Ref Range    TSH 1.79 0.40 - 4.00 mU/L   T4 free   Result Value Ref Range    T4 Free 1.08 0.76 - 1.46 ng/dL   T3 total   Result Value Ref Range    Triiodothyronine (T3) 79 60 - 181 ng/dL   25 Hydroxyvitamin D2 and D3   Result Value Ref Range    25 OH Vit D2 7 ug/L    25 OH Vit D3 49 ug/L    25 OH Vit D total 56 20 - 75 ug/L      Comment:      Season, race, dietary intake, and treatment affect the concentration of   25-hydroxy-Vitamin D. Values may decrease during winter months and increase   during summer months. Values 20-29 ug/L may indicate Vitamin D insufficiency   and values <20 ug/L may indicate Vitamin D deficiency.  This test was developed and its performance characteristics determined by the   [facility],  Special [facility] Laboratory. It has   not been cleared or approved by the FDA. The laboratory is regulated under   CLIA as qualified to perform high-complexity testing. This test is used for   clinical purposes. It should not be  regarded as investigational or for   research.     Ferritin   Result Value Ref Range    Ferritin 20 8 - 252 ng/mL   DHEA sulfate   Result Value Ref Range    DHEA Sulfate 39 35 - 430 ug/dL   Comprehensive metabolic panel   Result Value Ref Range    Sodium 142 133 - 144 mmol/L    Potassium 3.5 3.4 - 5.3 mmol/L    Chloride 108 94 - 109 mmol/L    Carbon Dioxide 29 20 - 32 mmol/L    Anion Gap 5 3 - 14 mmol/L    Glucose 107 (H) 70 - 99 mg/dL    Urea Nitrogen 11 7 - 30 mg/dL    Creatinine 0.76 0.52 - 1.04 mg/dL    GFR Estimate 76 >60 mL/min/1.7m2      Comment:      Non  GFR Calc    GFR Estimate If Black >90 >60 mL/min/1.7m2      Comment:       GFR Calc    Calcium 8.6 8.5 - 10.1 mg/dL    Bilirubin Total 0.3 0.2 - 1.3 mg/dL    Albumin 3.5 3.4 - 5.0 g/dL    Protein Total 7.3 6.8 - 8.8 g/dL    Alkaline Phosphatase 73 40 - 150 U/L    ALT 13 0 - 50 U/L    AST 14 0 - 45 U/L   Vitamin B1 whole blood   Result Value Ref Range    Vitamin B1 Whole Blood Level 178 70 - 180 nmol/L      Comment:      (Note)  INTERPRETIVE INFORMATION: Vitamin B1, Whole Blood  This assay measures the concentration of thiamine   diphosphate (TDP), the primary active form of vitamin B1.   Approximately 90 percent of vitamin B1 present in whole   blood is TDP. Thiamine and thiamine monophosphate, which   comprise the remaining 10 percent, are not measured.  Test developed and characteristics determined by [facility]. See Compliance Statement B: [employer]  Performed by [facility],  [address] [place] [address] [phone]  [email or web address],", Silas Bills MD, Lab. Director         [facility] Lab

## 2018-04-23 NOTE — PROGRESS NOTES
ADDENDUM: Patient called [facility] again this AM, reporting she started taking nabumetone 500mg - this medication is a NSAID, which can increase her risk of bleeding. Writer relayed this information to her, instructed her to use extra caution while taking it. If she should have any issues with bleeding of increased bruising, this could be part of the cause. Patient has an appointment tomorrow with [facility].     Nadia Monahan RN, CACP 5/9/2018 at 8:54 AM      Addendum:  Patient called stating she was started on DHEA 25mg daily per Endocronolgy. Per literature I did not see anything about effectiveness on Warfarin.    Justyn Polanco RN, BSN, PHN  Anticoagulation Clinic   [phone]        ANTICOAGULATION FOLLOW-UP CLINIC VISIT    Patient Name:  Tere Ortiz  Date:  4/23/2018  Contact Type:  Face to Face    SUBJECTIVE:     Patient Findings     Positives Change in medications    Comments Patient was placed on Relafen two weeks ago at bedtime, this drug has increase bleed risk when taking with warfarin. INR today 2.2 after taking Relafen two weeks. Writer will have patient remain on the same warfarin maintenance dose but have her recheck her INR in two weeks. Patient verbalizes understanding and agrees to plan. No further questions or concerns.               OBJECTIVE    INR Protime   Date Value Ref Range Status   04/23/2018 2.2 (A) 0.86 - 1.14 Final       ASSESSMENT / PLAN  INR assessment THER    Recheck INR In: 6 WEEKS    INR Location Clinic      Anticoagulation Summary as of 4/23/2018     INR goal 2.0-3.0   Today's INR 2.2   Maintenance plan 12.5 mg (5 mg x 2.5) on Mon; 10 mg (5 mg x 2) all other days   Full instructions 12.5 mg on Mon; 10 mg all other days   Weekly total 72.5 mg   No change documented Ara Kaplan, RN   Plan last modified Cony Pagan RN (11/16/2017)   Next INR check 5/7/2018   Priority INR   Target end date Indefinite    Indications   Lupus anticoagulant inhibitor syndrome (H)  [D68.62]  Pulmonary embolism with infarction (H) [I26.99]  Long-term (current) use of anticoagulants [Z79.01] [Z79.01]  Embolism - blood clot [I74.9]         Anticoagulation Episode Summary     INR check location     Preferred lab     Send INR reminders to [facility] POOL    Comments * comes in electric WC. warfarin 5mg tablets. takes warfarin in the AM      Anticoagulation Care Providers     Provider Role Specialty Phone number    Zina Pruett PA-C Responsible Physician Assistant [phone]            See the Encounter Report to view Anticoagulation Flowsheet and Dosing Calendar (Go to Encounters tab in chart review, and find the Anticoagulation Therapy Visit)        Ara Kaplan RN

## 2018-04-30 NOTE — PROGRESS NOTES
Mercy Carranza,    Your B1 lab is normal.  You should be hearing from endocrine regarding the labs they had ordered.    Please contact me if you have questions.    Zina Pruett PA-C

## 2018-05-02 NOTE — TELEPHONE ENCOUNTER
Pt to do DHEAS and INR in 2 weeks.    ---- Message from Shannan Clancy RN sent at 5/2/2018  4:51 PM CDT -----  I read her letter, she is ok trying dhea, if you can plz enter INR and she will do it locally, thanks  ----- Message -----     From: Karol Simomns MD     Sent: 5/2/2018   1:44 PM       To: [facility] End[facility]    Please review letter with pt - I tried calling pt but she was not in.

## 2018-05-02 NOTE — PROGRESS NOTES
Dear Tere    Thank you for your patience.  I was waiting on your vitamin D which came back normal.  Your labs actually look pretty good.  If you want, you could consider taking DHEA at 25 mg daily.  This is an over-the-counter medication.  Your thyroid was completely normal.  Your glucose is reasonable at 107, if you are nonfasting.    My recommendation is that we continue with your current thyroid plan you can consider adding 25 mg of DHEA to your program to see if this might improve your energy.  If so, we will recheck the DHEAS at your return visit.    If you have any questions, please feel free to contact my nurse at [phone] select option #3 for triage nurse  or  option #1 for scheduling related questions.    Regards    Karol Simmons MD

## 2018-05-02 NOTE — TELEPHONE ENCOUNTER
Called pt - pt to consider taking DHEA at 25 mg daily, pt may need to have coumadin monitored more closely in response if she does choose this option.     --  Dear Tere    Thank you for your patience.  I was waiting on your vitamin D which came back normal.  Your labs actually look pretty good.  If you want, you could consider taking DHEA at 25 mg daily.  This is an over-the-counter medication.  Your thyroid was completely normal.  Your glucose is reasonable at 107, if you are nonfasting.    My recommendation is that we continue with your current thyroid plan you can consider adding 25 mg of DHEA to your program to see if this might improve your energy.  If so, we will recheck the DHEAS at your return visit.    If you do start the DHEA, you may want to recheck your INR in about 1-2 weeks to make sure that this does not affect your Coumadin level.  I do not think it will but it would be good to check.    If you have any questions, please feel free to contact my nurse at [phone] select option #3 for triage nurse  or  option #1 for scheduling related questions.    Regards    Karol Simmons MD

## 2018-05-02 NOTE — LETTER
Patient:  Tere Ortiz  :   1955  MRN:     [id number]        Ms.Sheryl CARMINA Ortiz  [address] [facility] [address]        May 2, 2018    Dear Tere    Thank you for your patience.  I was waiting on your vitamin D which came back normal.  Your labs actually look pretty good.  If you want, you could consider taking DHEA at 25 mg daily.  This is an over-the-counter medication.  Your thyroid was completely normal.  Your glucose is reasonable at 107, if you are nonfasting.    My recommendation is that we continue with your current thyroid plan and you can consider adding 25 mg of DHEA to your program to see if this might improve your energy.  If so, we will recheck the DHEAS at your return visit.    If you do start the DHEA, you may want to recheck your INR  level in about 1-2 weeks to make sure that this does not affect your Coumadin level.  I do not think it will but it would be good to check.    If you have any questions, please feel free to contact my nurse at [phone] select option #3 for triage nurse  or  option #1 for scheduling related questions.    Regards    Karol Simmons MD    Orders Only on 2018   Component Date Value Ref Range Status     TSH 2018 1.79  0.40 - 4.00 mU/L Final     T4 Free 2018 1.08  0.76 - 1.46 ng/dL Final     Triiodothyronine (T3) 2018 79  60 - 181 ng/dL Final     25 OH Vit D2 2018 7  ug/L Final     25 OH Vit D3 2018 49  ug/L Final     25 OH Vit D total 2018 56  20 - 75 ug/L Final    Comment: Season, race, dietary intake, and treatment affect the concentration of   25-hydroxy-Vitamin D. Values may decrease during winter months and increase   during summer months. Values 20-29 ug/L may indicate Vitamin D insufficiency   and values <20 ug/L may indicate Vitamin D deficiency.  This test was developed and its performance characteristics determined by the   [facility],  Special [facility] Laboratory. It  has   not been cleared or approved by the FDA. The laboratory is regulated under   CLIA as qualified to perform high-complexity testing. This test is used for   clinical purposes. It should not be regarded as investigational or for   research.       Ferritin 04/23/2018 20  8 - 252 ng/mL Final     DHEA Sulfate 04/23/2018 39  35 - 430 ug/dL Final     Sodium 04/23/2018 142  133 - 144 mmol/L Final     Potassium 04/23/2018 3.5  3.4 - 5.3 mmol/L Final     Chloride 04/23/2018 108  94 - 109 mmol/L Final     Carbon Dioxide 04/23/2018 29  20 - 32 mmol/L Final     Anion Gap 04/23/2018 5  3 - 14 mmol/L Final     Glucose 04/23/2018 107* 70 - 99 mg/dL Final     Urea Nitrogen 04/23/2018 11  7 - 30 mg/dL Final     Creatinine 04/23/2018 0.76  0.52 - 1.04 mg/dL Final     GFR Estimate 04/23/2018 76  >60 mL/min/1.7m2 Final    Non  GFR Calc     GFR Estimate If Black 04/23/2018 >90  >60 mL/min/1.7m2 Final    African American GFR Calc     Calcium 04/23/2018 8.6  8.5 - 10.1 mg/dL Final     Bilirubin Total 04/23/2018 0.3  0.2 - 1.3 mg/dL Final     Albumin 04/23/2018 3.5  3.4 - 5.0 g/dL Final     Protein Total 04/23/2018 7.3  6.8 - 8.8 g/dL Final     Alkaline Phosphatase 04/23/2018 73  40 - 150 U/L Final     ALT 04/23/2018 13  0 - 50 U/L Final     AST 04/23/2018 14  0 - 45 U/L Final     Vitamin B1 Whole Blood Level 04/23/2018 178  70 - 180 nmol/L Final    Comment: (Note)  INTERPRETIVE INFORMATION: Vitamin B1, Whole Blood  This assay measures the concentration of thiamine   diphosphate (TDP), the primary active form of vitamin B1.   Approximately 90 percent of vitamin B1 present in whole   blood is TDP. Thiamine and thiamine monophosphate, which   comprise the remaining 10 percent, are not measured.  Test developed and characteristics determined by [facility]. See Compliance Statement B: [employer]  Performed by [facility],  [address] [place] [address] [phone]  [email or web address], Silas Bills MD,  Lab. Director     Anticoagulation Therapy Visit on 04/23/2018   Component Date Value Ref Range Status     INR Protime 04/23/2018 2.2* 0.86 - 1.14 Final

## 2018-05-10 NOTE — TELEPHONE ENCOUNTER
Hello. I saw this patient today in the anticoagulation clinic. She expresses concern that she has been losing weight and does not have an appetite. She states she is down to 121 pounds and looks to have been 134 lbs this time last month. She does drink Ensure daily but may benefit from increasing this. I have advised an increase to help lower INR as well.     Do you want to see her in clinic? She is wondering if you could prescribe Ensure to see if maybe her insurance would cover it since it is expensive for her.    Thank you,  Sharee Braxton, RN, BSN, PHN

## 2018-05-10 NOTE — TELEPHONE ENCOUNTER
Care team-Please contact patient to set up an appointment. I basically told her this in clinic today and to expect a call.    Thanks,  Sharee Braxton RN, BSN, PHN

## 2018-05-10 NOTE — TELEPHONE ENCOUNTER
Tere has very complicated medical history and has not seen me since Nov.  She needs to make an appt to be seen.

## 2018-05-10 NOTE — MR AVS SNAPSHOT
Tere Thapamajo   5/10/2018 2:15 PM   Anticoagulation Therapy Visit    Description:  63 year old female   Provider:  NB ANTI DANETTE   Department:  [facility]           INR as of 5/10/2018     Today's INR 4.2!      Anticoagulation Summary as of 5/10/2018     INR goal 2.0-3.0   Today's INR 4.2!   Full instructions 5/11: Hold; Otherwise 7.5 mg on Mon, Thu; 10 mg all other days   Next INR check 5/24/2018    Indications   Lupus anticoagulant inhibitor syndrome (H) [D68.62]  Pulmonary embolism with infarction (H) [I26.99]  Long-term (current) use of anticoagulants [Z79.01] [Z79.01]  Embolism - blood clot [I74.9]         Description     Increase Ensure to twice daily. Hold warfarin tomorrow. Then start 7.5 mg Mon/Thurs and 10 mg all other days.      Your next Anticoagulation Clinic appointment(s)     May 24, 2018  2:45 PM CDT   Anticoagulation Visit with [facility]   [facility]    [address] [place] [address] [phone]              Contact Numbers     Please call [phone] with any problems or questions regarding your therapy.    If you need to cancel and/or reschedule your appointment please call one of the following numbers:  [facility] - [phone]  [place] - [phone]  [place] - [phone]  [facility] [phone]  [place] - [phone]            May 2018 Details    Sun Mon Tue Wed Thu Fri Sat       1               2               3               4               5                 6               7               8               9               10      7.5 mg   See details      11      Hold         12      10 mg           13      10 mg         14      7.5 mg         15      10 mg         16      10 mg         17      7.5 mg         18      10 mg         19      10 mg           20      10 mg         21      7.5 mg         22      10 mg         23      10 mg         24            25               26                 27                28               29               30               31                  Date Details   05/10 This INR check   took dose this AM       Date of next INR:  5/24/2018         How to take your warfarin dose     To take:  7.5 mg Take 1.5 of the 5 mg tablets.    To take:  10 mg Take 2 of the 5 mg tablets.    Hold Do not take your warfarin dose. See the Details table to the right for additional instructions.

## 2018-05-10 NOTE — PROGRESS NOTES
ANTICOAGULATION FOLLOW-UP CLINIC VISIT    Patient Name:  Tere Ortiz  Date:  5/10/2018  Contact Type:  Face to Face    SUBJECTIVE:     Patient Findings     Positives Change in medications (DHEA for the last week or so to improve engery per endocrinologist. Also has been on nabumetone for at least one month), Change in diet/appetite (patient losing weight and not eating well. no appetite. Drinks one Ensure daily.), Inflammation (diarrhea multiple times on Sunday.  had it too so maybe something they ate. Lasted only the one day.)    Comments INR may be high due to a few factors; recent diarrhea, not eating well, and starting DHEA. Writer will hold warfarin tomorrow (already took dose today) and reduce maintenance dose by 10.3%. Albumin levels were at low end of range on 4-23-18 (3.5). Patient reports weight loss and is concerned about this. Will send phone encounter to PCP to see if she would like to see the patient and if she can prescribe Ensure to see if insurance will cover this per patient request. No bleeding concerns, except existing sores on face are bleeding a little longer when they crack open. Sounds like a chronic issue with this patient.           OBJECTIVE    INR Protime   Date Value Ref Range Status   05/10/2018 4.2 (A) 0.86 - 1.14 Final       ASSESSMENT / PLAN  INR assessment SUPRA    Recheck INR In: 2 WEEKS    INR Location Clinic      Anticoagulation Summary as of 5/10/2018     INR goal 2.0-3.0   Today's INR 4.2!   Maintenance plan 7.5 mg (5 mg x 1.5) on Mon, Thu; 10 mg (5 mg x 2) all other days   Full instructions 5/11: Hold; Otherwise 7.5 mg on Mon, Thu; 10 mg all other days   Weekly total 65 mg   Plan last modified Sharee Braxton RN (5/10/2018)   Next INR check 5/24/2018   Priority INR   Target end date Indefinite    Indications   Lupus anticoagulant inhibitor syndrome (H) [D68.62]  Pulmonary embolism with infarction (H) [I26.99]  Long-term (current) use of anticoagulants [Z79.01]  [Z79.01]  Embolism - blood clot [I74.9]         Anticoagulation Episode Summary     INR check location     Preferred lab     Send INR reminders to [facility] [place]    Comments * comes in electric WC. warfarin 5mg tablets. takes warfarin in the AM      Anticoagulation Care Providers     Provider Role Specialty Phone number    Zina Pruett PA-C Responsible Physician Assistant [phone]            See the Encounter Report to view Anticoagulation Flowsheet and Dosing Calendar (Go to Encounters tab in chart review, and find the Anticoagulation Therapy Visit)        Sharee Braxton RN

## 2018-05-14 NOTE — NURSING NOTE
"Chief Complaint   Patient presents with     Weight Problem       Initial BP (!) 84/58 (BP Location: Left arm, Patient Position: Chair, Cuff Size: Adult Regular)  Pulse 84  Ht 4' 11\" (1.499 m)  Wt 131 lb 8 oz (59.6 kg)  HC 16\" (40.6 cm)  SpO2 (!) 89%  BMI 26.56 kg/m2 Estimated body mass index is 26.56 kg/(m^2) as calculated from the following:    Height as of this encounter: 4' 11\" (1.499 m).    Weight as of this encounter: 131 lb 8 oz (59.6 kg).      Health Maintenance that is potentially due pending provider review:  NONE        Is there anyone who you would like to be able to receive your results? No  If yes have patient fill out DON      "
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
## 2018-05-14 NOTE — MR AVS SNAPSHOT
After Visit Summary   5/14/2018    Tere Ortiz    MRN: [id number]           Patient Information     Date Of Birth          1955        Visit Information        Provider Department      5/14/2018 2:00 PM Zina Pruett PA-C [facility]        Today's Diagnoses     Weight loss    -  1    Chronic obstructive pulmonary disease, unspecified COPD type (H)        Nausea        Intertrigo        Polypharmacy        Seasonal allergic rhinitis, unspecified chronicity, unspecified trigger          Care Instructions    Decided to hold off on labs and exam until you're ready   Be seen when ready    Check vit D due to very high lab in past and still on supplement  Decided to try spiriva as respimat instead of handihaler  Try azelastine (astelin) for allergies.  Allergist if not helping.  Try ranitidine (zantac) for nausea as needed instead of zofran (ondansetron)          Follow-ups after your visit        Your next 10 appointments already scheduled     May 19, 2018 11:15 AM CDT   MA SCREENING DIGITAL BILATERAL with NBMA1   [facility] ([facility])    [address] [place] [address]   [phone]           Do not use any powder, lotion or deodorant under your arms or on your breast. If you do, we will ask you to remove it before your exam.  Wear comfortable, two-piece clothing.  If you have any allergies, tell your care team.  Bring any previous mammograms from other facilities or have them mailed to the breast center.            May 24, 2018  2:45 PM CDT   Anticoagulation Visit with NB ANTI COAG   [facility] ([facility])    [address] [place] [id number]   [phone]            Oct 12, 2018 11:30 AM CDT   (Arrive by 11:15 AM)   RETURN ENDOCRINE with Karol Simmons MD   [facility] ([facility])    [address] [place] [address]  "[facility] [address] [phone]              Who to contact     If you have questions or need follow up information about today's clinic visit or your schedule please contact [facility] directly at [phone].  Normal or non-critical lab and imaging results will be communicated to you by MyChart, letter or phone within 4 business days after the clinic has received the results. If you do not hear from us within 7 days, please contact the clinic through [employer] or phone. If you have a critical or abnormal lab result, we will notify you by phone as soon as possible.  Submit refill requests through [employer] or call your pharmacy and they will forward the refill request to us. Please allow 3 business days for your refill to be completed.          Additional Information About Your Visit        [employer] Information     [employer] gives you secure access to your electronic health record. If you see a primary care provider, you can also send messages to your care team and make appointments. If you have questions, please call your primary care clinic.  If you do not have a primary care provider, please call [phone] and they will assist you.        Care EveryWhere ID     This is your Care EveryWhere ID. This could be used by other organizations to access your [facility] medical records  [id number]        Your Vitals Were     Pulse Height Head Circumference Pulse Oximetry BMI (Body Mass Index)       84 4' 11\" (1.499 m) 16\" (40.6 cm) 89% 26.56 kg/m2        Blood Pressure from Last 3 Encounters:   05/14/18 (!) 84/58   04/13/18 108/69   11/09/17 104/62    Weight from Last 3 Encounters:   05/14/18 131 lb 8 oz (59.6 kg)   04/13/18 134 lb 6.4 oz (61 kg)   10/31/17 143 lb (64.9 kg)              Today, you had the following     No orders found for display         Today's Medication Changes          These changes are accurate as of 5/14/18  3:13 PM.  If you have any questions, ask your nurse or " doctor.               Start taking these medicines.        Dose/Directions    azelastine 0.1 % spray   Commonly known as:  ASTELIN   Used for:  Seasonal allergic rhinitis, unspecified chronicity, unspecified trigger   Started by:  Zina Pruett PA-C        Dose:  1 spray   Spray 1 spray into both nostrils 2 times daily As needed.   Quantity:  1 Bottle   Refills:  11       ranitidine 150 MG tablet   Commonly known as:  ZANTAC   Used for:  Nausea   Started by:  Zina Pruett PA-C        Dose:  150 mg   Take 1 tablet (150 mg) by mouth 2 times daily Has nausea once a week   Quantity:  30 tablet   Refills:  1       tiotropium 2.5 MCG/ACT inhalation aerosol   Commonly known as:  SPIRIVA RESPIMAT   Used for:  Chronic obstructive pulmonary disease, unspecified COPD type (H)   Replaces:  tiotropium 18 MCG capsule   Started by:  Zina Pruett PA-C        Dose:  2 puff   Inhale 2 puffs into the lungs daily   Quantity:  4 g   Refills:  11         These medicines have changed or have updated prescriptions.        Dose/Directions    nystatin 510803 UNIT/GM Powd   Commonly known as:  NYSTOP   This may have changed:  See the new instructions.   Used for:  Intertrigo   Changed by:  Zina Pruett PA-C        APPLY TOPICALLY 3 TIMES DAILY FOR 1-2 WEEKS UNTIL RASH CLEARED   Quantity:  60 g   Refills:  11         Stop taking these medicines if you haven't already. Please contact your care team if you have questions.     ipratropium 0.03 % spray   Commonly known as:  ATROVENT   Stopped by:  Zina Pruett PA-C           Pramoxine-Calamine 1-3 % Lotn   Commonly known as:  AVEENO ANTI-ITCH   Stopped by:  Zina Pruett PA-C           tiotropium 18 MCG capsule   Commonly known as:  SPIRIVA HANDIHALER   Replaced by:  tiotropium 2.5 MCG/ACT inhalation aerosol   Stopped by:  Zina Pruett PA-C                Where to get your medicines      These medications were sent to [id number] [place], [place] - [address]  [address] [facility] [address]    Hours:  Closed 10-16-08 business to [facility] Phone:  [phone]     azelastine 0.1 % spray    nystatin 624544 UNIT/GM Powd    ranitidine 150 MG tablet    tiotropium 2.5 MCG/ACT inhalation aerosol                Primary Care Provider Office Phone # Fax #    Zina Pruett PA-C [phone] [address] [facility] [address]        Equal Access to Services     [facility]: Hadii aad ku hadasho Soomaali, waaxda luqadaha, qaybta kaalmada adealexyayolanda, salas callahan . So [facility] [phone].    ATENCIÓN: Si habla español, tiene a acosta disposición servicios gratuitos de asistencia lingüística. [facility] [phone].    We comply with applicable federal civil rights laws and [place] laws. We do not discriminate on the basis of race, color, national origin, age, disability, sex, sexual orientation, or gender identity.            Thank you!     Thank you for choosing [facility]  for your care. Our goal is always to provide you with excellent care. Hearing back from our patients is one way we can continue to improve our services. Please take a few minutes to complete the written survey that you may receive in the mail after your visit with us. Thank you!             Your Updated Medication List - Protect others around you: Learn how to safely use, store and throw away your medicines at www.disposemymeds.org.          This list is accurate as of 5/14/18  3:13 PM.  Always use your most recent med list.                   Brand Name Dispense Instructions for use Diagnosis    ammonium lactate 12 % cream    AMLACTIN    385 g    Apply topically 2 times daily    Other acute sinusitis, recurrence not specified, Chronic obstructive pulmonary disease, unspecified COPD type (H), Abnormal findings on diagnostic imaging of other specified body structures, Vitamin D  deficiency, Abnormal finding of blood chemistry, Dry skin       aspirin 81 MG tablet      Take 1 tablet by mouth daily.        azelastine 0.1 % spray    ASTELIN    1 Bottle    Spray 1 spray into both nostrils 2 times daily As needed.    Seasonal allergic rhinitis, unspecified chronicity, unspecified trigger       baclofen 10 MG tablet    LIORESAL     Take 10 mg by mouth daily 2 at bedtime        busPIRone 10 MG tablet    BUSPAR    90 tablet    Take 2-3 tablets (20-30 mg) by mouth At Bedtime    Anxiety       CLINDAMAX 1 % lotion   Generic drug:  clindamycin     60 mL    Apply topically 2 times daily        dhea 25 MG Tabs     90 tablet    Take 1 tablet (25 mg) by mouth daily    Other fatigue       DULoxetine 60 MG EC capsule    CYMBALTA     Take 1 capsule by mouth daily.        DUONEB 0.5-2.5 (3) MG/3ML neb solution   Generic drug:  ipratropium - albuterol 0.5 mg/2.5 mg/3 mL      Take 1 ampule by nebulization every 4 hours as needed.        FLONASE 50 MCG/ACT spray   Generic drug:  fluticasone      Spray 1 spray into both nostrils daily.        nabumetone 500 MG tablet    RELAFEN    30 tablet    Take 1 tablet (500 mg) by mouth daily    Other acute sinusitis, recurrence not specified, Chronic obstructive pulmonary disease, unspecified COPD type (H), Abnormal findings on diagnostic imaging of other specified body structures, Vitamin D deficiency, Abnormal finding of blood chemistry, Dry skin       nystatin [id number] UNIT/GM Powd    NYSTOP    60 g    APPLY TOPICALLY 3 TIMES DAILY FOR 1-2 WEEKS UNTIL RASH CLEARED    Intertrigo       ondansetron 4 MG ODT tab    ZOFRAN-ODT    18 tablet    TAKE 1 TABLET (4 MG) BY MOUTH EVERY 8 HOURS AS NEEDED    Nausea       * order for DME     1 Device    BiPAP: IPAP 11 cm H2O EPAP 6 cm H2O Pt has her own BiPAP New CPAP supplies- try [place] mask if it comes in an extra small size.  Alternatively could try [place]mers with nasal prongs occluded. Lifetime need and heated humidity.    TITO  (obstructive sleep apnea)       * order for DME      BiPAP: IPAP 12 cm H2O EPAP 7 cm H2O  Lifetime need and heated humidity.    TITO (obstructive sleep apnea)       * order for DME     1 Device    O2: During sleep 1.5 LPM via O2 Concentrator  Bled in through BiPAP    TITO (obstructive sleep apnea), Sleep related hypoventilation/hypoxemia in other disease       order for DME     4 Units    Compression stockings (Thigh High)- 2 pairs    Peripheral edema       OxyContin 80 MG 12 hr tablet   Generic drug:  oxyCODONE      Take  by mouth every 12 hours. Take at bedtime        potassium chloride 10 MEQ tablet    K-TAB,KLOR-CON    360 tablet    2 tablets twice daily    Low blood potassium       pramipexole 1 MG tablet    MIRAPEX     Take 1 mg by mouth At Bedtime        ranitidine 150 MG tablet    ZANTAC    30 tablet    Take 1 tablet (150 mg) by mouth 2 times daily Has nausea once a week    Nausea       SOMA 350 MG tablet   Generic drug:  carisoprodol      Take 1 tablet by mouth. Take at bedtime        SUMAtriptan 20 MG/ACT nasal spray    IMITREX    30 each    Give once, can repeat in 2 hour if not better    Episodic tension-type headache, not intractable       SYNTHROID 125 MCG tablet   Generic drug:  levothyroxine     90 tablet    Take 1 tablet (125 mcg) by mouth daily    Hypothyroidism, unspecified type       tiotropium 2.5 MCG/ACT inhalation aerosol    SPIRIVA RESPIMAT    4 g    Inhale 2 puffs into the lungs daily    Chronic obstructive pulmonary disease, unspecified COPD type (H)       TOPAMAX 100 MG tablet   Generic drug:  topiramate      Take  by mouth 2 times daily. Take 1.5 pill in mornings Take 1.5 at bedtime        traZODone HCl 150 MG 24 hr tablet    OLEPTRO     Take 150 mg by mouth At Bedtime        VICODIN ES PO      Take 7.5 mg by mouth as needed        vitamin D3 2000 units Caps      Take by mouth daily        warfarin 5 MG tablet    COUMADIN    185 tablet    Take 7.5 mg Mon/Thur and 10 mg the rest of the week  or as directed by Anticoagulation clinic.    Embolism (H), Lupus anticoagulant inhibitor syndrome (H)       * Notice:  This list has 3 medication(s) that are the same as other medications prescribed for you. Read the directions carefully, and ask your doctor or other care provider to review them with you.

## 2018-05-14 NOTE — PATIENT INSTRUCTIONS
Decided to hold off on labs and exam until you're ready   Be seen when ready    Check vit D due to very high lab in past and still on supplement  Decided to try spiriva as respimat instead of handihaler  Try azelastine (astelin) for allergies.  Allergist if not helping.  Try ranitidine (zantac) for nausea as needed instead of zofran (ondansetron)

## 2018-05-21 NOTE — TELEPHONE ENCOUNTER
"Requested Prescriptions   Pending Prescriptions Disp Refills     Cholecalciferol (VITAMIN D3) 2000 units CAPS 30 capsule      Sig: Take by mouth daily    Vitamin Supplements (Adult) Protocol Passed    5/21/2018  3:31 PM       Passed - High dose Vitamin D not ordered       Passed - Recent (12 mo) or future (30 days) visit within the authorizing provider's specialty    Patient had office visit in the last 12 months or has a visit in the next 30 days with authorizing provider or within the authorizing provider's specialty.  See \"Patient Info\" tab in inbasket, or \"Choose Columns\" in Meds & Orders section of the refill encounter.            Last Written Prescription Date:  HISTORICAL  Last Fill Quantity: ,  # refills:    Last office visit: 5/14/2018 with prescribing provider:  05/14/18   Future Office Visit:      "

## 2018-05-23 NOTE — TELEPHONE ENCOUNTER
Relafen  Last Written Prescription Date:  4/13/18  Last Fill Quantity: 30,   # refills: 0  Last Office Visit : 4/13/18  Future Office visit:  10/12/18    Routing refill request to provider for review/approval because:  Drug not on the [facility], [facility] or [facility] refill protocol

## 2018-05-24 NOTE — PROGRESS NOTES
Addendum: Patient as placed on penicillin V potassium for a tick bite reaction. She will be starting this today in the evening. Writer contacted the patient and will have her get an INR at the [facility] lab on 6-4-18 ([facility] is full). [facility] to follow up and contact pt with results. She does have an [facility] appt on 6-7 in [facility] also.    Sharee Braxton RN, BSN, PHN  5-   ANTICOAGULATION FOLLOW-UP CLINIC VISIT    Patient Name:  Tere Ortiz  Date:  5/24/2018  Contact Type:  Face to Face    SUBJECTIVE:     Patient Findings     Positives Change in diet/appetite (switched to high protein Ensure but still drinking one daily), Intentional hold of therapy (5-11-18)    Comments Patient is still taking DHEA for energy and has been on this for about 3 weeks now. She has gained some weight since last visit and was 128 lbs today. She did see PCP after last [facility] visit regarding her weight loss, but did not want labs, an exam or tests ordered due to insurance issues. This is all pending. INR did come down after dose decrease but is still slightly supra therapeutic. Will reduce another 3.8% and recheck in two weeks.           OBJECTIVE    INR Protime   Date Value Ref Range Status   05/24/2018 3.4 (A) 0.86 - 1.14 Final       ASSESSMENT / PLAN  INR assessment SUPRA    Recheck INR In: 2 WEEKS    INR Location Clinic      Anticoagulation Summary as of 5/24/2018     INR goal 2.0-3.0   Today's INR 3.4!   Warfarin maintenance plan 7.5 mg (5 mg x 1.5) on Mon, Wed, Fri; 10 mg (5 mg x 2) all other days   Full warfarin instructions 5/24: 5 mg; Otherwise 7.5 mg on Mon, Wed, Fri; 10 mg all other days   Weekly warfarin total 62.5 mg   Plan last modified Sharee Braxton RN (5/24/2018)   Next INR check 6/7/2018   Priority INR   Target end date Indefinite    Indications   Lupus anticoagulant inhibitor syndrome (H) [D68.62]  Pulmonary embolism with infarction (H) [I26.99]  Long-term (current) use of anticoagulants [Z79.01] [Z79.01]  Embolism - blood clot  [I74.9]         Anticoagulation Episode Summary     INR check location     Preferred lab     Send INR reminders to [facility] POOL    Comments * comes in electric WC. warfarin 5mg tablets. takes warfarin in the AM      Anticoagulation Care Providers     Provider Role Specialty Phone number    Zina Pruett PA-C Responsible Physician Assistant [phone]            See the Encounter Report to view Anticoagulation Flowsheet and Dosing Calendar (Go to Encounters tab in chart review, and find the Anticoagulation Therapy Visit)        Sharee Braxton RN

## 2018-05-24 NOTE — MR AVS SNAPSHOT
Tere CARMINA Ortiz   5/24/2018 2:45 PM   Anticoagulation Therapy Visit    Description:  63 year old female   Provider:  [facility] ANTI COAG   Department:  [facility] Antico[facility]           INR as of 5/24/2018     Today's INR 3.4!      Anticoagulation Summary as of 5/24/2018     INR goal 2.0-3.0   Today's INR 3.4!   Full warfarin instructions 5/24: 5 mg; Otherwise 7.5 mg on Mon, Wed, Fri; 10 mg all other days   Next INR check 6/7/2018    Indications   Lupus anticoagulant inhibitor syndrome (H) [D68.62]  Pulmonary embolism with infarction (H) [I26.99]  Long-term (current) use of anticoagulants [Z79.01] [Z79.01]  Embolism - blood clot [I74.9]         Your next Anticoagulation Clinic appointment(s)     Jun 07, 2018  3:00 PM CDT   Anticoagulation Visit with [facility] ANTI COAG   [facility] ([facility])    [address] [place] [address] [phone]              Contact Numbers     Please call [phone] with any problems or questions regarding your therapy.    If you need to cancel and/or reschedule your appointment please call one of the following numbers:  [facility] - [phone]  [place] - [phone]  [facility] [phone]  [facility] [phone]  [place] - [phone]            May 2018 Details    Sun Mon Tue Wed Thu Fri Sat       1               2               3               4               5                 6               7               8               9               10               11               12                 13               14               15               16               17               18               19                 20               21               22               23               24      5 mg   See details      25      7.5 mg         26      10 mg           27      10 mg         28      7.5 mg         29      10 mg         30      7.5 mg         31      10 mg            Date Details   05/24 This INR check               How to take your  warfarin dose     To take:  5 mg Take 1 of the 5 mg tablets.    To take:  7.5 mg Take 1.5 of the 5 mg tablets.    To take:  10 mg Take 2 of the 5 mg tablets.           June 2018 Details    Sun Mon Tue Wed Thu Fri Sat          1      7.5 mg         2      10 mg           3      10 mg         4      7.5 mg         5      10 mg         6      7.5 mg         7            8               9                 10               11               12               13               14               15               16                 17               18               19               20               21               22               23                 24               25               26               27               28               29               30                Date Details   No additional details    Date of next INR:  6/7/2018         How to take your warfarin dose     To take:  7.5 mg Take 1.5 of the 5 mg tablets.    To take:  10 mg Take 2 of the 5 mg tablets.

## 2018-05-30 NOTE — MR AVS SNAPSHOT
After Visit Summary   5/30/2018    Tere Ortiz    MRN: [id number]           Patient Information     Date Of Birth          1955        Visit Information        Provider Department      5/30/2018 6:30 PM Fiona Foley APRN CNP [facility]        Today's Diagnoses     Hordeolum externum of right upper eyelid    -  1      Care Instructions    Take antibiotic and use eye ointment as directed.    If symptoms worsen or do not resolve follow up with your primary care provider.     Follow-up with your primary care provider next week and as needed.    Indications for emergent return to emergency department discussed with patient, who verbalized good understanding and agreement.  Patient understands the limitations of today's evaluation.         Sty (or Stye)  A sty is an infection of the oil gland of the eyelid. It may develop into a small pocket of pus (an abscess). This can cause pain, redness, and swelling. In early stages, a sty is treated with antibiotic cream, eye drops, or a small towel soaked in warm water (a warm compress). More severe cases may need to be opened and drained by a healthcare provider.  Home care    Eye drops or ointment are usually prescribed to treat the infection. Use these as directed.     Artificial tears may also be used to lubricate the eye and make it more comfortable. You can buy these over the counter without a prescription. Talk with your healthcare provider before using any over-the-counter treatment for a sty.    Apply a warm, damp towel to the affected eye for at least 5 minutes, 3 to 4 times a day for a week. Warm compresses open the pores and speed the healing. But if the compresses are too hot, they may burn your eyelid.    Sometimes the sty will drain with this treatment alone. If this happens, keep using the antibiotic until all the redness and swelling are gone.    Wash your hands before and after touching the  infected eyelid to avoid spreading the infection.    Don t squeeze or try to break open the sty.  Follow-up care  Follow up with your healthcare provider, or as advised.   When to seek medical advice  Call your healthcare provider right away if any of these occur:    Increase in swelling or redness around the eyelid after 48 to 72 hours    Increase in eye pain or the eyelid blisters    Increase in warmth--the eyelid feels hot    Drainage of blood or thick pus from the sty    Blister on the eyelid    Inability to open the eyelid due to swelling    Fever of 100.4 F (38 C) or above, or as directed by your provider    Vision changes    Headache or stiff neck    The sty comes back  Date Last Reviewed: 8/1/2017 2000-2017 [employer]. [address] [place] [address], [place] [address]. All rights reserved. This information is not intended as a substitute for professional medical care. Always follow your healthcare professional's instructions.                Follow-ups after your visit        Follow-up notes from your care team     See patient instructions section of the AVS Return if symptoms worsen or fail to improve, for Follow up with your primary care provider.      Your next 10 appointments already scheduled     Jun 07, 2018  3:00 PM CDT   [facility]    [address] [place] [id number] [phone]            Oct 12, 2018 11:30 AM CDT   (Arrive by 11:15 AM)   RETURN ENDOCRINE with Karol Simmons MD   [facility] ([facility])    [address] [place] [id number] [phone]              Who to contact     If you have questions or need follow up information about today's clinic visit or your schedule please contact [facility] directly at [phone].  Normal or non-critical lab and imaging results will be  communicated to you by [employer], letter or phone within 4 business days after the clinic has received the results. If you do not hear from us within 7 days, please contact the clinic through [employer] or phone. If you have a critical or abnormal lab result, we will notify you by phone as soon as possible.  Submit refill requests through [employer] or call your pharmacy and they will forward the refill request to us. Please allow 3 business days for your refill to be completed.          Additional Information About Your Visit        [employer] Information     [employer] gives you secure access to your electronic health record. If you see a primary care provider, you can also send messages to your care team and make appointments. If you have questions, please call your primary care clinic.  If you do not have a primary care provider, please call [phone] and they will assist you.        Care EveryWhere ID     This is your Care EveryWhere ID. This could be used by other organizations to access your [place] medical records  [id number]        Your Vitals Were     Pulse Temperature Respirations Pulse Oximetry BMI (Body Mass Index)       68 98.1  F (36.7  C) (Tympanic) 16 92% 25.45 kg/m2        Blood Pressure from Last 3 Encounters:   05/30/18 99/56   05/14/18 (!) 84/58   04/13/18 108/69    Weight from Last 3 Encounters:   05/30/18 126 lb (57.2 kg)   05/24/18 128 lb (58.1 kg)   05/14/18 131 lb 8 oz (59.6 kg)              Today, you had the following     No orders found for display         Today's Medication Changes          These changes are accurate as of 5/30/18  8:31 PM.  If you have any questions, ask your nurse or doctor.               Start taking these medicines.        Dose/Directions    erythromycin ophthalmic ointment   Commonly known as:  ROMYCIN   Used for:  Hordeolum externum of right upper eyelid   Started by:  Fiona Foley APRN CNP        Dose:  1 Application   Place 1 Application into the right eye 3  times daily for 7 days   Quantity:  3.5 g   Refills:  0       penicillin V potassium 500 MG tablet   Commonly known as:  VEETID   Used for:  Hordeolum externum of right upper eyelid   Started by:  Fiona Foley APRN CNP        Dose:  500 mg   Take 1 tablet (500 mg) by mouth 3 times daily for 7 days   Quantity:  21 tablet   Refills:  0            Where to get your medicines      These medications were sent to [id number] - [place], [place] - [address] [facility]  [address] [facility] [address]    Hours:  Closed 10-16-08 business to [facility] Phone:  [phone]     erythromycin ophthalmic ointment    penicillin V potassium 500 MG tablet                Primary Care Provider Office Phone # Fax #    Zina Pruett PA-C [phone] [address] [facility] [address]        Equal Access to Services     [facility]: Hadii alcides tellezo Sotena, waaxda luqadaha, qaybta kaalmada adealexyada, salas callahan . So [facility] [phone].    ATENCIÓN: Si habla español, tiene a acosta disposición servicios gratuitos de asistencia lingüística. Bev al [phone].    We comply with applicable federal civil rights laws and [place] laws. We do not discriminate on the basis of race, color, national origin, age, disability, sex, sexual orientation, or gender identity.            Thank you!     Thank you for choosing [facility]  for your care. Our goal is always to provide you with excellent care. Hearing back from our patients is one way we can continue to improve our services. Please take a few minutes to complete the written survey that you may receive in the mail after your visit with us. Thank you!             Your Updated Medication List - Protect others around you: Learn how to safely use, store and throw away your medicines at www.disposemymeds.org.          This list is accurate as of 5/30/18  8:31 PM.  Always  use your most recent med list.                   Brand Name Dispense Instructions for use Diagnosis    ammonium lactate 12 % cream    AMLACTIN    385 g    Apply topically 2 times daily    Other acute sinusitis, recurrence not specified, Chronic obstructive pulmonary disease, unspecified COPD type (H), Abnormal findings on diagnostic imaging of other specified body structures, Vitamin D deficiency, Abnormal finding of blood chemistry, Dry skin       aspirin 81 MG tablet      Take 1 tablet by mouth daily.        azelastine 0.1 % spray    ASTELIN    1 Bottle    Spray 1 spray into both nostrils 2 times daily As needed.    Seasonal allergic rhinitis, unspecified chronicity, unspecified trigger       baclofen 10 MG tablet    LIORESAL     Take 10 mg by mouth daily 2 at bedtime        busPIRone 10 MG tablet    BUSPAR    90 tablet    Take 2-3 tablets (20-30 mg) by mouth At Bedtime    Anxiety       CLINDAMAX 1 % lotion   Generic drug:  clindamycin     60 mL    Apply topically 2 times daily        dhea 25 MG Tabs     90 tablet    Take 1 tablet (25 mg) by mouth daily    Other fatigue       DULoxetine 60 MG EC capsule    CYMBALTA     Take 1 capsule by mouth daily.        DUONEB 0.5-2.5 (3) MG/3ML neb solution   Generic drug:  ipratropium - albuterol 0.5 mg/2.5 mg/3 mL      Take 1 ampule by nebulization every 4 hours as needed.        erythromycin ophthalmic ointment    ROMYCIN    3.5 g    Place 1 Application into the right eye 3 times daily for 7 days    Hordeolum externum of right upper eyelid       FLONASE 50 MCG/ACT spray   Generic drug:  fluticasone      Spray 1 spray into both nostrils daily.        nabumetone 500 MG tablet    RELAFEN    90 tablet    TAKE 1 TABLET (500 MG) BY MOUTH DAILY    Other acute sinusitis, recurrence not specified, Chronic obstructive pulmonary disease, unspecified COPD type (H), Abnormal findings on diagnostic imaging of other specified body structures, Vitamin D deficiency, Abnormal finding of  blood chemistry, Dry skin       nystatin 735969 UNIT/GM Powd    NYSTOP    60 g    APPLY TOPICALLY 3 TIMES DAILY FOR 1-2 WEEKS UNTIL RASH CLEARED    Intertrigo       ondansetron 4 MG ODT tab    ZOFRAN-ODT    18 tablet    TAKE 1 TABLET (4 MG) BY MOUTH EVERY 8 HOURS AS NEEDED    Nausea       * order for DME     1 Device    BiPAP: IPAP 11 cm H2O EPAP 6 cm H2O Pt has her own BiPAP New CPAP supplies- try Formoso mask if it comes in an extra small size.  Alternatively could try Thida with nasal prongs occluded. Lifetime need and heated humidity.    TITO (obstructive sleep apnea)       * order for DME      BiPAP: IPAP 12 cm H2O EPAP 7 cm H2O  Lifetime need and heated humidity.    TITO (obstructive sleep apnea)       * order for DME     1 Device    O2: During sleep 1.5 LPM via O2 Concentrator  Bled in through BiPAP    TITO (obstructive sleep apnea), Sleep related hypoventilation/hypoxemia in other disease       order for DME     4 Units    Compression stockings (Thigh High)- 2 pairs    Peripheral edema       OxyContin 80 MG 12 hr tablet   Generic drug:  oxyCODONE      Take  by mouth every 12 hours. Take at bedtime        penicillin V potassium 500 MG tablet    VEETID    21 tablet    Take 1 tablet (500 mg) by mouth 3 times daily for 7 days    Hordeolum externum of right upper eyelid       potassium chloride 10 MEQ tablet    K-TAB,KLOR-CON    360 tablet    2 tablets twice daily    Low blood potassium       pramipexole 1 MG tablet    MIRAPEX     Take 1 mg by mouth At Bedtime        ranitidine 150 MG tablet    ZANTAC    30 tablet    Take 1 tablet (150 mg) by mouth 2 times daily Has nausea once a week    Nausea       SOMA 350 MG tablet   Generic drug:  carisoprodol      Take 1 tablet by mouth. Take at bedtime        SUMAtriptan 20 MG/ACT nasal spray    IMITREX    30 each    Give once, can repeat in 2 hour if not better    Episodic tension-type headache, not intractable       SYNTHROID 125 MCG tablet   Generic drug:  levothyroxine      90 tablet    Take 1 tablet (125 mcg) by mouth daily    Hypothyroidism, unspecified type       tiotropium 2.5 MCG/ACT inhalation aerosol    SPIRIVA RESPIMAT    4 g    Inhale 2 puffs into the lungs daily    Chronic obstructive pulmonary disease, unspecified COPD type (H)       TOPAMAX 100 MG tablet   Generic drug:  topiramate      Take  by mouth 2 times daily. Take 1.5 pill in mornings Take 1.5 at bedtime        traZODone HCl 150 MG 24 hr tablet    OLEPTRO     Take 150 mg by mouth At Bedtime        VICODIN ES PO      Take 7.5 mg by mouth as needed        vitamin D3 2000 units Caps     90 capsule    Take 1 capsule by mouth daily    Chronic fatigue       warfarin 5 MG tablet    COUMADIN    185 tablet    Take 7.5 mg MWF and 10 mg the rest of the week or as directed by Anticoagulation clinic.    Embolism (H), Lupus anticoagulant inhibitor syndrome (H)       * Notice:  This list has 3 medication(s) that are the same as other medications prescribed for you. Read the directions carefully, and ask your doctor or other care provider to review them with you.

## 2018-05-30 NOTE — TELEPHONE ENCOUNTER
Malenaer states was bitten by a tick last Thursday on her neck and was able to remove all the tick but redness is getting bigger, size of a quarter, painful to the touch and very itchy, she also notes that she has sty in her left eye lid that is very swollen, painful, and red. She has noted history of immunosuppression as well. Reviewed guidelines below and recommend  tonight and she agreed to get a ride in to [facility] now.       Reason for Disposition    [1] Red streak or red line AND [2] length > 2 inches (5 cm)    Additional Information    Negative: [1] 2 to 14 days following tick bite AND [2] severe headache with fever occurs    Negative: [1] 2 to 14 days following tick bite AND [2] widespread rash with fever occurs    Negative: Patient sounds very sick or weak to the triager    Negative: Not a tick bite    Negative: Sounds like a life-threatening emergency to the triager    Negative: [1] Fever AND [2] red area    Negative: [1] Fever AND [2] area is very tender to touch    Protocols used: TICK BITE-ADULT-    Stephanie Tirado, RN, BSN  [facility]

## 2018-05-31 NOTE — PATIENT INSTRUCTIONS
Take antibiotic and use eye ointment as directed.    If symptoms worsen or do not resolve follow up with your primary care provider.     Follow-up with your primary care provider next week and as needed.    Indications for emergent return to emergency department discussed with patient, who verbalized good understanding and agreement.  Patient understands the limitations of today's evaluation.         Sty (or Stye)  A sty is an infection of the oil gland of the eyelid. It may develop into a small pocket of pus (an abscess). This can cause pain, redness, and swelling. In early stages, a sty is treated with antibiotic cream, eye drops, or a small towel soaked in warm water (a warm compress). More severe cases may need to be opened and drained by a healthcare provider.  Home care    Eye drops or ointment are usually prescribed to treat the infection. Use these as directed.     Artificial tears may also be used to lubricate the eye and make it more comfortable. You can buy these over the counter without a prescription. Talk with your healthcare provider before using any over-the-counter treatment for a sty.    Apply a warm, damp towel to the affected eye for at least 5 minutes, 3 to 4 times a day for a week. Warm compresses open the pores and speed the healing. But if the compresses are too hot, they may burn your eyelid.    Sometimes the sty will drain with this treatment alone. If this happens, keep using the antibiotic until all the redness and swelling are gone.    Wash your hands before and after touching the infected eyelid to avoid spreading the infection.    Don t squeeze or try to break open the sty.  Follow-up care  Follow up with your healthcare provider, or as advised.   When to seek medical advice  Call your healthcare provider right away if any of these occur:    Increase in swelling or redness around the eyelid after 48 to 72 hours    Increase in eye pain or the eyelid blisters    Increase in warmth--the  eyelid feels hot    Drainage of blood or thick pus from the sty    Blister on the eyelid    Inability to open the eyelid due to swelling    Fever of 100.4 F (38 C) or above, or as directed by your provider    Vision changes    Headache or stiff neck    The sty comes back  Date Last Reviewed: 8/1/2017 2000-2017 [employer]. [address] [place] [address]. All rights reserved. This information is not intended as a substitute for professional medical care. Always follow your healthcare professional's instructions.
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
## 2018-06-04 NOTE — MR AVS SNAPSHOT
Tere Ortiz   6/4/2018   Anticoagulation Therapy Visit    Description:  63 year old female   Provider:  Justyn Polanco, RN   Department:  [facility]           INR as of 6/4/2018     Today's INR 1.86!      Anticoagulation Summary as of 6/4/2018     INR goal 2.0-3.0   Today's INR 1.86!   Full warfarin instructions 6/4: 10 mg; Otherwise 7.5 mg on Mon, Wed, Fri; 10 mg all other days   Next INR check 6/7/2018    Indications   Lupus anticoagulant inhibitor syndrome (H) [D68.62]  Pulmonary embolism with infarction (H) [I26.99]  Long-term (current) use of anticoagulants [Z79.01] [Z79.01]  Embolism - blood clot [I74.9]         Your next Anticoagulation Clinic appointment(s)     Jun 07, 2018  3:00 PM CDT   [facility]agulation [facility] with [facility]   [facility]    [address] [place] [id number]   [phone]              June 2018 Details    Sun Mon Tue Wed Thu Fri Sat          1               2                 3               4      10 mg   See details      5      10 mg         6      7.5 mg         7            8               9                 10               11               12               13               14               15               16                 17               18               19               20               21               22               23                 24               25               26               27               28               29               30                Date Details   06/04 This INR check       Date of next INR:  6/7/2018         How to take your warfarin dose     To take:  7.5 mg Take 1.5 of the 5 mg tablets.    To take:  10 mg Take 2 of the 5 mg tablets.

## 2018-06-04 NOTE — LETTER
Patient:  Tere Ortiz  :   1955  MRN:     [id number]        Ms.Sheryl CARMINA Ortiz  [address] [facility] [address]        2018    Dear Tere    Thank you for your recent lab results.  Your DHEAS has gotten a little bit better but as per our discussion, this has not been helpful for you to.  I agree with you stopping the supplement as it can affect the INR and I do not think need to be on it if it is not really helping you.  We will see about getting you in to see the lung doctors.  Please let me know if you do not receive this appointment.    If you have any questions, please feel free to contact my nurse at [phone] select option #3 for triage nurse  or  option #1 for scheduling related questions.    Regards    Karol Simmons MD      Resulted Orders   DHEA sulfate   Result Value Ref Range    DHEA Sulfate 54 35 - 430 ug/dL       [facility]

## 2018-06-04 NOTE — PROGRESS NOTES
ANTICOAGULATION FOLLOW-UP CLINIC VISIT    Patient Name:  Tere rOtiz  Date:  6/4/2018  Contact Type:  Telephone    SUBJECTIVE:     Patient Findings     Positives Unexplained INR or factor level change    Comments Patient denies any missed doses. She states she has not increased her protein drink intake. Patient's maintenance dose was decreased on her last visit and may need to be re-increased. I instructed her to take 10 mg today then to resume her maintenance dose. She has a f/u with [facility] in 3 days.           OBJECTIVE    INR   Date Value Ref Range Status   06/04/2018 1.86 (H) 0.86 - 1.14 Final       ASSESSMENT / PLAN  INR assessment SUB    Recheck INR In: 3 DAYS    INR Location Outside lab      Anticoagulation Summary as of 6/4/2018     INR goal 2.0-3.0   Today's INR 1.86!   Warfarin maintenance plan 7.5 mg (5 mg x 1.5) on Mon, Wed, Fri; 10 mg (5 mg x 2) all other days   Full warfarin instructions 6/4: 10 mg; Otherwise 7.5 mg on Mon, Wed, Fri; 10 mg all other days   Weekly warfarin total 62.5 mg   Plan last modified Sharee Braxton RN (5/24/2018)   Next INR check 6/7/2018   Priority INR   Target end date Indefinite    Indications   Lupus anticoagulant inhibitor syndrome (H) [D68.62]  Pulmonary embolism with infarction (H) [I26.99]  Long-term (current) use of anticoagulants [Z79.01] [Z79.01]  Embolism - blood clot [I74.9]         Anticoagulation Episode Summary     INR check location     Preferred lab     Send INR reminders to [facility] POOL    Comments * comes in electric [employer]. warfarin 5mg tablets. takes warfarin in the AM      Anticoagulation Care Providers     Provider Role Specialty Phone number    Zina Pruett PA-C Responsible Physician Assistant [phone]            See the Encounter Report to view Anticoagulation Flowsheet and Dosing Calendar (Go to Encounters tab in chart review, and find the Anticoagulation Therapy Visit)        Justyn Polanco RN

## 2018-06-07 NOTE — PROGRESS NOTES
ANTICOAGULATION FOLLOW-UP CLINIC VISIT    Patient Name:  Tere Ortiz  Date:  6/7/2018  Contact Type:  Face to Face    SUBJECTIVE:     Patient Findings     Positives Change in medications (stopped DHEA last week), Antibiotic use or infection (was on penicillin for 4 days, starting 5-31-18)    Comments Pt stopped DHEA, which she was taking for energy, about one week ago. She said it did not help her symptoms and was affecting her INRs. She has been eating well and gained a couple pounds. Still drinking her protein drink daily. Pt also states she stopped the penicillin after 4 days due to diarrhea. This may be why INR is slightly elevated. Pt was also given a larger dose of warfarin on Monday. Will reduce dose slightly today and then continue recently changed maintenance dosing. Recheck in 3 weeks. Pt will call [facility] or come sooner if any changes in medications, diet or health. No bleeding.           OBJECTIVE    INR Protime   Date Value Ref Range Status   06/07/2018 3.5 (A) 0.86 - 1.14 Final       ASSESSMENT / PLAN  INR assessment SUPRA    Recheck INR In: 3 WEEKS    INR Location Clinic      Anticoagulation Summary as of 6/7/2018     INR goal 2.0-3.0   Today's INR 3.5!   Warfarin maintenance plan 7.5 mg (5 mg x 1.5) on Mon, Wed, Fri; 10 mg (5 mg x 2) all other days   Full warfarin instructions 6/7: 7.5 mg; Otherwise 7.5 mg on Mon, Wed, Fri; 10 mg all other days   Weekly warfarin total 62.5 mg   Plan last modified Sharee Braxton RN (5/24/2018)   Next INR check 6/28/2018   Priority INR   Target end date Indefinite    Indications   Lupus anticoagulant inhibitor syndrome (H) [D68.62]  Pulmonary embolism with infarction (H) [I26.99]  Long-term (current) use of anticoagulants [Z79.01] [Z79.01]  Embolism - blood clot [I74.9]         Anticoagulation Episode Summary     INR check location     Preferred lab     Send INR reminders to [facility]    Comments * comes in electric [employer]. warfarin 5mg tablets. takes  warfarin in the AM. Drinks high protein Ensure daily      Anticoagulation Care Providers     Provider Role Specialty Phone number    Zina Pruett PA-C Responsible Physician Assistant [phone]            See the Encounter Report to view Anticoagulation Flowsheet and Dosing Calendar (Go to Encounters tab in chart review, and find the Anticoagulation Therapy Visit)        Sharee Braxton RN

## 2018-06-07 NOTE — MR AVS SNAPSHOT
Tere GREWAL Diana   6/7/2018 3:00 PM   Anticoagulation Therapy Visit    Description:  63 year old female   Provider:  [facility]   Department:  [facility]           INR as of 6/7/2018     Today's INR 3.5!      Anticoagulation Summary as of 6/7/2018     INR goal 2.0-3.0   Today's INR 3.5!   Full warfarin instructions 6/7: 7.5 mg; Otherwise 7.5 mg on Mon, Wed, Fri; 10 mg all other days   Next INR check 6/28/2018    Indications   Lupus anticoagulant inhibitor syndrome (H) [D68.62]  Pulmonary embolism with infarction (H) [I26.99]  Long-term (current) use of anticoagulants [Z79.01] [Z79.01]  Embolism - blood clot [I74.9]         Description     Take 7.5 mg today. Then resume 7.5 mg  Mondays, Wednesdays, and Fridays; 10 mg all other days of the week.        Your next Anticoagulation Clinic appointment(s)     Jun 28, 2018  3:15 PM CDT   [facility] with [facility]   [facility]    [address] [place] [address] [phone]              Contact Numbers     Please call [phone] with any problems or questions regarding your therapy.    If you need to cancel and/or reschedule your appointment please call one of the following numbers:  [facility] - [phone]  [place] - [phone]  [facility] [phone]  [facility] [phone]  [place] - [phone]            June 2018 Details    Sun Mon Tue Wed Thu Fri Sat          1               2                 3               4               5               6               7      7.5 mg   See details      8      7.5 mg         9      10 mg           10      10 mg         11      7.5 mg         12      10 mg         13      7.5 mg         14      10 mg         15      7.5 mg         16      10 mg           17      10 mg         18      7.5 mg         19      10 mg         20      7.5 mg         21      10 mg         22      7.5 mg         23      10 mg           24      10 mg         25       7.5 mg         26      10 mg         27      7.5 mg         28            29               30                Date Details   06/07 This INR check       Date of next INR:  6/28/2018         How to take your warfarin dose     To take:  7.5 mg Take 1.5 of the 5 mg tablets.    To take:  10 mg Take 2 of the 5 mg tablets.

## 2018-06-08 NOTE — PROGRESS NOTES
Dear Tere    Thank you for your recent lab results.  Your DHEAS has gotten a little bit better but as per our discussion, this has not been helpful for you to.  I agree with you stopping the supplement as it can affect the INR and I do not think need to be on it if it is not really helping you.  We will see about getting you in to see the lung doctors.  Please let me know if you do not receive this appointment.    If you have any questions, please feel free to contact my nurse at [phone] select option #3 for triage nurse  or  option #1 for scheduling related questions.    Regards    Karol Simmons MD

## 2018-06-08 NOTE — TELEPHONE ENCOUNTER
charito pt - will d/c DHEA    --  Dear Tere    Thank you for your recent lab results.  Your DHEAS has gotten a little bit better but as per our discussion, this has not been helpful for you to.  I agree with you stopping the supplement as it can affect the INR and I do not think need to be on it if it is not really helping you.  We will see about getting you in to see the lung doctors.  Please let me know if you do not receive this appointment.    If you have any questions, please feel free to contact my nurse at [phone] select option #3 for triage nurse  or  option #1 for scheduling related questions.    Regards    Karol Simmons MD

## 2018-06-12 NOTE — TELEPHONE ENCOUNTER
"Requested Prescriptions   Pending Prescriptions Disp Refills     ranitidine (ZANTAC) 150 MG tablet [Pharmacy Med Name: RANITIDINE 150 MG   TAB JOSE] 60 tablet 0    Last Written Prescription Date:  05/14/18  Last Fill Quantity: 60,  # refills: 0   Last office visit: 05/14/2018 with prescribing provider:  Flynn,  Future Office Visit:     Sig: TAKE ONE TABLET BY MOUTH TWICE DAILY    H2 Blockers Protocol Passed    6/12/2018  5:48 PM       Passed - Patient is age 12 or older       Passed - Recent (12 mo) or future (30 days) visit within the authorizing provider's specialty    Patient had office visit in the last 12 months or has a visit in the next 30 days with authorizing provider or within the authorizing provider's specialty.  See \"Patient Info\" tab in inbasket, or \"Choose Columns\" in Meds & Orders section of the refill encounter.              "

## 2018-06-18 NOTE — LETTER
My Depression Action Plan  Name: Tere Ortiz   Date of Birth 1955  Date: 6/18/2018    My doctor: Zina Pruett   My clinic: [facility] [id number] [place] [id number] [phone]          GREEN    ZONE   Good Control    What it looks like:     Things are going generally well. You have normal up s and down s. You may even feel depressed from time to time, but bad moods usually last less than a day.   What you need to do:  1. Continue to care for yourself (see self care plan)  2. Check your depression survival kit and update it as needed  3. Follow your physician s recommendations including any medication.  4. Do not stop taking medication unless you consult with your physician first.           YELLOW         ZONE Getting Worse    What it looks like:     Depression is starting to interfere with your life.     It may be hard to get out of bed; you may be starting to isolate yourself from others.    Symptoms of depression are starting to last most all day and this has happened for several days.     You may have suicidal thoughts but they are not constant.   What you need to do:     1. Call your care team, your response to treatment will improve if you keep your care team informed of your progress. Yellow periods are signs an adjustment may need to be made.     2. Continue your self-care, even if you have to fake it!    3. Talk to someone in your support network    4. Open up your depression survival kit           RED    ZONE Medical Alert - Get Help    What it looks like:     Depression is seriously interfering with your life.     You may experience these or other symptoms: You can t get out of bed most days, can t work or engage in other necessary activities, you have trouble taking care of basic hygiene, or basic responsibilities, thoughts of suicide or death that will not go away, self-injurious behavior.     What you need to do:  1. Call your care  team and request a same-day appointment. If they are not available (weekends or after hours) call your local crisis line, emergency room or 911.            Depression Self Care Plan / Survival Kit    Self-Care for Depression  Here s the deal. Your body and mind are really not as separate as most people think.  What you do and think affects how you feel and how you feel influences what you do and think. This means if you do things that people who feel good do, it will help you feel better.  Sometimes this is all it takes.  There is also a place for medication and therapy depending on how severe your depression is, so be sure to consult with your medical provider and/ or Behavioral Health Consultant if your symptoms are worsening or not improving.     In order to better manage my stress, I will:    Exercise  Get some form of exercise, every day. This will help reduce pain and release endorphins, the  feel good  chemicals in your brain. This is almost as good as taking antidepressants!  This is not the same as joining a gym and then never going! (they count on that by the way ) It can be as simple as just going for a walk or doing some gardening, anything that will get you moving.      Hygiene   Maintain good hygiene (Get out of bed in the morning, Make your bed, Brush your teeth, Take a shower, and Get dressed like you were going to work, even if you are unemployed).  If your clothes don't fit try to get ones that do.    Diet  I will strive to eat foods that are good for me, drink plenty of water, and avoid excessive sugar, caffeine, alcohol, and other mood-altering substances.  Some foods that are helpful in depression are: complex carbohydrates, B vitamins, flaxseed, fish or fish oil, fresh fruits and vegetables.    Psychotherapy  I agree to participate in Individual Therapy (if recommended).    Medication  If prescribed medications, I agree to take them.  Missing doses can result in serious side effects.  I  understand that drinking alcohol, or other illicit drug use, may cause potential side effects.  I will not stop my medication abruptly without first discussing it with my provider.    Staying Connected With Others  I will stay in touch with my friends, family members, and my primary care provider/team.    Use your imagination  Be creative.  We all have a creative side; it doesn t matter if it s oil painting, sand castles, or mud pies! This will also kick up the endorphins.    Witness Beauty  (AKA stop and smell the roses) Take a look outside, even in mid-winter. Notice colors, textures. Watch the squirrels and birds.     Service to others  Be of service to others.  There is always someone else in need.  By helping others we can  get out of ourselves  and remember the really important things.  This also provides opportunities for practicing all the other parts of the program.    Humor  Laugh and be silly!  Adjust your TV habits for less news and crime-drama and more comedy.    Control your stress  Try breathing deep, massage therapy, biofeedback, and meditation. Find time to relax each day.     My support system    Clinic Contact:  Phone number:    Contact 1:  Phone number:    Contact 2:  Phone number:    Mu-ism/:  Phone number:    Therapist:  Phone number:    Local crisis center:    Phone number:    Other community support:  Phone number:
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
## 2018-06-18 NOTE — PATIENT INSTRUCTIONS
ASSESSMENT:   (H6.433) Skin of right earlobe with infection  (primary encounter diagnosis)  Comment:   Plan: cephALEXin (KEFLEX) 500 MG capsule        Start cephalexin 500mg four times daily for 10 days.    You could use warm packs several times a day for 10-15 minutes at a time.  Heat helps bring your blood and defenses and the antibiotics to the skin area.  I expect the redness and swelling and pain to gradually improve in the next few days.   If pain is worse, more swelling or spreading redness, we can check it again.

## 2018-06-18 NOTE — NURSING NOTE
"Chief Complaint   Patient presents with     Derm Problem       Initial /60  Pulse 104  Temp 98.2  F (36.8  C) (Tympanic)  Resp 18  Ht 4' 11\" (1.499 m)  Wt 126 lb (57.2 kg)  SpO2 92%  BMI 25.45 kg/m2 Estimated body mass index is 25.45 kg/(m^2) as calculated from the following:    Height as of this encounter: 4' 11\" (1.499 m).    Weight as of this encounter: 126 lb (57.2 kg).      Health Maintenance that is potentially due pending provider review:          Is there anyone who you would like to be able to receive your results?   If yes have patient fill out DON    "

## 2018-06-18 NOTE — MR AVS SNAPSHOT
After Visit Summary   6/18/2018    Tere Ortiz    MRN: [id number]           Patient Information     Date Of Birth          1955        Visit Information        Provider Department      6/18/2018 1:40 PM Mj Esparza MD [facility]        Today's Diagnoses     Skin of right earlobe with infection    -  1      Care Instructions    ASSESSMENT:   (H60.391) Skin of right earlobe with infection  (primary encounter diagnosis)  Comment:   Plan: cephALEXin (KEFLEX) 500 MG capsule        Start cephalexin 500mg four times daily for 10 days.    You could use warm packs several times a day for 10-15 minutes at a time.  Heat helps bring your blood and defenses and the antibiotics to the skin area.  I expect the redness and swelling and pain to gradually improve in the next few days.   If pain is worse, more swelling or spreading redness, we can check it again.           Follow-ups after your visit        Your next 10 appointments already scheduled     Jun 28, 2018  3:15 PM CDT   Anticoagulation Visit with NB ANTI COAG   [facility]    [address] [place] [id number] [phone]            Aug 27, 2018  7:45 AM CDT   XR CHEST 2 VIEWS with UCXR1   [facility] ([facility])    [address] [place] [address] [phone]           Please bring a list of your current medicines to your exam. (Include vitamins, minerals and over-thecounter medicines.) Leave your valuables at home.  Tell your doctor if there is a chance you may be pregnant.  You do not need to do anything special for this exam.            Aug 27, 2018  8:00 AM CDT   FULL PULMONARY FUNCTION with  PFL A   [facility] ([facility])    [address] [address] [phone]            Aug 27, 2018  9:00 AM  "CDT   (Arrive by 8:45 AM)   New Patient Visit with Sophie Tovar MD   [facility] ([facility])    [address] [facility]  [address]  [facility] [address] [phone]            Oct 12, 2018 11:30 AM CDT   (Arrive by 11:15 AM)   RETURN ENDOCRINE with Karol Simmons MD   [facility] ([facility])    [address] [facility] [address]  3rd Floor  [facility] [address] [phone]              Who to contact     If you have questions or need follow up information about today's clinic visit or your schedule please contact [facility] directly at [phone].  Normal or non-critical lab and imaging results will be communicated to you by MyChart, letter or phone within 4 business days after the clinic has received the results. If you do not hear from us within 7 days, please contact the clinic through [employer] or phone. If you have a critical or abnormal lab result, we will notify you by phone as soon as possible.  Submit refill requests through [employer] or call your pharmacy and they will forward the refill request to us. Please allow 3 business days for your refill to be completed.          Additional Information About Your Visit        [employer] Information     [employer] gives you secure access to your electronic health record. If you see a primary care provider, you can also send messages to your care team and make appointments. If you have questions, please call your primary care clinic.  If you do not have a primary care provider, please call [phone] and they will assist you.        Care EveryWhere ID     This is your Care EveryWhere ID. This could be used by other organizations to access your [facility] medical records  [id number]        Your Vitals Were     Pulse Temperature Respirations Height Pulse Oximetry BMI (Body Mass Index)    104 98.2  F (36.8  C) (Tympanic) 18 4' 11\" (1.499 m) 92% 25.45 kg/m2 "       Blood Pressure from Last 3 Encounters:   06/18/18 100/60   05/30/18 99/56   05/14/18 (!) 84/58    Weight from Last 3 Encounters:   06/18/18 126 lb (57.2 kg)   05/30/18 126 lb (57.2 kg)   05/24/18 128 lb (58.1 kg)              We Performed the Following     DEPRESSION ACTION PLAN (DAP)          Today's Medication Changes          These changes are accurate as of 6/18/18  2:32 PM.  If you have any questions, ask your nurse or doctor.               Start taking these medicines.        Dose/Directions    cephALEXin 500 MG capsule   Commonly known as:  KEFLEX   Used for:  Skin of right earlobe with infection   Started by:  Mj Esparza MD        Dose:  500 mg   Take 1 capsule (500 mg) by mouth 4 times daily   Quantity:  40 capsule   Refills:  0            Where to get your medicines      These medications were sent to [id number] [address] [facility]  [address] [place] [address]    Hours:  Closed 10-16-08 business to [facility] Phone:  [phone]     cephALEXin 500 MG capsule                Primary Care Provider Office Phone # Fax #    Zina Pruett PA-C [phone] [address] [facility] [address]        Equal Access to Services     SHANE ATKINSON AH: Nicola tellezo Soomaali, waaxda luqadaha, qaybta kaalmada adeegyada, salas cunha haychandan cifuentes. So [facility] [phone].    ATENCIÓN: Si habla español, tiene a acosta disposición servicios gratuitos de asistencia lingüística. Llame al [phone].    We comply with applicable federal civil rights laws and [place] laws. We do not discriminate on the basis of race, color, national origin, age, disability, sex, sexual orientation, or gender identity.            Thank you!     Thank you for choosing [facility]  for your care. Our goal is always to provide you with excellent care. Hearing back from our patients is one way we can continue to improve our  services. Please take a few minutes to complete the written survey that you may receive in the mail after your visit with us. Thank you!             Your Updated Medication List - Protect others around you: Learn how to safely use, store and throw away your medicines at www.disposemymeds.org.          This list is accurate as of 6/18/18  2:32 PM.  Always use your most recent med list.                   Brand Name Dispense Instructions for use Diagnosis    ammonium lactate 12 % cream    AMLACTIN    385 g    Apply topically 2 times daily    Other acute sinusitis, recurrence not specified, Chronic obstructive pulmonary disease, unspecified COPD type (H), Abnormal findings on diagnostic imaging of other specified body structures, Vitamin D deficiency, Abnormal finding of blood chemistry, Dry skin       aspirin 81 MG tablet      Take 1 tablet by mouth daily.        azelastine 0.1 % spray    ASTELIN    1 Bottle    Spray 1 spray into both nostrils 2 times daily As needed.    Seasonal allergic rhinitis, unspecified chronicity, unspecified trigger       baclofen 10 MG tablet    LIORESAL     Take 10 mg by mouth daily 2 at bedtime        busPIRone 10 MG tablet    BUSPAR    90 tablet    Take 2-3 tablets (20-30 mg) by mouth At Bedtime    Anxiety       cephALEXin 500 MG capsule    KEFLEX    40 capsule    Take 1 capsule (500 mg) by mouth 4 times daily    Skin of right earlobe with infection       CLINDAMAX 1 % lotion   Generic drug:  clindamycin     60 mL    Apply topically 2 times daily        DULoxetine 60 MG EC capsule    CYMBALTA     Take 1 capsule by mouth daily.        DUONEB 0.5-2.5 (3) MG/3ML neb solution   Generic drug:  ipratropium - albuterol 0.5 mg/2.5 mg/3 mL      Take 1 ampule by nebulization every 4 hours as needed.        FLONASE 50 MCG/ACT spray   Generic drug:  fluticasone      Spray 1 spray into both nostrils daily.        nabumetone 500 MG tablet    RELAFEN    90 tablet    TAKE 1 TABLET (500 MG) BY MOUTH DAILY     Other acute sinusitis, recurrence not specified, Chronic obstructive pulmonary disease, unspecified COPD type (H), Abnormal findings on diagnostic imaging of other specified body structures, Vitamin D deficiency, Abnormal finding of blood chemistry, Dry skin       nystatin 716857 UNIT/GM Powd    NYSTOP    60 g    APPLY TOPICALLY 3 TIMES DAILY FOR 1-2 WEEKS UNTIL RASH CLEARED    Intertrigo       ondansetron 4 MG ODT tab    ZOFRAN-ODT    18 tablet    TAKE 1 TABLET (4 MG) BY MOUTH EVERY 8 HOURS AS NEEDED    Nausea       * order for DME     1 Device    BiPAP: IPAP 11 cm H2O EPAP 6 cm H2O Pt has her own BiPAP New CPAP supplies- try [place] mask if it comes in an extra small size.  Alternatively could try [place] with nasal prongs occluded. Lifetime need and heated humidity.    TITO (obstructive sleep apnea)       * order for DME      BiPAP: IPAP 12 cm H2O EPAP 7 cm H2O  Lifetime need and heated humidity.    TITO (obstructive sleep apnea)       * order for DME     1 Device    O2: During sleep 1.5 LPM via O2 Concentrator  Bled in through BiPAP    TITO (obstructive sleep apnea), Sleep related hypoventilation/hypoxemia in other disease       order for DME     4 Units    Compression stockings (Thigh High)- 2 pairs    Peripheral edema       OxyContin 80 MG 12 hr tablet   Generic drug:  oxyCODONE      Take  by mouth every 12 hours. Take at bedtime        potassium chloride 10 MEQ tablet    K-TAB,KLOR-CON    360 tablet    2 tablets twice daily    Low blood potassium       pramipexole 1 MG tablet    MIRAPEX     Take 1 mg by mouth At Bedtime        ranitidine 150 MG tablet    ZANTAC    60 tablet    TAKE ONE TABLET BY MOUTH TWICE DAILY    Nausea       SOMA 350 MG tablet   Generic drug:  carisoprodol      Take 1 tablet by mouth. Take at bedtime        SUMAtriptan 20 MG/ACT nasal spray    IMITREX    30 each    Give once, can repeat in 2 hour if not better    Episodic tension-type headache, not intractable       SYNTHROID 125 MCG  tablet   Generic drug:  levothyroxine     90 tablet    Take 1 tablet (125 mcg) by mouth daily    Hypothyroidism, unspecified type       tiotropium 2.5 MCG/ACT inhalation aerosol    SPIRIVA RESPIMAT    4 g    Inhale 2 puffs into the lungs daily    Chronic obstructive pulmonary disease, unspecified COPD type (H)       TOPAMAX 100 MG tablet   Generic drug:  topiramate      Take  by mouth 2 times daily. Take 1.5 pill in mornings Take 1.5 at bedtime        traZODone HCl 150 MG 24 hr tablet    OLEPTRO     Take 150 mg by mouth At Bedtime        VICODIN ES PO      Take 7.5 mg by mouth as needed        vitamin D3 2000 units Caps     90 capsule    Take 1 capsule by mouth daily    Chronic fatigue       warfarin 5 MG tablet    COUMADIN    185 tablet    Take 7.5 mg MWF and 10 mg the rest of the week or as directed by Anticoagulation clinic.    Embolism (H), Lupus anticoagulant inhibitor syndrome (H)       * Notice:  This list has 3 medication(s) that are the same as other medications prescribed for you. Read the directions carefully, and ask your doctor or other care provider to review them with you.

## 2018-07-02 NOTE — MR AVS SNAPSHOT
Tere GREWAL Yonykatemajo   7/2/2018 1:30 PM   Anticoagulation Therapy Visit    Description:  63 year old female   Provider:  NB ANTI COAG   Department:  [facility] Anticoag           INR as of 7/2/2018     Today's INR 2.3      Anticoagulation Summary as of 7/2/2018     INR goal 2.0-3.0   Today's INR 2.3   Full warfarin instructions 7.5 mg on Mon, Wed, Fri; 10 mg all other days   Next INR check 7/30/2018    Indications   Lupus anticoagulant inhibitor syndrome (H) [D68.62]  Pulmonary embolism with infarction (H) [I26.99]  Long-term (current) use of anticoagulants [Z79.01] [Z79.01]  Embolism - blood clot [I74.9]         Your next Anticoagulation Clinic appointment(s)     Jul 30, 2018  1:30 PM CDT   Anticoagulation Visit with NB ANTI COAG   [facility] ([facility]    [address] [place] [address] [phone]              Contact Numbers     Please call [phone] with any problems or questions regarding your therapy.    If you need to cancel and/or reschedule your appointment please call one of the following numbers:  [facility] - [phone]  [facility] [phone]  [place] - [phone]  [facility] [phone]  [place] - [phone]            July 2018 Details    Sun Mon Tue Wed Thu Fri Sat     1               2      7.5 mg   See details      3      10 mg         4      7.5 mg         5      10 mg         6      7.5 mg         7      10 mg           8      10 mg         9      7.5 mg         10      10 mg         11      7.5 mg         12      10 mg         13      7.5 mg         14      10 mg           15      10 mg         16      7.5 mg         17      10 mg         18      7.5 mg         19      10 mg         20      7.5 mg         21      10 mg           22      10 mg         23      7.5 mg         24      10 mg         25      7.5 mg         26      10 mg         27      7.5 mg         28      10 mg           29      10 mg         30            31                     Date Details   07/02 This INR check       Date of next INR:  7/30/2018         How to take your warfarin dose     To take:  7.5 mg Take 1.5 of the 5 mg tablets.    To take:  10 mg Take 2 of the 5 mg tablets.

## 2018-07-02 NOTE — PROGRESS NOTES
ANTICOAGULATION FOLLOW-UP CLINIC VISIT    Patient Name:  Tere Ortiz  Date:  7/2/2018  Contact Type:  Face to Face    SUBJECTIVE:     Patient Findings     Positives No Problem Findings    Comments Patient denies any changes. Patient will continue weekly maintenance dose. INR is therapeutic.              OBJECTIVE    INR Protime   Date Value Ref Range Status   07/02/2018 2.3 (A) 0.86 - 1.14 Final       ASSESSMENT / PLAN  INR assessment THER    Recheck INR In: 4 WEEKS    INR Location Clinic      Anticoagulation Summary as of 7/2/2018     INR goal 2.0-3.0   Today's INR 2.3   Warfarin maintenance plan 7.5 mg (5 mg x 1.5) on Mon, Wed, Fri; 10 mg (5 mg x 2) all other days   Full warfarin instructions 7.5 mg on Mon, Wed, Fri; 10 mg all other days   Weekly warfarin total 62.5 mg   No change documented Justyn Polanco RN   Plan last modified Sharee Braxton RN (5/24/2018)   Next INR check 7/30/2018   Priority INR   Target end date Indefinite    Indications   Lupus anticoagulant inhibitor syndrome (H) [D68.62]  Pulmonary embolism with infarction (H) [I26.99]  Long-term (current) use of anticoagulants [Z79.01] [Z79.01]  Embolism - blood clot [I74.9]         Anticoagulation Episode Summary     INR check location     Preferred lab     Send INR reminders to [facility] [place]    Comments * comes in electric WC. warfarin 5mg tablets. takes warfarin in the AM. Drinks high protein Ensure daily      Anticoagulation Care Providers     Provider Role Specialty Phone number    Zina Pruett PA-C Responsible Physician Assistant [phone]            See the Encounter Report to view Anticoagulation Flowsheet and Dosing Calendar (Go to Encounters tab in chart review, and find the Anticoagulation Therapy Visit)        Justyn Polanco, RN

## 2018-07-09 NOTE — TELEPHONE ENCOUNTER
"Requested Prescriptions   Pending Prescriptions Disp Refills     ondansetron (ZOFRAN-ODT) 4 MG ODT tab [Pharmacy Med Name: ONDANSETRON ODT 4MG TAB SAND] 18 tablet 0     Sig: DISSOLVE ONE TABLET IN MOUTH EVERY EIGHT HOURS AS NEEDED     Antivertigo/Antiemetic Agents Passed    7/8/2018  5:55 PM       Passed - Recent (12 mo) or future (30 days) visit within the authorizing provider's specialty    Patient had office visit in the last 12 months or has a visit in the next 30 days with authorizing provider or within the authorizing provider's specialty.  See \"Patient Info\" tab in inbasket, or \"Choose Columns\" in Meds & Orders section of the refill encounter.           Passed - Patient is 18 years of age or older        Last Written Prescription Date:  12/21/17  Last Fill Quantity: 18,  # refills: 0   Last office visit: 6/18/2018 with prescribing provider:     Future Office Visit:      "

## 2018-07-19 NOTE — TELEPHONE ENCOUNTER
SUMAtriptan (IMITREX) 20 MG/ACT    Last Written Prescription Date:  4/7/17  Last Fill Quantity: 30,   # refills: 1  Last Office Visit : 4/13/18  Future Office visit:  10/12/18    Routing refill request to provider for review/approval because:  Drug not on the refill protocol

## 2018-07-24 NOTE — TELEPHONE ENCOUNTER
"Requested Prescriptions   Pending Prescriptions Disp Refills     ondansetron (ZOFRAN-ODT) 4 MG ODT tab [Pharmacy Med Name: ONDANSETRON ODT 4MG TAB SAND] 18 tablet 0     Sig: DISSOLVE ONE TABLET IN MOUTH EVERY EIGHT HOURS AS NEEDED     Antivertigo/Antiemetic Agents Passed    7/24/2018 10:56 AM       Passed - Recent (12 mo) or future (30 days) visit within the authorizing provider's specialty    Patient had office visit in the last 12 months or has a visit in the next 30 days with authorizing provider or within the authorizing provider's specialty.  See \"Patient Info\" tab in inbasket, or \"Choose Columns\" in Meds & Orders section of the refill encounter.           Passed - Patient is 18 years of age or older        Last Written Prescription Date:  7/9/18  Last Fill Quantity: 18,  # refills: 0   Last office visit: 6/18/2018 with prescribing provider:     Future Office Visit:      "

## 2018-08-06 NOTE — MR AVS SNAPSHOT
Tere GREWAL Diana   8/6/2018 2:00 PM   Anticoagulation Therapy Visit    Description:  63 year old female   Provider:  NB ANTI COAG   Department:  [facility] Antico[facility]           INR as of 8/6/2018     Today's INR 2.4      Anticoagulation Summary as of 8/6/2018     INR goal 2.0-3.0   Today's INR 2.4   Full warfarin instructions 7.5 mg on Mon, Wed, Fri; 10 mg all other days   Next INR check 8/20/2018    Indications   Lupus anticoagulant inhibitor syndrome (H) [D68.62]  Pulmonary embolism with infarction (H) [I26.99]  Long-term (current) use of anticoagulants [Z79.01] [Z79.01]  Embolism - blood clot [I74.9]         Your next Anticoagulation Clinic appointment(s)     Aug 20, 2018  2:00 PM CDT   Anticoagulation Visit with NB ANTI COAG   [facility] ([facility])    [address] [place] [address] [phone]              Contact Numbers     Please call [phone] with any problems or questions regarding your therapy.    If you need to cancel and/or reschedule your appointment please call one of the following numbers:  [facility] - [phone]  [facility] [phone]  [place] - [phone]  [facility] [phone]  [place] - [phone]            August 2018 Details    Sun Mon Tue Wed Thu Fri Sat        1               2               3               4                 5               6      7.5 mg   See details      7      10 mg         8      7.5 mg         9      10 mg         10      7.5 mg         11      10 mg           12      10 mg         13      7.5 mg         14      10 mg         15      7.5 mg         16      10 mg         17      7.5 mg         18      10 mg           19      10 mg         20            21               22               23               24               25                 26               27               28               29               30               31                 Date Details   08/06 This INR check       Date of next INR:   8/20/2018         How to take your warfarin dose     To take:  7.5 mg Take 1.5 of the 5 mg tablets.    To take:  10 mg Take 2 of the 5 mg tablets.

## 2018-08-06 NOTE — PROGRESS NOTES
ANTICOAGULATION FOLLOW-UP CLINIC VISIT    Patient Name:  Tere Ortiz  Date:  8/6/2018  Contact Type:  Face to Face    SUBJECTIVE:     Patient Findings     Positives No Problem Findings    Comments Patient supposedly missed a dose when her last INR was sub-therapeutic. She was given an increased dose last Monday. Today her INR is therapeutic so patient will continue on maintenance dose. Recheck INR in 2 weeks.            OBJECTIVE    INR Protime   Date Value Ref Range Status   08/06/2018 2.4 (A) 0.86 - 1.14 Final       ASSESSMENT / PLAN  INR assessment THER    Recheck INR In: 2 WEEKS    INR Location Clinic      Anticoagulation Summary as of 8/6/2018     INR goal 2.0-3.0   Today's INR 2.4   Warfarin maintenance plan 7.5 mg (5 mg x 1.5) on Mon, Wed, Fri; 10 mg (5 mg x 2) all other days   Full warfarin instructions 7.5 mg on Mon, Wed, Fri; 10 mg all other days   Weekly warfarin total 62.5 mg   No change documented Justyn Polanco RN   Plan last modified Sharee Braxton RN (5/24/2018)   Next INR check 8/20/2018   Priority INR   Target end date Indefinite    Indications   Lupus anticoagulant inhibitor syndrome (H) [D68.62]  Pulmonary embolism with infarction (H) [I26.99]  Long-term (current) use of anticoagulants [Z79.01] [Z79.01]  Embolism - blood clot [I74.9]         Anticoagulation Episode Summary     INR check location     Preferred lab     Send INR reminders to [facility] [place]    Comments * comes in electric WC. warfarin 5mg tablets. takes warfarin in the AM. Drinks high protein Ensure daily      Anticoagulation Care Providers     Provider Role Specialty Phone number    Zina Pruett PA-C Responsible Physician Assistant [phone]            See the Encounter Report to view Anticoagulation Flowsheet and Dosing Calendar (Go to Encounters tab in chart review, and find the Anticoagulation Therapy Visit)        Justyn Polanco, RN

## 2018-08-20 NOTE — PROGRESS NOTES
ANTICOAGULATION FOLLOW-UP CLINIC VISIT    Patient Name:  Tere Ortiz  Date:  8/20/2018  Contact Type:  Face to Face    SUBJECTIVE:     Patient Findings     Positives Missed doses    Comments Patient missed a dose late last week. No other concerns noted. Patient will continue weekly maintenance dose. INR is therapeutic. Patient would like to look into a home meter. Writer will fill out the proper paperwork and process forward.             OBJECTIVE    INR Protime   Date Value Ref Range Status   08/20/2018 1.9 (A) 0.86 - 1.14 Final       ASSESSMENT / PLAN  INR assessment THER +/-1   Recheck INR In: 3 WEEKS    INR Location Clinic      Anticoagulation Summary as of 8/20/2018     INR goal 2.0-3.0   Today's INR 1.9!   Warfarin maintenance plan 7.5 mg (5 mg x 1.5) on Mon, Wed, Fri; 10 mg (5 mg x 2) all other days   Full warfarin instructions 7.5 mg on Mon, Wed, Fri; 10 mg all other days   Weekly warfarin total 62.5 mg   Plan last modified Sharee Braxton RN (5/24/2018)   Next INR check 9/10/2018   Priority INR   Target end date Indefinite    Indications   Lupus anticoagulant inhibitor syndrome (H) [D68.62]  Pulmonary embolism with infarction (H) [I26.99]  Long-term (current) use of anticoagulants [Z79.01] [Z79.01]  Embolism - blood clot [I74.9]         Anticoagulation Episode Summary     INR check location     Preferred lab     Send INR reminders to [facility] [place]    Comments * comes in electric WC. warfarin 5mg tablets. takes warfarin in the AM. Drinks high protein Ensure daily      Anticoagulation Care Providers     Provider Role Specialty Phone number    Zina Pruett PA-C Responsible Physician Assistant [phone]            See the Encounter Report to view Anticoagulation Flowsheet and Dosing Calendar (Go to Encounters tab in chart review, and find the Anticoagulation Therapy Visit)        Justyn Polanco RN

## 2018-08-20 NOTE — MR AVS SNAPSHOT
Tere GREWAL Diana   8/20/2018 2:00 PM   Anticoagulation Therapy Visit    Description:  63 year old female   Provider:  NB ANTI COAG   Department:  [facility] Antico[facility]           INR as of 8/20/2018     Today's INR 1.9!      Anticoagulation Summary as of 8/20/2018     INR goal 2.0-3.0   Today's INR 1.9!   Full warfarin instructions 7.5 mg on Mon, Wed, Fri; 10 mg all other days   Next INR check 9/10/2018    Indications   Lupus anticoagulant inhibitor syndrome (H) [D68.62]  Pulmonary embolism with infarction (H) [I26.99]  Long-term (current) use of anticoagulants [Z79.01] [Z79.01]  Embolism - blood clot [I74.9]         Your next Anticoagulation Clinic appointment(s)     Sep 10, 2018  2:00 PM CDT   Anticoagulation Visit with NB ANTI COAG   [facility]    [address] [place] [address] [phone]              Contact Numbers     Please call [phone] with any problems or questions regarding your therapy.    If you need to cancel and/or reschedule your appointment please call one of the following numbers:  [facility] - [phone]  [place] - [phone]  [place] - [phone]  [facility] [phone]  [place] - [phone]            August 2018 Details    Sun Mon Tue Wed Thu Fri Sat        1               2               3               4                 5               6               7               8               9               10               11                 12               13               14               15               16               17               18                 19               20      7.5 mg   See details      21      10 mg         22      7.5 mg         23      10 mg         24      7.5 mg         25      10 mg           26      10 mg         27      7.5 mg         28      10 mg         29      7.5 mg         30      10 mg         31      7.5 mg           Date Details   08/20 This INR check               How to take  your warfarin dose     To take:  7.5 mg Take 1.5 of the 5 mg tablets.    To take:  10 mg Take 2 of the 5 mg tablets.           September 2018 Details    Sun Mon Tue Wed Thu Fri Sat           1      10 mg           2      10 mg         3      7.5 mg         4      10 mg         5      7.5 mg         6      10 mg         7      7.5 mg         8      10 mg           9      10 mg         10            11               12               13               14               15                 16               17               18               19               20               21               22                 23               24               25               26               27               28               29                 30                      Date Details   No additional details    Date of next INR:  9/10/2018         How to take your warfarin dose     To take:  7.5 mg Take 1.5 of the 5 mg tablets.    To take:  10 mg Take 2 of the 5 mg tablets.

## 2018-08-23 NOTE — TELEPHONE ENCOUNTER
Demographic page with insurance information faxed to [facility] as requested per Whitney at [facility]. Clinic care team to re-fax physician form with date re-written. According to Whitney the insurance company could not read the date.    Aki GOODMAN RN, CACP 8/23/2018 at 2:03 PM

## 2018-08-28 NOTE — TELEPHONE ENCOUNTER
Form signed faxed and sent to scanning.  [facility]  Clinic [facility] [place]

## 2018-09-12 NOTE — MR AVS SNAPSHOT
Tere rOtiz   9/12/2018   Anticoagulation Therapy Visit    Description:  63 year old female   Provider:  Talia John RN   Department:  [place] [facility]           INR as of 9/12/2018     Today's INR 3.0 (9/10/2018)      Anticoagulation Summary as of 9/12/2018     INR goal 2.0-3.0   Today's INR 3.0 (9/10/2018)   Full warfarin instructions 7.5 mg on Mon, Wed, Fri; 10 mg all other days   Next INR check 9/26/2018    Indications   Lupus anticoagulant inhibitor syndrome (H) [D68.62]  Pulmonary embolism with infarction (H) [I26.99]  Long-term (current) use of anticoagulants [Z79.01] [Z79.01]  Embolism - blood clot [I74.9]         September 2018 Details    Sun Mon Tue Wed Thu Fri Sat           1                 2               3               4               5               6               7               8                 9               10               11               12      7.5 mg   See details      13      10 mg         14      7.5 mg         15      10 mg           16      10 mg         17      7.5 mg         18      10 mg         19      7.5 mg         20      10 mg         21      7.5 mg         22      10 mg           23      10 mg         24      7.5 mg         25      10 mg         26            27               28               29                 30                      Date Details   09/12 This INR check       Date of next INR:  9/26/2018         How to take your warfarin dose     To take:  7.5 mg Take 1.5 of the 5 mg tablets.    To take:  10 mg Take 2 of the 5 mg tablets.

## 2018-09-12 NOTE — PROGRESS NOTES
ANTICOAGULATION FOLLOW-UP CLINIC VISIT    Patient Name:  Tere Ortiz  Date:  9/12/2018  Contact Type:  Telephone    SUBJECTIVE:     Patient Findings     Positives No Problem Findings    Comments No changes in medications, diet, or activity. No concerns with bleeding or bruising. Took warfarin as prescribed. Continue maintenance warfarin dose. Will recheck INR in 2 weeks. Patient verbalizes understanding and agrees with plan. No further questions at this time.                   OBJECTIVE    INR   Date Value Ref Range Status   09/10/2018 3.0  Corrected       ASSESSMENT / PLAN  INR assessment THER    Recheck INR In: 2 WEEKS    INR Location Home INR      Anticoagulation Summary as of 9/12/2018     INR goal 2.0-3.0   Today's INR 3.0 (9/10/2018)   Warfarin maintenance plan 7.5 mg (5 mg x 1.5) on Mon, Wed, Fri; 10 mg (5 mg x 2) all other days   Full warfarin instructions 7.5 mg on Mon, Wed, Fri; 10 mg all other days   Weekly warfarin total 62.5 mg   Plan last modified Sharee Braxton RN (5/24/2018)   Next INR check 9/26/2018   Priority INR   Target end date Indefinite    Indications   Lupus anticoagulant inhibitor syndrome (H) [D68.62]  Pulmonary embolism with infarction (H) [I26.99]  Long-term (current) use of anticoagulants [Z79.01] [Z79.01]  Embolism - blood clot [I74.9]         Anticoagulation Episode Summary     INR check location     Preferred lab     Send INR reminders to [facility]    Comments * comes in electric WC. warfarin 5mg tablets. takes warfarin in the AM. Drinks high protein Ensure daily      Anticoagulation Care Providers     Provider Role Specialty Phone number    Zina Pruett PA-C Responsible Physician Assistant [phone]            See the Encounter Report to view Anticoagulation Flowsheet and Dosing Calendar (Go to Encounters tab in chart review, and find the Anticoagulation Therapy Visit)        Talia John RN

## 2018-09-13 NOTE — MR AVS SNAPSHOT
Tere Ortiz   9/13/2018   Anticoagulation Therapy Visit    Description:  63 year old female   Provider:  Ara Kaplan, RN   Department:  [place] [facility]           INR as of 9/13/2018     Today's INR 3.0 (9/12/2018)      Anticoagulation Summary as of 9/13/2018     INR goal 2.0-3.0   Today's INR 3.0 (9/12/2018)   Full warfarin instructions 7.5 mg on Mon, Wed, Fri; 10 mg all other days   Next INR check 9/27/2018    Indications   Lupus anticoagulant inhibitor syndrome (H) [D68.62]  Pulmonary embolism with infarction (H) [I26.99]  Long-term (current) use of anticoagulants [Z79.01] [Z79.01]  Embolism - blood clot [I74.9]         September 2018 Details    Sun Mon Tue Wed Thu Fri Sat           1                 2               3               4               5               6               7               8                 9               10               11               12               13      10 mg   See details      14      7.5 mg         15      10 mg           16      10 mg         17      7.5 mg         18      10 mg         19      7.5 mg         20      10 mg         21      7.5 mg         22      10 mg           23      10 mg         24      7.5 mg         25      10 mg         26      7.5 mg         27            28               29                 30                      Date Details   09/13 This INR check       Date of next INR:  9/27/2018         How to take your warfarin dose     To take:  7.5 mg Take 1.5 of the 5 mg tablets.    To take:  10 mg Take 2 of the 5 mg tablets.

## 2018-09-13 NOTE — PROGRESS NOTES
ANTICOAGULATION FOLLOW-UP CLINIC VISIT    Patient Name:  Tere Ortiz  Date:  9/13/2018  Contact Type:  Telephone/ Tere    SUBJECTIVE:     Patient Findings     Positives No Problem Findings    Comments Patient denies missed warfarin doses changes to diet, activity or medications, states took warfarin as instructed. No signs or symptoms of increased bruising or bleeding reported  Patient advised to continue the same warfarin maintenance dose, INR therapeutic.   Patient verbalizes understanding and agrees to plan. No further questions or concerns.             OBJECTIVE    INR   Date Value Ref Range Status   09/12/2018 3.0  Final       ASSESSMENT / PLAN  No question data found.  Anticoagulation Summary as of 9/13/2018     INR goal 2.0-3.0   Today's INR 3.0 (9/12/2018)   Warfarin maintenance plan 7.5 mg (5 mg x 1.5) on Mon, Wed, Fri; 10 mg (5 mg x 2) all other days   Full warfarin instructions 7.5 mg on Mon, Wed, Fri; 10 mg all other days   Weekly warfarin total 62.5 mg   Plan last modified Sharee Braxton RN (5/24/2018)   Next INR check 9/27/2018   Priority INR   Target end date Indefinite    Indications   Lupus anticoagulant inhibitor syndrome (H) [D68.62]  Pulmonary embolism with infarction (H) [I26.99]  Long-term (current) use of anticoagulants [Z79.01] [Z79.01]  Embolism - blood clot [I74.9]         Anticoagulation Episode Summary     INR check location     Preferred lab     Send INR reminders to [facility] [place]    Comments * comes in electric WC. warfarin 5mg tablets. takes warfarin in the AM. Drinks high protein Ensure daily      Anticoagulation Care Providers     Provider Role Specialty Phone number    Zina Pruett PA-C Responsible Physician Assistant [phone]            See the Encounter Report to view Anticoagulation Flowsheet and Dosing Calendar (Go to Encounters tab in chart review, and find the Anticoagulation Therapy Visit)        Ara Kaplan RN

## 2018-09-21 NOTE — TELEPHONE ENCOUNTER
Prescriptions for K+ & ondansetron approved per [facility] Refill Protocol.    Cony NEWTON RN

## 2018-09-21 NOTE — TELEPHONE ENCOUNTER
"Requested Prescriptions   Pending Prescriptions Disp Refills     potassium chloride (K-TAB,KLOR-CON) 10 MEQ tablet [Pharmacy Med Name: POT CHLORIDE 10 MEQ TAB SAND] 120 tablet 0     Sig: TAKE TWO TABLETS BY MOUTH TWICE DAILY    Potassium Supplements Protocol Passed    9/20/2018  4:18 PM       Passed - Recent (12 mo) or future (30 days) visit within the authorizing provider's specialty    Patient had office visit in the last 12 months or has a visit in the next 30 days with authorizing provider or within the authorizing provider's specialty.  See \"Patient Info\" tab in inbasket, or \"Choose Columns\" in Meds & Orders section of the refill encounter.           Passed - Patient is age 18 or older       Passed - Normal serum potassium in past 12 months    Recent Labs   Lab Test  04/23/18   1327   POTASSIUM  3.5                    ondansetron (ZOFRAN-ODT) 4 MG ODT tab [Pharmacy Med Name: ONDANSETRON ODT 4MG TAB KACIE] 18 tablet 0     Sig: DISSOLVE ONE TABLET IN MOUTH EVERY EIGHT HOURS AS NEEDED     Antivertigo/Antiemetic Agents Passed    9/20/2018  4:18 PM       Passed - Recent (12 mo) or future (30 days) visit within the authorizing provider's specialty    Patient had office visit in the last 12 months or has a visit in the next 30 days with authorizing provider or within the authorizing provider's specialty.  See \"Patient Info\" tab in inbasket, or \"Choose Columns\" in Meds & Orders section of the refill encounter.           Passed - Patient is 18 years of age or older        potassium chloride (K-TAB,KLOR-CON) 10 MEQ tablet  Last Written Prescription Date:  04/06/2018  Last Fill Quantity: 360 tablet,  # refills: 0   Last office visit: 6/18/2018 with prescribing provider:  ANDREEA Esparza   Future Office Visit:      ondansetron (ZOFRAN-ODT) 4 MG ODT tab  Last Written Prescription Date:  07/24/2018  Last Fill Quantity: 18 tablet,  # refills: 0   Last office visit: 6/18/2018 with prescribing provider:  ANDREEA Esparza   Future Office Visit:  "     Eryn Davis RT (R) (M)

## 2018-09-26 NOTE — MR AVS SNAPSHOT
Tere Ortiz   9/26/2018   Anticoagulation Therapy Visit    Description:  63 year old female   Provider:  Ara Kaplan, RN   Department:  [facility]           INR as of 9/26/2018     Today's INR 2.2      Anticoagulation Summary as of 9/26/2018     INR goal 2.0-3.0   Today's INR 2.2   Full warfarin instructions 7.5 mg on Mon, Wed, Fri; 10 mg all other days   Next INR check 10/10/2018    Indications   Lupus anticoagulant inhibitor syndrome (H) [D68.62]  Pulmonary embolism with infarction (H) [I26.99]  Long-term (current) use of anticoagulants [Z79.01] [Z79.01]  Embolism - blood clot [I74.9]         September 2018 Details    Sun Mon Tue Wed Thu Fri Sat           1                 2               3               4               5               6               7               8                 9               10               11               12               13               14               15                 16               17               18               19               20               21               22                 23               24               25               26      7.5 mg   See details      27      10 mg         28      7.5 mg         29      10 mg           30      10 mg                Date Details   09/26 This INR check               How to take your warfarin dose     To take:  7.5 mg Take 1.5 of the 5 mg tablets.    To take:  10 mg Take 2 of the 5 mg tablets.           October 2018 Details    Sun Mon Tue Wed Thu Fri Sat      1      7.5 mg         2      10 mg         3      7.5 mg         4      10 mg         5      7.5 mg         6      10 mg           7      10 mg         8      7.5 mg         9      10 mg         10            11               12               13                 14               15               16               17               18               19               20                 21               22               23               24               25                26               27                 28               29               30               31                   Date Details   No additional details    Date of next INR:  10/10/2018         How to take your warfarin dose     To take:  7.5 mg Take 1.5 of the 5 mg tablets.    To take:  10 mg Take 2 of the 5 mg tablets.

## 2018-09-26 NOTE — PROGRESS NOTES
ANTICOAGULATION FOLLOW-UP CLINIC VISIT    Patient Name:  Tere Ortiz  Date:  9/26/2018  Contact Type:  Telephone/ Tere    SUBJECTIVE:     Patient Findings     Positives No Problem Findings    Comments Patient calls with INR result from home meter. She denies any changes. Patient advised to continue the same warfarin maintenance dose, INR therapeutic. Patient verbalizes understanding and agrees to plan. No further questions or concerns.               OBJECTIVE    INR   Date Value Ref Range Status   09/26/2018 2.2  Final       ASSESSMENT / PLAN  No question data found.  Anticoagulation Summary as of 9/26/2018     INR goal 2.0-3.0   Today's INR 2.2   Warfarin maintenance plan 7.5 mg (5 mg x 1.5) on Mon, Wed, Fri; 10 mg (5 mg x 2) all other days   Full warfarin instructions 7.5 mg on Mon, Wed, Fri; 10 mg all other days   Weekly warfarin total 62.5 mg   Plan last modified Sharee Braxton RN (5/24/2018)   Next INR check 10/10/2018   Priority INR   Target end date Indefinite    Indications   Lupus anticoagulant inhibitor syndrome (H) [D68.62]  Pulmonary embolism with infarction (H) [I26.99]  Long-term (current) use of anticoagulants [Z79.01] [Z79.01]  Embolism - blood clot [I74.9]         Anticoagulation Episode Summary     INR check location     Preferred lab     Send INR reminders to [facility] [place]    Comments * comes in electric WC. warfarin 5mg tablets. takes warfarin in the AM. Drinks high protein Ensure daily      Anticoagulation Care Providers     Provider Role Specialty Phone number    Zina Pruett PA-C Responsible Physician Assistant [phone]            See the Encounter Report to view Anticoagulation Flowsheet and Dosing Calendar (Go to Encounters tab in chart review, and find the Anticoagulation Therapy Visit)        Ara Kaplan RN

## 2018-09-27 NOTE — PROGRESS NOTES
Writer received Marla INR result at [facility] today. Marla was called and spoke with Whitney, our rep. She changed the phone and fax number to [place].    Sharee Braxton RN, BSN, PHN  9-27-18

## 2018-10-11 NOTE — MR AVS SNAPSHOT
Tere Ortiz   10/11/2018   Anticoagulation Therapy Visit    Description:  63 year old female   Provider:  Kenyetta Cooper, RN   Department:  [place] [facility]           INR as of 10/11/2018     Today's INR 2.2      Anticoagulation Summary as of 10/11/2018     INR goal 2.0-3.0   Today's INR 2.2   Full warfarin instructions 7.5 mg on Mon, Wed, Fri; 10 mg all other days   Next INR check 10/25/2018    Indications   Lupus anticoagulant inhibitor syndrome (H) [D68.62]  Pulmonary embolism with infarction (H) [I26.99]  Long-term (current) use of anticoagulants [Z79.01] [Z79.01]  Embolism - blood clot [I74.9]         October 2018 Details    Sun Mon Tue Wed Thu Fri Sat      1               2               3               4               5               6                 7               8               9               10               11      10 mg   See details      12      7.5 mg         13      10 mg           14      10 mg         15      7.5 mg         16      10 mg         17      7.5 mg         18      10 mg         19      7.5 mg         20      10 mg           21      10 mg         22      7.5 mg         23      10 mg         24      7.5 mg         25            26               27                 28               29               30               31                   Date Details   10/11 This INR check       Date of next INR:  10/25/2018         How to take your warfarin dose     To take:  7.5 mg Take 1.5 of the 5 mg tablets.    To take:  10 mg Take 2 of the 5 mg tablets.

## 2018-10-11 NOTE — Clinical Note
Please review and note in chart. Every 4th home INR is to be reviewed by PCP for billing purposes. Thanks. Kenyetta Cooper RN

## 2018-10-11 NOTE — PROGRESS NOTES
ANTICOAGULATION FOLLOW-UP CLINIC VISIT    Patient Name:  Tere Ortiz  Date:  10/11/2018  Contact Type:  Telephone/ Spoke w/ pt and fax received from Marla    SUBJECTIVE:     Patient Findings     Positives No Problem Findings    Comments No changes in medications, activity, or diet noted. No bleeding or increased bruising noted. Took warfarin as prescribed.  Spoke w/ pt and she agreed to call in the future with any concerns/questions. She agreed to continue maintenance dose when in goal range and if she does not have any changes or concerns.  Patient is to continue maintenance warfarin plan, and check INR in 2 weeks.  Patient verbalizes understanding and agrees to plan. No further questions or concerns.           OBJECTIVE    INR   Date Value Ref Range Status   10/11/2018 2.2  Final       ASSESSMENT / PLAN  INR assessment THER    Recheck INR In: 2 WEEKS    INR Location Home INR Marla   Billed home INR Yes      Anticoagulation Summary as of 10/11/2018     INR goal 2.0-3.0   Today's INR 2.2   Warfarin maintenance plan 7.5 mg (5 mg x 1.5) on Mon, Wed, Fri; 10 mg (5 mg x 2) all other days   Full warfarin instructions 7.5 mg on Mon, Wed, Fri; 10 mg all other days   Weekly warfarin total 62.5 mg   No change documented Kenyetta Cooper, RN   Plan last modified Sharee Braxton, RN (5/24/2018)   Next INR check 10/25/2018   Priority INR   Target end date Indefinite    Indications   Lupus anticoagulant inhibitor syndrome (H) [D68.62]  Pulmonary embolism with infarction (H) [I26.99]  Long-term (current) use of anticoagulants [Z79.01] [Z79.01]  Embolism - blood clot [I74.9]         Anticoagulation Episode Summary     INR check location     Preferred lab     Send INR reminders to [place] [facility]    Comments * comes in [email or web address]. Marla Home monitor - may check q 2 weeks. Ok to continue same dose when in range and pt will call with concerns. takes in the AM. Drinks high protein Ensure daily.      Anticoagulation Care  Providers     Provider Role Specialty Phone number    Zina Pruett PA-C Responsible Physician Assistant [phone]            See the Encounter Report to view Anticoagulation Flowsheet and Dosing Calendar (Go to Encounters tab in chart review, and find the Anticoagulation Therapy Visit)    Dosage adjustment made based on physician directed care plan.    Kenyetta Cooper RN

## 2018-10-16 NOTE — TELEPHONE ENCOUNTER
"Requested Prescriptions   Pending Prescriptions Disp Refills     baclofen (LIORESAL) 10 MG tablet 90 tablet      Sig: Take 1 tablet (10 mg) by mouth daily 2 at bedtime    There is no refill protocol information for this order        busPIRone (BUSPAR) 10 MG tablet 90 tablet 1     Sig: Take 2-3 tablets (20-30 mg) by mouth At Bedtime    Atypical Antidepressants Protocol Passed    10/16/2018  1:32 PM       Passed - Recent (12 mo) or future (30 days) visit within the authorizing provider's specialty    Patient had office visit in the last 12 months or has a visit in the next 30 days with authorizing provider or within the authorizing provider's specialty.  See \"Patient Info\" tab in inbasket, or \"Choose Columns\" in Meds & Orders section of the refill encounter.           Passed - Patient is age 18 or older       Passed - No active pregnancy on record       Passed - No positive pregnancy test in past 12 mos        Last Written Prescription Date:  2014  Last Fill Quantity: unk,  # refills: unk   Last office visit: 6/18/2018 with prescribing provider:  Zina LOWERY   Future Office Visit:    Requested Prescriptions   Pending Prescriptions Disp Refills     baclofen (LIORESAL) 10 MG tablet 90 tablet      Sig: Take 1 tablet (10 mg) by mouth daily 2 at bedtime    There is no refill protocol information for this order        busPIRone (BUSPAR) 10 MG tablet 90 tablet 1     Sig: Take 2-3 tablets (20-30 mg) by mouth At Bedtime    Atypical Antidepressants Protocol Passed    10/16/2018  1:32 PM       Passed - Recent (12 mo) or future (30 days) visit within the authorizing provider's specialty    Patient had office visit in the last 12 months or has a visit in the next 30 days with authorizing provider or within the authorizing provider's specialty.  See \"Patient Info\" tab in inbasket, or \"Choose Columns\" in Meds & Orders section of the refill encounter.           Passed - Patient is age 18 or older       Passed - No active pregnancy on " record       Passed - No positive pregnancy test in past 12 mos        Buspirone  Last Written Prescription Date:  10/11/2017  Last Fill Quantity: 90,  # refills: 1   Last office visit: 6/18/2018 with prescribing provider:  Zina Kline Office Visit:

## 2018-10-19 NOTE — TELEPHONE ENCOUNTER
Requested Prescriptions   Pending Prescriptions Disp Refills     baclofen (LIORESAL) 10 MG tablet 90 tablet      Sig: Take 1 tablet (10 mg) by mouth daily 2 at bedtime    There is no refill protocol information for this order        baclofen (LIORESAL) 10 MG tablet  Last Written Prescription Date:  02/26/2014  Last Fill Quantity: ?,  # refills: ?   Last office visit: 6/18/2018 with prescribing provider:  ANDREEA Esparza   Future Office Visit:      Eryn HODGES (SUSAN) (M)

## 2018-10-25 PROBLEM — J44.1 COPD EXACERBATION (H): Status: ACTIVE | Noted: 2018-01-01

## 2018-10-25 PROBLEM — J96.02 ACUTE HYPERCAPNIC RESPIRATORY FAILURE (H): Status: ACTIVE | Noted: 2018-01-01

## 2018-10-25 NOTE — PROGRESS NOTES
and grandaughter here, updated on plan of care. Family concerned about pt taking her medications, states that overtaking her narcotics has resulted in hospitalization before and are concerned that this was the case again today. Stated that after this happened before pt stopped smoking and that someone used to have to be in control of her medications but that they gradually allowed her to have her medications back and she started smoking again,  asked how they could prevent this from happening again, suggested a family conference with provider once pt is able to participate in the conversation to discuss plans moving forward. Family also aware that C02 level was critical upon admission and that this played a role in her altered mental status. Pt continues to sleep and appears comfortable. MD updated on pt hypotension but aware that MAP has been >65. Call light within reach. Alarms activated and audible.    Problem: OXYGENATION/RESPIRATORY FUNCTION  Goal: Patient will maintain patent airway  Outcome: Ongoing      Problem: MECHANICAL VENTILATION  Goal: Oral health is maintained or improved  Outcome: Ongoing    Goal: ET tube will be managed safely  Outcome: Ongoing

## 2018-10-25 NOTE — ED NOTES
Pt very fidgety and rambling occasionally speaks clearly. Difficulty to keep O2 on. VBG drawn essentially on RA. Lungs coarse and crackles. RT called.

## 2018-10-25 NOTE — ED NOTES
Pt to CT on monitor with nurse. OK for pt to go on oxymask for transport. Off BIBAP for CT run. Pt tolerated with O2 sats 90%, pt did look a little dusky, pt breathes very shallow. Now back on BIPAP in room. Last BP 86/54.

## 2018-10-25 NOTE — LETTER
Transition Communication Hand-off for Care Transitions to Next Level of Care Provider    Name: Tere Ortiz  : 1955  MRN #: [id number]  Primary Care Provider: Zina Pruett  Primary Care MD Name: Flynn  Primary Clinic: [address] [facility] [address]  Primary Care Clinic Name: [facility]  Reason for Hospitalization:  COPD exacerbation (H) [J44.1]  Acute respiratory failure with hypercapnia (H) [J96.02]  Acute bronchitis, unspecified organism [J20.9]  Sepsis, due to unspecified organism (H) [A41.9]  Admit Date/Time: 10/25/2018  7:57 AM  Discharge Date: 10/31/18  Payor Source: Payor: MEDICARE / Plan: MEDICARE / Product Type: Medicare /     Readmission Assessment Measure (SKY) Risk Score/category: low      Reason for Communication Hand-off Referral: Fragility    Discharge Plan: Patient is discharging home with [facility] ([phone] Fax: [phone]).        Concern for non-adherence with plan of care:   Y/N no  Discharge Needs Assessment:  Needs       Most Recent Value    Equipment Currently Used at Home oxygen, cane, straight    Transportation Available family or friend will provide          Follow-up specialty is recommended: No    Follow-up plan:  Future Appointments  Date Time Provider Department Center   2018 3:00 PM Zina Pruett PA-C [facility]   2018 2:30 PM Karol Simmons MD [facility]       Any outstanding tests or procedures:    Procedures     Future Labs/Procedures    Oxygen Adult     Comments:    Renew Home Oxygen Order  Renew previous prescription.  Expected treatment length is indefinite (99 months).    Attending Provider: Casper Carter  Physician signature: See electronic signature associated with these discharge orders  Date of Order: 2018                Key Recommendations:  Patient will be returning home today with [facility] ([phone] Fax: [phone]). She will be receiving RN, SW, OT, and PT.  Patient lives with her  in the community.  Her long term goal is to get her strength back.     Nanette Reyes  Social Work [facility] [phone]  [facility] [phone]      AVS/Discharge Summary is the source of truth; this is a helpful guide for improved communication of patient story

## 2018-10-25 NOTE — PROGRESS NOTES
Patient placed on BIPAP 12/5 and 40% at 0825 for hypoventilation. Increased to 14/5 @ 0850.  Patient dose have a home BIPAP which she may not have used last night and those setting are 12/7 with 1.5 02 bled in fore sleep. She is still smoking a pack a day. Continue to monitor.

## 2018-10-25 NOTE — IP AVS SNAPSHOT
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 mouth 2 times daily. Take 1.5 pill in mornings Take 1.5 at bedtime   Last time this was given:  100 mg on 10/31/2018  7:36 AM   Generic drug:  topiramate                                      traZODone HCl 150 MG 24 hr tablet   Commonly known as:  OLEPTRO   Take 150 mg by mouth At Bedtime                                   vitamin D3 2000 units Caps   Take 1 capsule by mouth daily                                   warfarin 5 MG tablet   Commonly known as:  COUMADIN   TAKE 2 & 1/2 TABS BY MOUTH ON MONDAY, AND 2 TABS THE REST OF THE WEEK.AS DIRECTED ANTICOAGULATION CLINIC.   Last time this was given:  5 mg on 10/29/2018  5:47 PM   Notes to Patient:  HOLD dose Wednesday resume 7.5 mg on Thurs 11/1 recheck INR Friday and await further dosing.                                   * Notice:  This list has 3 medication(s) that are the same as other medications prescribed for you. Read the directions carefully, and ask your doctor or other care provider to review them with you.

## 2018-10-25 NOTE — H&P
Admitted:     10/25/2018      CHIEF COMPLAINT:  Found slumped over and drooling.      HISTORY OF PRESENT ILLNESS:  A patient with a complex past medical history (see below) was found by her  this morning in her recliner with her head slumped forward and drooling.  She was unresponsive, breathing was somewhat shallow but rapid, was warm at the time.  Ambulance was called, and at the scene they give Narcan with some immediate improvement for a short time; she seemed to be more able to interact, open her eyes, say a few words.  By the time she got to the Emergency Department, she was back to responsive enough to open her eyes to command, say a few words, but O2 sats were poor and she needed oxygen, and then a VBG showed a significant acute hypercapnia and she was placed on BiPAP.  There was some initial improvement in the VBGs an hour after the BiPAP was placed.  She was still, however, somewhat somnolent.      She is on home Trilogy type of BiPAP; I am not sure the settings.  This is for COPD.      She uses chronic opiates for chronic pain and is currently on 80 mg t.i.d. of OxyContin plus Vicodin up to 3 tablets a day, 7.5 mg hydrocodone p.r.n.      PAST MEDICAL HISTORY:  Was gleaned mostly from the chart.  The patient can give no history, and the  does not seem to know as much.     1.  TITO.   2.  Chronic macrocytosis.   3.  Chronic respiratory failure on Trilogy BiPAP.   4.  Dysphagia.   5.  Chronic migraines.   6.  Mild major depression.   7.  Fibromyalgia syndrome.   8.  History of PEs on chronic warfarin.   9.  Chronic narcotic dependence.   10.  COPD.   11.  Tobacco use disorder, still smoking 1 pack per day, on home oxygen.   12.  Restless leg syndrome.   13.  SLE.   14.  Mild cognitive impairment.   15.  Vitamin D deficiency.   16.  Chronic fatigue syndrome.   17.  Irritable bowel syndrome.   18.  Chronic low back pain, SI joint dysfunction.   19.  GERD.      PAST SURGICAL HISTORY:  Hysterectomy,  carpal tunnel surgery, tonsillectomy, appendectomy, cholecystectomy.      MEDICATIONS:  Reviewed.      SOCIAL HISTORY:  She lives with her .  She is quite disabled, does not do much, tends to go from her recliner to her bed.  She manages all of her own medications.  She continues to smoke about a pack per day.  They deny any alcohol at this time.      FAMILY HISTORY:  Reviewed, but is noncontributory at this time.      REVIEW OF SYSTEMS:  Really not obtainable from the patient as she is obtunded.  The  states she went to bed in her normal state of health.  She has been having more headaches lately.  Per granddaughter, she has had increased cough lately, possibly some increased shortness of breath.  Her appetite has been down, she has lost some weight.  She had an episode similar to this 2017, about a year and a half ago for which she was hospitalized for 5 days after taking too much of her narcotic.  She has not reduced her dose since that time.  The family was hoping that that would occur.      PHYSICAL EXAMINATION:   GENERAL:  She is obtunded, but opens her eyes, will follow some commands, squeezes my fingers, wiggle her feet, tends to clench her eyes closed when I try to look at her pupils.  She is on BiPAP with O2 sats in the 90s.     VITAL SIGNS:  Blood pressures tending to run 85-90 with a low of 77/55 but MAPS have generally been in the 64-78 range.   HEENT:  Eyes shows pupils are equal and reactive, somewhat dilated at this time.  EOMs appear to be intact.  Though she does not follow commands well, I can get her to move her eyes by moving her head.  TMs normal.  Nasal mucosa normal.  Throat is normal.   NECK:  Supple, without masses, nodes or thyromegaly.   CHEST:  Reveals prolonged expiratory phase, a few scattered wheezes heard, but reasonable air flow on BiPAP.   CARDIOVASCULAR:  Regular rate without murmur that I can hear.  Pulses are brisk and equal.  There is no edema.   ABDOMEN:  Soft.   She seems to wince some when I press deeply into her mid abdomen.   EXTREMITIES:  Grossly normal, thin, somewhat cachectic appearing.   NEUROLOGIC:  She withdraws to pain all extremities equally.  She will squeeze my fingers when asked.      LABORATORY STUDIES:  White count is 12.1,  with hemoglobin of 15.1.  UA is negative.  Chemistries:  Creatinine is 1.37, BUN 19.  This is up from her baseline of 0.65 creatinine.      VBGs:  Initially pH is 7.06, pCO2 of 102, bicarb 29.  On recheck an hour later is 7.11 with a pCO2 of 88, and now 6 hours later, pH is 7.12, pCO2 of 90, bicarb 29.      DIAGNOSTIC DATA:  CT scan chest showed bibasilar prominent new ground-glass nodules and bronchial wall thickening with areas of mucus plugging suspicious for bronchiolitis/infection, most conspicuous in the right lower lobe.  There is pulmonary artery enlargement suggestive of pulmonary hypertension.      ASSESSMENT:     Acute hypoxic and hypercarbic respiratory failure presumed secondary to narcotic overdose.  She was reasonably stable, but did require some Ativan for agitation in the ED after getting narcan in the rig.  At this point,  she is again more somnolent.  Her ABGs are not significantly improved, so we will go up on her pressures from 16 to 20 IPAP, 7 EPAP.  Try to improve her ventilation.  If VBG is not improving, will consider a dose of Narcan again and possibly a drip, but may ultimately need intubation.  Family understands this.      Chronic obstructive pulmonary disease exacerbation.  She does seem to be wheezing and her chest CT shows some airway disease.  This could also be the primary cause of her resp failure, but she didn't have much prodrome, as  states she was in her usual state of health when she went to bed.   We will treat with Solu-Medrol, scheduled nebs.  A viral respiratory panel / rapid flu/ procalcitonin obtained.  Started on Zosyn and zithromax  for potential pneumonitis or at least  chronic obstructive pulmonary disease exacerbation.     Acute toxic/metabolic Encephalopathy  -presumed this is from narcotic overdose because of the apparent agitation she had in the ED after getting narcan in the rig, but certainly possible it could be primary hypercapnic resp failure with CO2-narcosis.  Treatment as above, but consider narcan repeat and drip if not improving.  Didn't start with that because she needed ativan to sedate once she was given narcan.        Acute kidney injury.  Her usual creatinine is 0.6 and is currently 1.37.  Continue IV fluids.  Avoid nephrotoxins.      Hypotension.  Her blood pressures have been running in the 80s and 90s, though when I look back in the chart, she tends to run low and she is a small, frail person.  MAPS have been actually okay, but she does seem to be fluid responsive as after the first 500 mL of her fourth bolus blood pressures came up to 100.  No pressors at this time. If requires intubation will likely need norepi. (avoid neosynephrin due to pulm HTN)       Chronic pain/chronic narcotic dependence.  She is on 80 mg of OxyContin t.i.d. plus Vicodin p.r.n.  She clearly overdosed at this time.  This dose is clearly dangerous going forward and would strongly encourage her outpatient physicians that she needs to get off these.  This is the second time she has done this.  At this point, we are holding all narcotics until she starts waking up and showing us more withdrawal symptoms or pain and then would treat with IV initially and then slowly get her back on possibly half her home dose.      H/O PE  -didn't do PE study on CT due to STEVIE and low BPs.   PE Seems unlikely with primarily hypercapnia but will bridge with IV hep while NPO.       Pulm HTN:   Moderate seen on last echo, but CT shows some evidence of Pulm HTN< so will be very difficult to manage fluids.  Needs high preload, but will drop BPs with any air trapping and certainly with PPV     Restless leg  syndrome.  Holding her orals at this time.       Depression.  Holding her oral medications at this time.      CODE STATUS:  Full.     Prophylaxis:  She is on full dose warfarin for a history of deep venous thrombosis and pulmonary embolism.      DISPOSITION:  She needs ICU care.  Maximal support.     90 min spent in critical care         AMANDA FERNANDEZ MD             D: 10/25/2018   T: 10/25/2018   MT:       Name:     STUART SAINI   MRN:      [id number]        Account:      [id number]   :      1955        Admitted:     10/25/2018                   Document: [id number]
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
## 2018-10-25 NOTE — ED NOTES
Dr. Rubio notified of VBG results. Pt also hypotensive 86/54 HR=82. Will continue with IV fluid boluses. 2nd Liter of LR currently infusing.

## 2018-10-25 NOTE — PHARMACY-ANTICOAGULATION SERVICE
Patient to start the heparin DVT/PE protocol with a goal anti 10a level of 0.3-0.7. Using a HT of 59 inches and a WT of 57.9 kg, a dosing WT of 49 kg was calculated. Based on this dosing WT, give patient a heparin bolus of 4000 units from the bag and then start a drip at 900 units/hr. Check the patient's anti 10a level 6 hours after starting the drip at ~midnight..   Per DVT/PE protocol.    Wade MishraD.

## 2018-10-25 NOTE — ED NOTES
Pt brought by ems after  reported her unresponsive at 0755-pupils pinpoint and ems gave narcan 0.5mg and pt became more responsive-confused and groggy upon arrival-pulling on iv and bp cuff and sat monitor-02 at 15l/nonrebreather

## 2018-10-25 NOTE — PROGRESS NOTES
[place] [facility] [place]    Patient admitted to room 100-8 at approximately 1400 via cart from emergency room. Patient was accompanied by nurse.     Verbal SBAR report received from DAWSON Roberson prior to patient arrival.     Patient trasferred to bed via air mercy. Patient alert and oriented X 1. The patient is not having any pain.  . Admission vital signs: Blood pressure (!) 77/56, temperature 97.2  F (36.2  C), temperature source Temporal, resp. rate 19, weight 60.8 kg (134 lb 0.6 oz), SpO2 92 %, not currently breastfeeding. Patient was oriented to plan of care, call light, bed controls, tv, telephone, bathroom and visiting hours.     Risk Assessment    The following safety risks were identified during admission: fall. Yellow risk band applied: YES.     Skin Initial Assessment    This writer admitted this patient and completed a full skin assessment and Bala score in the Adult PCS flowsheet. Appropriate interventions initiated as needed.     Secondary skin check completed by Kira Argueta.       Pt noted to have blanchable redness on buttocks and on her low back under a medication patch she had present on admission. She had a band aid on her right big toe: no open areas noted, just noted to have a long toenail, bandaid likely in place because it kept getting caught on clothing. Also had a band aid on the bridge of her nose: again, no open areas noted but redness present. Gel cushion placed on bridge of nose and barrier on occiput to aid with protection from BiPap use.        Nichole C. Bushweiler

## 2018-10-25 NOTE — IP AVS SNAPSHOT
[facility]ensive [facility]    [address] [address]    Phone:  [phone]    Fax:  [phone]                                       After Visit Summary   10/25/2018    Tere Ortiz    MRN: [id number]           After Visit Summary Signature Page     I have received my discharge instructions, and my questions have been answered. I have discussed any challenges I see with this plan with the nurse or doctor.    ..........................................................................................................................................  Patient/Patient Representative Signature      ..........................................................................................................................................  Patient Representative Print Name and Relationship to Patient    ..................................................               ................................................  Date                                   Time    ..........................................................................................................................................  Reviewed by Signature/Title    ...................................................              ..............................................  Date                                               Time          22EPIC Rev 08/18

## 2018-10-25 NOTE — PROGRESS NOTES
Writer has reviewed initial admission skin assessment and nemesio assessment and agree with documention and assessment findings.

## 2018-10-25 NOTE — PROGRESS NOTES
6:15 PM  Trop from the this AM's lab came back 0.452.  Will repeat in am.  Assume strain related at this point.  Currently pt is somewhat more awake per nursing.  vbg is being repeated.      7:29 PM   Pt examined.  Nurses reports she is waking up.  Now able to answer questions easily.  But falls back asleep if not stimulated.  vbg 7.17/ 74    Discussed with Dr Cooper tele  co2 is down, mental status is improved.  Will continue observation--repeat vbg in two hours.    10:12 PM   RSV is positive  vbg 7,23/65  Will continue to follow

## 2018-10-25 NOTE — PROGRESS NOTES
Pt starting to wake up and converse a little. Does not remember any of the events that occurred last night or today nor does she recall why she came to the hospital. Started to get a little anxious with the BiPap, family states she gets very slausterphobic at times, given PRN dose of ativan to help keep her calm while on BiPap

## 2018-10-26 NOTE — PROGRESS NOTES
DATE:  10/26/2018   TIME OF RECEIPT FROM LAB:  0500  LAB TEST:  TROPONIN   LAB VALUE:  1.043  RESULTS GIVEN WITH READ-BACK TO (PROVIDER):  WEB PAGE TO DR. HERCULES  TIME LAB VALUE REPORTED TO PROVIDER:   6969    Orders for Aspirin 325 mg once and an EKG obtained.

## 2018-10-26 NOTE — PHARMACY-ANTICOAGULATION SERVICE
Heparin 10a(Xa) =     Lab Results   Component Value Date    AXA 0.40 10/26/2018       Goal level of: 0.3-0.7 IU/mL    Heparin instructions: Continue rate of 900 units/hr    Recheck next heparin 10a(Xa): in the AM.    Heparin dosed per [place] Protocol. Monitor platelets every three days while on anticoagulation therapy.

## 2018-10-26 NOTE — PROGRESS NOTES
Discussed with Dr Davis.  PCO2 decreased to 74 from 90.  He tells me that she is following commands and is easily arousable  She is tolerating BiPAP well  Agree with continued observation without intubation currently.    GB

## 2018-10-26 NOTE — PROGRESS NOTES
Pt received dilaudid at 1500, was restless after receiving ativan at 1430, not wanting bipap on.  Denied pain but family felt she was uncomfortable.  Pt did settle in and have decreased restlessness, looked more comfortable. Tolerating mask and resting.

## 2018-10-26 NOTE — PLAN OF CARE
"Problem: Patient Care Overview  Goal: Plan of Care/Patient Progress Review  Outcome: Improving  Pt currently on FIO2 45% with sat's 90-95%.  LS have crackles. Pt was able to rest quietly most overnight with IV Ativan 0.5 mg Q4. At times pt appears to have an essential tremor. Pt replied yes when asked if she normally shakes at home. Alert to self and able to state  at times and other times repeated the respone \"2 sips of water\". Was able to drink from a straw and swallowed her aspirin without difficulty. Denied pain. BP's stable. Turned and repositioned every 2 hours.       "

## 2018-10-26 NOTE — PROGRESS NOTES
Patients family is here and she is off BIPAP to swallow pills and visit with them for about 30 minutes.  During this time she is on 5L NC with sats 90-93%. She continues to have tremors and is lethargic but answers most questions appropriately and with instructions could swallow pills and water safely.  Continues to deny pain or discomfort of any sort.  Family states while she is using narcotics she doesn't shake, but does shake when she is withdrawing from narcotics.

## 2018-10-26 NOTE — PHARMACY-ANTICOAGULATION SERVICE
Clinical Pharmacy - Warfarin Dosing Consult     Pharmacy has been consulted to manage this patient s warfarin therapy.  Indication: DVT/PE Prophylaxis (Lupus anticoagulant inhibitor syndrome)  Therapy Goal: INR 2-3  Provider/Team: [facility] - Dr Isaias RAMSEY [facility]: [facility].  Has own INR machine at home.  Warfarin Prior to Admission: Yes  Warfarin PTA Regimen: 7.5 mg MWF, 10 mg all other days of the week  Significant drug interactions: Azithromycin  Recent documented change in oral intake/nutrition: Unknown  Dose Comments: No dose as currently NPO and has been started on heparin due to NPO status.    INR   Date Value Ref Range Status   10/25/2018 2.51 (H) 0.86 - 1.14 Final   10/11/2018 2.2  Final       Pharmacy will monitor Tere Thapamajo daily and order warfarin doses when appropriate to achieve specified goal.      Please contact pharmacy as soon as possible if the warfarin needs to be held for a procedure or if the warfarin goals change.      Lisseth Quezada Pharm.D.

## 2018-10-26 NOTE — PROGRESS NOTES
"SUBJECTIVE:   More responsive.  Can answer questions, but then tends to fall back asleep.   No complains of pain.   Tolerating BIPAP well, better since pressures dropped.   Coughs but  non-productive      ROS:4 point ROS including Respiratory, CV, GI and , other than that noted in the HPI,  is negative     OBJECTIVE:   /71  Temp 97.4  F (36.3  C) (Axillary)  Resp 25  Ht 1.499 m (4' 11\")  Wt 59.9 kg (132 lb 0.9 oz)  SpO2 96%  BMI 26.67 kg/m2    GENERAL APPEARANCE:  Somnolent, but opens eyes to voice, follows commands, Ox3 intermittently      RESP:coarse rales diffusely      CV: regular rate and rhythm,  No  murmur , edema: none       Abdomen: soft, nontender, no liver or spleen enlargement, no masses, BSs normal   Skin: no cyanosis, pallor, or jaundice   Neuro: symmetric strength.      CMP  Recent Labs  Lab 10/26/18  0441 10/25/18  0824   * 144   POTASSIUM 3.6 4.3   CHLORIDE 112* 107   CO2 28 28   ANIONGAP 7 9   GLC 99 144*   BUN 15 19   CR 0.77 1.37*   GFRESTIMATED 76 39*   GFRESTBLACK >90 47*   JOYCELYN 7.6* 8.6   PROTTOTAL 5.4* 7.4   ALBUMIN 2.4* 3.2*   BILITOTAL 0.3 0.3   ALKPHOS 50 76   AST 32 35   ALT 23 29     CBC  Recent Labs  Lab 10/26/18  0441 10/25/18  0824   WBC 9.1 12.1*   RBC 3.75* 4.31   HGB 13.2 15.1   HCT 40.5 49.8*   * 116*   MCH 35.2* 35.0*   MCHC 32.6 30.3*   RDW 12.9 13.0    183     INR  Recent Labs  Lab 10/26/18  0441 10/25/18  0824   INR 3.27* 2.51*     Arterial BloodGas    Recent Labs  Lab 10/26/18  0441 10/25/18  2138 10/25/18  1603 10/25/18  0923   O2PER 45% 40 52% 40%      Venous Blood Gas    Recent Labs  Lab 10/26/18  0441 10/25/18  2138 10/25/18  1830 10/25/18  1603 10/25/18  0923   PHV 7.33 7.23* 7.17* 7.12* 7.11*   PCO2V 49 65* 74* 90* 88*   PO2V 57* 28 24* 22* 31   HCO3V 26 27 27 29* 28   O2PER 45% 40  --  52% 40%     Lab Results   Component Value Date    TROPI 1.043 (HH) 10/26/2018    TROPI 0.452 (HH) 10/25/2018    TROPI <0.015 08/27/2017    TROPI  " 02/26/2017     <0.015  The 99th percentile for upper reference range is 0.045 ug/L.  Troponin values in   the range of 0.045 - 0.120 ug/L may be associated with risks of adverse   clinical events.       INR 1757  RSV PCR +     Medications     aspirin  81 mg Oral Daily     azithromycin  500 mg Intravenous Q24H     busPIRone  10 mg Oral BID     DULoxetine  60 mg Oral BID     furosemide  20 mg Intravenous Once     influenza vaccine adult (product based on age)  0.5 mL Intramuscular Prior to discharge     ipratropium - albuterol 0.5 mg/2.5 mg/3 mL  3 mL Nebulization Q4H While awake     levothyroxine  125 mcg Oral Daily     methylPREDNISolone  62.5 mg Intravenous Daily     pantoprazole (PROTONIX) IV  40 mg Intravenous Daily with breakfast     piperacillin-tazobactam  3.375 g Intravenous Q6H     topiramate  100 mg Oral BID       Intake/Output Summary (Last 24 hours) at 10/26/18 0756  Last data filed at 10/26/18 0600   Gross per 24 hour   Intake          2302.08 ml   Output             1000 ml   Net          1302.08 ml       ASSESSMENT:     Acute hypoxic and hypercarbic respiratory failure presumed secondary to narcotic overdose vs primary lung issue..  She was reasonably stable, but did require some Ativan for agitation in the ED after getting narcan in the rig.  then she was again more somnolent.  Her VBGs were not significantly improved, so we initially went  up on her pressures from 16 to 20 IPAP, 7 EPAP.  CO2 improved slowly, unclear if this was as she woke up or that she woke up as the CO2 improved.    -still needs BIPAP       Chronic obstructive pulmonary disease exacerbation/ acute RSV bronchiolitis.   She does seem to be wheezing and her chest CT shows some airway disease.  This could also be the primary cause of her resp failure, but she didn't have much prodrome, as  states she was in her usual state of health when she went to bed.     - We will treat with Solu-Medrol, scheduled nebs.   - A viral  respiratory panel showed RSV  -  Started on Zosyn and zithromax  for potential pneumonitis or at least chronic obstructive pulmonary disease exacerbation.  Consider stopping if procal normal and now with positive RSV.       Acute toxic/metabolic Encephalopathy  -presumed this is from narcotic overdose because of the apparent agitation she had in the ED after getting narcan in the rig, but certainly possible it could be primary hypercapnic resp failure with CO2-narcosis.    -still somnolent on day 2 with normal pCO2.  May be lingering effect of elevated CO2, vs the ativan she is getting for the BIPAP, or still some narcotic effects.  No c/o pain at this time.         Acute kidney injury.  Her usual creatinine is 0.6 and is currently 1.37.  Continue IV fluids.  Avoid nephrotoxins.   -resolved on day 2.        Hypotension.  Her blood pressures have been running in the 80s and 90s, though when I look back in the chart, she tends to run low and she is a small, frail person.  MAPS have been actually okay, but she does seem to be fluid responsive as after the first 500 mL of her fourth bolus blood pressures came up to 100.  No pressors at this time. If requires intubation will likely need norepi. (avoid neosynephrin due to pulm HTN)   -BPs came up with fluids but she got at least 5 L on day 1.  Will start some lasix, gently for the evidence of right heart strain.        Chronic pain/chronic narcotic dependence.  She is on 80 mg of OxyContin t.i.d. plus Vicodin p.r.n.  She appeared overdosed at this time.  This dose is clearly dangerous going forward and would strongly encourage her outpatient physicians that she needs to get off these.  This is the second time she has done this.  At this point, we are holding all narcotics until she starts waking up and showing us more withdrawal symptoms or pain and then would treat with IV initially and then slowly get her back on possibly half her home dose.       H/O PE  -didn't do PE  study on CT due to STEVIE and low BPs.    -LE doppler negative for DVT  - INR therapeutic   -did bridge with heparin while NPO on day one, but will restart her warfarin on day 2  -  PE Seems unlikely with primarily hypercapnia.        Pulm HTN/ right heart strain:   Moderate PH seen on last echo, but CT shows some evidence of Pulm HTN< so will be very difficult to manage fluids.  Needs high preload, but will drop BPs with any air trapping and certainly with PPV  -BPs better on day 2 and will try a small dose of lasix  -trops and BNP elevated with only right heart strain on EKG  -echo pending  -will start with a single dose of lasix 20mg IV and see how BP tolerates.    - follow trops.      Trop elevation  -doubt ACS with no pain and no EKG changes suggesting   -suspect this is from right heart strain as above.       Restless leg syndrome.  Holding her orals at this time.        Depression.  Holding her oral medications on admission but restarted on day 2.         CODE STATUS:  Full.     Prophylaxis:  She is on full dose warfarin for a history of deep venous thrombosis and pulmonary embolism.       DISPOSITION:  She needs ICU care.  Maximal support.

## 2018-10-26 NOTE — PHARMACY-ANTICOAGULATION SERVICE
Heparin 10a(Xa) =     Lab Results   Component Value Date    AXA 0.70 10/26/2018       Goal level of: 0.3-0.7 IU/mL    Heparin instructions: Continue rate of 900 units/hr    Recheck next heparin 10a(Xa): in the AM.    Heparin dosed per [place] Protocol. Monitor platelets every three days while on anticoagulation therapy.

## 2018-10-26 NOTE — CONSULTS
Care Transition Initial Assessment - RN  Reason For Consult: discharge planning   Met with: Patient's  in her room    DATA   Active Problems:    COPD exacerbation (H)    Acute hypercapnic respiratory failure (H)       Primary Care Clinic Name: [facility]  Primary Care MD Name: Flynn  Contact information and PCP information verified: Yes    ASSESSMENT  Cognitive Status: awake and on Bipap.             Lives With: spouse  Living Arrangements: house     Description of Support System: Supportive, Involved   Who is your support system?:    Support Assessment: Adequate family and caregiver support   Insurance Concerns: No Insurance issues identified      This writer met with pt's , introduced self and role. Discussed discharge planning and Medicare guidelines in regards to home care and SNF benefits. Patient lives at home independently with her . Discussed MTM or home care for medication management with patient's  which he declines at this time.     PLAN    Home      Discharge Planner   Discharge Plans in progress: Home  Barriers to discharge plan: clinical improvement  Follow up plan: handoff to CCC       Entered by: JAVI VARGAS 10/26/2018 2:44 PM         Ana Disla, MSN, RN, Care Coordinator  [facility] [phone]  [facility] [phone]

## 2018-10-27 NOTE — PROGRESS NOTES
"Pt up to chair following med incontinent stool, rested in chair sitting up without feet reclined.  Pt tolerated for about 30 minutes, though heart rate was 110's to 120's while in the chair.  Pt denied pain when asked, but did report feeling \"stuffed up\" and some sinus pain.  When I asked her what she typically takes at home for this, he stated \"ibuprofen or Vicodin\".  Ibuprofen given, pt denied discomfort 30 minutes later.   "

## 2018-10-27 NOTE — PROGRESS NOTES
Pt given narcan, 1/2 dose given and 30 seconds later pt much more alert, oriented x3, and asking for food and cell phone.  Pt is a little restless and shaky, denies pain.  Will continue to monitor for need of additional doses.  Provider Updated.

## 2018-10-27 NOTE — PLAN OF CARE
Problem: Patient Care Overview  Goal: Plan of Care/Patient Progress Review  Outcome: Improving  Pt had been on Bi-Pap FIO2 45% all night after lab had drawn her this morning she asked to have a break from it, pt was put on 3 L NC with sat's in the low 90's. Lung sounds are clear but diminished t/o, she has a good loose cough.  Pt's mentation improved t/o the night she's alert to person, she knows she is in the hospital but wasn't sure which one, able to let her needs be known.  Will continue to monitor close for changes.

## 2018-10-27 NOTE — PROGRESS NOTES
Teaching/education verbalized pt pt's daughter regarding RSV.  Reinforced that 1yo is not advised to visit due to RSV susceptibility. Daughter continued to state that 1yo child was not actively ill, more teaching done to reinforce the effects of RSV on young children.  Daughter stated that she would have pt wear a mask when she brought him the next day.  I again stated that it was ultimately her decision, again stating that RSV infection process in young children.

## 2018-10-27 NOTE — PROGRESS NOTES
Pt continues to attempt to get out of bed without assistance, is unsteady on her feet and has urgency to void on commode.  Continuing to reorient and use bed alarm.

## 2018-10-27 NOTE — PROGRESS NOTES
[facility] Internal Medicine Progress Note     Date of Service (when I saw the patient): 10/27/2018    REASON FOR ADMISSION / INTERVAL HISTORY:  AMS/somnolance. Brought in by  via ambulance. still somnolant with eyes closed-arousable and talks. See details below.     CT CHEST 10/25-IMPRESSION:   1. Basilar predominant groundglass nodules and bronchial wall  thickening with areas of mucous plugging, suspicious for  bronchiolitis/infection, currently most conspicuously affecting the  right lower lobe.  2. Pulmonary artery enlargement suggestive of pulmonary artery  hypertension, unchanged.    ASSESSMENT/PLAN:   Acute hypoxic and hypercarbic respiratory failure due to unintentional narcotic overdose  Pt apparently somnolant all day for last 2-3 weeks per daughter. But was arousable--until now on admission when she Presented somnolant/ unresponsive. Has been taking her prescribed meds oxycontin 80 tid, prn vicodin. VBG showed 7.11/88/31/28. Apparently got narcan just once in the rig. Continues to be somnolant with eyes closed. Continues to need 3 L o2 and BIPAP at night  -will try narcan. If responding , will start a narcan gtt    Chronic obstructive pulmonary disease exacerbation/ acute RSV bronchiolitis/ bibasilar pneumonitis-possible pneumonia  Pt didn't have much prodrome, as  states she was in her usual state of health when she went to bed.   A viral respiratory panel showed RSV. Could also have aspiration pneumonia due to somnolance/ encephalopathy.  CT as above. Did have mild leucocytosis-resolved now. PCT 0.3.  -  cont  Zosyn, zithromax for atleast 3 days, follow PCT and consider po antbx if elevated still. Continue  iv steroids       Acute toxic/metabolic Encephalopathy  Likely due to narcotic overdose. Continues to be somnolant -pco2 57.   -will try narcan as above.        Acute kidney injury.    Resolved       Hypotension.   Likely due to dehydration  -Resolved.         Chronic  "pain/chronic narcotic dependence.    She is on 80 mg of OxyContin t.i.d. plus Vicodin p.r.n.   This dose is clearly dangerous going forward and would strongly encourage her outpatient physicians that she needs to get off these.  This is the second time she has done this.  At this point, we are holding all narcotics -consider resuming at  half her home dose when ready       H/O PE  On chronic coumadin  -continue meds.      Trop elevation  Doubt ACS with no pain and no EKG changes suggesting . Suspect this is from right heart strain due to h/o mod pulm HTN. Last ECHO 11/17 showed Pulmonary hypertension- RVSP 38 mm hg +RA.  - F/u ECHO on Monday.       Restless leg syndrome.   Resume mirapex when awake.         Depression.    Continue meds      DISPO  Will continue ICU status. Pt at high risk aspiration-will consider holding all po meds until more awake. Will be in hospital 3-4 days. Discussed with family at length-concerned she is getting too much narcotics at home--see details above.     ARABELLA ROMAN MD   Pg [phone]    DVT Prhylaxis: Warfarin  Code Status: Full Code    ROS:  As described in A/P and Exam.  Otherwise ALL are  negative.    PHYSICAL EXAM:  All vitals have been reviewed    Blood pressure 144/87, temperature 98.7  F (37.1  C), temperature source Axillary, resp. rate 20, height 1.499 m (4' 11\"), weight 57.2 kg (126 lb 1.7 oz), SpO2 90 %, not currently breastfeeding.    I/O this shift:  In: -   Out: 400 [Urine:400]    GENERAL APPEARANCE: healthy, somnolant with eyes closed and no distress  EYES: conjunctiva clear, eyes grossly normal  HENT: external ears and nose normal   NECK: supple, no masses or adenopathy  RESP: lungs-decreased BS B- -diffuse exp wheezes  CV: regular rate and rhythm, normal S1 S2, no S3 or S4 and no murmur, click or rub   ABDOMEN: soft, nontender, no HSM or masses and bowel sounds normal  MS: no clubbing, cyanosis; no edema  SKIN: clear without significant rashes or lesions  NEURO: " -non-focal moves all 4 extr    ROUTINE  LABS (Last four results)  CMP  Recent Labs  Lab 10/27/18  0501 10/26/18  0441 10/25/18  0824   * 147* 144   POTASSIUM 3.0* 3.6 4.3   CHLORIDE 109 112* 107   CO2 30 28 28   ANIONGAP 6 7 9   GLC 80 99 144*   BUN 12 15 19   CR 0.70 0.77 1.37*   GFRESTIMATED 84 76 39*   GFRESTBLACK >90 >90 47*   JOYCELYN 8.1* 7.6* 8.6   PROTTOTAL 5.6* 5.4* 7.4   ALBUMIN 2.5* 2.4* 3.2*   BILITOTAL 0.4 0.3 0.3   ALKPHOS 45 50 76   AST 22 32 35   ALT 21 23 29     CBC  Recent Labs  Lab 10/27/18  0501 10/26/18  0441 10/25/18  0824   WBC 7.5 9.1 12.1*   RBC 3.71* 3.75* 4.31   HGB 12.9 13.2 15.1   HCT 39.5 40.5 49.8*   * 108* 116*   MCH 34.8* 35.2* 35.0*   MCHC 32.7 32.6 30.3*   RDW 13.2 12.9 13.0    155 183     INR  Recent Labs  Lab 10/27/18  0501 10/26/18  0441 10/25/18  0824   INR 1.69* 3.27* 2.51*     Arterial Blood Gas  Recent Labs  Lab 10/27/18  0501 10/26/18  0441 10/25/18  2138 10/25/18  1603   O2PER 45% 45% 40 52%       No results found for this or any previous visit (from the past 24 hour(s)).

## 2018-10-28 NOTE — PROGRESS NOTES
[facility] Internal Medicine Progress Note     Date of Service (when I saw the patient): 10/28/2018    REASON FOR ADMISSION / INTERVAL HISTORY:  AMS/somnolance. Brought in by  via ambulance. Still somnolant/ difficult to arouse. See details below.     CT CHEST 10/25-IMPRESSION:   1. Basilar predominant groundglass nodules and bronchial wall  thickening with areas of mucous plugging, suspicious for  bronchiolitis/infection, currently most conspicuously affecting the  right lower lobe.  2. Pulmonary artery enlargement suggestive of pulmonary artery  hypertension, unchanged.    ASSESSMENT/PLAN:   Acute hypoxic and hypercarbic respiratory failure due to unintentional narcotic overdose  Pt apparently somnolant all day for last 2-3 weeks per daughter. But was arousable--until now on admission when she Presented somnolant/ unresponsive. Has been taking her prescribed meds oxycontin 80 tid, prn vicodin. VBG showed 7.11/88/31/28. Apparently got narcan just once in the rig. Continued to be somnolant with eyes closed. Continued to need 4 L o2 until 10/28 Gave narcan 10/27 one dose-woke up and had icecream. Later fell back to sleep and constantly sleeping  -start narcan gtt     Chronic obstructive pulmonary disease exacerbation/ acute RSV bronchiolitis/ bibasilar pneumonitis-possible pneumonia  Pt didn't have much prodrome, as  states she was in her usual state of health when she went to bed.   A viral respiratory panel showed RSV. Could also have aspiration pneumonia due to somnolance/ encephalopathy.  CT as above. Did have mild leucocytosis-resolved now. PCT 0.3.  Had  been on   Zosyn, zithromax for  3 days-will discharge antbx-afebrile, nl WBC.   - Continue  iv steroids , will not start po antbx      Acute toxic/metabolic Encephalopathy  Likely due to narcotic overdose. More alert since narcan started 10/28  -continue narcan     Acute kidney injury.    Resolved       Hypotension.   Likely due to  "dehydration  -Resolved.         Chronic pain/chronic narcotic dependence.    She is on 80 mg of OxyContin t.i.d. plus Vicodin p.r.n.   This dose is clearly dangerous going forward and would strongly encourage her outpatient physicians that she needs to get off these.  This is the second time she has done this.  At this point, we are holding all narcotics -consider resuming at  half her home dose when ready  -will discuss with dr jackson , pts neurologist.       H/O PE  On chronic coumadin  -continue meds.      Trop elevation  Doubt ACS with no pain and no EKG changes suggesting . Suspect this is from right heart strain due to h/o mod pulm HTN. Last ECHO 11/17 showed Pulmonary hypertension- RVSP 38 mm hg +RA.  - F/u ECHO today-p.       Restless leg syndrome.   Resume mirapex when awake.         Depression.    Continue meds      DISPO  Will continue ICU status. Will be in hospital 3-4 days. Discussed with family at length-concerned she is getting too much narcotics at home--see details above.     ARABELLA ROMAN MD   Pg [phone]    DVT Prhylaxis: Warfarin  Code Status: Full Code    ROS:  As described in A/P and Exam.  Otherwise ALL are  negative.    PHYSICAL EXAM:  All vitals have been reviewed    Blood pressure 140/76, temperature 97.4  F (36.3  C), temperature source Oral, resp. rate 24, height 1.499 m (4' 11\"), weight 56.1 kg (123 lb 10.9 oz), SpO2 96 %, not currently breastfeeding.    I/O this shift:  In: 100 [I.V.:100]  Out: -     GENERAL APPEARANCE: healthy,awake/ alert-talking and no distress  EYES: conjunctiva clear, eyes grossly normal  HENT: external ears and nose normal   NECK: supple, no masses or adenopathy  RESP: lungs--CTA-B  CV: regular rate and rhythm, normal S1 S2, no S3 or S4 and no murmur, click or rub   ABDOMEN: soft, nontender, no HSM or masses and bowel sounds normal  MS: no clubbing, cyanosis; no edema  SKIN: clear without significant rashes or lesions  NEURO: -non-focal moves all 4 " extr    ROUTINE  LABS (Last four results)  CMP    Recent Labs  Lab 10/28/18  1125 10/28/18  0504 10/28/18  0023 10/27/18  0501 10/26/18  0441 10/25/18  0824   NA  --  144  --  145* 147* 144   POTASSIUM 3.3* 3.4 3.2* 3.0* 3.6 4.3   CHLORIDE  --  110*  --  109 112* 107   CO2  --  26  --  30 28 28   ANIONGAP  --  8  --  6 7 9   GLC  --  74  --  80 99 144*   BUN  --  6*  --  12 15 19   CR  --  0.61  --  0.70 0.77 1.37*   GFRESTIMATED  --  >90  --  84 76 39*   GFRESTBLACK  --  >90  --  >90 >90 47*   JOYCELYN  --  8.3*  --  8.1* 7.6* 8.6   PROTTOTAL  --   --   --  5.6* 5.4* 7.4   ALBUMIN  --   --   --  2.5* 2.4* 3.2*   BILITOTAL  --   --   --  0.4 0.3 0.3   ALKPHOS  --   --   --  45 50 76   AST  --   --   --  22 32 35   ALT  --   --   --  21 23 29     CBC    Recent Labs  Lab 10/27/18  0501 10/26/18  0441 10/25/18  0824   WBC 7.5 9.1 12.1*   RBC 3.71* 3.75* 4.31   HGB 12.9 13.2 15.1   HCT 39.5 40.5 49.8*   * 108* 116*   MCH 34.8* 35.2* 35.0*   MCHC 32.7 32.6 30.3*   RDW 13.2 12.9 13.0    155 183     INR    Recent Labs  Lab 10/28/18  0504 10/27/18  0501 10/26/18  0441 10/25/18  0824   INR 1.34* 1.69* 3.27* 2.51*     Arterial Blood Gas    Recent Labs  Lab 10/27/18  0501 10/26/18  0441 10/25/18  2138 10/25/18  1603   O2PER 45% 45% 40 52%       No results found for this or any previous visit (from the past 24 hour(s)).

## 2018-10-28 NOTE — PLAN OF CARE
Problem: Patient Care Overview  Goal: Plan of Care/Patient Progress Review  Outcome: No Change  Pt has had a very restless night, she has been awake for most of the night.  Pt has has multiple loose incontinent stools, there have been only a few she was able to make it to the commode.  Pt has been shown multiple times how to use the call light but has only used it once she sets off the bed alarm trying to climb out of bed.  Mitt's were applied in hopes of keeping her from pulling off her mask, her lines, and her IV.  At 0500 pt was placed on a nasal canula with 4 L of oxygen (pt states this us what she uses at home). Pt was finally calm and resting at 0630.  Lung sounds remain diminished t/o she does have a fair loose cough.  Will continue to monitor close for changes.

## 2018-10-28 NOTE — PROGRESS NOTES
"Pt stated they feel like they \"can't breath\" as reported by family.  O2 saturation was 94% on 3 LPM NC.  Pt repositioned, walked to BR.  Stated they felt better.    "

## 2018-10-28 NOTE — PROGRESS NOTES
Pt continues to be confused about place, but is accurate with date, time and person, slow to respond.  Increased activity and restlessness since this AM, increased c/o pain.  Ibuprofen, repositioning, warm blankets, and frequent ambulating in the room seem to decrease pain.

## 2018-10-28 NOTE — PROGRESS NOTES
"Pt is very restless she keeps pulling at her lines, trying to pull out her IV, constantly trying to climb out of bed setting the bed alarm off, won't keep on her own Bi-Pap mask or oxygen.  Mitt's have been placed on pt's which have helped some, she does work them off but it take a while.  Pt is c/o \"hurting all over and just feeling awful.\"  Will continue to monitor close for changes.  "

## 2018-10-28 NOTE — PROGRESS NOTES
Pt had brief period of nausea/spitting up phlegm.  Stated they felt sick to their stomach.  Zofran given x1, reported relief shortly after.  Ambulated to bed.

## 2018-10-28 NOTE — PROGRESS NOTES
Pt offered food/clear liquids.  Has eaten vanilla ice cream without problems, takes oral pills without difficulty.  Despite pt being awake when given toast, she quickly became sleepy and couldn't finish bite of toast.  Will continue to offer liquids and observe when eating.

## 2018-10-28 NOTE — PROGRESS NOTES
Pt fired the Lactic Acid Level Stat for Sepsis Protocol. Lab result as follow, 0.9 WNL.  Will continue to monitor close for changes.

## 2018-10-29 NOTE — PROGRESS NOTES
"Pt is very restless, anxious, c/o severe pain \"all over\" or \"my legs hurt, can you get me my pain med's, I can't take it.\"  When this writer explained again why she was here, what the plan was, and what medications she was getting to help clear her thinking so she could go home.\"  Pt stated \"just stop giving me that stuff, and if you give me my Oxy I won't say anything.\"  This writer explained that was not an option and pt was offered Advil for pain and Ativan for her anxiety.  Pt did agree, this did help for a short time.  Also tried dimming the lights, turning off the TV, quiet music, aroma therapy, multiple positions of the bed.  Pt will rest for tops 15-30 minutes and up she goes.  Will continue to try different options.     "

## 2018-10-29 NOTE — PLAN OF CARE
Problem: Patient Care Overview  Goal: Plan of Care/Patient Progress Review  Outcome: No Change  Patient's granddaughter states that Patient looks worse today when compared with yesterday. Patient resting at this time appears asleep.    Problem: Chronic Obstructive Pulmonary Disease (Adult)  Goal: Signs and Symptoms of Listed Potential Problems Will be Absent, Minimized or Managed (Chronic Obstructive Pulmonary Disease)  Signs and symptoms of listed potential problems will be absent, minimized or managed by discharge/transition of care (reference Chronic Obstructive Pulmonary Disease (Adult) CPG).   Outcome: No Change  Patient continues to require oxygen @ 2 liters nasal cannula. Patient will take oxygen off and forget to put it back on. Patient reportedly has not slept for approx 3 days, per family this is not unusual .

## 2018-10-29 NOTE — PROGRESS NOTES
[facility] Internal Medicine Progress Note     Date of Service (when I saw the patient): 10/29/2018    REASON FOR ADMISSION / INTERVAL HISTORY:  AMS/somnolance. Brought in by  via ambulance. Awake sitting in chair-able to talk but still  falls off to sleep. See details below.     CT CHEST 10/25-IMPRESSION:   1. Basilar predominant groundglass nodules and bronchial wall  thickening with areas of mucous plugging, suspicious for  bronchiolitis/infection, currently most conspicuously affecting the  right lower lobe.  2. Pulmonary artery enlargement suggestive of pulmonary artery  hypertension, unchanged.    ASSESSMENT/PLAN:   Acute hypoxic and hypercarbic respiratory failure due to unintentional narcotic overdose  Pt apparently somnolant all day for last 2-3 weeks per daughter. But was arousable--until now on admission when she Presented somnolant/ unresponsive. Has been taking her prescribed meds oxycontin 80 tid, prn vicodin. VBG showed 7.11/88/31/28. Apparently got narcan just once in the rig. Continued to be somnolant with eyes closed. Continued to need 4 L o2 until 10/28 Gave narcan 10/27 one dose-woke up and had icecream. Later fell back to sleep and constantly sleeping  Started narcan gtt 10/28. Awake and sitting in chair but still doses off periodically. o2 sats stable on just 2L o2 now.    Chronic obstructive pulmonary disease exacerbation/ acute RSV bronchiolitis/ bibasilar pneumonitis-possible pneumonia  Pt didn't have much prodrome, as  states she was in her usual state of health when she went to bed.   A viral respiratory panel showed RSV. Could also have aspiration pneumonia due to somnolance/ encephalopathy.  CT as above. Did have mild leucocytosis-resolved now. PCT 0.3.  Had  been on   Zosyn, zithromax for  3 days until 10/28--afebrile, nl WBC.   - Continue  iv steroids , will not start po antbx      Acute toxic/metabolic Encephalopathy  Likely due to narcotic overdose. More alert  "since narcan started 10/28  -continue narcan     Acute kidney injury.    Resolved       Hypotension.   Likely due to dehydration  -Resolved.         Chronic pain/chronic narcotic dependence.    She is on 80 mg of OxyContin t.i.d. plus Vicodin p.r.n.   This dose is clearly dangerous going forward and would strongly encourage her outpatient physicians that she needs to get off these.  This is the second time she has done this.  At this point, we are holding all narcotics -consider resuming at  half her home dose when ready  -will discuss with dr jackson , pts neurologist.       H/O PE  On chronic coumadin  -continue meds.      Trop elevation  Doubt ACS with no pain and no EKG changes suggesting . Suspect this is from right heart strain due to h/o mod pulm HTN. Last ECHO 11/17 showed Pulmonary hypertension- RVSP 38 mm hg +RA.  - F/u ECHO today-p.       Restless leg syndrome.   Resume mirapex when awake.         Depression.    Continue meds      DISPO  Will continue ICU status. Will be in hospital 3-4 days. Discussed with family at length-concerned she is getting too much narcotics at home--see details above.     ARABELLA ROMAN MD   Pg [phone]    DVT Prhylaxis: Warfarin  Code Status: Full Code    ROS:  As described in A/P and Exam.  Otherwise ALL are  negative.    PHYSICAL EXAM:  All vitals have been reviewed    Blood pressure (!) 158/91, temperature 98.1  F (36.7  C), temperature source Axillary, resp. rate 26, height 1.499 m (4' 11\"), weight 54.1 kg (119 lb 4.3 oz), SpO2 91 %, not currently breastfeeding.    I/O this shift:  In: 50 [P.O.:50]  Out: -     GENERAL APPEARANCE: healthy,awake/ alert-talking and no distress  EYES: conjunctiva clear, eyes grossly normal  HENT: external ears and nose normal   NECK: supple, no masses or adenopathy  RESP: lungs--CTA-B  CV: regular rate and rhythm, normal S1 S2, no S3 or S4 and no murmur, click or rub   ABDOMEN: soft, nontender, no HSM or masses and bowel sounds normal  MS: no " clubbing, cyanosis; no edema  SKIN: clear without significant rashes or lesions  NEURO: -non-focal moves all 4 extr    ROUTINE  LABS (Last four results)  CMP    Recent Labs  Lab 10/29/18  0451 10/28/18  2221 10/28/18  1125 10/28/18  0504  10/27/18  0501 10/26/18  0441 10/25/18  0824     --   --  144  --  145* 147* 144   POTASSIUM 3.7 3.7 3.3* 3.4  < > 3.0* 3.6 4.3   CHLORIDE 110*  --   --  110*  --  109 112* 107   CO2 23  --   --  26  --  30 28 28   ANIONGAP 9  --   --  8  --  6 7 9   GLC 78  --   --  74  --  80 99 144*   BUN 5*  --   --  6*  --  12 15 19   CR 0.52  --   --  0.61  --  0.70 0.77 1.37*   GFRESTIMATED >90  --   --  >90  --  84 76 39*   GFRESTBLACK >90  --   --  >90  --  >90 >90 47*   JOYCELYN 9.0  --   --  8.3*  --  8.1* 7.6* 8.6   PROTTOTAL  --   --   --   --   --  5.6* 5.4* 7.4   ALBUMIN  --   --   --   --   --  2.5* 2.4* 3.2*   BILITOTAL  --   --   --   --   --  0.4 0.3 0.3   ALKPHOS  --   --   --   --   --  45 50 76   AST  --   --   --   --   --  22 32 35   ALT  --   --   --   --   --  21 23 29   < > = values in this interval not displayed.  CBC    Recent Labs  Lab 10/29/18  0451 10/27/18  0501 10/26/18  0441 10/25/18  0824   WBC 9.0 7.5 9.1 12.1*   RBC 4.23 3.71* 3.75* 4.31   HGB 14.6 12.9 13.2 15.1   HCT 43.4 39.5 40.5 49.8*   * 107* 108* 116*   MCH 34.5* 34.8* 35.2* 35.0*   MCHC 33.6 32.7 32.6 30.3*   RDW 12.9 13.2 12.9 13.0    164 155 183     INR    Recent Labs  Lab 10/29/18  0451 10/28/18  0504 10/27/18  0501 10/26/18  0441   INR 2.32* 1.34* 1.69* 3.27*     Arterial Blood Gas    Recent Labs  Lab 10/27/18  0501 10/26/18  0441 10/25/18  2138 10/25/18  1603   O2PER 45% 45% 40 52%       No results found for this or any previous visit (from the past 24 hour(s)).

## 2018-10-29 NOTE — PLAN OF CARE
Problem: Patient Care Overview  Goal: Plan of Care/Patient Progress Review  Outcome: No Change  Pt has been on a Narcan drip at 0.2 mg/hr for almost 24 hr's.  Pt's mentation has improved, she is more aware of things around her, she remembers she is in the hospital at times and is less confused, but....on the other hand she has not really slept in 3 nights which is affecting her progress.  She has been so restless, she sleeps for maybe an hour at a time.  Pt has refused to wear her own Bi-Pap, she says that is not her mask.  Lungs sounds are diminished t/o, she has been on 3 L of oxygen, her sat's have been in the upper 90's, this am decreased her to 2 L.  Will continue to monitor close for changes.

## 2018-10-30 NOTE — PLAN OF CARE
"Problem: Patient Care Overview  Goal: Plan of Care/Patient Progress Review  Outcome: No Change  Patient continues up in the recliner at bedside.  Refused use of commode to void,\"I don't have to go Now.\"  Wants to sleep in recliner tonight-\"The bed is too hard\"  Requested and did medicate with Advil/Motrin for generalized pain allow with melatonin to help her rest.  Did snap at staff and express anger about not being allowed \"more/Stronger\" pain pills.  Continue to monitor closely, plan of care continues.      "

## 2018-10-30 NOTE — PROGRESS NOTES
Patient transferred to med/surg status. Up in chair, awake alert, visiting with family. Still some right sided weakness noted and patient c/o blurred vision.

## 2018-10-30 NOTE — PROGRESS NOTES
[facility] Internal Medicine Progress Note     Date of Service (when I saw the patient): 10/30/2018    REASON FOR ADMISSION / INTERVAL HISTORY:  AMS/somnolance. Brought in by  via ambulance. Awake sitting in bed-able to talk and hold a conversation without falling off to sleep.. See details below.     CT CHEST 10/25-IMPRESSION:   1. Basilar predominant groundglass nodules and bronchial wall  thickening with areas of mucous plugging, suspicious for  bronchiolitis/infection, currently most conspicuously affecting the  right lower lobe.  2. Pulmonary artery enlargement suggestive of pulmonary artery  hypertension, unchanged.    ASSESSMENT/PLAN:   Acute hypoxic and hypercarbic respiratory failure due to unintentional narcotic overdose  Pt apparently somnolant all day for last 2-3 weeks per daughter. But was arousable--until now on admission when she Presented somnolant/ unresponsive. Has been taking her prescribed meds oxycontin 80 tid, prn vicodin. VBG showed 7.11/88/31/28. Apparently got narcan just once in the rig. Continued to be somnolant with eyes closed. Continued to need 4 L o2 until 10/28 Gave narcan 10/27 one dose-woke up and had icecream. Later fell back to sleep and constantly sleeping  Started narcan gtt 10/28. Awake and sitting in bed-holding a converstion without falling off to sleep. o2 sats stable on just 2L o2 now.  -hold off narcan gtt and watch today. If has recurrent somnolance, will resume the gtt. Will discuss with pts neurologist about discharge narcotic regimen.    Chronic obstructive pulmonary disease exacerbation/ acute RSV bronchiolitis/ bibasilar pneumonitis-possible  Viral pneumonia  Pt didn't have much prodrome, as  states she was in her usual state of health when she went to bed.   A viral respiratory panel showed RSV. Could also have aspiration pneumonia due to somnolance/ encephalopathy.  CT as above. Did have mild leucocytosis-resolved now. PCT 0.3.  Had  been on    "Zosyn, zithromax for  3 days until 10/28--afebrile, nl WBC. Steroids changed to po 10/29. Coughing a bit now--  -will start emp po levaquin x 5 days due COPD       Acute toxic/metabolic Encephalopathy  Likely due to narcotic overdose. More alert since narcan started 10/28  -discharge narcan and follow     Acute kidney injury.    Resolved       Hypotension.   Likely due to dehydration  -Resolved.         Chronic pain/chronic narcotic dependence.    She is on 80 mg of OxyContin t.i.d. plus Vicodin p.r.n.   This dose is clearly dangerous going forward and would strongly encourage her outpatient physicians that she needs to get off these.  This is the second time she has done this.  At this point, we are holding all narcotics -consider resuming at  half her home dose when ready  -will discuss with dr jackson , pts neurologist.       H/O PE  On chronic coumadin  -continue meds.      Trop elevation  Doubt ACS with no pain and no EKG changes suggesting . Suspect this is from right heart strain due to h/o mod pulm HTN. Last ECHO 11/17 showed Pulmonary hypertension- RVSP 38 mm hg +RA.  ECHO shows EF 55-60%, no WMabnl.       Restless leg syndrome.   Resume mirapex when awake.         Depression.    Continue meds      DISPO  Home in 1-2 days. .     ARABELLA ROMAN MD   Pg [phone]    DVT Prhylaxis: Warfarin  Code Status: Full Code    ROS:  As described in A/P and Exam.  Otherwise ALL are  negative.    PHYSICAL EXAM:  All vitals have been reviewed    Blood pressure 146/86, temperature 98.3  F (36.8  C), temperature source Oral, resp. rate 21, height 1.499 m (4' 11\"), weight 52.2 kg (115 lb 1.3 oz), SpO2 94 %, not currently breastfeeding.         GENERAL APPEARANCE: healthy,awake/ alert-talking and no distress  EYES: conjunctiva clear, eyes grossly normal  HENT: external ears and nose normal   NECK: supple, no masses or adenopathy  RESP: lungs--CTA-B  CV: regular rate and rhythm, normal S1 S2, no S3 or S4 and no murmur, click or " rub   ABDOMEN: soft, nontender, no HSM or masses and bowel sounds normal  MS: no clubbing, cyanosis; no edema  SKIN: clear without significant rashes or lesions  NEURO: -non-focal moves all 4 extr    ROUTINE  LABS (Last four results)  CMP    Recent Labs  Lab 10/30/18  0446 10/29/18  0451 10/28/18  2221 10/28/18  1125 10/28/18  0504  10/27/18  0501 10/26/18  0441 10/25/18  0824   NA  --  142  --   --  144  --  145* 147* 144   POTASSIUM 3.2* 3.7 3.7 3.3* 3.4  < > 3.0* 3.6 4.3   CHLORIDE  --  110*  --   --  110*  --  109 112* 107   CO2  --  23  --   --  26  --  30 28 28   ANIONGAP  --  9  --   --  8  --  6 7 9   GLC  --  78  --   --  74  --  80 99 144*   BUN  --  5*  --   --  6*  --  12 15 19   CR  --  0.52  --   --  0.61  --  0.70 0.77 1.37*   GFRESTIMATED  --  >90  --   --  >90  --  84 76 39*   GFRESTBLACK  --  >90  --   --  >90  --  >90 >90 47*   JOYCELYN  --  9.0  --   --  8.3*  --  8.1* 7.6* 8.6   PROTTOTAL  --   --   --   --   --   --  5.6* 5.4* 7.4   ALBUMIN  --   --   --   --   --   --  2.5* 2.4* 3.2*   BILITOTAL  --   --   --   --   --   --  0.4 0.3 0.3   ALKPHOS  --   --   --   --   --   --  45 50 76   AST  --   --   --   --   --   --  22 32 35   ALT  --   --   --   --   --   --  21 23 29   < > = values in this interval not displayed.  CBC    Recent Labs  Lab 10/29/18  0451 10/27/18  0501 10/26/18  0441 10/25/18  0824   WBC 9.0 7.5 9.1 12.1*   RBC 4.23 3.71* 3.75* 4.31   HGB 14.6 12.9 13.2 15.1   HCT 43.4 39.5 40.5 49.8*   * 107* 108* 116*   MCH 34.5* 34.8* 35.2* 35.0*   MCHC 33.6 32.7 32.6 30.3*   RDW 12.9 13.2 12.9 13.0    164 155 183     INR    Recent Labs  Lab 10/30/18  0446 10/29/18  0451 10/28/18  0504 10/27/18  0501   INR 3.92* 2.32* 1.34* 1.69*     Arterial Blood Gas    Recent Labs  Lab 10/27/18  0501 10/26/18  0441 10/25/18  2138 10/25/18  1603   O2PER 45% 45% 40 52%       No results found for this or any previous visit (from the past 24 hour(s)).

## 2018-10-30 NOTE — PROGRESS NOTES
Pt remains on NC @ 2lpm.  Sats 96%.  Sitting in chair.  BS diminished bilaterally.  Loose nonprod cough.  Will continue to monitor.

## 2018-10-30 NOTE — PROGRESS NOTES
Narcan gtt stopped this morning at 0830.Patient able to ambulate to bathroom with assist and use of walker. Patient c/o not being able to see on her right periferal area. Right arm weakness noted, mildly flaccid.Patient has difficulty bringing arm up by herself. Placed on r/a and oxygen saturation 94%, while sleeping saturation 89 to 90% on r/a. Patient claims to wear 02 at night.Mentation slowly improving this morning. More alert than yesterday , still gets tired easily. Appetite still very poor,but encourage to eat.  Loose greenish stool sm amounts.Will continue plan of care.

## 2018-10-30 NOTE — PLAN OF CARE
Problem: Patient Care Overview  Goal: Plan of Care/Patient Progress Review  Outcome: No Change  Has continued to be intermittently restless, not attempting to get out of bed but restless and moving about a lot which sets off the alarms.  No further outbursts of anger although as night has progressed has been more quiet.  Making a request and waiting for staff to comply.  Requested pain pills with am medications, given both Tylenol and Advil. Resettled and resting again.  Narcan drip continue at same settings.  Plan of care continues.

## 2018-10-30 NOTE — PROGRESS NOTES
Has been up and down in bed, very restless.  Given IV Ativan 0.5 mg so as to rest along with Tylenol for pain about MN but continues to be restless.  Bed alarm on as reminder to patient to stay in bed but so far has had home CPAP on and off dozen times and restless in bed.  States cold but most of the time has blankets kicked to floor and needed to resettle with blankets close up to face and cheeks.  Plan of care continues.
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
## 2018-10-31 NOTE — PROGRESS NOTES
[place] [facility] DISCHARGE NOTE    Patient discharged to home at 1:29 PM via wheel chair. Accompanied by spouse and daughter and staff. Discharge instructions reviewed with spouse and daughter, opportunity offered to ask questions. Prescriptions sent to patients preferred pharmacy. All belongings sent with patient.    Kira Argueta
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
## 2018-10-31 NOTE — PLAN OF CARE
Problem: Patient Care Overview  Goal: Plan of Care/Patient Progress Review  Outcome: Improving  A&Ox3-still off on date & time. VSS on RA. Tele-NSR. Wears home cpap at night. C/o generalized & lower back pain, getting some relief from tylenol & ibuprophen also gave aqua K pad & seems to be helping with back pain. Continues with small loose/greenish stools, voiding adequate amounts. Moving better-A1/walker, still weak but improving. Miles patch to left shoulder. spoke with sister Irma last night & she informed staff that patient has had a lot of loss in her life over past 5 years starting with her daughter passing away & she is concerned she needs intensive therapy to cope & prevent future addiction issues.

## 2018-10-31 NOTE — LETTER
[facility] CARE COORDINATION  [facility]  [address] [address]  [phone]    November 1, 2018    Tere Ortiz  [address] [place] [id number]      Dear Tere,    I am a clinic care coordinator who works with Zina Pruett PA-C at [facility]. I wanted to thank you for spending the time talking with me.  I wanted to introduce myself and provide you with my contact information so that you can call me with questions or concerns about your health care. Below is a description of clinic care coordination and how I can further assist you.     The clinic care coordinator is a registered nurse and/or  who understand the health care system. The goal of clinic care coordination is to help you manage your health and improve access to the [facility] system in the most efficient manner. The registered nurse can assist you in meeting your health care goals by providing education, coordinating services, and strengthening the communication among your providers. The  can assist you with financial, behavioral, psychosocial, chemical dependency, counseling, and/or psychiatric resources.    Please feel free to contact me at [phone], with any questions or concerns. We at [facility] are focused on providing you with the highest-quality healthcare experience possible and that all starts with you.     Sincerely,     Michelle Rader    Enclosed: I have enclosed a copy of a 24 Hour Access Plan. This has helpful phone numbers for you to call when needed. Please keep this in an easy to access place to use as needed.

## 2018-10-31 NOTE — DISCHARGE INSTRUCTIONS
You have an appointment scheduled at the [facility] in [place] on November 14th at 12:20PM with Dr. Porras.    Warfarin Instructions:  Your INR is elevated again today. INR =3.4  You will not take any warfarin today as your goal is 2-3.  Please take 7.5 mg of warfarin on Thursday and have your INR rechecked with the [facility] on Friday. The phone number to schedule an appointment is [phone]. They will provide you with further dosing instructions.

## 2018-10-31 NOTE — PHARMACY-ANTICOAGULATION SERVICE
Clinical Pharmacy- Warfarin Discharge Note  This patient is currently on warfarin for the treatment of PE.  INR Goal= 2-3  Expected length of therapy lifetime.    Warfarin PTA Regimen: 7.5 mgMWF and 10 mg ROW      Anticoagulation Dose History     Recent Dosing and Labs Latest Ref Rng & Units 10/25/2018 10/26/2018 10/27/2018 10/28/2018 10/29/2018 10/30/2018 10/31/2018    Warfarin 5 mg - - - 10 mg - 5 mg - -    Warfarin 7.5 mg - - - - 15 mg - - -    INR 0.86 - 1.14 2.51(H) 3.27(H) 1.69(H) 1.34(H) 2.32(H) 3.92(H) 3.40(H)    INR 0.86 - 1.14 - - - - - - -          Patient was on a Heparin drip as well during this encounter  Recommend discharging the patient on a warfarin regimen of 0 mg today and 7.5 mg tomorrow. Recheck INR with [facility] on Friday.  Provided patient with the number to schedule an appointment as she typically sees [facility]    The patient should have an INR checked in 2 days.

## 2018-10-31 NOTE — PROGRESS NOTES
Letter by Nanette Reyes on 10/31/2018   Transition Communication Hand-off for Care Transitions to Next Level of Care Provider     Name: Tere Ortiz  : 1955  MRN #: [id number]  Primary Care Provider: Zina Pruett  Primary Care MD Name: Flynn  Primary Clinic: [address] [facility] [address]  Primary Care Clinic Name: [facility]  Reason for Hospitalization:  COPD exacerbation (H) [J44.1]  Acute respiratory failure with hypercapnia (H) [J96.02]  Acute bronchitis, unspecified organism [J20.9]  Sepsis, due to unspecified organism (H) [A41.9]  Admit Date/Time: 10/25/2018  7:57 AM  Discharge Date: 10/31/18  Payor Source: Payor: MEDICARE / Plan: MEDICARE / Product Type: Medicare /      Readmission Assessment Measure (SKY) Risk Score/category: low        Reason for Communication Hand-off Referral: Fragility     Discharge Plan: Patient is discharging home with [facility] ([phone] Fax: [phone]).          Concern for non-adherence with plan of care:                           Y/N no  Discharge Needs Assessment:  Needs        Most Recent Value     Equipment Currently Used at Home oxygen, cane, straight     Transportation Available family or friend will provide            Follow-up specialty is recommended: No    Follow-up plan:  Future Appointments  Date Time Provider Department Center   2018 3:00 PM Zina Pruett PA-C [facility]   2018 2:30 PM Karol Simmons MD [facility]         Any outstanding tests or procedures:    Procedures     Future Labs/Procedures     Oxygen Adult      Comments:     Renew Home Oxygen Order  Renew previous prescription.  Expected treatment length is indefinite (99 months).     Attending Provider: Casper Carter  Physician signature: See electronic signature associated with these discharge orders  Date of Order: 2018                 Key Recommendations:  Patient will be returning home today with [facility] ([phone] Fax: [phone]). She will be receiving RN, SW, OT, and PT. Patient lives with her  in the community.  Her long term goal is to get her strength back.      Nanette Reyes  Social Work [facility] [phone]  [facility] [phone]        AVS/Discharge Summary is the source of truth; this is a helpful guide for improved communication of patient story

## 2018-10-31 NOTE — PROGRESS NOTES
Name: Tere Ortiz       MRN#: [id number]    Reason for Hospitalization: COPD exacerbation (H) [J44.1]  Acute respiratory failure with hypercapnia (H) [J96.02]  Acute bronchitis, unspecified organism [J20.9]  Sepsis, due to unspecified organism (H) [A41.9]    Discharge Date: 10/31/2018    Patient / Family response to discharge plan: Patient will be discharging home today with [facility] ([phone] Fax: [phone]). Patient is in agreement.     Other Providers (Care Coordinator, County Services, PCA services etc): No    CTS Hand Off Completed: Yes: completed    PAS #: none    SKY Score: low    Future Appointments: Future Appointments  Date Time Provider Department Center   11/2/2018 3:00 PM Zina Pruett, PAAyazC [facility]   11/16/2018 2:30 PM Karol Simmons MD [facility]       Discharge Disposition: home with homecare    Discharge Planner   Discharge Plans in progress: [facility]  Barriers to discharge plan: none  Follow up plan: Home Care       Entered by: Nanette Reyes 10/31/2018 10:56 AM           Niyah Nieves MSW, [facility], [phone]
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
## 2018-11-01 NOTE — MR AVS SNAPSHOT
Tere Ortiz   11/1/2018   Anticoagulation Therapy Visit    Description:  63 year old female   Provider:  Kenyetta Cooper, RN   Department:  [facility]           INR as of 11/1/2018         The patient was not given dosing instructions in this encounter.      Anticoagulation Summary as of 11/1/2018         The patient was not given dosing instructions in this encounter.      October 2018 Details    Sun Mon Tue Wed Thu Fri Sat      1               2               3               4               5               6                 7               8               9               10               11      10 mg   See details      12      7.5 mg         13      10 mg           14      10 mg         15      7.5 mg         16      10 mg         17      7.5 mg         18      10 mg         19      7.5 mg         20      10 mg           21      10 mg         22      7.5 mg         23      10 mg         24      7.5 mg         25            26               27                 28               29               30               31                   Date Details   10/11 This INR check       Date of next INR:  10/25/2018         How to take your warfarin dose     To take:  7.5 mg Take 1.5 of the 5 mg tablets.    To take:  10 mg Take 2 of the 5 mg tablets.

## 2018-11-01 NOTE — PROGRESS NOTES
Clinic Care Coordination Contact  Care Team Conversations    Victoria from  Home Care called Care Coordinator RN back she states they are not able to see the patient today as they lots of sick calls but do plan on calling today due to Care Coordinator RN's message.    Michelle Rader RN, [facility]  RN Care Coordinator  [facility]  Phone # [phone]  11/1/2018 10:32 AM

## 2018-11-01 NOTE — PROGRESS NOTES
Clinic Care Coordination Contact  Care Team Conversations    Care Coordinator RN left a VM for  at [facility] regarding the concerns from today's call.    Michelle Rader RN, [facility]  RN Care Coordinator  [facility]  Phone # [phone]  11/1/2018 10:28 AM

## 2018-11-02 NOTE — MR AVS SNAPSHOT
After Visit Summary   11/2/2018    Tere Ortiz    MRN: [id number]           Patient Information     Date Of Birth          1955        Visit Information        Provider Department      11/2/2018 3:00 PM Zina Pruett PA-C [facility]        Today's Diagnoses     Diarrhea, unspecified type    -  1    Other chronic pain          Care Instructions    Pain -  Can increase tylenol (acetaminophen) to 1000 mg (2 of the 500 mg tabs) every 4-6 hrs, max 4 doses per day  Stop the ibuprofen.  Make sure not taking nabumetone (relafen)  Try meloxicam prescribed today instead.  Take with food to protect stomach.  If blood or black in stool, or stomach pain, stop med and let me know.  If dizzy, short of breathe, etc, be seen.  cymbalta (duloxetine) should be 60 mg ONCE DAILY  Can try lidocaine ointment prescribed  Other topical options - Tiger balm, capsaicin (icy hot), thermacare heat  Keep follow up with pain doctor    INR TEST NEXT WEEK IF GOING TO STAY ON MELOXICAM    Stools -  C dif test    Breathing -   Continue the levaquin (levofloxacin) and prednisone until prescription done     Recheck 1 month for pain and all conditions, sooner if needed          Follow-ups after your visit        Your next 10 appointments already scheduled     Nov 16, 2018  2:30 PM CST   (Arrive by 2:15 PM)   RETURN ENDOCRINE with Karol Simmons MD   [facility] ([facility])    [address] [place] [id number] [phone]              Future tests that were ordered for you today     Open Future Orders        Priority Expected Expires Ordered    Clostridium difficile Toxin B PCR Routine  12/2/2018 11/2/2018            Who to contact     If you have questions or need follow up information about today's clinic visit or your schedule please contact [facility] directly at [phone].  Normal or non-critical lab and  "imaging results will be communicated to you by MyChart, letter or phone within 4 business days after the clinic has received the results. If you do not hear from us within 7 days, please contact the clinic through [employer] or phone. If you have a critical or abnormal lab result, we will notify you by phone as soon as possible.  Submit refill requests through [employer] or call your pharmacy and they will forward the refill request to us. Please allow 3 business days for your refill to be completed.          Additional Information About Your Visit        [employer] & [employer] Information     [employer] gives you secure access to your electronic health record. If you see a primary care provider, you can also send messages to your care team and make appointments. If you have questions, please call your primary care clinic.  If you do not have a primary care provider, please call [phone] and they will assist you.        Care EveryWhere ID     This is your Care EveryWhere ID. This could be used by other organizations to access your [place] medical records  [id number]        Your Vitals Were     Pulse Respirations Height Pulse Oximetry BMI (Body Mass Index)       91 18 4' 11\" (1.499 m) 96% 23.23 kg/m2        Blood Pressure from Last 3 Encounters:   11/02/18 96/65   10/31/18 122/68   06/18/18 100/60    Weight from Last 3 Encounters:   11/02/18 115 lb (52.2 kg)   10/30/18 115 lb 1.3 oz (52.2 kg)   06/18/18 126 lb (57.2 kg)                 Today's Medication Changes          These changes are accurate as of 11/2/18  3:58 PM.  If you have any questions, ask your nurse or doctor.               Start taking these medicines.        Dose/Directions    lidocaine 5 % ointment   Commonly known as:  XYLOCAINE   Used for:  Other chronic pain   Started by:  Zina Pruett PA-C        Apply topically daily As needed for pain   Quantity:  30 g   Refills:  0       meloxicam 7.5 MG tablet   Commonly known as:  MOBIC   Used for:  Other chronic " pain   Started by:  Zina Pruett PA-C        Dose:  7.5 mg   Take 1 tablet (7.5 mg) by mouth daily   Quantity:  30 tablet   Refills:  0         These medicines have changed or have updated prescriptions.        Dose/Directions    DULoxetine 60 MG EC capsule   Commonly known as:  CYMBALTA   This may have changed:  when to take this   Used for:  Other chronic pain   Changed by:  Zina Pruett PA-C        Dose:  60 mg   Take 1 capsule (60 mg) by mouth daily   Quantity:  30 capsule   Refills:  1       warfarin 5 MG tablet   Commonly known as:  COUMADIN   This may have changed:  additional instructions   Used for:  Embolism (H), Lupus anticoagulant inhibitor syndrome (H)   Changed by:  Nadia Monahan RN        Take 1.5-2 tabs daily as directed by the Anticoag Clinic (maintenance dose being re-established after hospitalization)   Quantity:  160 tablet   Refills:  0         Stop taking these medicines if you haven't already. Please contact your care team if you have questions.     nabumetone 500 MG tablet   Commonly known as:  RELAFEN   Stopped by:  Zina Pruett PA-C                Where to get your medicines      These medications were sent to [facility] #[facility] [address] [address]    Hours:  Closed 10-16-08 business to [facility] Phone:  [phone]     DULoxetine 60 MG EC capsule    lidocaine 5 % ointment    meloxicam 7.5 MG tablet                Primary Care Provider Office Phone # Fax #    Zina Pruett PA-C [phone] [address] [facility] [address]        Equal Access to Services     [facility] Hospital[facility] : Hadii alcides manriquez hadasho Soomaali, waaxda luqadaha, qaybta kaalmada adeegyayolanda, salas cifuentes. So [facility] [phone].    ATENCIÓN: Si habla español, tiene a acosta disposición servicios gratuitos de asistencia lingüística. Llame al [phone].    We comply with  applicable federal civil rights laws and [place] laws. We do not discriminate on the basis of race, color, national origin, age, disability, sex, sexual orientation, or gender identity.            Thank you!     Thank you for choosing [facility]  for your care. Our goal is always to provide you with excellent care. Hearing back from our patients is one way we can continue to improve our services. Please take a few minutes to complete the written survey that you may receive in the mail after your visit with us. Thank you!             Your Updated Medication List - Protect others around you: Learn how to safely use, store and throw away your medicines at www.disposemymeds.org.          This list is accurate as of 11/2/18  3:58 PM.  Always use your most recent med list.                   Brand Name Dispense Instructions for use Diagnosis    ammonium lactate 12 % cream    AMLACTIN    385 g    Apply topically 2 times daily    Other acute sinusitis, recurrence not specified, Chronic obstructive pulmonary disease, unspecified COPD type (H), Abnormal findings on diagnostic imaging of other specified body structures, Vitamin D deficiency, Abnormal finding of blood chemistry, Dry skin       aspirin 81 MG tablet      Take 1 tablet by mouth daily.        azelastine 0.1 % nasal spray    ASTELIN    1 Bottle    Spray 1 spray into both nostrils 2 times daily As needed.    Seasonal allergic rhinitis, unspecified chronicity, unspecified trigger       baclofen 10 MG tablet    LIORESAL    180 tablet    Take 1 tablet (10 mg) by mouth daily 2 at bedtime    Cervical strain, Myofascial pain syndrome       busPIRone 10 MG tablet    BUSPAR    90 tablet    Take 2-3 tablets (20-30 mg) by mouth At Bedtime    Anxiety       CLINDAMAX 1 % lotion   Generic drug:  clindamycin     60 mL    Apply topically 2 times daily        DULoxetine 60 MG EC capsule    CYMBALTA    30 capsule    Take 1 capsule (60 mg) by mouth daily    Other  chronic pain       FLONASE 50 MCG/ACT spray   Generic drug:  fluticasone      Spray 1 spray into both nostrils daily.        levofloxacin 500 MG tablet    LEVAQUIN    4 tablet    Take 1 tablet (500 mg) by mouth daily for 4 days    COPD exacerbation (H)       lidocaine 5 % ointment    XYLOCAINE    30 g    Apply topically daily As needed for pain    Other chronic pain       melatonin 3 MG tablet      Take 1 mg by mouth nightly as needed for sleep        meloxicam 7.5 MG tablet    MOBIC    30 tablet    Take 1 tablet (7.5 mg) by mouth daily    Other chronic pain       nystatin 706496 UNIT/GM Powd    NYSTOP    60 g    APPLY TOPICALLY 3 TIMES DAILY FOR 1-2 WEEKS UNTIL RASH CLEARED    Intertrigo       ondansetron 4 MG ODT tab    ZOFRAN-ODT    18 tablet    DISSOLVE ONE TABLET IN MOUTH EVERY EIGHT HOURS AS NEEDED    Nausea, Migraines       * order for DME     1 Device    BiPAP: IPAP 11 cm H2O EPAP 6 cm H2O Pt has her own BiPAP New CPAP supplies- try [place] mask if it comes in an extra small size.  Alternatively could try [place] with nasal prongs occluded. Lifetime need and heated humidity.    TITO (obstructive sleep apnea)       * order for DME      BiPAP: IPAP 12 cm H2O EPAP 7 cm H2O  Lifetime need and heated humidity.    TITO (obstructive sleep apnea)       * order for DME     1 Device    O2: During sleep 1.5 LPM via O2 Concentrator  Bled in through BiPAP    TITO (obstructive sleep apnea), Sleep related hypoventilation/hypoxemia in other disease       order for DME     4 Units    Compression stockings (Thigh High)- 2 pairs    Peripheral edema       pantoprazole 40 MG EC tablet    PROTONIX    30 tablet    Take 1 tablet (40 mg) by mouth daily Take 30-60 minutes before a meal.    Gastroesophageal reflux disease with esophagitis       potassium chloride 10 MEQ tablet    K-TAB,KLOR-CON    120 tablet    TAKE TWO TABLETS BY MOUTH TWICE DAILY    Low blood potassium       pramipexole 1 MG tablet    MIRAPEX     Take 1 mg by mouth 4  times daily as needed        predniSONE 20 MG tablet    DELTASONE    6 tablet    Take 2 tablets (40 mg) by mouth daily for 3 days    COPD exacerbation (H)       SOMA 350 MG tablet   Generic drug:  carisoprodol      Take 1 tablet by mouth. Take at bedtime        SPIRIVA HANDIHALER 18 MCG capsule   Generic drug:  tiotropium     30 capsule    INHALE ONE CAPSULE VIA HANDIHALER ONE TIME DAILY    Chronic obstructive pulmonary disease, unspecified COPD type (H)       SUMAtriptan 20 MG/ACT nasal spray    IMITREX    1 each    SPRAY ONCE INTO NOSTRIL CAN REPEAT IN 2 HRS IF NOT BETTER    Episodic tension-type headache, not intractable       SYNTHROID 125 MCG tablet   Generic drug:  levothyroxine     90 tablet    Take 1 tablet (125 mcg) by mouth daily    Hypothyroidism, unspecified type       TOPAMAX 100 MG tablet   Generic drug:  topiramate      Take  by mouth 2 times daily. Take 1.5 pill in mornings Take 1.5 at bedtime        traZODone HCl 150 MG 24 hr tablet    OLEPTRO     Take 150 mg by mouth At Bedtime        vitamin D3 2000 units Caps     90 capsule    Take 1 capsule by mouth daily    Chronic fatigue       warfarin 5 MG tablet    COUMADIN    160 tablet    Take 1.5-2 tabs daily as directed by the Anticoag Clinic (maintenance dose being re-established after hospitalization)    Embolism (H), Lupus anticoagulant inhibitor syndrome (H)       * Notice:  This list has 3 medication(s) that are the same as other medications prescribed for you. Read the directions carefully, and ask your doctor or other care provider to review them with you.

## 2018-11-02 NOTE — NURSING NOTE
"Chief Complaint   Patient presents with     Hospital F/U       Initial BP 96/65 (BP Location: Left arm, Patient Position: Chair, Cuff Size: Adult Regular)  Pulse 91  Resp 18  Ht 4' 11\" (1.499 m)  Wt 115 lb (52.2 kg)  SpO2 96%  BMI 23.23 kg/m2 Estimated body mass index is 23.23 kg/(m^2) as calculated from the following:    Height as of this encounter: 4' 11\" (1.499 m).    Weight as of this encounter: 115 lb (52.2 kg).    Patient presents to the clinic using Wheel Chair    Health Maintenance that is potentially due pending provider review:  NONE        Is there anyone who you would like to be able to receive your results? No  If yes have patient fill out DON      "

## 2018-11-02 NOTE — TELEPHONE ENCOUNTER
Medication interactions noted as Duloxetine and warfarin, Levofloxacin and Ondansetron, Levofloxacin and Trazodone, Levofloxacin and Warfarin, Relafen and Warfarin.   Please Advise.   Thank you   Ayesha Mendez RN on 11/2/2018 at 4:50 PM  Ph# [phone]

## 2018-11-02 NOTE — PROGRESS NOTES
ANTICOAGULATION FOLLOW-UP CLINIC VISIT    Patient Name:  Tere Ortiz  Date:  11/2/2018  Contact Type:  Telephone/ [facility]    SUBJECTIVE:     Patient Findings     Positives Inflammation (COPD exacerbation), Antibiotic use or infection (Levaquin)    Comments Hospitalized 10/25-10/31 for COPD exacerbation, acute respiratory failure, bronchitis, sepsis    Principle diagnosis: Acute hypoxic and hypercarbic respiratory failure due to unintentional narcotic overdose.    Patient was prescribed Levaquin for 4 days, should be complete after Saturday. Patient continues to drink 2 cans of ensure daily - this is unchanged. Homecare nurse confirmed she held her dose Wednesday and took 7.5mg yesterday.     Patient had 37.5mg in the previous 7 days, will adjust dose to keep weekly mg total the same by the next INR check on Monday.    Patient is no longer taking oxycodone, now she is alternating between tylenol and ibuprofen. Writer explained the increased bleed risk.           OBJECTIVE    INR   Date Value Ref Range Status   11/02/2018 2.8  Final       ASSESSMENT / PLAN  No question data found.  Anticoagulation Summary as of 11/2/2018     INR goal 2.0-3.0   Today's INR 2.8   Warfarin maintenance plan No maintenance plan   Full warfarin instructions 11/2: 7.5 mg; 11/3: 7.5 mg; 11/4: 10 mg   Weekly warfarin total 62.5 mg   Plan last modified Nadia Monahan RN (11/2/2018)   Next INR check 11/5/2018   Priority INR   Target end date Indefinite    Indications   Lupus anticoagulant inhibitor syndrome (H) [D68.62]  Pulmonary embolism with infarction (H) [I26.99]  Long-term (current) use of anticoagulants [Z79.01] [Z79.01]  Embolism - blood clot [I74.9]         Anticoagulation Episode Summary     INR check location     Preferred lab     Send INR reminders to [place] PHONE ANTICOAG POOL    Comments * Marla Ramirez, Ok to continue same dose if in range & pt will call with concerns. warfarin in AM. Drinks high  protein Ensure daily.  [facility]      Anticoagulation Care Providers     Provider Role Specialty Phone number    Zina Pruett PA-C Responsible Physician Assistant [phone]            See the Encounter Report to view Anticoagulation Flowsheet and Dosing Calendar (Go to Encounters tab in chart review, and find the Anticoagulation Therapy Visit)        Nadia Monahan RN [facility]

## 2018-11-02 NOTE — PATIENT INSTRUCTIONS
Pain -  Can increase tylenol (acetaminophen) to 1000 mg (2 of the 500 mg tabs) every 4-6 hrs, max 4 doses per day  Stop the ibuprofen.  Make sure not taking nabumetone (relafen)  Try meloxicam prescribed today instead.  Take with food to protect stomach.  If blood or black in stool, or stomach pain, stop med and let me know.  If dizzy, short of breathe, etc, be seen.  cymbalta (duloxetine) should be 60 mg ONCE DAILY  Can try lidocaine ointment prescribed  Other topical options - Tiger balm, capsaicin (icy hot), thermacare heat  Keep follow up with pain doctor    INR TEST NEXT WEEK IF GOING TO STAY ON MELOXICAM    Stools -  C dif test    Breathing -   Continue the levaquin (levofloxacin) and prednisone until prescription done     Recheck 1 month for pain and all conditions, sooner if needed

## 2018-11-02 NOTE — TELEPHONE ENCOUNTER
relafen was discontinue today, levaquin short term, duloxetine chronic. No concerns from thsi provider.

## 2018-11-02 NOTE — PROGRESS NOTES
SUBJECTIVE:   Tere Ortiz is a 63 year old female who presents to clinic today for the following health issues:          Hospital Follow-up Visit:    Hospital/Nursing Home/IP Rehab Facility: [facility]  Date of Admission: 10/25/18  Date of Discharge: 10/31/18  Reason(s) for Admission: COPD exacerbation (H) [J44.1]  Acute respiratory failure with hypercapnia (H) [J96.02]  Acute bronchitis, unspecified organism [J20.9]  Sepsis, due to unspecified organism (H) [A41.9]            Problems taking medications regularly:  None       Medication changes since discharge: None       Problems adhering to non-medication therapy:  None    Summary of hospitalization:  [facility] discharge summary reviewed  Diagnostic Tests/Treatments reviewed.  Follow up needed: none  Other Healthcare Providers Involved in Patient s Care:         Homecare, Care Coordination and Specialist appointment - pain clinic next wk  Update since discharge: improved.     Post Discharge Medication Reconciliation: discharge medications reconciled and changed, per note/orders (see AVS).  Plan of care communicated with patient and family     Coding guidelines for this visit:  Type of Medical   Decision Making Face-to-Face Visit       within 7 Days of discharge Face-to-Face Visit        within 14 days of discharge   Moderate Complexity 29900 [id number]   High Complexity 21495 17036            Pain medications were discontinued in hospital.  Has been having some serious leg/calf pain.  Taking ibuprofen/tylenol.  Because of that, it's upsetting her stomach.  Home health nurse was out and suggested Lyrica.    Acute hypoxia due to unintentional narcotic overdose.  No longer on narcotics.  Pain has increased - at worst 9/10, usually 7/10.  Taking tylenol 500 (q4-6 hrs) and ibuprofen 600 (q 7-8 hrs), alternating, and feels is already tearing up her stomach (nausea). No blood in stool but watery diarrhea 3 x per day since left hospital.  No normal  "stools.  Also apparently taking relafen from her endocrinologist.    Breathing doing well, back to normal settings - oxygen 4L continuous bipap night.   No smoking since discharge.    Patient and  overwhelmed by what happened but feeling they have good supports and planning to utilize all the services provided.  She does not recall conversation (referenced in care coordinator note) of telling someone in hospital that she would need a lot of counseling to avoid addiction issues related to losses over past 5 yrs.  She does not feel depressed like she was last yr.  She feels medication overdose was accidental and  agrees.  He feels she frequently asks for pain medication, related to pain.  Daughter has helped with meds.  She does not feel she needs counseling at this time.  Has pain specialist f/u appt next week.    INR today 2.8.    Problem list and histories reviewed & adjusted, as indicated.  Additional history: as documented    BP Readings from Last 3 Encounters:   11/02/18 96/65   10/31/18 122/68   06/18/18 100/60    Wt Readings from Last 3 Encounters:   11/02/18 115 lb (52.2 kg)   10/30/18 115 lb 1.3 oz (52.2 kg)   06/18/18 126 lb (57.2 kg)         Labs reviewed in EPIC    Reviewed and updated as needed this visit by clinical staff  Tobacco  Allergies  Meds  Problems  Med Hx  Surg Hx  Fam Hx  Soc Hx        Reviewed and updated as needed this visit by Provider  Tobacco  Allergies  Meds  Problems  Med Hx  Surg Hx  Fam Hx  Soc Hx          ROS:  Constitutional, HEENT, cardiovascular, pulmonary, GI, , musculoskeletal, neuro, skin, endocrine and psych systems are negative, except as otherwise noted.    OBJECTIVE:     BP 96/65 (BP Location: Left arm, Patient Position: Chair, Cuff Size: Adult Regular)  Pulse 91  Resp 18  Ht 4' 11\" (1.499 m)  Wt 115 lb (52.2 kg)  SpO2 96%  BMI 23.23 kg/m2  Body mass index is 23.23 kg/(m^2).  GENERAL: healthy, alert and no distress  RESP: lungs clear to " auscultation - no rales, rhonchi or wheezes  CV: regular rate and rhythm, normal S1 S2, no S3 or S4, no murmur, click or rub, no peripheral edema and peripheral pulses strong  PSYCH: mentation appears normal, affect normal/bright    Diagnostic Test Results:  none     ASSESSMENT/PLAN:       ICD-10-CM    1. Acute and chronic respiratory failure with hypoxia (H) J96.21    2. Narcotic overdose, accidental or unintentional, initial encounter (H) T40.601A    3. COPD exacerbation (H) J44.1    4. Lupus anticoagulant inhibitor syndrome (H) D68.62 Coumadin therapy chronic, complicating NSAID use   5. Viral pneumonia J12.9    6. Diarrhea, unspecified type R19.7 Clostridium difficile Toxin B PCR   7. Other chronic pain G89.29 meloxicam (MOBIC) 7.5 MG tablet     DULoxetine (CYMBALTA) 60 MG EC capsule     lidocaine (XYLOCAINE) 5 % ointment       Patient Instructions   Pain -  Can increase tylenol (acetaminophen) to 1000 mg (2 of the 500 mg tabs) every 4-6 hrs, max 4 doses per day  Stop the ibuprofen.  Make sure not taking nabumetone (relafen)  Try meloxicam prescribed today instead.  Take with food to protect stomach.  If blood or black in stool, or stomach pain, stop med and let me know.  If dizzy, short of breathe, etc, be seen.  cymbalta (duloxetine) should be 60 mg ONCE DAILY  Can try lidocaine ointment prescribed  Other topical options - Tiger balm, capsaicin (icy hot), thermacare heat  Keep follow up with pain doctor    INR TEST NEXT WEEK IF GOING TO STAY ON MELOXICAM    Stools -  C dif test    Breathing -   Continue the levaquin (levofloxacin) and prednisone until prescription done     Recheck 1 month for pain and all conditions, sooner if needed      Zina Pruett PA-C  [facility]

## 2018-11-02 NOTE — MR AVS SNAPSHOT
Tere Ortiz   11/2/2018   Anticoagulation Therapy Visit    Description:  63 year old female   Provider:  Nadia Monahan, RN   Department:  [facility]           INR as of 11/2/2018     Today's INR 2.8      Anticoagulation Summary as of 11/2/2018     INR goal 2.0-3.0   Today's INR 2.8   Full warfarin instructions 11/2: 7.5 mg; 11/3: 7.5 mg; 11/4: 7.5 mg; 11/5: 7.5 mg   Next INR check 11/6/2018    Indications   Lupus anticoagulant inhibitor syndrome (H) [D68.62]  Pulmonary embolism with infarction (H) [I26.99]  Long-term (current) use of anticoagulants [Z79.01] [Z79.01]  Embolism - blood clot [I74.9]         November 2018 Details    Sun Mon Tue Wed Thu Fri Sat         1               2      7.5 mg   See details      3      7.5 mg           4      7.5 mg         5      7.5 mg         6            7               8               9               10                 11               12               13               14               15               16               17                 18               19               20               21               22               23               24                 25               26               27               28               29               30                 Date Details   11/02 This INR check       Date of next INR:  11/6/2018         How to take your warfarin dose     To take:  7.5 mg Take 1.5 of the 5 mg tablets.
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
## 2018-11-03 NOTE — TELEPHONE ENCOUNTER
Caller reports she has had  5-6 watery stools for past  5 days; Recent hospitalization for accidental opioid overdose.  Did see PCP yesterday for follow up but did not mention diarrhea's severity and was sent home to collect  Stool sample for C diff  Todays states she she is very thirsty and toleratiing only small amounts of water without more diarrhea; is weak and dizzy and mouth is dry; voided early a.m.but none since   triage prococol and EMR  Reviewed   advised ED assessment today; advised to bring stool sample with her   Understands and will comply  Reason for Disposition    [1] Drinking very little AND [2] dehydration suspected (e.g., no urine > 12 hours, very dry mouth, very lightheaded)    Additional Information    Negative: Shock suspected (e.g., cold/pale/clammy skin, too weak to stand, low BP, rapid pulse)    Negative: Difficult to awaken or acting confused  (e.g., disoriented, slurred speech)    Negative: Sounds like a life-threatening emergency to the triager    Negative: Vomiting also present and worse than the diarrhea    Negative: [1] Blood in stool AND [2] without diarrhea    Negative: [1] SEVERE abdominal pain (e.g., excruciating) AND [2] present > 1 hour    Negative: [1] SEVERE abdominal pain AND [2] age > 60    Negative: [1] Blood in the stool AND [2] moderate or large amount of blood    Negative: Black or tarry bowel movements  (Exception: chronic-unchanged  black-grey bowel movements AND is taking iron pills or Pepto-bismol)    Protocols used: DIARRHEA-ADULT-

## 2018-11-03 NOTE — DISCHARGE INSTRUCTIONS
Gatorade 64 ounces in 24 hours to avoid dehydration.  Try to collect stool sample through methods discussed and bring sample back for testing.  Outpatient orders been placed.  He can return sample to the triage desk in the ED.  Results be sent to primary clinic provider and typically take 2-3 days.  Monitor for fever, abdominal distention, abdominal pain or bloody stools.  If noted please return your primary clinic provider or to the emergency department.    Recommend good hygiene/handwashing..

## 2018-11-03 NOTE — ED TRIAGE NOTES
"Pt arrives via WC from triage from home. Admit to recent hospitalization for \"drug overdose\" currently taking levaquin for pneumonia. States she had diarrhea upon discharge Thursday, but now worse \" I think I am dehydrated. Actually I know I am\" Pt sitting in NAD. Here with .   Currently changing into a gown, enteric precautions started.   "

## 2018-11-03 NOTE — ED AVS SNAPSHOT
[facility] Emergency Department    [address] [address]    Phone:  [phone]    Fax:  [phone]                                       Tere Ortiz   MRN: [id number]    Department:  [facility] Emergency Department   Date of Visit:  11/3/2018           After Visit Summary Signature Page     I have received my discharge instructions, and my questions have been answered. I have discussed any challenges I see with this plan with the nurse or doctor.    ..........................................................................................................................................  Patient/Patient Representative Signature      ..........................................................................................................................................  Patient Representative Print Name and Relationship to Patient    ..................................................               ................................................  Date                                   Time    ..........................................................................................................................................  Reviewed by Signature/Title    ...................................................              ..............................................  Date                                               Time          22EPIC Rev 08/18

## 2018-11-05 NOTE — PROGRESS NOTES
ANTICOAGULATION FOLLOW-UP CLINIC VISIT    Patient Name:  Tere Ortiz  Date:  11/5/2018  Contact Type:  Telephone/ [facility]    SUBJECTIVE:     Patient Findings     Positives Change in medications (NSAID use, cymbalta dose decrease)    Comments meloxicam started on Friday last week (NSAID), stopped taking ibuprofen. Patient's tylenol was increased to 1000mg every 4-6 hours. Cymbalta decreased from 60mg twice daily down to 60mg daily.    Appetite has been very poor (last albumin level on 10/27 was 2.5). Writer encouraged more high protein foods.    No issues with bleeding or unusual bruising noted.            OBJECTIVE    INR   Date Value Ref Range Status   11/05/2018 3.1  Final       ASSESSMENT / PLAN  No question data found.  Anticoagulation Summary as of 11/5/2018     INR goal 2.0-3.0   Today's INR 3.1!   Warfarin maintenance plan No maintenance plan   Full warfarin instructions 11/5: 2.5 mg; 11/6: 2.5 mg; 11/7: 2.5 mg   Weekly warfarin total 62.5 mg   Plan last modified Nadia Monahan RN (11/2/2018)   Next INR check 11/8/2018   Priority INR   Target end date Indefinite    Indications   Lupus anticoagulant inhibitor syndrome (H) [D68.62]  Pulmonary embolism with infarction (H) [I26.99]  Long-term (current) use of anticoagulants [Z79.01] [Z79.01]  Embolism - blood clot [I74.9]         Anticoagulation Episode Summary     INR check location     Preferred lab     Send INR reminders to [place] PHONE ANTICOAG POOL    Comments * Marla Ramirez, Ok to continue same dose if in range & pt will call with concerns. warfarin in AM. Drinks high protein Ensure twice daily  [facility]      Anticoagulation Care Providers     Provider Role Specialty Phone number    Zina Pruett PA-C Responsible Physician Assistant [phone]            See the Encounter Report to view Anticoagulation Flowsheet and Dosing Calendar (Go to Encounters tab in chart review, and find the Anticoagulation Therapy  Visit)        [facility]ahan RN [facility]

## 2018-11-05 NOTE — TELEPHONE ENCOUNTER
Lidocaine 5% ointment is not covered.  Lidocaine priolocaine 2.5% may be covered.  Please fax new rx if OK to change.

## 2018-11-05 NOTE — MR AVS SNAPSHOT
Tere Ortiz   11/5/2018   Anticoagulation Therapy Visit    Description:  63 year old female   Provider:  Nadia Monahan, RN   Department:  [facility]           INR as of 11/5/2018     Today's INR 3.1!      Anticoagulation Summary as of 11/5/2018     INR goal 2.0-3.0   Today's INR 3.1!   Full warfarin instructions 11/5: 2.5 mg; 11/6: 2.5 mg; 11/7: 2.5 mg   Next INR check 11/8/2018    Indications   Lupus anticoagulant inhibitor syndrome (H) [D68.62]  Pulmonary embolism with infarction (H) [I26.99]  Long-term (current) use of anticoagulants [Z79.01] [Z79.01]  Embolism - blood clot [I74.9]         November 2018 Details    Sun Mon Tue Wed Thu Fri Sat         1               2               3                 4               5      2.5 mg   See details      6      2.5 mg         7      2.5 mg         8            9               10                 11               12               13               14               15               16               17                 18               19               20               21               22               23               24                 25               26               27               28               29               30                 Date Details   11/05 This INR check       Date of next INR:  11/8/2018         How to take your warfarin dose     To take:  2.5 mg Take 0.5 of a 5 mg tablet.

## 2018-11-06 NOTE — PROGRESS NOTES
Clinic Care Coordination Contact  Care Team Conversations    Care Coordinator RN left a VM for Arlyn VILLA RN with [facility] who has seen patient twice now, for start of care and yesterday. Care Coordinator RN asking her for update on patient before calling out to patient and family. Care Coordinator RN left her name and contact information on her VM and requested a return call.     Per review, patient did see PCP on Friday 11/2/18 as well but denies having any depression issues and denies having the conversation with the nurse in the hospital about needing more intensive therapy to copy & prevent future addiction issues.    Plan:  Care Coordinator RN will await for Arlyn to call Care Coordinator RN back.    Michelle Rader RN, [facility]  RN Care Coordinator  [facility]  Phone # [phone]  11/6/2018 10:44 AM

## 2018-11-06 NOTE — TELEPHONE ENCOUNTER
Central Prior Authorization Team   Phone: [phone]      PA Initiation    Medication: lidocaine 5%-Initiated  Insurance Company: [id number] - Phone [phone] Fax [phone]  Pharmacy Filling the Rx: [id number] [place] [id number] [place]  Filling Pharmacy Phone: [phone]  Filling Pharmacy Fax:    Start Date: 11/6/2018
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
## 2018-11-07 NOTE — TELEPHONE ENCOUNTER
Called and spoke with Christina at [facility] to check on the status of PA.  It is in the pharmacists queue to be reviewed.

## 2018-11-07 NOTE — PROGRESS NOTES
Clinic Care Coordination Contact  Care Team Conversations    Arlyn VILLA Rn with [facility] care came into the clinic. We discussed concerns of patient being extremely depressed, not drinking enough water/fluids. Discussed how spouse is not sure how to care for patient and how patient had completely messed up her medications over the weekend using ones that have been discontinued in her pill box. She states she sat down with patient and spouse and redid her medication box for three days until next RN to assess. We also discussed that maybe the best for the patient would be TCU to get her strength back but will see what the next nurse thinks at the next assessment.    Plan:  Care Coordinator RN will f/u with patient and spouse to see how things are going.    Michelle Rader RN, [facility]  RN Care Coordinator  [facility]  Phone # [phone]  11/7/2018 11:32 AM

## 2018-11-07 NOTE — TELEPHONE ENCOUNTER
PRIOR AUTHORIZATION DENIED    Medication: lidocaine 5%-DENIED    Denial Date: 11/7/2018    Denial Rational: Diagnosis is not a medically-accepted indication based on Medicare Part D prescription requirements.              Appeal Information: No appeal information was provided.

## 2018-11-08 NOTE — MR AVS SNAPSHOT
Tere Ortiz   11/8/2018   Anticoagulation Therapy Visit    Description:  63 year old female   Provider:  Nadia Monahan, RN   Department:  [facility]           INR as of 11/8/2018     Today's INR 1.8!      Anticoagulation Summary as of 11/8/2018     INR goal 2.0-3.0   Today's INR 1.8!   Full warfarin instructions 11/8: 10 mg; 11/9: 10 mg; 11/10: 7.5 mg; 11/11: 10 mg   Next INR check 11/12/2018    Indications   Lupus anticoagulant inhibitor syndrome (H) [D68.62]  Pulmonary embolism with infarction (H) [I26.99]  Long-term (current) use of anticoagulants [Z79.01] [Z79.01]  Embolism - blood clot [I74.9]         November 2018 Details    Sun Mon Tue Wed Thu Fri Sat         1               2               3                 4               5               6               7               8      10 mg   See details      9      10 mg         10      7.5 mg           11      10 mg         12            13               14               15               16               17                 18               19               20               21               22               23               24                 25               26               27               28               29               30                 Date Details   11/08 This INR check       Date of next INR:  11/12/2018         How to take your warfarin dose     To take:  7.5 mg Take 1.5 of the 5 mg tablets.    To take:  10 mg Take 2 of the 5 mg tablets.

## 2018-11-08 NOTE — TELEPHONE ENCOUNTER
Homecare RN called. States Pt has been c/o abdominal pain and SOB for the last few days. States she thinks pt should come into clinic to be assessed. I questioned if she needed to do tot he emergency room. Home Care RN states she doesn't think pt needs ER as she is SOB but not struggling. Appointment made for today. Amee Alexandra RN

## 2018-11-08 NOTE — TELEPHONE ENCOUNTER
Pt called. Advised physical therapy has already been ordered. See Home Care Referral. Amee Alexandra RN

## 2018-11-08 NOTE — TELEPHONE ENCOUNTER
Reason for call:  Patient reporting a symptom    Symptom or request: Abdominal Pain & SOB. Past two days.    Phone Number patient can be reached at:  Home number on file [phone]     Best Time:  Any Time      Can we leave a detailed message on this number:  YES    Call taken on 11/8/2018 at 9:30 AM by Ileana Santos

## 2018-11-08 NOTE — NURSING NOTE
"Chief Complaint   Patient presents with     Abdominal Pain       Initial /70  Pulse 94  Temp 97.9  F (36.6  C) (Tympanic)  Resp 22  Ht 4' 10\" (1.473 m)  Wt 115 lb (52.2 kg)  SpO2 99%  BMI 24.04 kg/m2 Estimated body mass index is 24.04 kg/(m^2) as calculated from the following:    Height as of this encounter: 4' 10\" (1.473 m).    Weight as of this encounter: 115 lb (52.2 kg).    Patient presents to the clinic using Wheel Chair    Health Maintenance that is potentially due pending provider review:  NONE    n/a    Is there anyone who you would like to be able to receive your results? No  If yes have patient fill out DON      "

## 2018-11-08 NOTE — PROGRESS NOTES
[facility] now requests orders and shares plan of care/discharge summaries for some patients through [employer].  Please REPLY TO THIS MESSAGE OR ROUTE BACK TO THE AUTHOR in order to give authorization for orders when needed.  This is considered a verbal order, you will still receive a faxed copy of orders for signature.  Thank you for your assistance in improving collaboration for our patients.    ORDER    MD SUMMARY/PLAN OF CARE    PT for ambulation and exercises 2w3.    Katty Hightower MPT

## 2018-11-08 NOTE — PATIENT INSTRUCTIONS
Stop the Meloxicam and start the Diclofenac topical.  Make sure to monitor for any bleeding or excessive bruising and should this occur discontinue the topical medication.    See pain clinic as scheduled in the next week.    Follow up with your primary care provider in the next month    Follow-up with your primary care provider next week and as needed.    Indications for emergent return to emergency department discussed with patient, who verbalized good understanding and agreement.  Patient understands the limitations of today's evaluation.         Chronic Pain  Pain serves an important role. It lets you know something is wrong that needs your attention. When the body heals, pain normally goes away.  When pain lasts longer than 6 months, it is called  chronic  pain. This is pain that is present even after the body has healed. Chronic pain can cause mood problems and get in the way of your relationships and your daily life.  A number of conditions can cause chronic pain. Some of the more common include:    Previous surgery    An old injury    Infection    Diseases such as diabetes    Nerve damage    Back injury    Arthritis    Migraine or other headaches    Fibromyalgia    Cancer  Depression and stress can make chronic pain symptoms worse. In some cases, a cause for the pain can't be found.   Treatment  Treatment can greatly reduce pain. In many cases, pain can become less severe, occur less often, and interfere less with your daily life. Chronic pain is often treated with a combination of medicines, therapies, and lifestyle changes. You will work closely with your healthcare provider to find a treatment plan that works best for you.    Ask your healthcare provider for a referral to a pain management specialty center. These can provide the most recent and proven pain management strategies, along with emotional support and comprehensive services.    Several different types of medicines may be prescribed for chronic  pain. Work with your healthcare provider to develop a medicine plan that helps manage your pain.    Physical therapy can help reduce certain types of chronic pain.    Occupational therapy teaches you how to do routine tasks of daily living in ways that lessen your discomfort.    Counseling can help you cope better with stress and pain.    Other therapies such as meditation, yoga, biofeedback, massage, and acupuncture can also help manage chronic pain.    Changing certain habits can help reduce chronic pain. They include:  ? Eating healthy  ? Developing an exercise routine  ? Getting enough sleep   ? Stopping smoking and limiting alcohol use  ? Losing excess weight  Follow-up care  Follow up with your healthcare provider, or as advised. Let your healthcare provider know if your current treatment plan is working or if changes are needed.  Resources  For more information, contact:    American Head[employer] and Migraine Association, ahma.memberclNuovo Wind.net or 136-407-4417    American Chronic Pain Association, theacpa.org or 920-044-4912  Date Last Reviewed: 8/1/2017 2000-2018 [employer]. [address] [place] [address], [place] [address]. All rights reserved. This information is not intended as a substitute for professional medical care. Always follow your healthcare professional's instructions.

## 2018-11-08 NOTE — MR AVS SNAPSHOT
After Visit Summary   11/8/2018    Tere Ortiz    MRN: [id number]           Patient Information     Date Of Birth          1955        Visit Information        Provider Department      11/8/2018 11:20 AM Fiona Foley APRN CNP [facility]        Today's Diagnoses     Gastrointestinal irritation    -  1    Medication side effects        Other chronic pain          Care Instructions    Stop the Meloxicam and start the Diclofenac topical.  Make sure to monitor for any bleeding or excessive bruising and should this occur discontinue the topical medication.    See pain clinic as scheduled in the next week.    Follow up with your primary care provider in the next month    Follow-up with your primary care provider next week and as needed.    Indications for emergent return to emergency department discussed with patient, who verbalized good understanding and agreement.  Patient understands the limitations of today's evaluation.         Chronic Pain  Pain serves an important role. It lets you know something is wrong that needs your attention. When the body heals, pain normally goes away.  When pain lasts longer than 6 months, it is called  chronic  pain. This is pain that is present even after the body has healed. Chronic pain can cause mood problems and get in the way of your relationships and your daily life.  A number of conditions can cause chronic pain. Some of the more common include:    Previous surgery    An old injury    Infection    Diseases such as diabetes    Nerve damage    Back injury    Arthritis    Migraine or other headaches    Fibromyalgia    Cancer  Depression and stress can make chronic pain symptoms worse. In some cases, a cause for the pain can't be found.   Treatment  Treatment can greatly reduce pain. In many cases, pain can become less severe, occur less often, and interfere less with your daily life. Chronic pain is often treated with a combination  of medicines, therapies, and lifestyle changes. You will work closely with your healthcare provider to find a treatment plan that works best for you.    Ask your healthcare provider for a referral to a pain management specialty center. These can provide the most recent and proven pain management strategies, along with emotional support and comprehensive services.    Several different types of medicines may be prescribed for chronic pain. Work with your healthcare provider to develop a medicine plan that helps manage your pain.    Physical therapy can help reduce certain types of chronic pain.    Occupational therapy teaches you how to do routine tasks of daily living in ways that lessen your discomfort.    Counseling can help you cope better with stress and pain.    Other therapies such as meditation, yoga, biofeedback, massage, and acupuncture can also help manage chronic pain.    Changing certain habits can help reduce chronic pain. They include:  ? Eating healthy  ? Developing an exercise routine  ? Getting enough sleep   ? Stopping smoking and limiting alcohol use  ? Losing excess weight  Follow-up care  Follow up with your healthcare provider, or as advised. Let your healthcare provider know if your current treatment plan is working or if changes are needed.  Resources  For more information, contact:    American Headache and Migraine Association, aj.memberclicks.net or 227-453-8131    American Chronic Pain Association, theacpa.org or 461-538-8604  Date Last Reviewed: 8/1/2017 2000-2018 [employer]. [address] [place] [address]. All rights reserved. This information is not intended as a substitute for professional medical care. Always follow your healthcare professional's instructions.                Follow-ups after your visit        Follow-up notes from your care team     See patient instructions section of the AVS Return in about 1 week (around 11/15/2018) for Follow up with  "your specialist.      Your next 10 appointments already scheduled     Nov 16, 2018  2:30 PM CST   (Arrive by 2:15 PM)   RETURN ENDOCRINE with Karol Simmons MD   [facility] ([facility] [place])    [address] [place] [id number] [phone]              Who to contact     If you have questions or need follow up information about today's clinic visit or your schedule please contact [facility] directly at [phone].  Normal or non-critical lab and imaging results will be communicated to you by [employer], letter or phone within 4 business days after the clinic has received the results. If you do not hear from us within 7 days, please contact the clinic through [employer] or phone. If you have a critical or abnormal lab result, we will notify you by phone as soon as possible.  Submit refill requests through [employer] or call your pharmacy and they will forward the refill request to us. Please allow 3 business days for your refill to be completed.          Additional Information About Your Visit        [employer] Information     [employer] gives you secure access to your electronic health record. If you see a primary care provider, you can also send messages to your care team and make appointments. If you have questions, please call your primary care clinic.  If you do not have a primary care provider, please call [phone] and they will assist you.        Care EveryWhere ID     This is your Care EveryWhere ID. This could be used by other organizations to access your [place] medical records  [id number]        Your Vitals Were     Pulse Temperature Respirations Height Pulse Oximetry BMI (Body Mass Index)    94 97.9  F (36.6  C) (Tympanic) 22 4' 10\" (1.473 m) 99% 24.04 kg/m2       Blood Pressure from Last 3 Encounters:   11/08/18 103/70   11/03/18 95/61   11/02/18 96/65    Weight from Last 3 Encounters:   11/08/18 115 lb (52.2 kg)   11/03/18 115 lb " (52.2 kg)   11/02/18 115 lb (52.2 kg)              Today, you had the following     No orders found for display         Today's Medication Changes          These changes are accurate as of 11/8/18 12:08 PM.  If you have any questions, ask your nurse or doctor.               Start taking these medicines.        Dose/Directions    diclofenac 1 % Gel topical gel   Commonly known as:  VOLTAREN   Used for:  Other chronic pain   Started by:  Fiona Foley APRN CNP        Apply 4 grams to knees or 2 grams to hands four times daily using enclosed dosing card.   Quantity:  100 g   Refills:  1            Where to get your medicines      These medications were sent to [id number] [address] [facility]  [address] [facility] [address]    Hours:  Closed 10-16-08 business to [facility] Phone:  [phone]     diclofenac 1 % Gel topical gel                Primary Care Provider Office Phone # Fax #    Zina Pruett PA-C [phone] [facility] [address]        Equal Access to Services     SHANE ATKINSON : Hadii alcides manriquez hadasho Soomaali, waaxda luqadaha, qaybta kaalmada adeegyayolanda, salas callahan . So [facility] [phone].    ATENCIÓN: Si habla español, tiene a acosta disposición servicios gratappleos de asistencia lingüística. [facility] [phone].    We comply with applicable federal civil rights laws and [place] laws. We do not discriminate on the basis of race, color, national origin, age, disability, sex, sexual orientation, or gender identity.            Thank you!     Thank you for choosing [facility]  for your care. Our goal is always to provide you with excellent care. Hearing back from our patients is one way we can continue to improve our services. Please take a few minutes to complete the written survey that you may receive in the mail after your visit with us. Thank you!             Your Updated  Medication List - Protect others around you: Learn how to safely use, store and throw away your medicines at www.disposemymeds.org.          This list is accurate as of 11/8/18 12:08 PM.  Always use your most recent med list.                   Brand Name Dispense Instructions for use Diagnosis    ammonium lactate 12 % cream    AMLACTIN    385 g    Apply topically 2 times daily    Other acute sinusitis, recurrence not specified, Chronic obstructive pulmonary disease, unspecified COPD type (H), Abnormal findings on diagnostic imaging of other specified body structures, Vitamin D deficiency, Abnormal finding of blood chemistry, Dry skin       aspirin 81 MG tablet      Take 1 tablet by mouth daily.        azelastine 0.1 % nasal spray    ASTELIN    1 Bottle    Spray 1 spray into both nostrils 2 times daily As needed.    Seasonal allergic rhinitis, unspecified chronicity, unspecified trigger       baclofen 10 MG tablet    LIORESAL    180 tablet    Take 1 tablet (10 mg) by mouth daily 2 at bedtime    Cervical strain, Myofascial pain syndrome       busPIRone 10 MG tablet    BUSPAR    90 tablet    Take 2-3 tablets (20-30 mg) by mouth At Bedtime    Anxiety       CLINDAMAX 1 % lotion   Generic drug:  clindamycin     60 mL    Apply topically 2 times daily        diclofenac 1 % Gel topical gel    VOLTAREN    100 g    Apply 4 grams to knees or 2 grams to hands four times daily using enclosed dosing card.    Other chronic pain       DULoxetine 60 MG EC capsule    CYMBALTA    30 capsule    Take 1 capsule (60 mg) by mouth daily    Other chronic pain       FLONASE 50 MCG/ACT spray   Generic drug:  fluticasone      Spray 1 spray into both nostrils daily.        * lidocaine 5 % ointment    XYLOCAINE    30 g    Apply topically daily As needed for pain    Other chronic pain       * lidocaine 4 % Crea cream    LMX4    45 g    Apply topically once as needed for moderate pain Up to 4 times daily.    Myofascial pain syndrome, SI (sacroiliac)  joint dysfunction       melatonin 3 MG tablet      Take 1 mg by mouth nightly as needed for sleep        meloxicam 7.5 MG tablet    MOBIC    30 tablet    Take 1 tablet (7.5 mg) by mouth daily    Other chronic pain       nystatin 104161 UNIT/GM Powd    NYSTOP    60 g    APPLY TOPICALLY 3 TIMES DAILY FOR 1-2 WEEKS UNTIL RASH CLEARED    Intertrigo       ondansetron 4 MG ODT tab    ZOFRAN-ODT    18 tablet    DISSOLVE ONE TABLET IN MOUTH EVERY EIGHT HOURS AS NEEDED    Nausea, Migraines       * order for DME     1 Device    BiPAP: IPAP 11 cm H2O EPAP 6 cm H2O Pt has her own BiPAP New CPAP supplies- try [place] mask if it comes in an extra small size.  Alternatively could try Brady with nasal prongs occluded. Lifetime need and heated humidity.    TITO (obstructive sleep apnea)       * order for DME      BiPAP: IPAP 12 cm H2O EPAP 7 cm H2O  Lifetime need and heated humidity.    TITO (obstructive sleep apnea)       * order for DME     1 Device    O2: During sleep 1.5 LPM via O2 Concentrator  Bled in through BiPAP    TITO (obstructive sleep apnea), Sleep related hypoventilation/hypoxemia in other disease       order for DME     4 Units    Compression stockings (Thigh High)- 2 pairs    Peripheral edema       pantoprazole 40 MG EC tablet    PROTONIX    30 tablet    Take 1 tablet (40 mg) by mouth daily Take 30-60 minutes before a meal.    Gastroesophageal reflux disease with esophagitis       potassium chloride 10 MEQ tablet    K-TAB,KLOR-CON    120 tablet    TAKE TWO TABLETS BY MOUTH TWICE DAILY    Low blood potassium       pramipexole 1 MG tablet    MIRAPEX     Take 1 mg by mouth 4 times daily as needed        SOMA 350 MG tablet   Generic drug:  carisoprodol      Take 1 tablet by mouth. Take at bedtime        SPIRIVA HANDIHALER 18 MCG capsule   Generic drug:  tiotropium     30 capsule    INHALE ONE CAPSULE VIA HANDIHALER ONE TIME DAILY    Chronic obstructive pulmonary disease, unspecified COPD type (H)       SUMAtriptan 20  MG/ACT nasal spray    IMITREX    1 each    SPRAY ONCE INTO NOSTRIL CAN REPEAT IN 2 HRS IF NOT BETTER    Episodic tension-type headache, not intractable       SYNTHROID 125 MCG tablet   Generic drug:  levothyroxine     90 tablet    Take 1 tablet (125 mcg) by mouth daily    Hypothyroidism, unspecified type       TOPAMAX 100 MG tablet   Generic drug:  topiramate      Take  by mouth 2 times daily. Take 1.5 pill in mornings Take 1.5 at bedtime        traZODone HCl 150 MG 24 hr tablet    OLEPTRO     Take 150 mg by mouth At Bedtime        vitamin D3 2000 units Caps     90 capsule    Take 1 capsule by mouth daily    Chronic fatigue       warfarin 5 MG tablet    COUMADIN    160 tablet    Take 1.5-2 tabs daily as directed by the Anticoag Clinic (maintenance dose being re-established after hospitalization)    Embolism (H), Lupus anticoagulant inhibitor syndrome (H)       * Notice:  This list has 5 medication(s) that are the same as other medications prescribed for you. Read the directions carefully, and ask your doctor or other care provider to review them with you.

## 2018-11-08 NOTE — TELEPHONE ENCOUNTER
Victoria is calling to get physical therapy okayed.  The therapist is there now.  [facility]  [place] [place]
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
## 2018-11-10 NOTE — PROGRESS NOTES
[facility] now requests orders and shares plan of care/discharge summaries for some patients through [employer].  Please REPLY TO THIS MESSAGE OR ROUTE BACK TO THE AUTHOR in order to give authorization for orders when needed.  This is considered a verbal order, you will still receive a faxed copy of orders for signature.  Thank you for your assistance in improving collaboration for our patients.    ORDER    Skilled OT 1 week 2 for Cognitive assessments and cognitive retriaing.

## 2018-11-12 NOTE — TELEPHONE ENCOUNTER
"Requested Prescriptions   Pending Prescriptions Disp Refills     warfarin (COUMADIN) 5 MG tablet [Pharmacy Med Name: WARFARIN 5 MG       TAB TEVA] 180 tablet 0     Sig: TAKE 2 TABLETS BY MOUTH DAILY-TAKE EXTRA 1/2 TAB ON MONDAY OR AS DIRECTED BY INR CLINIC    Vitamin K Antagonists Failed    11/12/2018  3:38 PM       Failed - INR is within goal in the past 6 weeks    Confirm INR is within goal in the past 6 weeks.     Recent Labs   Lab Test 11/12/18   INR  3.5                      Passed - Recent (12 mo) or future (30 days) visit within the authorizing provider's specialty    Patient had office visit in the last 12 months or has a visit in the next 30 days with authorizing provider or within the authorizing provider's specialty.  See \"Patient Info\" tab in inbasket, or \"Choose Columns\" in Meds & Orders section of the refill encounter.             Passed - Patient is 18 years of age or older       Passed - Patient is not pregnant       Passed - No positive pregnancy on file in past 12 months      warfarin (COUMADIN) 5 MG tablet  Last Written Prescription Date:  11/02/2018  Last Fill Quantity: 160 tablet,  # refills: 0   Last office visit: Department has no specialty with prescribing provider:  11/08/2018 DANIELLE Foley   Future Office Visit:      Eryn Davis RT (R) (M)      "

## 2018-11-12 NOTE — MR AVS SNAPSHOT
Tere Ortiz   11/12/2018   Anticoagulation Therapy Visit    Description:  63 year old female   Provider:  Nadia Monahan, RN   Department:  [facility]           INR as of 11/12/2018     Today's INR 3.5!      Anticoagulation Summary as of 11/12/2018     INR goal 2.0-3.0   Today's INR 3.5!   Full warfarin instructions 11/12: 2.5 mg; 11/13: 2.5 mg; 11/14: 2.5 mg   Next INR check 11/15/2018    Indications   Lupus anticoagulant inhibitor syndrome (H) [D68.62]  Pulmonary embolism with infarction (H) [I26.99]  Long-term (current) use of anticoagulants [Z79.01] [Z79.01]  Embolism - blood clot [I74.9]         November 2018 Details    Sun Mon Tue Wed Thu Fri Sat         1               2               3                 4               5               6               7               8               9               10                 11               12      2.5 mg   See details      13      2.5 mg         14      2.5 mg         15            16               17                 18               19               20               21               22               23               24                 25               26               27               28               29               30                 Date Details   11/12 This INR check       Date of next INR:  11/15/2018         How to take your warfarin dose     To take:  2.5 mg Take 0.5 of a 5 mg tablet.

## 2018-11-15 NOTE — MR AVS SNAPSHOT
Tere Ortiz   11/15/2018   Anticoagulation Therapy Visit    Description:  63 year old female   Provider:  Lucia aPulino, RN   Department[facility]  [facility]           INR as of 11/15/2018     Today's INR 1.5!      Anticoagulation Summary as of 11/15/2018     INR goal 2.0-3.0   Today's INR 1.5!   Full warfarin instructions 11/15: 10 mg; 11/16: 10 mg; 11/17: 7.5 mg; 11/18: 7.5 mg; 11/19: 7.5 mg   Next INR check 11/20/2018    Indications   Lupus anticoagulant inhibitor syndrome (H) [D68.62]  Pulmonary embolism with infarction (H) [I26.99]  Long-term (current) use of anticoagulants [Z79.01] [Z79.01]  Embolism - blood clot [I74.9]         Description             November 2018 Details    Sun Mon Tue Wed Thu Fri Sat         1               2               3                 4               5               6               7               8               9               10                 11               12               13               14               15      10 mg   See details      16      10 mg         17      7.5 mg           18      7.5 mg         19      7.5 mg         20            21               22               23               24                 25               26               27               28               29               30                 Date Details   11/15 This INR check       Date of next INR:  11/20/2018         How to take your warfarin dose     To take:  7.5 mg Take 1.5 of the 5 mg tablets.    To take:  10 mg Take 2 of the 5 mg tablets.

## 2018-11-15 NOTE — TELEPHONE ENCOUNTER
[facility] now requests orders and shares plan of care/discharge summaries for some patients through [employer].  Please REPLY TO THIS MESSAGE OR ROUTE BACK TO THE AUTHOR in order to give authorization for orders when needed.  This is considered a verbal order, you will still receive a faxed copy of orders for signature.  Thank you for your assistance in improving collaboration for our patients.    ORDER    Requesting orders for 2 SW revisits for community resource connection (grief support groups/counseling), coping support, and long range planning.    Thank you,  Michelle Doe, [facility]  [phone]

## 2018-11-15 NOTE — TELEPHONE ENCOUNTER
"Requested Prescriptions   Pending Prescriptions Disp Refills     busPIRone (BUSPAR) 10 MG tablet [Pharmacy Med Name: BUSPIRONE 10 MG     TAB ACCO] 90 tablet 0     Sig: TAKE 2-3 TABLETS (20-30 MG) BY MOUTH AT BEDTIME    Atypical Antidepressants Protocol Passed    11/15/2018  2:28 PM       Passed - Recent (12 mo) or future (30 days) visit within the authorizing provider's specialty    Patient had office visit in the last 12 months or has a visit in the next 30 days with authorizing provider or within the authorizing provider's specialty.  See \"Patient Info\" tab in inbasket, or \"Choose Columns\" in Meds & Orders section of the refill encounter.             Passed - Patient is age 18 or older       Passed - No active pregnancy on record       Passed - No positive pregnancy test in past 12 mos        Last Written Prescription Date:  10/16/18  Last Fill Quantity: 90,  # refills: 0   Last office visit: Department has no specialty with prescribing provider:     Future Office Visit:      "

## 2018-11-15 NOTE — PROGRESS NOTES
ANTICOAGULATION FOLLOW-UP CLINIC VISIT    Patient Name:  Tere Ortiz  Date:  11/15/2018  Contact Type:  Telephone/ Keke from [facility]    SUBJECTIVE:     Patient Findings     Positives Change in diet/appetite (not drinking high protein ensure twice daily recently), Unexplained INR or factor level change (re-establishing what maintenance dose needs to be )    Comments Keke from [facility] called with patient INR results. No changes in medication, or activity. Has not been drinking her high protein ensure twice daily the last few days. Reports patient has taken warfarin as instructed, no missed doses. Reports no signs or symptoms of a clot. Reports patient is in pain today, is working with OT. Patient had 45mg in the previous 7 days, will increase dose to 47.5mg by the next INR check on Tuesday 11/20 (~6% increase). Patient's INR decreased from 3.5 to 1.5 on the same weekly dose.            OBJECTIVE    INR   Date Value Ref Range Status   11/15/2018 1.5  Final       ASSESSMENT / PLAN  INR assessment SUB    Recheck INR In: 5 DAYS    [facility]R Location [facility]      Anticoagulation Summary as of 11/15/2018     INR goal 2.0-3.0   Today's INR 1.5!   Warfarin maintenance plan No maintenance plan   Full warfarin instructions 11/15: 10 mg; 11/16: 10 mg; 11/17: 7.5 mg; 11/18: 7.5 mg; 11/19: 7.5 mg   Weekly warfarin total 62.5 mg   Plan last modified Nadia Monahan RN (11/2/2018)   Next INR check 11/20/2018   Priority INR   Target end date Indefinite    Indications   Lupus anticoagulant inhibitor syndrome (H) [D68.62]  Pulmonary embolism with infarction (H) [I26.99]  Long-term (current) use of anticoagulants [Z79.01] [Z79.01]  Embolism - blood clot [I74.9]         Anticoagulation Episode Summary     INR check location     Preferred lab     Send INR reminders to [facility]    Comments * Marla Ramirez, Ok to continue same dose if in range & pt will call with concerns. warfarin in AM. Drinks  high protein Ensure twice daily  [facility]      Anticoagulation Care Providers     Provider Role Specialty Phone number    Zina Pruett PA-C Responsible Physician Assistant [phone]            See the Encounter Report to view Anticoagulation Flowsheet and Dosing Calendar (Go to Encounters tab in chart review, and find the Anticoagulation Therapy Visit)        Lucia Paulino RN

## 2018-11-16 NOTE — TELEPHONE ENCOUNTER
Prescription approved per [facility] Refill Protocol.  Cony NEWTON RN

## 2018-11-20 NOTE — PROGRESS NOTES
ANTICOAGULATION FOLLOW-UP CLINIC VISIT    Patient Name:  Tere Ortiz  Date:  11/20/2018  Contact Type:  Telephone/ Renuka OSMAN [facility]/Malou DVM w/patient    SUBJECTIVE:     Patient Findings     Positives Unexplained INR or factor level change    Comments No changes in medications, activity, or diet noted. No bleeding or increased bruising noted. Took warfarin as prescribed.  Patient's INR has been fluctuating. Patient had 47.5 mg in the past 7 days. Writer adjusted dose so patient will continue at 47.5 mg weekly, however the dosing will not be balanced out accurately.   Writer requested patient write down her intake over the next few days so [facility] can r/o anything that may contribute to the fluctuating INR.   Recheck in 3 days.           OBJECTIVE    INR   Date Value Ref Range Status   11/20/2018 3.5  Final       ASSESSMENT / PLAN  INR assessment SUPRA    Recheck INR In: 3 DAYS    [facility]R Location [facility]      Anticoagulation Summary as of 11/20/2018     INR goal 2.0-3.0   Today's INR 3.5!   Warfarin maintenance plan No maintenance plan   Full warfarin instructions 11/20: 5 mg; 11/21: 5 mg; 11/22: 5 mg; 11/23: 5 mg   Weekly warfarin total 62.5 mg   Plan last modified Nadia Monahan RN (11/2/2018)   Next INR check 11/23/2018   Priority INR   Target end date Indefinite    Indications   Lupus anticoagulant inhibitor syndrome (H) [D68.62]  Pulmonary embolism with infarction (H) [I26.99]  Long-term (current) use of anticoagulants [Z79.01] [Z79.01]  Embolism - blood clot [I74.9]         Anticoagulation Episode Summary     INR check location     Preferred lab     Send INR reminders to [facility] POOL    Comments * Marla Ramirez, Ok to continue same dose if in range & pt will call with concerns. warfarin in AM. Drinks high protein Ensure twice daily  [facility]      Anticoagulation Care Providers     Provider Role Specialty Phone number    Zina Pruett PA-C Responsible Physician Assistant [phone]             See the Encounter Report to view Anticoagulation Flowsheet and Dosing Calendar (Go to Encounters tab in chart review, and find the Anticoagulation Therapy Visit)        Justyn Polanco RN

## 2018-11-21 NOTE — TELEPHONE ENCOUNTER
Michelle GIVENS RN- Notified she will contact edil [facility]

## 2018-11-21 NOTE — TELEPHONE ENCOUNTER
Pain Management Center Referral      1. Confirmed address with patient? Yes  2. Confirmed phone number with patient? Yes  3. Confirmed referring provider? Yes  4. Is the PCP the same as the referring provider? Yes  5. Has the patient been to any previous pain clinics? No  (If yes, send DON with welcome letter)  6. Which insurance are we to bill for this appointment?  Medicare    7. Informed pt of cancellation (48 hour) policy? Yes    REGARDING OPIOID MEDICATIONS: We will always address appropriateness of opioid pain medications, but we generally will not automatically take on a prescribing role. When we do take on prescribing of opioids for chronic pain, it is in collaboration with the referring physician for an intermediate period of time (months), with an expectation that the primary physician or provider will assume the prescribing role if medications are effective at stable doses with demonstrated compliance. Therefore, please do not assume that your prescribing responsibilities end on the day of pain clinic consultation.  7. Informed pt of prescribing policy? Yes      8. Referring Provider: Zina Pruett    [facility]

## 2018-11-21 NOTE — PROGRESS NOTES
Clinic Care Coordination Contact  Care Team Conversations    Phone message from [facility] regarding her visit and what resources she gave.     HOMECARE REVISIT    HOMEBOUND STATUS/ Patient is home bound secondary to poor functional mobility impeding her ability to access community based services without a taxing effort.    Pt was in the back bedroom when MSW arrived. Pt appeared to be in good spirits, using her walker to walk to the kitchen table to sit w/writer. Spouse Ryan home but did not participate in visit as he was downstairs most of the time.     Pt shared that her back pain is better but has been experiencing a lot of pain from the knee down in her legs since stopping the opiods. The appoint w/[facility] physician did not address this pain as he was uncertain the cause. Pt is pursuing an appointment at the pain clinic and found one in [place] she will call on. Pt had MRI on Friday, and this will be used by clinic to address pain and create plan.    BRING ONLINE SUPPORT GROUP INFO/ verbal and written info provided for [employer] online support group for individuals who are dealing with the death of an adult child provided as well as monthly support groups that meet in person in [facility]. Pt very grateful for this resource as she had previously heard about it but could not remember the name. Pt stated she would look online at the support group.      BRING LIVING WELL WITH CHRONIC DISEASE GROUP INFO/ verbal and written info provided for Living Well With Chronic Pain class that begins in January at [facility] as well as Living Well With Chronic Conditions class that begins in April in [place] provided. Pt stated she would review info and think about this option.      BRING LOCAL GRIEF THERAPIST INFO/ verbal and written info provided about options avail, 2 [place] counseling providers who specialize in grief name/number provided to pt, 2 other local therapists who work with grief  info/bios provided. Pt very grateful for the info and stated she and her spouse would review and decide together who they would like to call and meet with.      CARE COORD/ MSW faciliated a call to [facility] to inquire into process for pt making appt, if Medicare insuarnce is accepted, and how long the wait time is. This info is written down for pt. MSW left  for clinic care sabrina Martinez RN and requested assistance in requesting an internal referarl from pt PCP for the  Pain Clinic in [facility]. Pt received a referral from [facility] for a pain but  pain clinic needs an internal referral. MSW also provided clinic care coord w/an update about resources provided for mental health/grief.     PLAN/ no further MSW visits planned to the home at this time, both pt/spouse are grateful for the resources and endorse they will call on their own to schedule appointments, declined this writer's assistance. MSW contact info provided if additional needs/questions arise.

## 2018-11-21 NOTE — LETTER
November 21, 2018    Tere Ortiz  [id number] [facility] [id number]    Dear Tere,                                                                 Welcome to the [facility].  We are located on the 2nd floor ([address]) of the [facility], located at [address] [place], [place] [address], [place] [address].    Your appointment at the [facility] has been scheduled on Thursday, January 10TH at 11:00AM with Aj Ridley MD.    At your first visit, you will meet your team of caregivers who will help you to develop pain management strategies that will last a lifetime. You will meet with our support staff to review your insurance information, and collect your co-payment if required by your insurance company. You will also meet with a medical pain specialist and care coordinator who will assess your pain and develop a plan of care for your successful pain rehabilitation. You should expect to spend 1-2 hours at your first visit with us. Usually, patients work with us for a period of 6-12 months, and eventually return to their primary doctor once their pain management has stabilized.      To help us make your visit go as smoothly as possible, please bring the following items with you on your visit:     Completed Pain Questionnaire enclosed in this packet.  If you do not bring the completed questionnaire, we may have to reschedule your appointment.  List of any medicines that you are currently taking or have been prescribed  Important NON-[place] medical information such as medical records or tests results (X-rays, or laboratory tests)  Your health insurance card  Financial resources to cover your co-payment or balance due at the time of service (cash, personal check, Visa, and MasterCard are acceptable methods of payment)     Due to the demand for new patient evaluations, you must notify the scheduling department 48 hours in advance if you are not able to keep this  appointment. Failure to do so could affect your ability to reschedule with our clinic. Please be aware that we will not prescribe any medications at your first visit.     Please call [phone] with any questions regarding your appointment. We look forward to meeting you and working to address your health care needs.     Sincerely,      [facility]
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
## 2018-11-23 NOTE — MR AVS SNAPSHOT
Tere Ortiz   11/23/2018   Anticoagulation Therapy Visit    Description:  63 year old female   Provider:  Nadia Monahan, RN   Department:  [place]           INR as of 11/23/2018     Today's INR 2.0      Anticoagulation Summary as of 11/23/2018     INR goal 2.0-3.0   Today's INR 2.0   Full warfarin instructions 5 mg on Thu; 7.5 mg all other days   Next INR check 11/29/2018    Indications   Lupus anticoagulant inhibitor syndrome (H) [D68.62]  Pulmonary embolism with infarction (H) [I26.99]  Long-term (current) use of anticoagulants [Z79.01] [Z79.01]  Embolism - blood clot [I74.9]         Description             November 2018 Details    Sun Mon Tue Wed Thu Fri Sat         1               2               3                 4               5               6               7               8               9               10                 11               12               13               14               15               16               17                 18               19               20               21               22               23      7.5 mg   See details      24      7.5 mg           25      7.5 mg         26      7.5 mg         27      7.5 mg         28      7.5 mg         29            30                 Date Details   11/23 This INR check       Date of next INR:  11/29/2018         How to take your warfarin dose     To take:  5 mg Take 1 of the 5 mg tablets.    To take:  7.5 mg Take 1.5 of the 5 mg tablets.

## 2018-11-23 NOTE — PROGRESS NOTES
ANTICOAGULATION FOLLOW-UP CLINIC VISIT    Patient Name:  Tere Ortiz  Date:  11/23/2018  Contact Type:  Telephone/ Mann [facility]    SUBJECTIVE:     Patient Findings     Positives No Problem Findings    Comments Patient had 47.5mg in the previous 7 days, will increase dose to 50mg by the next INR check on Thursday (~5% increase).     No changes in medications, diet, or activity noted. Took warfarin as instructed, denies any missed doses. Patient has a referral to the pain clinic, she made an appointment for early January.            OBJECTIVE    INR   Date Value Ref Range Status   11/23/2018 2.0  Final       ASSESSMENT / PLAN  No question data found.  Anticoagulation Summary as of 11/23/2018     INR goal 2.0-3.0   Today's INR 2.0   Warfarin maintenance plan 5 mg (5 mg x 1) on Thu; 7.5 mg (5 mg x 1.5) all other days   Full warfarin instructions 5 mg on Thu; 7.5 mg all other days   Weekly warfarin total 50 mg   Plan last modified Nadia Monahan RN (11/23/2018)   Next INR check 11/29/2018   Priority INR   Target end date Indefinite    Indications   Lupus anticoagulant inhibitor syndrome (H) [D68.62]  Pulmonary embolism with infarction (H) [I26.99]  Long-term (current) use of anticoagulants [Z79.01] [Z79.01]  Embolism - blood clot [I74.9]         Anticoagulation Episode Summary     INR check location     Preferred lab     Send INR reminders to [place] PHONE ANTICOAG POOL    Comments * Alere Meter, Ok to continue same dose if in range & pt will call with concerns. warfarin in AM. Drinks high protein Ensure twice daily  [facility]      Anticoagulation Care Providers     Provider Role Specialty Phone number    Zina Pruett PA-C Responsible Physician Assistant [phone]            See the Encounter Report to view Anticoagulation Flowsheet and Dosing Calendar (Go to Encounters tab in chart review, and find the Anticoagulation Therapy Visit)        [facility] RN [facility]

## 2018-11-29 NOTE — MR AVS SNAPSHOT
Tere CARMINA Ortiz   11/29/2018   Anticoagulation Therapy Visit    Description:  63 year old female   Provider:  Nadia Monahan, RN   Department:  [facility]           INR as of 11/29/2018     Today's INR 2.1      Anticoagulation Summary as of 11/29/2018     INR goal 2.0-3.0   Today's INR 2.1   Full warfarin instructions 7.5 mg every day   Next INR check 12/6/2018    Indications   Lupus anticoagulant inhibitor syndrome (H) [D68.62]  Pulmonary embolism with infarction (H) [I26.99]  Long-term (current) use of anticoagulants [Z79.01] [Z79.01]  Embolism - blood clot [I74.9]         Description             November 2018 Details    Sun Mon Tue Wed Thu Fri Sat         1               2               3                 4               5               6               7               8               9               10                 11               12               13               14               15               16               17                 18               19               20               21               22               23               24                 25               26               27               28               29      7.5 mg   See details      30      7.5 mg           Date Details   11/29 This INR check               How to take your warfarin dose     To take:  7.5 mg Take 1.5 of the 5 mg tablets.           December 2018 Details    Sun Mon Tue Wed Thu Fri Sat           1      7.5 mg           2      7.5 mg         3      7.5 mg         4      7.5 mg         5      7.5 mg         6            7               8                 9               10               11               12               13               14               15                 16               17               18               19               20               21               22                 23               24               25               26               27               28               29                 30                31                     Date Details   No additional details    Date of next INR:  12/6/2018         How to take your warfarin dose     To take:  7.5 mg Take 1.5 of the 5 mg tablets.

## 2018-11-29 NOTE — PROGRESS NOTES
ANTICOAGULATION FOLLOW-UP CLINIC VISIT    Patient Name:  Tere Ortiz  Date:  11/29/2018  Contact Type:  Telephone/ [facility]    SUBJECTIVE:     Patient Findings     Positives No Problem Findings    Comments Patient had 50mg in the previous 7 days, will increase dose to 52.5mg by the next INR check in 1 week (~5% increase) to reach mid-range goal.    No changes in medications, diet, or activity noted. Took warfarin as instructed, denies any missed doses.             OBJECTIVE    INR   Date Value Ref Range Status   11/29/2018 2.1  Final       ASSESSMENT / PLAN  INR assessment THER    Recheck INR In: 1 WEEK    INR Location [facility] INR [facility]     Anticoagulation Summary as of 11/29/2018     INR goal 2.0-3.0   Today's INR 2.1   Warfarin maintenance plan 7.5 mg (5 mg x 1.5) every day   Full warfarin instructions 7.5 mg every day   Weekly warfarin total 52.5 mg   Plan last modified Nadia Monahan RN (11/29/2018)   Next INR check 12/6/2018   Priority INR   Target end date Indefinite    Indications   Lupus anticoagulant inhibitor syndrome (H) [D68.62]  Pulmonary embolism with infarction (H) [I26.99]  Long-term (current) use of anticoagulants [Z79.01] [Z79.01]  Embolism - blood clot [I74.9]         Anticoagulation Episode Summary     INR check location     Preferred lab     Send INR reminders to [place] PHONE ANTICOAG POOL    Comments * Alere Meter, Ok to continue same dose if in range & pt will call with concerns. warfarin in AM. Drinks high protein Ensure twice daily  [facility]      Anticoagulation Care Providers     Provider Role Specialty Phone number    Zina Pruett PA-C Responsible Physician Assistant [phone]            See the Encounter Report to view Anticoagulation Flowsheet and Dosing Calendar (Go to Encounters tab in chart review, and find the Anticoagulation Therapy Visit)        Nadia Monahan RN CACP

## 2018-11-29 NOTE — TELEPHONE ENCOUNTER
[facility] now requests orders and shares plan of care/discharge summaries for some patients through [employer]  Please REPLY TO THIS MESSAGE OR ROUTE BACK TO THE AUTHOR in order to give authorization for orders when needed.  This is considered a verbal order, you will still receive a faxed copy of orders for signature.  Thank you for your assistance in improving collaboration for our patients.    ORDER    MD SUMMARY/PLAN OF CARE    PT 1 visit next week for a discharge as pt unable to have a visit on 11/30/2018 for discharge due to MD appointment.

## 2018-12-06 NOTE — MR AVS SNAPSHOT
Tere Ortiz   12/6/2018   Anticoagulation Therapy Visit    Description:  63 year old female   Provider:  Kenyetta Cooper, RN   Department:  [facility]           INR as of 12/6/2018     Today's INR 2.1      Anticoagulation Summary as of 12/6/2018     INR goal 2.0-3.0   Today's INR 2.1   Full warfarin instructions 7.5 mg every day   Next INR check 12/20/2018    Indications   Lupus anticoagulant inhibitor syndrome (H) [D68.62]  Pulmonary embolism with infarction (H) [I26.99]  Long-term (current) use of anticoagulants [Z79.01] [Z79.01]  Embolism - blood clot [I74.9]         December 2018 Details    Sun Mon Tue Wed Thu Fri Sat           1                 2               3               4               5               6      7.5 mg   See details      7      7.5 mg         8      7.5 mg           9      7.5 mg         10      7.5 mg         11      7.5 mg         12      7.5 mg         13      7.5 mg         14      7.5 mg         15      7.5 mg           16      7.5 mg         17      7.5 mg         18      7.5 mg         19      7.5 mg         20            21               22                 23               24               25               26               27               28               29                 30               31                     Date Details   12/06 This INR check       Date of next INR:  12/20/2018         How to take your warfarin dose     To take:  7.5 mg Take 1.5 of the 5 mg tablets.

## 2018-12-06 NOTE — PROGRESS NOTES
ANTICOAGULATION FOLLOW-UP CLINIC VISIT    Patient Name:  Tere Ortiz  Date:  12/6/2018  Contact Type:  Telephone/ Spoke to Keke from [facility]    SUBJECTIVE:     Patient Findings     Positives No Problem Findings    Comments Spoke with Keke from [facility].   No changes in medications, activity, or diet noted. No bleeding or increased bruising noted. Took warfarin as prescribed.  Patient is to continue maintenance warfarin plan, and check INR in two weeks with her home meter.  Nurse Keke confirmed with patient that she is comfortable with continuing maintenance dose when INR is therapeutic and when she does not have any changes/concerns. Pt will notify [facility] with any concerns/changes.   Pt will be discharged from Homecare and will check with Alere meter at next INR check.  Keke verbalizes understanding and agrees to plan and discussed with patient. No further questions or concerns.           OBJECTIVE    INR   Date Value Ref Range Status   12/06/2018 2.1  Final       ASSESSMENT / PLAN  INR assessment THER    Recheck INR In: 2 WEEKS    INR Location [facility] INR      Anticoagulation Summary as of 12/6/2018     INR goal 2.0-3.0   Today's INR 2.1   Warfarin maintenance plan 7.5 mg (5 mg x 1.5) every day   Full warfarin instructions 7.5 mg every day   Weekly warfarin total 52.5 mg   Plan last modified Nadia Monahan, RN (11/29/2018)   Next INR check 12/20/2018   Priority INR   Target end date Indefinite    Indications   Lupus anticoagulant inhibitor syndrome (H) [D68.62]  Pulmonary embolism with infarction (H) [I26.99]  Long-term (current) use of anticoagulants [Z79.01] [Z79.01]  Embolism - blood clot [I74.9]         Anticoagulation Episode Summary     INR check location     Preferred lab     Send INR reminders to [place] PHONE ANTICOAG POOL    Comments * Alere Meter, Ok to continue same dose if in range & pt will call with concerns. warfarin in AM. Drinks high protein Ensure twice daily      Anticoagulation Care  Providers     Provider Role Specialty Phone number    Zina Pruett PA-C Responsible Physician Assistant [phone]            See the Encounter Report to view Anticoagulation Flowsheet and Dosing Calendar (Go to Encounters tab in chart review, and find the Anticoagulation Therapy Visit)        Kenyetta Cooper RN

## 2018-12-06 NOTE — TELEPHONE ENCOUNTER
[facility] Call Center    Phone Message    May a detailed message be left on voicemail: yes    Reason for Call: Pt called and requested Dr. Simmons to put in a Thyroid check at her local clinic, [facility]. Pt states she would like a call back once the order has been placed. Please call back pt. Thanks.    Action Taken: Message routed to:  [facility] ([facility] [place]

## 2018-12-14 NOTE — PROGRESS NOTES
SUBJECTIVE:   Tere Ortiz is a 63 year old female who presents to clinic today for the following health issues:      ENT Symptoms             Symptoms: cc Present Absent Comment   Fever/Chills  x  Highest 99.3-99.4   Fatigue  x     Muscle Aches  x     Eye Irritation   x    Sneezing  x     Nasal Bryan/Drg  x     Sinus Pressure/Pain  x     Loss of smell  x     Dental pain   x    Sore Throat  x     Swollen Glands   x Unsure   Ear Pain/Fullness   x    Cough  x     Wheeze  x     Chest Pain  x     Shortness of breath  x     Rash   x    Other  x  Stomach pains/diarrhea     Symptom duration:  3 days   Symptom severity:  moderate   Treatments tried:  tylenol 1000mg q 4 hrs   Contacts:  no   Started with runny nose (worse than normal cold), then ST (constant, irritant), then into chest, then fever.  Wheezing, short but freq dry cough, SOB worse than usual cough cold.  HA moderate, sleeping with ice pack at base of head which helps, not worsening, x 3 days.  Off topamax this month due to donut hole/can't afford, back to usual HA  No rash.  Body aches worse than usual.   Sharp epigastric pains x 1 wk, not often.  Diarrhea since leaving hospital 10/31 - all tests neg.  Weaker than usual.    * Patient request-besides her TSH being checked, wondering if she can get her liver and kidney function checked as well.  Hypokalemia in hospital.      Problem list and histories reviewed & adjusted, as indicated.  Additional history: as documented    BP Readings from Last 3 Encounters:   12/14/18 125/76   11/08/18 103/70   11/03/18 95/61    Wt Readings from Last 3 Encounters:   11/08/18 52.2 kg (115 lb)   11/03/18 52.2 kg (115 lb)   11/02/18 52.2 kg (115 lb)         Labs reviewed in EPIC    Reviewed and updated as needed this visit by clinical staff  Tobacco  Allergies  Meds  Problems  Med Hx  Surg Hx  Fam Hx  Soc Hx        Reviewed and updated as needed this visit by Provider  Allergies  Meds  Problems  Med Hx  Surg Hx   "Fam Hx         ROS:  Constitutional, HEENT, cardiovascular, pulmonary, GI, , musculoskeletal, neuro, skin, endocrine and psych systems are negative, except as otherwise noted.    OBJECTIVE:     /76 (BP Location: Left arm, Patient Position: Chair, Cuff Size: Adult Regular)   Pulse 95   Temp 100.6  F (38.1  C) (Tympanic)   Resp 20   Ht 1.473 m (4' 10\")   SpO2 90%   BMI 24.04 kg/m    Body mass index is 24.04 kg/m .  GENERAL: healthy really not ill appearing today, alert and no distress  EYES: Eyes grossly normal to inspection, PERRL and conjunctivae and sclerae normal  HENT: ear canals and TM's normal, nose and mouth without ulcers or lesions  NECK: no adenopathy, no asymmetry, masses, or scars  RESP: lungs scattered wheeze and coarse, sonorus  CV: regular rate and rhythm, normal S1 S2, no S3 or S4, no murmur, click or rub, no peripheral edema  ABDOMEN: soft, nontender, no hepatosplenomegaly, no masses and bowel sounds normal    Influenza swab neg  CXR read by Zina Pruett PA-C as no infiltrate    ASSESSMENT/PLAN:       ICD-10-CM    1. Fever in adult R50.9 CBC with platelets and differential     XR Chest 2 Views     Influenza A/B antigen     T4 free     TSH   2. COPD exacerbation (H) J44.1 CBC with platelets and differential     XR Chest 2 Views     doxycycline monohydrate (ADOXA) 100 MG tablet     predniSONE (DELTASONE) 20 MG tablet   3. Muscle weakness (generalized) M62.81 Comprehensive metabolic panel   4. Diarrhea, unspecified type R19.7 Comprehensive metabolic panel     Clostridium difficile toxin B PCR   5. Hypokalemia E87.6 Comprehensive metabolic panel   6. Upper respiratory tract infection, unspecified type J06.9 Influenza A/B antigen   7. Lupus anticoagulant inhibitor syndrome (H) D68.62 INR     CANCELED: INR   8. Abnormal findings on diagnostic imaging of other specified body structures  R93.89 CANCELED: TSH     CANCELED: T4 free   9. Vitamin D deficiency  E55.9 CANCELED: TSH     " CANCELED: T4 free       Patient Instructions   Treat for COPD exacerbation/possible pneumonia  Doxycycline = antibiotic  Prednisone    Be seen if worsening     Call if questions      Zina Pruett PA-C  [facility]

## 2018-12-14 NOTE — PATIENT INSTRUCTIONS
Treat for COPD exacerbation/possible pneumonia  Doxycycline = antibiotic  Prednisone    Be seen if worsening     Call if questions

## 2018-12-14 NOTE — NURSING NOTE
"Chief Complaint   Patient presents with     Fever     Patient Request     Labs       Initial /76 (BP Location: Left arm, Patient Position: Chair, Cuff Size: Adult Regular)   Pulse 95   Temp 100.6  F (38.1  C) (Tympanic)   Resp 20   Ht 1.473 m (4' 10\")   SpO2 90%   BMI 24.04 kg/m   Estimated body mass index is 24.04 kg/m  as calculated from the following:    Height as of this encounter: 1.473 m (4' 10\").    Weight as of 11/8/18: 52.2 kg (115 lb).    Patient presents to the clinic using No DME    Health Maintenance that is potentially due pending provider review:  NONE        Is there anyone who you would like to be able to receive your results? No  If yes have patient fill out DON      "

## 2018-12-17 NOTE — RESULT ENCOUNTER NOTE
Dr Simmons - Your standing thyroid orders somehow got released under my name, so I am routing to you.    Mercy Carranza,    Your potassium, sodium, kidney and liver function were normal.  I am routing your thyroid labs to your endocrinologist but it appears thyroid dose will likely be decreased.    Please contact me if you have questions.    Zina Pruett PA-C

## 2018-12-17 NOTE — TELEPHONE ENCOUNTER
Spoke with patient.  We will have her reduce her Synthroid slightly to 1 tablet daily 6 days a week and 1/2 tablet daily on the seventh day.  Patient to draw blood in 2 months.  Patient verbalized understanding.

## 2018-12-17 NOTE — PROGRESS NOTES
ANTICOAGULATION FOLLOW-UP CLINIC VISIT    Patient Name:  Tere Ortiz  Date:  2018  Contact Type:  fax from nuria    SUBJECTIVE:     Patient Findings     Positives:   No Problem Findings    Comments:   No changes noted in Epic or reported by patient. Per previous agreement, patient will continue same dose when INR therapeutic and pt will call [facility] with any concerns.            OBJECTIVE    INR   Date Value Ref Range Status   12/15/2018 2.2  Final       ASSESSMENT / PLAN  INR assessment THER    Recheck INR In: 1 WEEK    INR Location Home INR    Billed home INR No      Anticoagulation Summary  As of 2018    INR goal:   2.0-3.0   TTR:   56.5 % (1.7 y)   INR used for dosin.2 (12/15/2018)   Warfarin maintenance plan:   7.5 mg (5 mg x 1.5) every day   Full warfarin instructions:   7.5 mg every day   Weekly warfarin total:   52.5 mg   Plan last modified:   Nadia Monahan RN (2018)   Next INR check:   2018   Priority:   INR   Target end date:   Indefinite    Indications    Lupus anticoagulant inhibitor syndrome (H) [D68.62]  Pulmonary embolism with infarction (H) [I26.99]  Long-term (current) use of anticoagulants [Z79.01] [Z79.01]  Embolism - blood clot [I74.9]             Anticoagulation Episode Summary     INR check location:       Preferred lab:       Send INR reminders to:   [place] PHONE ANTICOAG POOL    Comments:   * Nuria Ramirez, Ok to continue same dose if in range & pt will call with concerns. warfarin in AM. Drinks high protein Ensure twice daily      Anticoagulation Care Providers     Provider Role Specialty Phone number    Zina Pruett PA-C Responsible Physician Assistant [phone]            See the Encounter Report to view Anticoagulation Flowsheet and Dosing Calendar (Go to Encounters tab in chart review, and find the Anticoagulation Therapy Visit)        Lucia Paulino, DAWSON

## 2018-12-24 NOTE — TELEPHONE ENCOUNTER
"Requested Prescriptions   Pending Prescriptions Disp Refills     pantoprazole (PROTONIX) 40 MG EC tablet 30 tablet 1     Sig: Take 1 tablet (40 mg) by mouth daily Take 30-60 minutes before a meal.    PPI Protocol Passed - 12/24/2018  8:51 AM       Passed - Not on Clopidogrel (unless Pantoprazole ordered)       Passed - No diagnosis of osteoporosis on record       Passed - Recent (12 mo) or future (30 days) visit within the authorizing provider's specialty    Patient had office visit in the last 12 months or has a visit in the next 30 days with authorizing provider or within the authorizing provider's specialty.  See \"Patient Info\" tab in inbasket, or \"Choose Columns\" in Meds & Orders section of the refill encounter.             Passed - Patient is age 18 or older       Passed - No active pregnacy on record       Passed - No positive pregnancy test in past 12 months        Last Written Prescription Date:  10/31/185  Last Fill Quantity: 30,  # refills: 1   Last office visit: 12/14/2018 with prescribing provider:  12/14/16   Future Office Visit:      "

## 2018-12-24 NOTE — TELEPHONE ENCOUNTER
"Requested Prescriptions   Pending Prescriptions Disp Refills     pantoprazole (PROTONIX) 40 MG EC tablet 30 tablet 1     Sig: Take 1 tablet (40 mg) by mouth daily Take 30-60 minutes before a meal.    PPI Protocol Passed - 12/24/2018  8:51 AM       Passed - Not on Clopidogrel (unless Pantoprazole ordered)       Passed - No diagnosis of osteoporosis on record       Passed - Recent (12 mo) or future (30 days) visit within the authorizing provider's specialty    Patient had office visit in the last 12 months or has a visit in the next 30 days with authorizing provider or within the authorizing provider's specialty.  See \"Patient Info\" tab in inbasket, or \"Choose Columns\" in Meds & Orders section of the refill encounter.             Passed - Patient is age 18 or older       Passed - No active pregnacy on record       Passed - No positive pregnancy test in past 12 months            "

## 2018-12-27 NOTE — TELEPHONE ENCOUNTER
"Requested Prescriptions   Pending Prescriptions Disp Refills     busPIRone (BUSPAR) 10 MG tablet [Pharmacy Med Name: BUSPIRONE 10 MG     TAB APNA] 90 tablet 0     Sig: TAKE 2-3 TABLETS BY MOUTH AT BEDTIME    Atypical Antidepressants Protocol Passed - 12/27/2018  2:20 PM       Passed - Recent (12 mo) or future (30 days) visit within the authorizing provider's specialty    Patient had office visit in the last 12 months or has a visit in the next 30 days with authorizing provider or within the authorizing provider's specialty.  See \"Patient Info\" tab in inbasket, or \"Choose Columns\" in Meds & Orders section of the refill encounter.      Last Written Prescription Date:  11/16/18  Last Fill Quantity: 90,  # refills: 0   Last office visit: 12/14/2018 with prescribing provider:     Future Office Visit:               Passed - Patient is age 18 or older       Passed - No active pregnancy on record       Passed - No positive pregnancy test in past 12 mos          "

## 2018-12-28 NOTE — LETTER
[place] CARE COORDINATION  [facility]  [address] [place] [address]  [phone]    December 28, 2018    Tere Ortiz  [id number] [id number]      Dear Tere,    I just wanted to update your information in your chart. I put a new Health Care Home Care plan with your current information. If you have any questions or concerns or something looks incorrect, please give me a call.     Otherwise, I will plan to continue to outreach to you as we have previously planned.     Please feel free to contact me at [phone] with any further questions or concerns that may arise. We at [facility] are focused on providing you with the highest-quality healthcare experience possible and that all starts with you.       Sincerely,     Michelle Rader    Enclosed: I have enclosed a copy of the Complex Care Plan. This has helpful information and goals that we have talked about. Please keep this in an easy to access place to use as needed.
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
## 2019-01-01 ENCOUNTER — ANTICOAGULATION THERAPY VISIT (OUTPATIENT)
Dept: ANTICOAGULATION | Facility: CLINIC | Age: 64
End: 2019-01-01
Payer: COMMERCIAL

## 2019-01-01 ENCOUNTER — TELEPHONE (OUTPATIENT)
Dept: FAMILY MEDICINE | Facility: CLINIC | Age: 64
End: 2019-01-01

## 2019-01-01 ENCOUNTER — NURSE TRIAGE (OUTPATIENT)
Dept: NURSING | Facility: CLINIC | Age: 64
End: 2019-01-01

## 2019-01-01 ENCOUNTER — OFFICE VISIT (OUTPATIENT)
Dept: FAMILY MEDICINE | Facility: CLINIC | Age: 64
End: 2019-01-01
Payer: COMMERCIAL

## 2019-01-01 ENCOUNTER — TELEPHONE (OUTPATIENT)
Dept: PALLIATIVE MEDICINE | Facility: CLINIC | Age: 64
End: 2019-01-01

## 2019-01-01 ENCOUNTER — OFFICE VISIT (OUTPATIENT)
Dept: PALLIATIVE MEDICINE | Facility: CLINIC | Age: 64
End: 2019-01-01
Payer: COMMERCIAL

## 2019-01-01 ENCOUNTER — ANTICOAGULATION THERAPY VISIT (OUTPATIENT)
Dept: ANTICOAGULATION | Facility: CLINIC | Age: 64
End: 2019-01-01

## 2019-01-01 ENCOUNTER — OFFICE VISIT (OUTPATIENT)
Dept: ENDOCRINOLOGY | Facility: CLINIC | Age: 64
End: 2019-01-01
Payer: COMMERCIAL

## 2019-01-01 ENCOUNTER — TELEPHONE (OUTPATIENT)
Dept: PHARMACY | Facility: CLINIC | Age: 64
End: 2019-01-01

## 2019-01-01 ENCOUNTER — ANESTHESIA - HEALTHEAST (OUTPATIENT)
Dept: INTENSIVE CARE | Facility: HOSPITAL | Age: 64
End: 2019-01-01

## 2019-01-01 ENCOUNTER — APPOINTMENT (OUTPATIENT)
Dept: GENERAL RADIOLOGY | Facility: CLINIC | Age: 64
End: 2019-01-01
Attending: EMERGENCY MEDICINE
Payer: COMMERCIAL

## 2019-01-01 ENCOUNTER — ANCILLARY PROCEDURE (OUTPATIENT)
Dept: RADIOLOGY | Facility: CLINIC | Age: 64
End: 2019-01-01
Attending: PAIN MEDICINE
Payer: COMMERCIAL

## 2019-01-01 ENCOUNTER — TELEPHONE (OUTPATIENT)
Dept: ANTICOAGULATION | Facility: CLINIC | Age: 64
End: 2019-01-01

## 2019-01-01 ENCOUNTER — PATIENT OUTREACH (OUTPATIENT)
Dept: CARE COORDINATION | Facility: CLINIC | Age: 64
End: 2019-01-01

## 2019-01-01 ENCOUNTER — TELEPHONE (OUTPATIENT)
Dept: NURSING | Facility: CLINIC | Age: 64
End: 2019-01-01

## 2019-01-01 ENCOUNTER — RECORDS - HEALTHEAST (OUTPATIENT)
Dept: INTENSIVE CARE | Facility: HOSPITAL | Age: 64
End: 2019-01-01

## 2019-01-01 ENCOUNTER — TELEPHONE (OUTPATIENT)
Dept: ENDOCRINOLOGY | Facility: CLINIC | Age: 64
End: 2019-01-01

## 2019-01-01 ENCOUNTER — TRANSFERRED RECORDS (OUTPATIENT)
Dept: HEALTH INFORMATION MANAGEMENT | Facility: CLINIC | Age: 64
End: 2019-01-01

## 2019-01-01 ENCOUNTER — HOSPITAL ENCOUNTER (EMERGENCY)
Facility: CLINIC | Age: 64
Discharge: ANOTHER HOSPITAL | End: 2019-03-10
Attending: EMERGENCY MEDICINE | Admitting: EMERGENCY MEDICINE
Payer: COMMERCIAL

## 2019-01-01 ENCOUNTER — MYC REFILL (OUTPATIENT)
Dept: FAMILY MEDICINE | Facility: CLINIC | Age: 64
End: 2019-01-01

## 2019-01-01 ENCOUNTER — OFFICE VISIT (OUTPATIENT)
Dept: PHARMACY | Facility: CLINIC | Age: 64
End: 2019-01-01
Payer: COMMERCIAL

## 2019-01-01 ENCOUNTER — RADIOLOGY INJECTION OFFICE VISIT (OUTPATIENT)
Dept: PALLIATIVE MEDICINE | Facility: CLINIC | Age: 64
End: 2019-01-01
Payer: COMMERCIAL

## 2019-01-01 ENCOUNTER — ANCILLARY PROCEDURE (OUTPATIENT)
Dept: GENERAL RADIOLOGY | Facility: CLINIC | Age: 64
End: 2019-01-01
Payer: COMMERCIAL

## 2019-01-01 VITALS
RESPIRATION RATE: 18 BRPM | TEMPERATURE: 98.8 F | OXYGEN SATURATION: 94 % | DIASTOLIC BLOOD PRESSURE: 64 MMHG | SYSTOLIC BLOOD PRESSURE: 110 MMHG | HEART RATE: 98 BPM

## 2019-01-01 VITALS
DIASTOLIC BLOOD PRESSURE: 64 MMHG | RESPIRATION RATE: 24 BRPM | SYSTOLIC BLOOD PRESSURE: 124 MMHG | TEMPERATURE: 97.9 F | OXYGEN SATURATION: 96 % | HEART RATE: 89 BPM

## 2019-01-01 VITALS
SYSTOLIC BLOOD PRESSURE: 131 MMHG | OXYGEN SATURATION: 93 % | DIASTOLIC BLOOD PRESSURE: 62 MMHG | HEART RATE: 85 BPM | RESPIRATION RATE: 16 BRPM

## 2019-01-01 VITALS
WEIGHT: 116 LBS | BODY MASS INDEX: 24.24 KG/M2 | HEART RATE: 91 BPM | DIASTOLIC BLOOD PRESSURE: 67 MMHG | OXYGEN SATURATION: 97 % | TEMPERATURE: 98.9 F | RESPIRATION RATE: 36 BRPM | SYSTOLIC BLOOD PRESSURE: 107 MMHG

## 2019-01-01 VITALS — HEART RATE: 85 BPM | DIASTOLIC BLOOD PRESSURE: 68 MMHG | SYSTOLIC BLOOD PRESSURE: 118 MMHG

## 2019-01-01 VITALS
RESPIRATION RATE: 22 BRPM | HEART RATE: 85 BPM | OXYGEN SATURATION: 95 % | SYSTOLIC BLOOD PRESSURE: 118 MMHG | TEMPERATURE: 97.8 F | DIASTOLIC BLOOD PRESSURE: 68 MMHG

## 2019-01-01 VITALS — DIASTOLIC BLOOD PRESSURE: 82 MMHG | HEART RATE: 90 BPM | SYSTOLIC BLOOD PRESSURE: 142 MMHG

## 2019-01-01 DIAGNOSIS — Z72.0 TOBACCO ABUSE: ICD-10-CM

## 2019-01-01 DIAGNOSIS — M19.90 ARTHRITIS: ICD-10-CM

## 2019-01-01 DIAGNOSIS — M79.18 MYOFASCIAL PAIN SYNDROME: ICD-10-CM

## 2019-01-01 DIAGNOSIS — I26.99 PULMONARY EMBOLISM WITH INFARCTION (H): ICD-10-CM

## 2019-01-01 DIAGNOSIS — G25.81 RESTLESS LEGS SYNDROME (RLS): ICD-10-CM

## 2019-01-01 DIAGNOSIS — M79.7 FIBROMYALGIA: ICD-10-CM

## 2019-01-01 DIAGNOSIS — G43.909 MIGRAINE WITHOUT STATUS MIGRAINOSUS, NOT INTRACTABLE, UNSPECIFIED MIGRAINE TYPE: ICD-10-CM

## 2019-01-01 DIAGNOSIS — K21.9 GASTROESOPHAGEAL REFLUX DISEASE, ESOPHAGITIS PRESENCE NOT SPECIFIED: ICD-10-CM

## 2019-01-01 DIAGNOSIS — D68.62 LUPUS ANTICOAGULANT INHIBITOR SYNDROME (H): ICD-10-CM

## 2019-01-01 DIAGNOSIS — I74.9 EMBOLISM (H): ICD-10-CM

## 2019-01-01 DIAGNOSIS — G47.33 OSA (OBSTRUCTIVE SLEEP APNEA): ICD-10-CM

## 2019-01-01 DIAGNOSIS — F11.20 NARCOTIC DEPENDENCE (H): ICD-10-CM

## 2019-01-01 DIAGNOSIS — R59.1 LYMPHADENOPATHY: ICD-10-CM

## 2019-01-01 DIAGNOSIS — J44.1 CHRONIC OBSTRUCTIVE PULMONARY DISEASE WITH ACUTE EXACERBATION (H): Primary | ICD-10-CM

## 2019-01-01 DIAGNOSIS — J44.9 CHRONIC OBSTRUCTIVE PULMONARY DISEASE, UNSPECIFIED COPD TYPE (H): ICD-10-CM

## 2019-01-01 DIAGNOSIS — I26.99 PULMONARY EMBOLISM WITH INFARCTION (H): Primary | ICD-10-CM

## 2019-01-01 DIAGNOSIS — F41.9 ANXIETY: ICD-10-CM

## 2019-01-01 DIAGNOSIS — E87.6 HYPOKALEMIA: ICD-10-CM

## 2019-01-01 DIAGNOSIS — I27.20 PULMONARY HYPERTENSION (H): ICD-10-CM

## 2019-01-01 DIAGNOSIS — J96.22 ACUTE ON CHRONIC RESPIRATORY FAILURE WITH HYPERCAPNIA (H): ICD-10-CM

## 2019-01-01 DIAGNOSIS — R06.00 DYSPNEA, UNSPECIFIED TYPE: ICD-10-CM

## 2019-01-01 DIAGNOSIS — J30.2 SEASONAL ALLERGIC RHINITIS, UNSPECIFIED TRIGGER: ICD-10-CM

## 2019-01-01 DIAGNOSIS — R53.82 CHRONIC FATIGUE: ICD-10-CM

## 2019-01-01 DIAGNOSIS — R05.9 COUGH: ICD-10-CM

## 2019-01-01 DIAGNOSIS — R06.00 DYSPNEA, UNSPECIFIED TYPE: Primary | ICD-10-CM

## 2019-01-01 DIAGNOSIS — J44.1 CHRONIC OBSTRUCTIVE PULMONARY DISEASE WITH ACUTE EXACERBATION (H): ICD-10-CM

## 2019-01-01 DIAGNOSIS — E27.9 ADRENAL NODULE (H): ICD-10-CM

## 2019-01-01 DIAGNOSIS — J44.9 CHRONIC OBSTRUCTIVE PULMONARY DISEASE, UNSPECIFIED COPD TYPE (H): Primary | ICD-10-CM

## 2019-01-01 DIAGNOSIS — G44.219 EPISODIC TENSION-TYPE HEADACHE, NOT INTRACTABLE: ICD-10-CM

## 2019-01-01 DIAGNOSIS — S16.1XXA CERVICAL STRAIN: ICD-10-CM

## 2019-01-01 DIAGNOSIS — M54.16 LUMBAR RADICULOPATHY: Primary | ICD-10-CM

## 2019-01-01 DIAGNOSIS — Z79.899 POLYPHARMACY: ICD-10-CM

## 2019-01-01 DIAGNOSIS — R60.9 DEPENDENT EDEMA: ICD-10-CM

## 2019-01-01 DIAGNOSIS — R53.83 FATIGUE, UNSPECIFIED TYPE: Primary | ICD-10-CM

## 2019-01-01 DIAGNOSIS — J44.1 COPD EXACERBATION (H): ICD-10-CM

## 2019-01-01 DIAGNOSIS — Z79.01 LONG TERM CURRENT USE OF ANTICOAGULANT THERAPY: ICD-10-CM

## 2019-01-01 DIAGNOSIS — G89.29 OTHER CHRONIC PAIN: ICD-10-CM

## 2019-01-01 DIAGNOSIS — K21.00 GASTROESOPHAGEAL REFLUX DISEASE WITH ESOPHAGITIS: ICD-10-CM

## 2019-01-01 DIAGNOSIS — M54.16 LUMBAR RADICULOPATHY: ICD-10-CM

## 2019-01-01 DIAGNOSIS — E03.9 HYPOTHYROIDISM, UNSPECIFIED TYPE: Primary | ICD-10-CM

## 2019-01-01 DIAGNOSIS — F32.0 MILD MAJOR DEPRESSION (H): ICD-10-CM

## 2019-01-01 DIAGNOSIS — G47.8 POOR SLEEP PATTERN: ICD-10-CM

## 2019-01-01 DIAGNOSIS — M79.18 MYOFASCIAL MUSCLE PAIN: ICD-10-CM

## 2019-01-01 DIAGNOSIS — J96.21 ACUTE AND CHRONIC RESPIRATORY FAILURE WITH HYPOXIA (H): ICD-10-CM

## 2019-01-01 DIAGNOSIS — J06.9 VIRAL URI WITH COUGH: ICD-10-CM

## 2019-01-01 DIAGNOSIS — E03.9 HYPOTHYROIDISM, UNSPECIFIED TYPE: ICD-10-CM

## 2019-01-01 DIAGNOSIS — M85.80 OSTEOPENIA, UNSPECIFIED LOCATION: ICD-10-CM

## 2019-01-01 DIAGNOSIS — Z78.0 ASYMPTOMATIC MENOPAUSAL STATE: ICD-10-CM

## 2019-01-01 DIAGNOSIS — J44.1 COPD EXACERBATION (H): Primary | ICD-10-CM

## 2019-01-01 LAB
ALBUMIN SERPL-MCNC: 3.5 G/DL (ref 3.4–5)
ALP SERPL-CCNC: 66 U/L (ref 40–150)
ALT SERPL W P-5'-P-CCNC: 18 U/L (ref 0–50)
ANION GAP SERPL CALCULATED.3IONS-SCNC: 3 MMOL/L (ref 3–14)
AST SERPL W P-5'-P-CCNC: 14 U/L (ref 0–45)
BASE EXCESS BLDV CALC-SCNC: 4.2 MMOL/L
BASOPHILS # BLD AUTO: 0 10E9/L (ref 0–0.2)
BASOPHILS NFR BLD AUTO: 0.1 %
BILIRUB SERPL-MCNC: 0.3 MG/DL (ref 0.2–1.3)
BUN SERPL-MCNC: 13 MG/DL (ref 7–30)
CALCIUM SERPL-MCNC: 8.9 MG/DL (ref 8.5–10.1)
CHLORIDE SERPL-SCNC: 104 MMOL/L (ref 94–109)
CO2 SERPL-SCNC: 33 MMOL/L (ref 20–32)
CREAT SERPL-MCNC: 0.6 MG/DL (ref 0.52–1.04)
DIFFERENTIAL METHOD BLD: ABNORMAL
EOSINOPHIL # BLD AUTO: 0 10E9/L (ref 0–0.7)
EOSINOPHIL NFR BLD AUTO: 0 %
ERYTHROCYTE [DISTWIDTH] IN BLOOD BY AUTOMATED COUNT: 12.4 % (ref 10–15)
GFR SERPL CREATININE-BSD FRML MDRD: >90 ML/MIN/{1.73_M2}
GLUCOSE SERPL-MCNC: 117 MG/DL (ref 70–99)
HCO3 BLDV-SCNC: 35 MMOL/L (ref 21–28)
HCT VFR BLD AUTO: 42.1 % (ref 35–47)
HGB BLD-MCNC: 12.9 G/DL (ref 11.7–15.7)
IMM GRANULOCYTES # BLD: 0.1 10E9/L (ref 0–0.4)
IMM GRANULOCYTES NFR BLD: 0.9 %
INR PPP: 1 (ref 0.86–1.14)
INR PPP: 1.29 (ref 0.86–1.14)
INR PPP: 1.48 (ref 0.86–1.14)
INR PPP: 2.05 (ref 0.86–1.14)
INR PPP: 2.2
INR PPP: 3
LACTATE BLD-SCNC: 0.8 MMOL/L (ref 0.7–2)
LYMPHOCYTES # BLD AUTO: 0.4 10E9/L (ref 0.8–5.3)
LYMPHOCYTES NFR BLD AUTO: 4.9 %
MCH RBC QN AUTO: 35.5 PG (ref 26.5–33)
MCHC RBC AUTO-ENTMCNC: 30.6 G/DL (ref 31.5–36.5)
MCV RBC AUTO: 116 FL (ref 78–100)
MONOCYTES # BLD AUTO: 0.5 10E9/L (ref 0–1.3)
MONOCYTES NFR BLD AUTO: 6.7 %
NEUTROPHILS # BLD AUTO: 6.8 10E9/L (ref 1.6–8.3)
NEUTROPHILS NFR BLD AUTO: 87.4 %
NRBC # BLD AUTO: 0 10*3/UL
NRBC BLD AUTO-RTO: 0 /100
O2/TOTAL GAS SETTING VFR VENT: 6 %
PCO2 BLDV: 83 MM HG (ref 40–50)
PH BLDV: 7.23 PH (ref 7.32–7.43)
PLATELET # BLD AUTO: 189 10E9/L (ref 150–450)
PO2 BLDV: 28 MM HG (ref 25–47)
POTASSIUM SERPL-SCNC: 4 MMOL/L (ref 3.4–5.3)
PROT SERPL-MCNC: 7.4 G/DL (ref 6.8–8.8)
RBC # BLD AUTO: 3.63 10E12/L (ref 3.8–5.2)
SODIUM SERPL-SCNC: 140 MMOL/L (ref 133–144)
T3 SERPL-MCNC: 89 NG/DL (ref 60–181)
T4 FREE SERPL-MCNC: 1.08 NG/DL (ref 0.76–1.46)
TROPONIN I SERPL-MCNC: <0.015 UG/L (ref 0–0.04)
TSH SERPL DL<=0.005 MIU/L-ACNC: 0.25 MU/L (ref 0.4–4)
WBC # BLD AUTO: 7.8 10E9/L (ref 4–11)

## 2019-01-01 PROCEDURE — 99285 EMERGENCY DEPT VISIT HI MDM: CPT | Mod: 25

## 2019-01-01 PROCEDURE — 94640 AIRWAY INHALATION TREATMENT: CPT

## 2019-01-01 PROCEDURE — 99207 ZZC NO CHARGE NURSE ONLY: CPT

## 2019-01-01 PROCEDURE — 80053 COMPREHEN METABOLIC PANEL: CPT | Performed by: EMERGENCY MEDICINE

## 2019-01-01 PROCEDURE — G8978 MOBILITY CURRENT STATUS: HCPCS | Mod: CM | Performed by: PHYSICAL THERAPIST

## 2019-01-01 PROCEDURE — 25000132 ZZH RX MED GY IP 250 OP 250 PS 637: Performed by: EMERGENCY MEDICINE

## 2019-01-01 PROCEDURE — 84480 ASSAY TRIIODOTHYRONINE (T3): CPT | Performed by: INTERNAL MEDICINE

## 2019-01-01 PROCEDURE — 62323 NJX INTERLAMINAR LMBR/SAC: CPT | Performed by: PAIN MEDICINE

## 2019-01-01 PROCEDURE — G8979 MOBILITY GOAL STATUS: HCPCS | Mod: CL | Performed by: PHYSICAL THERAPIST

## 2019-01-01 PROCEDURE — 94660 CPAP INITIATION&MGMT: CPT

## 2019-01-01 PROCEDURE — 85610 PROTHROMBIN TIME: CPT | Performed by: PHYSICIAN ASSISTANT

## 2019-01-01 PROCEDURE — 40000275 ZZH STATISTIC RCP TIME EA 10 MIN

## 2019-01-01 PROCEDURE — 83605 ASSAY OF LACTIC ACID: CPT | Performed by: EMERGENCY MEDICINE

## 2019-01-01 PROCEDURE — 36415 COLL VENOUS BLD VENIPUNCTURE: CPT | Performed by: PHYSICIAN ASSISTANT

## 2019-01-01 PROCEDURE — 25000125 ZZHC RX 250: Performed by: EMERGENCY MEDICINE

## 2019-01-01 PROCEDURE — 93005 ELECTROCARDIOGRAM TRACING: CPT

## 2019-01-01 PROCEDURE — 97112 NEUROMUSCULAR REEDUCATION: CPT | Mod: GP | Performed by: PHYSICAL THERAPIST

## 2019-01-01 PROCEDURE — 25000128 H RX IP 250 OP 636: Performed by: EMERGENCY MEDICINE

## 2019-01-01 PROCEDURE — 96375 TX/PRO/DX INJ NEW DRUG ADDON: CPT

## 2019-01-01 PROCEDURE — 84443 ASSAY THYROID STIM HORMONE: CPT | Performed by: INTERNAL MEDICINE

## 2019-01-01 PROCEDURE — 99205 OFFICE O/P NEW HI 60 MIN: CPT | Performed by: PAIN MEDICINE

## 2019-01-01 PROCEDURE — 84484 ASSAY OF TROPONIN QUANT: CPT | Performed by: EMERGENCY MEDICINE

## 2019-01-01 PROCEDURE — 36415 COLL VENOUS BLD VENIPUNCTURE: CPT | Performed by: INTERNAL MEDICINE

## 2019-01-01 PROCEDURE — 99215 OFFICE O/P EST HI 40 MIN: CPT | Performed by: PHYSICIAN ASSISTANT

## 2019-01-01 PROCEDURE — 36416 COLLJ CAPILLARY BLOOD SPEC: CPT | Performed by: PHYSICIAN ASSISTANT

## 2019-01-01 PROCEDURE — 25800030 ZZH RX IP 258 OP 636: Performed by: EMERGENCY MEDICINE

## 2019-01-01 PROCEDURE — 97161 PT EVAL LOW COMPLEX 20 MIN: CPT | Mod: GP | Performed by: PHYSICAL THERAPIST

## 2019-01-01 PROCEDURE — 99215 OFFICE O/P EST HI 40 MIN: CPT | Mod: 25 | Performed by: FAMILY MEDICINE

## 2019-01-01 PROCEDURE — 93010 ELECTROCARDIOGRAM REPORT: CPT | Mod: Z6 | Performed by: EMERGENCY MEDICINE

## 2019-01-01 PROCEDURE — 82803 BLOOD GASES ANY COMBINATION: CPT | Performed by: EMERGENCY MEDICINE

## 2019-01-01 PROCEDURE — 99285 EMERGENCY DEPT VISIT HI MDM: CPT | Mod: 25 | Performed by: EMERGENCY MEDICINE

## 2019-01-01 PROCEDURE — 99607 MTMS BY PHARM ADDL 15 MIN: CPT | Performed by: PHARMACIST

## 2019-01-01 PROCEDURE — 71046 X-RAY EXAM CHEST 2 VIEWS: CPT

## 2019-01-01 PROCEDURE — 85025 COMPLETE CBC W/AUTO DIFF WBC: CPT | Performed by: EMERGENCY MEDICINE

## 2019-01-01 PROCEDURE — 94060 EVALUATION OF WHEEZING: CPT | Performed by: FAMILY MEDICINE

## 2019-01-01 PROCEDURE — 99214 OFFICE O/P EST MOD 30 MIN: CPT | Performed by: NURSE PRACTITIONER

## 2019-01-01 PROCEDURE — 84439 ASSAY OF FREE THYROXINE: CPT | Performed by: INTERNAL MEDICINE

## 2019-01-01 PROCEDURE — 99605 MTMS BY PHARM NP 15 MIN: CPT | Performed by: PHARMACIST

## 2019-01-01 PROCEDURE — 96365 THER/PROPH/DIAG IV INF INIT: CPT

## 2019-01-01 PROCEDURE — 99207 ZZC NO BILLABLE SERVICE THIS VISIT: CPT | Performed by: NURSE PRACTITIONER

## 2019-01-01 PROCEDURE — 85610 PROTHROMBIN TIME: CPT | Performed by: EMERGENCY MEDICINE

## 2019-01-01 RX ORDER — CEFTRIAXONE SODIUM 1 G/50ML
1 INJECTION, SOLUTION INTRAVENOUS ONCE
Status: COMPLETED | OUTPATIENT
Start: 2019-01-01 | End: 2019-01-01

## 2019-01-01 RX ORDER — ALBUTEROL SULFATE 1.25 MG/3ML
1.25 SOLUTION RESPIRATORY (INHALATION) ONCE
Status: CANCELLED | OUTPATIENT
Start: 2019-01-01 | End: 2019-01-01

## 2019-01-01 RX ORDER — ALBUTEROL SULFATE 5 MG/ML
2.5 SOLUTION RESPIRATORY (INHALATION) ONCE
Status: COMPLETED | OUTPATIENT
Start: 2019-01-01 | End: 2019-01-01

## 2019-01-01 RX ORDER — ALBUTEROL SULFATE 0.83 MG/ML
2.5 SOLUTION RESPIRATORY (INHALATION) EVERY 6 HOURS PRN
Qty: 1 BOX | Refills: 0 | Status: SHIPPED | OUTPATIENT
Start: 2019-01-01 | End: 2019-01-01

## 2019-01-01 RX ORDER — PREDNISONE 20 MG/1
40 TABLET ORAL DAILY
Qty: 8 TABLET | Refills: 0 | Status: SHIPPED | OUTPATIENT
Start: 2019-01-01 | End: 2019-01-01

## 2019-01-01 RX ORDER — PREDNISONE 20 MG/1
60 TABLET ORAL ONCE
Status: COMPLETED | OUTPATIENT
Start: 2019-01-01 | End: 2019-01-01

## 2019-01-01 RX ORDER — ALBUTEROL SULFATE 0.83 MG/ML
2.5 SOLUTION RESPIRATORY (INHALATION) EVERY 6 HOURS PRN
Qty: 1 BOX | Refills: 1 | Status: SHIPPED | OUTPATIENT
Start: 2019-01-01 | End: 2019-01-01

## 2019-01-01 RX ORDER — WARFARIN SODIUM 5 MG/1
TABLET ORAL
Qty: 90 TABLET | Refills: 0 | Status: SHIPPED | OUTPATIENT
Start: 2019-01-01

## 2019-01-01 RX ORDER — BUSPIRONE HYDROCHLORIDE 10 MG/1
TABLET ORAL
Qty: 90 TABLET | Refills: 0 | Status: SHIPPED | OUTPATIENT
Start: 2019-01-01 | End: 2019-01-01

## 2019-01-01 RX ORDER — LEVOTHYROXINE SODIUM 100 MCG
100 TABLET ORAL DAILY
Qty: 90 TABLET | Refills: 3 | Status: SHIPPED | OUTPATIENT
Start: 2019-01-01 | End: 2019-01-01

## 2019-01-01 RX ORDER — HYDROCODONE BITARTRATE AND ACETAMINOPHEN 5; 325 MG/1; MG/1
2 TABLET ORAL ONCE
Status: COMPLETED | OUTPATIENT
Start: 2019-01-01 | End: 2019-01-01

## 2019-01-01 RX ORDER — ALBUTEROL SULFATE 0.83 MG/ML
2.5 SOLUTION RESPIRATORY (INHALATION) ONCE
Status: COMPLETED | OUTPATIENT
Start: 2019-01-01 | End: 2019-01-01

## 2019-01-01 RX ORDER — BACLOFEN 10 MG/1
TABLET ORAL
Qty: 90 TABLET | Refills: 3 | Status: SHIPPED | OUTPATIENT
Start: 2019-01-01

## 2019-01-01 RX ORDER — BACLOFEN 10 MG/1
TABLET ORAL
Qty: 90 TABLET | Refills: 5 | Status: SHIPPED | OUTPATIENT
Start: 2019-01-01 | End: 2019-01-01

## 2019-01-01 RX ORDER — PREDNISONE 20 MG/1
20 TABLET ORAL 2 TIMES DAILY
Qty: 10 TABLET | Refills: 0 | Status: SHIPPED | OUTPATIENT
Start: 2019-01-01 | End: 2019-01-01

## 2019-01-01 RX ORDER — DULOXETIN HYDROCHLORIDE 60 MG/1
60 CAPSULE, DELAYED RELEASE ORAL DAILY
Qty: 90 CAPSULE | Refills: 0 | Status: SHIPPED | OUTPATIENT
Start: 2019-01-01

## 2019-01-01 RX ORDER — LEVOTHYROXINE SODIUM 100 MCG
100 TABLET ORAL DAILY
Qty: 90 TABLET | Refills: 2 | Status: SHIPPED | OUTPATIENT
Start: 2019-01-01

## 2019-01-01 RX ORDER — ALBUTEROL SULFATE 90 UG/1
2 AEROSOL, METERED RESPIRATORY (INHALATION) EVERY 4 HOURS PRN
Qty: 1 INHALER | Refills: 2 | Status: SHIPPED | OUTPATIENT
Start: 2019-01-01

## 2019-01-01 RX ORDER — TIOTROPIUM BROMIDE 18 UG/1
18 CAPSULE ORAL; RESPIRATORY (INHALATION) DAILY
Qty: 30 CAPSULE | Refills: 5 | Status: SHIPPED | OUTPATIENT
Start: 2019-01-01

## 2019-01-01 RX ORDER — ALBUTEROL SULFATE 0.83 MG/ML
2.5 SOLUTION RESPIRATORY (INHALATION) EVERY 6 HOURS PRN
Qty: 1 BOX | Refills: 1 | Status: SHIPPED | OUTPATIENT
Start: 2019-01-01 | End: 2020-02-01

## 2019-01-01 RX ORDER — DOXYCYCLINE HYCLATE 100 MG
100 TABLET ORAL 2 TIMES DAILY
Qty: 14 TABLET | Refills: 0 | Status: SHIPPED | OUTPATIENT
Start: 2019-01-01 | End: 2019-01-01

## 2019-01-01 RX ORDER — PANTOPRAZOLE SODIUM 40 MG/1
40 TABLET, DELAYED RELEASE ORAL DAILY
Qty: 90 TABLET | Refills: 0 | Status: SHIPPED | OUTPATIENT
Start: 2019-01-01

## 2019-01-01 RX ORDER — SUMATRIPTAN 20 MG/1
SPRAY NASAL
Qty: 1 EACH | Refills: 1 | Status: SHIPPED | OUTPATIENT
Start: 2019-01-01

## 2019-01-01 RX ORDER — IPRATROPIUM BROMIDE AND ALBUTEROL SULFATE 2.5; .5 MG/3ML; MG/3ML
3 SOLUTION RESPIRATORY (INHALATION) ONCE
Status: COMPLETED | OUTPATIENT
Start: 2019-01-01 | End: 2019-01-01

## 2019-01-01 RX ORDER — BACLOFEN 10 MG/1
10 TABLET ORAL DAILY
Qty: 180 TABLET | Refills: 1 | Status: SHIPPED | OUTPATIENT
Start: 2019-01-01 | End: 2019-01-01

## 2019-01-01 RX ORDER — BUSPIRONE HYDROCHLORIDE 10 MG/1
TABLET ORAL
Qty: 90 TABLET | Refills: 1 | Status: SHIPPED | OUTPATIENT
Start: 2019-01-01

## 2019-01-01 RX ORDER — BACLOFEN 10 MG/1
10 TABLET ORAL DAILY
Qty: 180 TABLET | Refills: 0 | OUTPATIENT
Start: 2019-01-01

## 2019-01-01 RX ADMIN — CEFTRIAXONE SODIUM 1 G: 1 INJECTION, SOLUTION INTRAVENOUS at 20:24

## 2019-01-01 RX ADMIN — ALBUTEROL SULFATE 2.5 MG: 0.83 SOLUTION RESPIRATORY (INHALATION) at 07:54

## 2019-01-01 RX ADMIN — PREDNISONE 60 MG: 20 TABLET ORAL at 19:39

## 2019-01-01 RX ADMIN — ALBUTEROL SULFATE 2.5 MG: 5 SOLUTION RESPIRATORY (INHALATION) at 18:49

## 2019-01-01 RX ADMIN — AZITHROMYCIN MONOHYDRATE 500 MG: 500 INJECTION, POWDER, LYOPHILIZED, FOR SOLUTION INTRAVENOUS at 19:39

## 2019-01-01 RX ADMIN — IPRATROPIUM BROMIDE AND ALBUTEROL SULFATE 3 ML: .5; 3 SOLUTION RESPIRATORY (INHALATION) at 18:12

## 2019-01-01 RX ADMIN — HYDROCODONE BITARTRATE AND ACETAMINOPHEN 2 TABLET: 5; 325 TABLET ORAL at 20:21

## 2019-01-01 ASSESSMENT — ENCOUNTER SYMPTOMS
NIGHT SWEATS: 1
CHILLS: 1
MUSCLE CRAMPS: 1
SPUTUM PRODUCTION: 1
ARTHRALGIAS: 1
POOR WOUND HEALING: 0
INSOMNIA: 1
SNORES LOUDLY: 0
NUMBNESS: 1
DIZZINESS: 1
SPEECH CHANGE: 0
MUSCLE WEAKNESS: 1
POSTURAL DYSPNEA: 1
WEIGHT GAIN: 1
MEMORY LOSS: 1
COUGH: 1
NECK PAIN: 1
WHEEZING: 1
COUGH DISTURBING SLEEP: 1
INCREASED ENERGY: 1
WEAKNESS: 1
SHORTNESS OF BREATH: 1
JOINT SWELLING: 0
TINGLING: 1
PANIC: 0
LOSS OF CONSCIOUSNESS: 0
TREMORS: 0
POLYDIPSIA: 1
HEMOPTYSIS: 0
PARALYSIS: 0
DEPRESSION: 1
HEADACHES: 1
MYALGIAS: 1
NAIL CHANGES: 0
DISTURBANCES IN COORDINATION: 1
SKIN CHANGES: 0
STIFFNESS: 1
FEVER: 0
BACK PAIN: 1
DECREASED APPETITE: 0
ALTERED TEMPERATURE REGULATION: 1
DECREASED CONCENTRATION: 1
SEIZURES: 0
POLYPHAGIA: 0
NERVOUS/ANXIOUS: 1
FATIGUE: 1
HALLUCINATIONS: 0
DYSPNEA ON EXERTION: 1
WEIGHT LOSS: 0

## 2019-01-01 ASSESSMENT — ANXIETY QUESTIONNAIRES
5. BEING SO RESTLESS THAT IT IS HARD TO SIT STILL: NOT AT ALL
7. FEELING AFRAID AS IF SOMETHING AWFUL MIGHT HAPPEN: NOT AT ALL
6. BECOMING EASILY ANNOYED OR IRRITABLE: SEVERAL DAYS
2. NOT BEING ABLE TO STOP OR CONTROL WORRYING: NOT AT ALL
GAD7 TOTAL SCORE: 3
3. WORRYING TOO MUCH ABOUT DIFFERENT THINGS: NOT AT ALL
1. FEELING NERVOUS, ANXIOUS, OR ON EDGE: NOT AT ALL
GAD7 TOTAL SCORE: 3

## 2019-01-01 ASSESSMENT — PAIN SCALES - GENERAL
PAINLEVEL: EXTREME PAIN (8)
PAINLEVEL: EXTREME PAIN (8)

## 2019-01-01 ASSESSMENT — PATIENT HEALTH QUESTIONNAIRE - PHQ9
5. POOR APPETITE OR OVEREATING: MORE THAN HALF THE DAYS
SUM OF ALL RESPONSES TO PHQ QUESTIONS 1-9: 7

## 2019-01-01 ASSESSMENT — ACTIVITIES OF DAILY LIVING (ADL): DEPENDENT_IADLS:: TRANSPORTATION

## 2019-01-02 NOTE — TELEPHONE ENCOUNTER
01/02/19    Writer spoke with Tere to advise her she was overdue for an INR. Patient states she will have it completed today.     Justyn Polanco RN, BSN, PHN  Anticoagulation Clinic   [phone]

## 2019-01-03 NOTE — PROGRESS NOTES
ANTICOAGULATION FOLLOW-UP CLINIC VISIT    Patient Name:  Tere Ortiz  Date:  1/3/2019  Contact Type:  fax from [facility]    SUBJECTIVE:     Patient Findings     Positives:   No Problem Findings    Comments:   No concerns noted in Epic or reported by patient. Per previous agreement, patient will continue same dose when INR in range. Recheck INR in 1 week.            OBJECTIVE    INR   Date Value Ref Range Status   01/03/2019 3.0  Final       ASSESSMENT / PLAN  INR assessment THER    Recheck INR In: 1 WEEK    INR Location Home INR    Billed home INR No      Anticoagulation Summary  As of 1/3/2019    INR goal:   2.0-3.0   TTR:   57.8 % (1.8 y)   INR used for dosing:   3.0 (1/3/2019)   Warfarin maintenance plan:   7.5 mg (5 mg x 1.5) every day   Full warfarin instructions:   7.5 mg every day   Weekly warfarin total:   52.5 mg   No change documented:   Lucia Paulino RN   Plan last modified:   Nadia Monahan RN (11/29/2018)   Next INR check:   1/10/2019   Priority:   INR   Target end date:   Indefinite    Indications    Lupus anticoagulant inhibitor syndrome (H) [D68.62]  Pulmonary embolism with infarction (H) [I26.99]  Long-term (current) use of anticoagulants [Z79.01] [Z79.01]  Embolism - blood clot [I74.9]             Anticoagulation Episode Summary     INR check location:       Preferred lab:       Send INR reminders to:   [place] RADHA Obe[place] POOL    Comments:   * Alere Meter, Ok to continue same dose if in range & pt will call with concerns. warfarin in AM. Drinks high protein Ensure twice daily      Anticoagulation Care Providers     Provider Role Specialty Phone number    Zina Pruett PA-C Responsible Physician Assistant [phone]            See the Encounter Report to view Anticoagulation Flowsheet and Dosing Calendar (Go to Encounters tab in chart review, and find the Anticoagulation Therapy Visit)        Lucia Paulino, DAWSON

## 2019-01-08 NOTE — TELEPHONE ENCOUNTER
Insurance changed to are for Seniors 1-1-2019.    Cait Rosburg  Patient Representative  [facility]
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
## 2019-01-10 NOTE — PATIENT INSTRUCTIONS
- Further procedures recommended:               - consider lumbar epidural- would get records from [facility] first  - Medication Management: would make 1 change at a time              - Would consider slow wean off SOMA given hx of accidental overdose and is not helping with sleep. Would wean over a month. If interested               - reasonable to continue baclofen for now consider switch in the future. I would transition to robaxin               - Will certify for medical cannabis [email or web address]              - Consider low dose naltrexone              - consider restarting low dose gabapentin for both sleep and pain. Will get records from [facility] to further evaluate past workup              - consider starting another migraine prophylactic medication              - Discussed that she call insurance about topamax or maybe switch pharmacy or try mail order.  - Physical Therapy: Will order pain PHYSICAL THERAPY  Today. Patient will schedule   - Diagnostic Studies:no  - Urine toxicology screen today: no   - Follow up: 1-2 months after starting medical cannabis and at least 1 session of pain PHYSICAL THERAPY   - I will call once I get the records from [facility]- to discuss possible procedural option  - please keep a diet and activity journal for 3 days and bring to next appointment.        ----------------------------------------------------------------  Clinic Number:  [phone]   Call this number with any questions about your care and for scheduling assistance. Calls are returned Monday through Friday between 8 AM and 4:30 PM. We usually get back to you within 2 business days depending on the issue/request.       Medication refills:    For non-narcotic medications, call your pharmacy directly to request a refill. The pharmacy will contact the [facility] Management Center for authorization. Please allow 3-4 days for these refills to be processed.     For narcotic refills, call the clinic number or send a [employer] message. Please  contact us 7-10 days before your refill is due. The message MUST include the name of the specific medication(s) requested and how you would like to receive the prescription(s). The options are as follows:    Pain Clinic staff can mail the prescription to your pharmacy. Please tell us the name of the pharmacy.    You may pick the prescription up at the Pain Clinic (tell us the location) or during a clinic visit with your pain provider    Pain Clinic staff can deliver the prescription to the [facility] pharmacy in the clinic building. Please tell us the location.      We believe regular attendance is key to your success in our program.    Any time you are unable to keep your appointment we ask that you call us at least 24 hours in advance to let us know. This will allow us to offer the appointment time to another patient.

## 2019-01-10 NOTE — Clinical Note
I discussed with the patient that given her history of accidental overdose I would recommend a slow wean of her Soma.  More than happy to help you with this process.  But in general I wean it over a month in order to prevent any major withdrawal.

## 2019-01-13 NOTE — TELEPHONE ENCOUNTER
"Requested Prescriptions    baclofen (LIORESAL) 10 MG tablet  Last Written Prescription Date:  10/19/18  Last Fill Quantity: 180,   # refills: 0  Last Office Visit: 12/14/18  Future Office visit:       Routing refill request to provider for review/approval because:  Drug not on the G, P or [facility] refill protocol or controlled substance       Pending Prescriptions Disp Refills     tiotropium (SPIRIVA HANDIHALER) 18 MCG inhaled capsule  Last Written Prescription Date:  6/26/18  Last Fill Quantity: 30,  # refills: 5   Last office visit: 12/14/2018 with prescribing provider:  MUNA Pruett   Future Office Visit:     30 capsule 5    Asthma Maintenance Inhalers - Anticholinergics Passed - 1/12/2019 12:36 PM       Passed - Patient is age 12 years or older       Passed - Recent (12 mo) or future (30 days) visit within the authorizing provider's specialty    Patient had office visit in the last 12 months or has a visit in the next 30 days with authorizing provider or within the authorizing provider's specialty.  See \"Patient Info\" tab in inbasket, or \"Choose Columns\" in Meds & Orders section of the refill encounter.             Passed - Medication is active on med list        baclofen (LIORESAL) 10 MG tablet 180 tablet 0     Sig: Take 1 tablet (10 mg) by mouth daily 2 at bedtime    There is no refill protocol information for this order          "

## 2019-01-14 NOTE — TELEPHONE ENCOUNTER
Routing refill request to provider for review/approval because:  Drug not on the [facility] refill protocol     Cony NEWTON RN

## 2019-01-14 NOTE — TELEPHONE ENCOUNTER
[facility] called and needs this clarified, is it one in the day, two at HS? Or ?     Medication Detail      Disp Refills Start End NALLELY   baclofen (LIORESAL) 10 MG tablet 180 tablet 1 1/14/2019  No   Sig - Route: Take 1 tablet (10 mg) by mouth daily 2 at bedtime - Oral   Sent to pharmacy as: baclofen (LIORESAL) 10 MG tablet   Class: E-Prescribe   Order: [id number]   E-Prescribing Status: Receipt confirmed by pharmacy (1/14/2019  3:45 PM CST)     Cnoy NEWTON RN

## 2019-01-15 NOTE — TELEPHONE ENCOUNTER
Requested Prescriptions   Pending Prescriptions Disp Refills     baclofen (LIORESAL) 10 MG tablet 180 tablet 0     Sig: Take 1 tablet (10 mg) by mouth daily 2 at bedtime    There is no refill protocol information for this order

## 2019-01-18 NOTE — TELEPHONE ENCOUNTER
Writer called pt back.      Pt stated that her pain is bad enough that she would like to try Dr. Ridley recommendations.    Looks like we are still awaiting her records from [facility], however Pt would like to her the Lumbar Epidural steroid injection.    Writer reviewed that Dr. ridley only makes one change at a time for medications, pt stated that no medications were changed at her Appt.      Will forward to Dr. Ridley to review and advise on LESI and Medications.      1/10/19 Appt with Dr. Ridley   - Further procedures recommended:   - consider lumbar epidural- would get records from [facility] first  - Medication Management: would make 1 change at a time   - Would consider slow wean off SOMA given hx of accidental overdose and is not helping with sleep. Would wean over a month. If interested   - reasonable to continue baclofen for now consider switch in the future. I would transition to robaxin   - Will certify for medical cannabis [email or web address]   - Consider low dose naltrexone   - consider restarting low dose gabapentin for both sleep and pain. Will get records from [facility] to further evaluate past workup   - consider starting another migraine prophylactic medication   - Discussed that she call insurance about topamax or maybe switch pharmacy or try mail order.    Efrain Johnston, RN  Care Coordinator   [facility]

## 2019-01-18 NOTE — TELEPHONE ENCOUNTER
Pt calling to follow up with Dr Ridley regarding the instruction he gave her. Switching medication, records coming in from [facility]. [facility]. She mentioned she did receive her rx for Topamax and it was $2.      Ben OLIVER    [facility]

## 2019-01-21 NOTE — PATIENT INSTRUCTIONS
Prednisone start tomorrow and take until completed  Albuterol use with the spacer, 2 puffs every 4 hours as needed for wheezing/shortness of breath  Doxycycline take twice a day for 7 days    Follow-up to be seen in the next 2-3 days  If you have worsening symptoms, please call the clinic for a recommendation    Discuss if you need an up to date pulmonary function test, you may benefit from a daily inhaler to help with your condition.
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
## 2019-01-23 NOTE — TELEPHONE ENCOUNTER
Writer attempted to call pt, No answer.  LVM for Pt to call writer back at [phone].    Efrain Johnston, RN  Care Coordinator   [facility]

## 2019-01-23 NOTE — TELEPHONE ENCOUNTER
Patient is requesting to schedule an injection with the [facility].     This would require the patient to hold:                 Coumadin (Warfarin)         Hold 5 days prior to procedure.  Check INR prior to procedure.  Ok to resume night of procedure, unless directed otherwise by provider or anticoagulation clinic.      We are requesting your approval to hold the medication for this time frame.    Please keep call open and route back to the PAIN NURSE [[id number]] pool.     If hold approved, we will contact the patient for scheduling.    Thank you.    Routed to Zina Pruett PA-C Scott Line, DAWSON  Care Coordinator   [facility]
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
## 2019-01-23 NOTE — TELEPHONE ENCOUNTER
Procedure order.  Please clarify who is prescribing the patient's Soma and when the last Rx was for.  Discussed case with PCP is not prescribing.  If interested I can patient titrate off the Soma and start a new muscle relaxer.

## 2019-01-24 NOTE — TELEPHONE ENCOUNTER
If the 5 day hold is approved, please address if patient would need to bridge with Lovenox.     Aki GOODMAN RN, [facility]  Anticoagulation Clinic

## 2019-01-25 NOTE — TELEPHONE ENCOUNTER
01/25/19     Writer left message with patient's spouse to have her call [facility] today in regards to the pain injection. [facility] needs to verify the date of the procedure. Patient will need to hold for 5 days and bridge with Prophylactic Lovenox.     Justyn Polanco RN, BSN, PHN  Anticoagulation Clinic   [phone]

## 2019-01-25 NOTE — PROGRESS NOTES
01/25/19    Writer left message with patient's spouse to have her call [facility] today in regards to the pain injection. [facility] needs to verify the date of the procedure. Patient will need to hold for 5 days and bridge with Prophylactic Lovenox.    Justyn Polanco RN, BSN, PHN  Anticoagulation Clinic   [phone]

## 2019-01-25 NOTE — PROGRESS NOTES
ANTICOAGULATION FOLLOW-UP CLINIC VISIT    Patient Name:  Tere Ortiz  Date:  2019  Contact Type:  Fax Alere    SUBJECTIVE:     Patient Findings     Positives:   No Problem Findings    Comments:   Patient will continue weekly maintenance dose. INR is therapeutic.   Recheck in 1 week.   Message pending on date of Lumbar injection for hold and bridging instructions.           OBJECTIVE    INR   Date Value Ref Range Status   2019 2.2  Final       ASSESSMENT / PLAN  INR assessment THER    Recheck INR In: 1 WEEK    INR Location Home INR    Billed home INR No      Anticoagulation Summary  As of 2019    INR goal:   2.0-3.0   TTR:   59.2 % (1.8 y)   INR used for dosin.2 (2019)   Warfarin maintenance plan:   7.5 mg (5 mg x 1.5) every day   Full warfarin instructions:   7.5 mg every day   Weekly warfarin total:   52.5 mg   No change documented:   Justyn Polanco RN   Plan last modified:   Nadia Monahan RN (2018)   Next INR check:   2019   Priority:   INR   Target end date:   Indefinite    Indications    Lupus anticoagulant inhibitor syndrome (H) [D68.62]  Pulmonary embolism with infarction (H) [I26.99]  Long-term (current) use of anticoagulants [Z79.01] [Z79.01]  Embolism - blood clot [I74.9]             Anticoagulation Episode Summary     INR check location:       Preferred lab:       Send INR reminders to:   [place] [facility] [employer] [facility] POOL    Comments:   * Alere Meter, Ok to continue same dose if in range & pt will call with concerns. warfarin in AM. Drinks high protein Ensure twice daily      Anticoagulation Care Providers     Provider Role Specialty Phone number    Zina Pruett PA-C Responsible Physician Assistant [phone]            See the Encounter Report to view Anticoagulation Flowsheet and Dosing Calendar (Go to Encounters tab in chart review, and find the Anticoagulation Therapy Visit)        Justyn Polanco, RN

## 2019-01-26 NOTE — TELEPHONE ENCOUNTER
"Requested Prescriptions   Pending Prescriptions Disp Refills     busPIRone (BUSPAR) 10 MG tablet [Pharmacy Med Name: BUSPIRONE 10 MG     TAB APNA] 90 tablet 0    Last Written Prescription Date:  12/28/18  Last Fill Quantity: 90,  # refills: 0   Last office visit: 1/21/2019 with prescribing provider:  REX Hazel Future Office Visit:     Sig: TAKE 2-3 TABLETS BY MOUTH AT BEDTIME    Atypical Antidepressants Protocol Passed - 1/26/2019 11:36 AM       Passed - Recent (12 mo) or future (30 days) visit within the authorizing provider's specialty    Patient had office visit in the last 12 months or has a visit in the next 30 days with authorizing provider or within the authorizing provider's specialty.  See \"Patient Info\" tab in inbasket, or \"Choose Columns\" in Meds & Orders section of the refill encounter.             Passed - Medication active on med list       Passed - Patient is age 18 or older       Passed - No active pregnancy on record       Passed - No positive pregnancy test in past 12 mos          "

## 2019-01-28 NOTE — TELEPHONE ENCOUNTER
PC to patient regarding scheduled date for pain injection which will require a 5 days hold of warfarin and possible bridging with Lovenox if ordered. Patient states she has not scheduled yet but will do today and notify [facility] of procedure date. Talia John RN on 1/28/2019 at 2:05 PM
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
## 2019-01-30 NOTE — PROGRESS NOTES
PHYSICAL THERAPY INITIAL EVALUATION and PLAN OF CARE    Patient Name:  Tere Ortiz  : 1955  MRN:[id number]    SUBJECTIVE:  PRESENTATION AND ETIOLOGY  Chief Complaint: Lumbar radiculopathy, fibromyalgia limiting the ability to perform perform activities of daily living.      Onset / Etiology: 2003    Pattern Since Onset: Worsened    Pertinent Medical  / Surgical History: Epic Snapshot Reviewed:  Contributing factors to the severity / complexity of the patient's chief complaint include: see providers notes    Symptom Pattern: None    Pain:  Frequency: Constant or Activity Dependent     LEVEL OF FUNCTION AT START OF CARE  Current Aggrevating Activities / Functional Limitations: walk 1' (has cane and 4WW), sit (sits on a donut) 1-3', stand 1'    Home or Community Barriers: has stairs, uses cane and rail    Patient's selected goals for physical therapy: be able to spend more time with grandkids and family, walk more easily, tolerate daily routine    CURRENT / PREVIOUS INTERVENTION(S):     Relieving Activities / Self Care / Exercise: heat, ice, change positions, lie down, does have a TENS, lotion/cream    Previous / Current therapies for current chief complaint: PT long ago    DEMOGRAPHICS  Employment Status: not working     Social Support:  Good family support    ===============================================================  OBJECTIVE:  POSTURE:  Observation: Assumes poor slouched posture with difficulty holding upright sitting posture for more than 2 minutes,  Very deconditioned.    GAIT, LOCOMOTION, and BALANCE:  Gait and Locomotion:  Declines to walk.  States does not really walk much anymore, has a power WC to get around.      RANGE OF MOTION:   Active: not formally assessed due to pt fatigued today.  Pt declined standing today.    MUSCLE PERFORMANCE:   Strength: not formally assessed due to pt fatigued today.  Pt declined standing  today    ===============================================================  Today's Treatment:  Initial evaluation  Neuromuscular Reeducation:   Posture - ed on upright sitting neutral spine position  Ed on chronic pain PT POC, basic movement program.  Seated march x 30 seconds/fatigued.  ===============================================================  ASSESSMENT:  Physical Therapy Diagnosis: Musculoskeletal Patterns    Patient requires PT intervention for the following impairments: Limited knowledge of condition and / or self care - inability to control symptoms, Impaired functional mobility, Impaired posture / muscle imbalance, Impaired static and / or dynamic balance, Muscle strength and endurance limitations, Pain and Deconditioning    Anticipated Goals and Expected Outcomes:EDUCATION AND SELF CARE  Independent and safe with home exercise / self care program in 6 week(s).  Good working knowledge of posture principles / joint protection in 6 week(s).  Sit to stand from normal height chair without increased pain or symptoms in 12 week(s).  Standing tolerance 5 minutes without increased pain or symptoms in 12 week(s).  Sitting tolerance 15 minutes without increased pain or symptoms in 12 week(s).    Rehab potential for achieving goals: fair.    ===============================================================  PLAN:   Patient will benefit from skilled physical therapy consisting of: neuromuscular reeducation of: movement, balance, coordination, kinesthetic sense, posture and proprioception for sitting and / or standing activities, education in self care / home management training to include instruction in: joint protection principles and symptom control techniques, therapeutic activities to achieve improved functional performance in: daily actvities and therapeutic exercise to develop: strength and endurance, joint mobility and joint stability    Assessment will be ongoing with changes in treatment as indicated.   Benefits/risks/alternatives to treatment have been reviewed and the patient has been instructed to contact this office if there are any questions or concerns.  This plan of care has been discussed with the patient and the patient is in agreement.     Frequency / Duration:  Patient will be seen for a total of 8-12 visits; at a frequency of every 1-3 weeks.    Total Visit Time: 45  minutes     GCodes   Mobility  current 80-99%    goal 60-79%           Mele Michoacanoedwardo          PT                                Date:  1/30/2019    =====================================================  **  Referring Provider Certification: Referring Provider reviewing certifies that the above treatment / plan of care is required and authorized, and that the patient's plan will be reviewed every thirty (30) days **.   ======================================================     PRESENT: NA    MULTIDISCIPLINARY PATIENT / FAMILY EDUCATION RECORD  Department:  Physical Therapy    Readiness to Learn: Ability to understand verbal instructions,Ability to understand written instructions,Knowledge of educational needs / treatment plan  Specific Barriers to Learning: None  Referrals: None  Learning Needs: Rehabilitation techniques to improve functional independence  Who: Patient  How: Demonstration,Verbal instructions,Written instructions  Response: Appropriate verbal response,Asked questions,Demonstrated ability,Verbalized recall / understanding
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
## 2019-02-01 NOTE — PATIENT INSTRUCTIONS
"Consider chantix to help with smoking cessation    Reduce synthroid to 100 mcg daily - recheck labs in 2 months    Consider the voltaren gel to legs - 2 grams 4 times per day    [facility] [phone]        To expedite your medication refill(s), please contact your pharmacy and have them fax a refill request to: [phone].  *Please allow 3 business days for routine medication refills.  *Please allow 5 business days for controlled substance medication refills.  --------------------  To schedule an appointment (including lab work) you can reach our clinic schedulers at [phone], or you can schedule any follow up appointment directly through [employer] by clicking on the \"Visits\" tab and selecting \"Schedule an Appointment.\"    To ask a question to your Endocrine care team, please send them a [employer] message, or reach them by phone at [phone] and press option #3.    For after-hours urgent Endocrine issues, that do not require 911, please dial [phone], and ask to speak with the Endocrinologist On-Call.    For questions related to your bill, please contact:  [place]: [phone]  [facility]: [phone]    If you do not have enough, or have no insurance for your care, or have questions about possible costs and coverage, please reach out to our [facility] Financial Counselors to discuss with them any options you may have. To reach them, please call [phone].    --------------------  Please Note: If you are active on [employer], all future test results will be sent by [employer] message only and will no longer be sent by mail. You may also receive communication directly from your physician.        "

## 2019-02-01 NOTE — PATIENT INSTRUCTIONS
Prednisone sent to the pharmacy for symptoms    Use nebs as needed for shortness of breath    Follow up if symptoms do not improve or worsen.
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
## 2019-02-05 NOTE — TELEPHONE ENCOUNTER
This Rx for albuterol neb was sent to the [facility] in [place], [place], then sent to [facility] the same day. Writer called and spoke with Desire Pearson at [facility]. Confirmed it was ordered 2/1/19 and was picked up 2/2/19.   Will send Rx for 1 box with 0 refills to mail order, as it was originally ordered as 1 box with 1 refill.    Cony NEWTON RN

## 2019-02-05 NOTE — TELEPHONE ENCOUNTER
Pts script for albuterol went to [facility]. [facility]. Please send.

## 2019-02-13 NOTE — TELEPHONE ENCOUNTER
Called pt and scheduled the injection for 3/1/19.  Coumadin hold to begin on 2/25/19  Pt to call INR clinic today for lovenox instructions.  Injection intake questions were reviewed.    Routed to the INR clinic.    Pilar Eller RN-BSN  [facility]

## 2019-02-13 NOTE — PROGRESS NOTES
Last warfarin dose: Saturday, February 23rd Sunday, February 24th, NO warfarin  Monday, February 25th, NO warfarin  Tuesday, February 26th, NO warfarin, begin enoxaparin injections into the abdomen every 24 hours in the morning.  Wednesday, February 27th, NO warfarin, enoxaparin injection into the abdomen in the morning.  Thursday, February 28th, NO warfarin, enoxaparin injection into the abdomen in the morning (no enoxaparin 24 hours prior to surgery)  Friday, March 1st, DAY OF PROCEDURE, NO enoxaparin. Restart warfarin 15mg (3 tabs) in the evening, unless instructed otherwise by the physician.  Saturday, March 2nd, Restart enoxaparin injections into the abdomen every 24 hours (in the morning), unless instructed otherwise by the physician, and 15mg (3 tabs) warfarin in the evening.   Sunday, March 3rd, Enoxaparin injection into the abdomen in the morning and 10mg (2 tabs) warfarin in the evening.  Monday, March 4th, Enoxaparin injection into the abdomen in the morning and 7.5mg (1.5 tabs) warfarin in the evening.  Tuesday, March 5th, Enoxaparin injection into the abdomen in the morning. Recheck INR at the [facility] before 10am. The Anticoagulation Clinic will contact you with further dosing instructions.   If you have any questions please call the Anticoagulation Clinic at [phone].  To schedule your appointment please call [phone].      .

## 2019-02-13 NOTE — PATIENT INSTRUCTIONS
Last warfarin dose: Saturday, February 23rd Sunday, February 24th, NO warfarin    Monday, February 25th, NO warfarin    Tuesday, February 26th, NO warfarin, begin enoxaparin injections into the abdomen every 24 hours in the morning.    Wednesday, February 27th, NO warfarin, enoxaparin injection into the abdomen in the morning.    Thursday, February 28th, NO warfarin, enoxaparin injection into the abdomen in the morning (no enoxaparin 24 hours prior to surgery)    Friday, March 1st, DAY OF PROCEDURE, NO enoxaparin. Restart warfarin 15mg (3 tabs) in the evening, unless instructed otherwise by the physician.    Saturday, March 2nd, Restart enoxaparin injections into the abdomen every 24 hours (in the morning), unless instructed otherwise by the physician, and 15mg (3 tabs) warfarin in the evening.     Sunday, March 3rd, Enoxaparin injection into the abdomen in the morning and 10mg (2 tabs) warfarin in the evening.    Monday, March 4th, Enoxaparin injection into the abdomen in the morning and 7.5mg (1.5 tabs) warfarin in the evening.    Tuesday, March 5th, Enoxaparin injection into the abdomen in the morning. Recheck INR at the [facility] before 10am. The Anticoagulation Clinic will contact you with further dosing instructions.     If you have any questions please call the Anticoagulation Clinic at [phone].    To schedule your appointment please call [phone].

## 2019-02-15 NOTE — PROGRESS NOTES
ANTICOAGULATION FOLLOW-UP CLINIC VISIT    Patient Name:  Tere Ortiz  Date:  2/15/2019  Contact Type:  Telephone    SUBJECTIVE:     Patient Findings     Positives:   No Problem Findings    Comments:   No changes in medications, activity, health, or diet noted. No bleeding or increased bruising noted. Took warfarin as prescribed.  Patient will continue weekly maintenance dose. INR is therapeutic.   Recheck in 3 weeks.  Patient verbalizes understanding and agrees to plan. No further questions or concerns.             OBJECTIVE    INR   Date Value Ref Range Status   2019 2.05 (H) 0.86 - 1.14 Final       ASSESSMENT / PLAN  INR assessment THER    Recheck INR In: 3 WEEKS    INR Location Outside lab      Anticoagulation Summary  As of 2/15/2019    INR goal:   2.0-3.0   TTR:   60.4 % (1.9 y)   INR used for dosin.05 (2019)   Warfarin maintenance plan:   7.5 mg (5 mg x 1.5) every day   Full warfarin instructions:   : Hold; : Hold; : Hold; : Hold; : Hold; 3/1: 15 mg; 3/2: 15 mg; 3/3: 10 mg; Otherwise 7.5 mg every day   Weekly warfarin total:   52.5 mg   No change documented:   Justyn Polanco RN   Plan last modified:   Nadia Monahan RN (2018)   Next INR check:   3/5/2019   Priority:   INR   Target end date:   Indefinite    Indications    Lupus anticoagulant inhibitor syndrome (H) [D68.62]  Pulmonary embolism with infarction (H) [I26.99]  Long-term (current) use of anticoagulants [Z79.01] [Z79.01]  Embolism - blood clot [I74.9]             Anticoagulation Episode Summary     INR check location:       Preferred lab:       Send INR reminders to:   [place] PHONE [email or web address]    Comments:   * Alere Meter, Ok to continue same dose if in range & pt will call with concerns. warfarin in AM. Drinks high protein Ensure twice daily      Anticoagulation Care Providers     Provider Role Specialty Phone number    Zina Pruett PA-C Responsible Physician Assistant [phone]             See the Encounter Report to view Anticoagulation Flowsheet and Dosing Calendar (Go to Encounters tab in chart review, and find the Anticoagulation Therapy Visit)        Justyn Polanco RN

## 2019-02-15 NOTE — TELEPHONE ENCOUNTER
Per Pt requesting a 90 Day supply sent to her Mail order pharmacy  [facility]

## 2019-02-16 NOTE — TELEPHONE ENCOUNTER
Patient calling, says that as of the first of the year she needs to have a 90 day supply of her warfarin prescription sent to [facility] as soon as possible. She is concerned that she may run out before this happens. Their phone number is [phone].  Hayley Flower RN  [facility]

## 2019-02-19 NOTE — TELEPHONE ENCOUNTER
Called pt - tsh improving - continue with current program and pt ot recheck labs in 2 months - pt is planning on imaging in a few weeks - she will schedule

## 2019-02-26 NOTE — PROGRESS NOTES
02/26/19    Patient called to state that she did not hold her Coumadin the last two days. She was to start holding on 2/24 for an Epidural injection on 3/1. Writer instructed patient to start the hold today. Writer instructed patient to eat Vitamin K enriched foods the next couple days. Patient is bridging with prophylactic Lovenox.     Justyn Polanco RN, BSN, PHN  Anticoagulation Clinic   [phone]

## 2019-02-27 NOTE — TELEPHONE ENCOUNTER
"Requested Prescriptions   Pending Prescriptions Disp Refills     DULoxetine (CYMBALTA) 60 MG capsule 90 capsule 0     Sig: Take 1 capsule (60 mg) by mouth daily    Serotonin-Norepinephrine Reuptake Inhibitors  Passed - 2/27/2019  3:52 PM       Passed - Blood pressure under 140/90 in past 12 months    BP Readings from Last 3 Encounters:   02/01/19 118/68   02/01/19 118/68   01/21/19 124/64                Passed - Recent (12 mo) or future (30 days) visit within the authorizing provider's specialty    Patient had office visit in the last 12 months or has a visit in the next 30 days with authorizing provider or within the authorizing provider's specialty.  See \"Patient Info\" tab in inbasket, or \"Choose Columns\" in Meds & Orders section of the refill encounter.             Passed - Medication is active on med list       Passed - Patient is age 18 or older       Passed - No active pregnancy on record       Passed - No positive pregnancy test in past 12 months      Last Written Prescription Date:  02/27/19  Last Fill Quantity: 90,  # refills: 0   Last office visit: 2/1/2019 with prescribing provider:  02/01/19   Future Office Visit:   Next 5 appointments (look out 90 days)    Mar 04, 2019  1:00 PM CST  [facility] Zina [facility]ett [facility]-C  [facility] [address] [place] [id number] [phone]         "

## 2019-03-01 NOTE — NURSING NOTE
Discharge Information    IV Discontiued Time:  NA    Amount of Fluid Infused:  NA    Discharge Criteria = When patient returns to baseline or as per MD order    Consciousness:  Pt is fully awake    Circulation:  BP +/- 20% of pre-procedure level    Respiration:  Patient is able to breathe deeply    O2 Sat:  Patient is able to maintain O2 Sat >92% on room air    Activity:  Moves 4 extremities on command    Ambulation:  Patient is able to stand and walk or stand and pivot into wheelchair    Dressing:  Clean/dry or No Dressing    Notes:   Discharge instructions and AVS given to patient    Patient meets criteria for discharge?  YES    Admitted to PCU?  No    Responsible adult present to accompany patient home?  Yes    Signature/Title:    Efrain Johnston RN Care Coordinator  [facility]
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
## 2019-03-01 NOTE — PATIENT INSTRUCTIONS
[facility]   Procedure Discharge Instructions    Today you saw:    Dr. Wang Ridley      You had an:  Lumbar Epidural steroid injection      Medications used:  Lidocaine   Bupivacaine   Dexamethasone   Omnipaque              If you were holding your blood thinning medication, please restart taking it: restart your coumadin tonight or as instructed by your INR clinic.  Please follow closely with your INR clinic moving forward.    Be cautious when walking. Numbness and/or weakness in the lower extremities may occur for up to 6-8 hours after the procedure due to effect of the local anesthetic    Do not drive for 6 hours. The effect of the local anesthetic could slow your reflexes.     You may resume your regular activities after 24 hours    Avoid strenuous activity for the first 24 hours    You may shower, however avoid swimming, tub baths or hot tubs for 24 hours following your procedure    You may have a mild to moderate increase in pain for several days following the injection.    It may take up to 14 days for the steroid medication to start working although you may feel the effect as early as a few days after the procedure.       You may use ice packs for 10-15 minutes, 3 to 4 times a day at the injection site for comfort    Do not use heat to painful areas for 6 to 8 hours. This will give the local anesthetic time to wear off and prevent you from accidentally burning your skin.     You may use anti-inflammatory medications (such as Ibuprofen or Aleve or Advil) or Tylenol for pain control if necessary    Possible side effects of steroids that you may experience include flushing, elevated blood pressure, increased appetite, mild headaches and restlessness.  All of these symptoms will get better with time.    If you experience any of the following, call the Pain Clinic during work hours at [phone] or the Provider Line after hours at [phone]:  -Fever over 100 degree F  -Swelling,  bleeding, redness, drainage, warmth at the injection site  -Progressive weakness or numbness in your legs   -Loss of bowel or bladder function  -Unusual new onset of pain that is not improving

## 2019-03-01 NOTE — NURSING NOTE
Pre-procedure Intake    Have you been fasting? NA    If yes, for how long? NO     Are you taking a prescribed blood thinner such as coumadin, Plavix, Xarelto?    Yes -   Coumadin    If yes, when did you take your last dose? 2/26/19    Do you take aspirin?  [id number]    If cervical procedure, have you held aspirin for 6 days?   No     Do you have any allergies to contrast dye, iodine, steroid and/or numbing medications?  NO    Are you currently taking antibiotics or have an active infection?  NO    Have you had a fever/elevated temperature within the past week? NO    Are you currently taking oral steroids? NO    Do you have a ? Yes       Are you pregnant or breastfeeding?  NO    Are the vital signs normal?  Yes  Jesus Henning MA

## 2019-03-02 NOTE — PHARMACY-ANTICOAGULATION SERVICE
Patient called this morning as she needed assistance with warfarin and lovenox dosing. She did NOT take warfarin on Friday as directed, so the following information was given to her today:         Saturday, March 2nd, Restart enoxaparin injections into the abdomen every 24 hours (in the morning), unless instructed otherwise by the physician, and 15mg (3 tabs) warfarin in the evening.      Sunday, March 3rd, Enoxaparin injection into the abdomen in the morning and 15mg (3 tabs) warfarin in the evening.     Monday, March 4th, Enoxaparin injection into the abdomen in the morning and 10mg (2 tabs) warfarin in the evening.     Tuesday, March 5th, Enoxaparin injection into the abdomen in the morning. Recheck INR at the [facility] before 10am. The Anticoagulation Clinic will contact you with further dosing instructions.

## 2019-03-04 PROBLEM — F11.20 NARCOTIC DEPENDENCE (H): Status: ACTIVE | Noted: 2019-01-01

## 2019-03-04 NOTE — PATIENT INSTRUCTIONS
Fatigue -  Many issues - multiple health problems   Probably partially from sleep apnea not fully treated - mask comes off in night  Could be in part from soma - I think pain doc is planning wean  Also potentially poor oxygen in day 0- upcoming lung appt  Also possibly from med interactions - see MTM pharmacist to be sure no concerns    Will get a call to set up MTM pharmacist  Also a call to help with quitting smoking, and can ask MTM about any way to get chantix cheaper - drug [employer] programs, [employer] patient assistance, etc    New jozef hose prescription when you get re-measured - can set CMA appt     Tetanus shot due    Physical in 3 months  I want to see you every 3-6 months

## 2019-03-04 NOTE — TELEPHONE ENCOUNTER
Patient is calling, she states that her pain is now moving into her knees and shins. She is not sure what to do as she has had this pain since her injection.      Please call      Beth LEONARDO    [facility]

## 2019-03-04 NOTE — TELEPHONE ENCOUNTER
Pt has appt with [facility] today, restarted warfarin after holding for procedure 3/2/19, 2 days is not enough time to be therapeutic. Left in VM 3/3/19 is earliest should be testing INR per instructions given by [facility]. Left VM on both home and cell phone. Talia John RN on 3/4/2019 at 8:27 AM

## 2019-03-04 NOTE — PROGRESS NOTES
SUBJECTIVE:   Tere Ortiz is a 64 year old female who presents to clinic today for the following health issues:    Multiple health issues    Wt loss has resolved - increasing wt now that endocrine adjusted thyroid meds    Continues to be tired all the time. Sleeps 1/2 hr at a time,wakes and has difficulty falling back asleep..   Concerns from 2017 sleep consult, with no f/u since last adjustment, and patient apparently takes mask off in her sleep.      Upcoming pulm appt.  1 ppd.  No specific times of day.  Tried wellbutrin, patches, gum.  Chantix too expensive.  Tried chewing toothpicks.    Aneurysm screen UTD.  Adrenal nodules - stable.    Edema - currently needing new jozef hose as previous ones do not fit with her change in wt, but not wishing to take time for measurement today.  Lupus anticoag syndrome, history dvt with PE, on warfarin per patient preference.    Mood doing well.    Pain - new pain in shins since back injection, working with pain specialist.    Problem list and histories reviewed & adjusted, as indicated.  Additional history: as documented    BP Readings from Last 3 Encounters:   03/04/19 110/64   03/01/19 131/62   02/01/19 118/68    Wt Readings from Last 3 Encounters:   11/08/18 52.2 kg (115 lb)   11/03/18 52.2 kg (115 lb)   11/02/18 52.2 kg (115 lb)        Labs reviewed in EPIC    Reviewed and updated as needed this visit by clinical staff  Tobacco  Allergies  Meds  Problems  Med Hx  Surg Hx  Fam Hx  Soc Hx        Reviewed and updated as needed this visit by Provider  Tobacco  Allergies  Meds  Problems  Med Hx  Surg Hx  Fam Hx  Soc Hx          ROS:  Constitutional, HEENT, cardiovascular, pulmonary, GI, , musculoskeletal, neuro, skin, endocrine and psych systems are negative, except as otherwise noted.    OBJECTIVE:     /64   Pulse 98   Temp 98.8  F (37.1  C) (Tympanic)   Resp 18   SpO2 94%   There is no height or weight on file to calculate BMI.  GENERAL:  healthy, is alert and no distress  CV: trace to +1 peripheral edema currently  PSYCH: mentation appears normal, affect normal/bright    ASSESSMENT/PLAN:       ICD-10-CM    1. Fatigue, unspecified type R53.83    2. Lupus anticoagulant inhibitor syndrome (H) D68.62 stable   3. Pulmonary hypertension (H) I27.20 Upcoming pulm   4. Adrenal nodule (H) E27.9 stable   5. Mild major depression (H) F32.0 Stable in remission   6. Acute and chronic respiratory failure with hypoxia (H) J96.21 Upcoming pulm; sleep referral today   7. Polypharmacy Z79.899 MED THERAPY MANAGE REFERRAL   8. Tobacco abuse Z72.0 MED THERAPY MANAGE REFERRAL   9. TITO (obstructive sleep apnea) G47.33 SLEEP EVALUATION & MANAGEMENT REFERRAL - [facility]  [phone] (Age 2 and up)   10. Poor sleep pattern G47.8 SLEEP EVALUATION & MANAGEMENT REFERRAL - [facility]  [phone] (Age 2 and up)   11. Dependent edema R60.9 order for DME     40 min spent with patient, over half in discussion of symptoms with further treatment options and update of problem list and histories.  Patient Instructions   Fatigue -  Many issues - multiple health problems   Probably partially from sleep apnea not fully treated - mask comes off in night  Could be in part from soma - I think pain doc is planning wean  Also potentially poor oxygen in day 0- upcoming lung appt  Also possibly from med interactions - see MTM pharmacist to be sure no concerns    Will get a call to set up MTM pharmacist  Also a call to help with quitting smoking, and can ask [facility] about any way to get chantix cheaper - drug [employer] programs, [employer] patient assistance, etc    New jozef hose prescription when you get re-measured - can set CMA appt     Tetanus shot due    Physical in 3 months  I want to see you every 3-6 months          Zina Pruett PA-C  [facility]

## 2019-03-05 NOTE — PROGRESS NOTES
Clinic Care Coordination Contact    Situation: Patient chart reviewed by care coordinator.    Background: Follow up on pain clinic    Assessment: Per notes, patient received a steriod injection for her low back on 2/28/19. Patient had f/u with endocrinology for her thyroid issues. She saw PCP today, recommended follow up on sleep consult due to abnormalities. Has upcoming pulmonary appointment. Smokes 1ppd, has tried several things to quit, PCP gave her QUIT plan referral. MTM referral to discuss medications to see if any of them are not helping her with sleeping. Patient is to see PCP every 3-6 months.    Plan/Recommendations: Care Coordinator RN to f/u with patient/spouse in one month to see how things are going and if she has started the medical marijuana.    Michelle Rader RN, [facility]  RN Care Coordinator  [facility]  Phone # [phone]  3/4/2019 9:00 PM

## 2019-03-05 NOTE — PROGRESS NOTES
Pt called, she missed her lab appt this AM. Rescheduled lab appt for this afternoon. Instructed to continue Lovenox and take 7.5 mg today. Explained to pt [facility] will not receive INR result until tomorrow due to missing AM pickup time.Pt states she is okay with this and understands instructions. Talia John RN on 3/5/2019 at 10:50 AM

## 2019-03-06 NOTE — TELEPHONE ENCOUNTER
Pt had a lumbar epidural steroid injection on 3/1/19.  Called pt. She states that the epidural steroid injection helped her low back pain by 60%.  Since the injection she has new pains located in her knees and her shins. She says her feet feel like they are on fire intermittently. This is new and it is affecting her sleep. Writer informed her that sometimes that she may have had this pain all along but the low back pain took precedence.      She is still on soma 1 tab at bedtime.  Prescribed by her primary care provider and is open to weaning off.  Dr. Ridley how do you want her to do that?    Dr. Ridley certified her for medical cannabis back in January but she has not received an email from them.  Writer gave her the # to call to check status of this.    Routed to Dr. Ridley.  What do you think of her new pain areas?  How should she wean off of soma?    Pilar Eller, RN-BSN  [facility]

## 2019-03-06 NOTE — TELEPHONE ENCOUNTER
[facility] Call Center    Phone Message    May a detailed message be left on voicemail: yes    Reason for Call: Medication Refill Request    Has the patient contacted the pharmacy for the refill? Yes   Name of medication being requested: SYNTHROID 100 MCG tablet  Provider who prescribed the medication: Chow  Pharmacy: [facility] ([phone])  Date medication is needed: ASAP    PT's insurance requiring PT get Rx sent through mail order pharmacy. A new Rx needs to be sent to [facility] for a 90 day supply. Phone number is [phone] for [facility]. Please contact PT if there are any additional questions.      Action Taken: Message routed to:  [facility] [place]

## 2019-03-06 NOTE — PROGRESS NOTES
Addendum: Patient contacted [facility] this morning to see if she should continue lovenox. Writer stated that she should and to check her INR with her home meter.    Patient waited until 3:45 pm to use her home meter and got error codes of 4 and 5 several times (she has not used her meter in some time). She used all her test strips up.     Will have the patient continue lovenox and recheck INR at [facility] tomorrow. Inpatient pharmacy to follow up tomorrow afternoon. Patient was also given their direct number, if she does not hear from them by 4pm tomorrow.     Patient would like to be set up for a [employer] home meter since her insurance does not allow her to use [employer] any longer. [facility] needs to send Roche meter information to PCP for signature (there was not time for this today).    Sharee Braxton RN, BSN, PHN  3-8-2019 at 3:50 pm    ANTICOAGULATION FOLLOW-UP CLINIC VISIT    Patient Name:  Tere Ortiz  Date:  3/6/2019  Contact Type:  Telephone    SUBJECTIVE:     Patient Findings     Positives:   Extra doses, Intentional hold of therapy    Comments:   Previous hold for a procedure. Extra doses given to increase the INR post-procedure.   INR remains sub-therapeutic at 1.29. Write instructed patient to take 15 mg today and 10 mg tomorrow.   Patient will continue on Lovenox injections.   Recheck the INR at the [facility] on Friday.  Patient verbalizes understanding and agrees to plan. No further questions or concerns.             OBJECTIVE    INR   Date Value Ref Range Status   2019 1.29 (H) 0.86 - 1.14 Final       ASSESSMENT / PLAN  INR assessment SUB    Recheck INR In: 2 DAYS    INR Location Outside lab      Anticoagulation Summary  As of 3/6/2019    INR goal:   2.0-3.0   TTR:   58.9 % (1.9 y)   INR used for dosin.29! (3/5/2019)   Warfarin maintenance plan:   7.5 mg (5 mg x 1.5) every day   Full warfarin instructions:   3/6: 15 mg; 3/7: 10 mg; Otherwise 7.5 mg every day   Weekly warfarin total:   52.5 mg   Plan last  modified:   Nadia Monahan RN (11/29/2018)   Next INR check:   3/8/2019   Priority:   INR   Target end date:   Indefinite    Indications    Lupus anticoagulant inhibitor syndrome (H) [D68.62]  Pulmonary embolism with infarction (H) [I26.99]  Long-term (current) use of anticoagulants [Z79.01] [Z79.01]             Anticoagulation Episode Summary     INR check location:       Preferred lab:       Send INR reminders to:   [place] PHONE ANTICOAG POOL    Comments:   * Alere Meter, Ok to continue same dose if in range & pt will call with concerns. warfarin in AM. Drinks high protein Ensure twice daily      Anticoagulation Care Providers     Provider Role Specialty Phone number    Zina Pruett PA-C Responsible Physician Assistant [phone]            See the Encounter Report to view Anticoagulation Flowsheet and Dosing Calendar (Go to Encounters tab in chart review, and find the Anticoagulation Therapy Visit)        Justyn Polanco RN

## 2019-03-07 NOTE — PROGRESS NOTES
"SUBJECTIVE/OBJECTIVE:                           Tere Ortiz is a 64 year old female coming in for an initial visit for Medication Therapy Management.  She was referred to me from Zina Pruett PA-C.    Chief Complaint: Medication review. Concern for side effects.    Allergies/ADRs: Reviewed in Epic  Tobacco: 0-1 pack per day - is interested in quittingTobacco Cessation Action Plan: Pharmacotherapies : Chantix - concerned about cost, but determined during visit that Chantix would be $40 for 30-day supply and she had been spend $80 a week alone on cigarettes  Alcohol: none  Caffeine: 1 cups/day of coffee  Activity: wheelchair bound  PMH: Reviewed in Epic    Medication Adherence/Access:  Patient uses pill box(es).  Patient takes medications 4 time(s) per day.   Per patient, misses medication 0 times per week.   Medication barriers: affording medications - some difficulty swallowing  The patient fills medications at [facility]: NO, fills medications at [employer].    COPD: Current medications: Spiriva Handihaler - one puff daily, albuterol HFA PRN (2-4 times a week), and albuterol nebs PRN.     Pt is not experiencing side effects.   Pt reports the following symptoms: shortness of breath with exertion.  Pt does have an COPD Action Plan on file.   Has spirometry been completed: Yes   PIF was completed today: No    Anxiety/Insomnia/RLS: Currently taking trazodone 150 mg at bedtime, duloxetine 60 mg daily (off for about a week), pramipexole 1 mg four times daily, and buspirone 10 mg at lunch and 20 mg at bedtime.  Anxiety is still an issue - \"not well enough\" in terms of benefit. She noticed a change in mood and pain control when she was off duloxetine. Sleep issues have been going on for years. This past year has the been the worst yet.  Difficulty tolerating BiPAP and only able to sleep 30 minutes at a time even if she falls asleep during the day. Due to chronic fatigue issues wondering if medications may be " contributing. Referred back to sleep medicine.     PHQ-9 SCORE 1/15/2018 5/14/2018 2/1/2019   PHQ-9 Total Score 14 - -   PHQ-9 Total Score MyChart - 7 (Mild depression) -   PHQ-9 Total Score - 7 7     Ferritin level was <50 ng/mL.    Lupus: Currently taking enoxaparin subQ 40 mg daily, aspirin 81 mg daily, and warfarin as directed by the [place] anticoagulation service. Last INR was 1.29. Pt reports no bruising or bleeding issues.     Hypothyroidism: Patient is taking Synthroid 100 mcg daily. Patient is having the following symptoms: none.   TSH   Date Value Ref Range Status   02/14/2019 0.25 (L) 0.40 - 4.00 mU/L Final     T4 Free   Date Value Ref Range Status   02/14/2019 1.08 0.76 - 1.46 ng/dL Final     Migraines: Currently taking sumatriptan nasal spray PRN and topiramate 150 mg twice daily. Pt finds this to be fairly effective, maybe uses triptan once weekly with good relief. Pt reports no known side effects.     GERD: Current medications include: Protonix (pantoprazole) 40 mg once daily. Pt c/o no current symptoms.  Patient feels that current regimen is effective.    Allergic rhinitis: Current medications include azelastine nasal spray - two sprays twice daily PRN.  Pt feels that current therapy is effective.     Hypokalemia: Currently taking potassium chloride 10 mEq - two tablets twice daily. Pt reports no known issues.  Potassium   Date Value Ref Range Status   03/10/2019 4.0 3.4 - 5.3 mmol/L Final     Pain: Currently taking baclofen 10 mg - three tablets at bedtime and Soma 350 mg at bedtime. Pt struggles with ongoing pain issues. Pt is working to wean off Soma with prescriber.     Today's Vitals: There were no vitals taken for this visit. - declined by patient due to pain concerns      ASSESSMENT:                             Current medications were reviewed today.     Medication Adherence: good, no issues identified    COPD: Needs Improvement. Pt would benefit from continued use of albuterol  HFA/nebs.    Anxiety/Insomnia/RLS: Needs improvement. Disrupting in SNRI therapy may be impacting mood. Likely too soon to reassess.  RLS symptoms not well controlled. Due to patient's historically low ferritin level may benefit from iron supplementation. Topiramate, baclofen, Soma, and pramipexole all could be contributing to sleep concerns, but may benefit from sleep medicine referral first given lack of sleep overnight.     Lupus: Stable. Follow-up plan in place.     Hypothyroidism: Stable. Last TSH is below normal range . However, last T4 was WNL.    Migraines: Stable per patient.     GERD: Stable.     Allergic rhinitis: Stable.    Hypokalemia: Stable.     Pain: Needs Improvement. Pt is working to wean off Soma for potential side effects. Helping with RLS symptoms per above may help pain in legs.     PLAN:                            1. Start ferrous sulfate 325 mg daily.     I spent 45 minutes with this patient today. All changes were made via verbal approval with Zina Pruett. A copy of the visit note was provided to the patient's primary care provider.    Will follow up in 1-3 months.    The patient was given a summary of these recommendations as an after visit summary.       Karlos Hall, PharmD, BCACP  Medication Therapy Management Pharmacist  Pager: [phone]

## 2019-03-07 NOTE — PATIENT INSTRUCTIONS
Recommendations from today's MTM visit:                                                    MTM (medication therapy management) is a service provided by a clinical pharmacist designed to help you get the most of out of your medicines.   Today we reviewed what your medicines are for, how to know if they are working, that your medicines are safe and how to make your medicine regimen as easy as possible.     1. Your deductible for your insurance plan is $400 a year.  This is likely part of the reason why your Chantix was so high priced.  Now that you've likely met your deductible your Chantix prescription should be around $40 for a 30 day supply.  This is likely worth the cost given what you are spending on cartons of cigarettes.    2. There may be a prescription for an albuterol or ProAir inhaler at the [facility] pharmacy this is a Tier 3 medication and may cost ~$40 to fill.     3. I will talk to Zina about your low ferritin (iron study) level.  This may be contributing to your restless leg issues.      4. I will look into your previous sleep medications and provide future recommendations to Zina, but we may want to wait until you see the sleep doctors again.      5. Contact the [place] Prescription Assistance Program/Fund (Erika Smith and Viji Yang) at [phone].    Next MTM visit: I will call you if we decide to have you start iron supplement.     To schedule another MTM appointment, please call the clinic directly or you may call the MTM scheduling line at [phone] or toll-free at [phone].     My Clinical Pharmacist's contact information:                                                      It was a pleasure talking with you today!  Please feel free to contact me with any questions or concerns you have.      Karlos Hall, PharmD, BCACP  Medication Therapy Management Pharmacist  Pager: [phone]    You may receive a survey about the MTM services you received by email and/or  US Mail.  I would appreciate your feedback to help me serve you better in the future. Your comments will be anonymous.

## 2019-03-08 NOTE — TELEPHONE ENCOUNTER
[facility] Call Center    Phone Message    May a detailed message be left on voicemail: yes    Reason for Call: Medication Refill Request    Has the patient contacted the pharmacy for the refill? Yes   Name of medication being requested: SUMAtriptan (IMITREX) 20 MG/ACT nasal spray - Needs 90 day supply due to change in insurance in the new year.   Provider who prescribed the medication: Dr. Karol Simmons  Pharmacy: [facility] - [phone]  Date medication is needed: As soon as can be sent     Action Taken: Message routed to:  [facility]

## 2019-03-09 NOTE — TELEPHONE ENCOUNTER
INR: Patient did not have an INR drawn today, 3/9/19 (as of 2:15 pm) as instructed.  Goal INR:2-3  Reason for therapy: PE, Lupus anticoagulant inhibitor.  Bridging with Lovenox until INR at goal.  Current Warfarin dose:  Usual warfarin dose is 7.5 mg (1.5 tabs of 5 mg) daily.  When INR low on 3/6, instructed to take 15 mg on 3/6, and 10 mg on 3/7.  On 3/8 patient was instructed to take 7.5 mg and go to the lab to have her INR rechecked.  Plan: Instructed patient to continue Lovenox injections and go to either [place] or [place] to get her INR checked today.  As the result will not be back until late, she was instructed to continue 7.5 mg (1.5 mg of warfarin 5 mg tabs) as well as the Lovenox injections every 12 hours.    Recheck: Today as originally planned.  Result and plan discussed with Meche (patient) who verbalized understanding.    Lisseth Quezada, Pharm.D.

## 2019-03-10 NOTE — TELEPHONE ENCOUNTER
"Spouse calling and reports patient with new confusion today. Making \"odd\" comments and not making sense. Is oriented to self and . No consent to communicate in chart for caller but due to urgency of the symptoms did not obtain. Advised 911, caller agreed.    Reason for Disposition    [1] Difficult to awaken or acting confused (disoriented, slurred speech) AND [2] present now AND [3] new onset    Additional Information    Negative: [1] Difficult to awaken or acting confused (disoriented, slurred speech) AND [2] present now AND [3] diabetic    Protocols used: CONFUSION - DELIRIUM-ADULT-AH      "
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
## 2019-03-10 NOTE — ED NOTES
DATE:  3/10/2019   TIME OF RECEIPT FROM LAB:  1833  LAB TEST:  pC02  LAB VALUE:  83  RESULTS GIVEN WITH READ-BACK TO (PROVIDER):  Arie  TIME LAB VALUE REPORTED TO PROVIDER:   2013

## 2019-03-11 NOTE — TELEPHONE ENCOUNTER
SUMAtriptan (IMITREX) 20 MG/ACT nasal spray    Last Written Prescription Date:  7/19/18  Last Fill Quantity: 1 ea,   # refills: 1  Last Office Visit : 2/1/19  Future Office visit:  7/12/19    Routed because: not on protocol

## 2019-03-11 NOTE — ED NOTES
Spoke with  per patient request and gave him [facility] address and the room number she will be going to.

## 2019-03-12 NOTE — TELEPHONE ENCOUNTER
Common to have some flair after procedure would give it time to see if symptoms resolve. If symptoms persist for another 2 wks would set up an apt. Please clarify how the pt is taking the soma since it is not in her med list. In general it is a slow taper over a month. Will need to clarify exactly how it is being used

## 2019-03-14 NOTE — TELEPHONE ENCOUNTER
Called pt and LM asking for clarification of medication dose; mg dose of tablet per Dr. Ridley request.     Leni Morgan, BSN, RN-BC  Patient Care Supervisor/Care Coordinator  [facility]

## 2019-03-29 NOTE — TELEPHONE ENCOUNTER
Reviewed chart. Pt had been hospitalized and per Admission note:      Patient was unable to liberate from the mechanical ventilation due to various reasons. Family rightfully declined tach and PEG. Palliative care consulted. ICU staffs were managing the patient primarily with the help of various consultants. After discussion with the family on 3/27, decision was made for compassionate extubation. This was done on 3/27, and subsequently the patient was transferred down to  for comfort cares. Patient passed away peacefully on 3/28/19 at 09:55.   ------------------  Will inform provider and care team.     Leni Morgan, THAIN, RN-BC  Patient Care Supervisor/Care Coordinator  [facility]

## 2019-04-01 ENCOUNTER — ANTICOAGULATION THERAPY VISIT (OUTPATIENT)
Dept: ANTICOAGULATION | Facility: CLINIC | Age: 64
End: 2019-04-01

## 2019-04-01 DIAGNOSIS — D68.62 LUPUS ANTICOAGULANT INHIBITOR SYNDROME (H): ICD-10-CM

## 2019-04-01 DIAGNOSIS — I26.99 PULMONARY EMBOLISM WITH INFARCTION (H): ICD-10-CM

## 2019-04-01 NOTE — PROGRESS NOTES
Per [facility] notes:    After discussion with the family on 3/27, decision was made for compassionate extubation. This was done on 3/27, and subsequently the patient was transferred down to  for comfort cares. Patient passed away peacefully on 3/28/19 at 09:55. Family (son, Jorje) updated by myself. Condolences offered.     Electronically signed by Steve Cuevas MBBS at 03/28/2019 10:21 AM

## 2019-04-17 ENCOUNTER — PATIENT OUTREACH (OUTPATIENT)
Dept: CARE COORDINATION | Facility: CLINIC | Age: 64
End: 2019-04-17

## 2019-04-17 NOTE — PROGRESS NOTES
Clinic Care Coordination Contact    Removed from care team, completed/deleted goals, resolved care coordination episodes.     Michelle GARCES RN, PHN, [facility]  Primary Care Clinic RN Care Coordinator  [facility]   [email or web address]  Office:  [phone]

## 2019-09-24 NOTE — MR AVS SNAPSHOT
Tere Ortiz   11/20/2018   Anticoagulation Therapy Visit    Description:  63 year old female   Provider:  Justyn Polanco, RN   Department:  [facility]           INR as of 11/20/2018     Today's INR 3.5!      Anticoagulation Summary as of 11/20/2018     INR goal 2.0-3.0   Today's INR 3.5!   Full warfarin instructions 11/20: 5 mg; 11/21: 5 mg; 11/22: 5 mg; 11/23: 5 mg   Next INR check 11/23/2018    Indications   Lupus anticoagulant inhibitor syndrome (H) [D68.62]  Pulmonary embolism with infarction (H) [I26.99]  Long-term (current) use of anticoagulants [Z79.01] [Z79.01]  Embolism - blood clot [I74.9]         November 2018 Details    Sun Mon Tue Wed Thu Fri Sat         1               2               3                 4               5               6               7               8               9               10                 11               12               13               14               15               16               17                 18               19               20      5 mg   See details      21      5 mg         22      5 mg         23            24                 25               26               27               28               29               30                 Date Details   11/20 This INR check       Date of next INR:  11/23/2018         How to take your warfarin dose     To take:  5 mg Take 1 of the 5 mg tablets.            Spinal Block  Performed by: Hawk Trevino CRNA  Authorized by: Daren Randall MD     Patient Location:  OR  Start Time:  9/24/2019 7:19 AM  End Time:  9/24/2019 7:23 AM  Site identification: surface landmarks    Reason for Block: at surgeon's reques

## 2020-05-03 NOTE — ED NOTES
Pt's BP 81/50 HR=79. Dr. Rubio at bedside and aware. 3rd Liter of LR started. MD considering levophed.    General

## 2021-05-29 ENCOUNTER — RECORDS - HEALTHEAST (OUTPATIENT)
Dept: ADMINISTRATIVE | Facility: CLINIC | Age: 66
End: 2021-05-29

## 2021-06-24 NOTE — ANESTHESIA PROCEDURE NOTES
Emergent Intubation  Date/Time: 3/11/2019 1:12 AM    Performing CRNA: Schuweiler, Shannon Marie, CRNA  Indications: respiratory failure  Route: oral  Technique: fiberoptic assisted  Laryngoscope size: Mac 3  Tube size: 7.5 mm  Tube type: cuffed  Cuff inflated: yes  Level of Difficulty: 0ETT to lip: 20 cm  Tube secured with: ETT flores  ETCO2 = Yes  Breath sounds: equal  SaO2 %: 95    Sign out given. CXR and sedation per primary care team.

## 2021-07-31 NOTE — TELEPHONE ENCOUNTER
I received a call from Lo at [facility]. She informed me that after reviewing with the documentation department Tere does not qualify for a replacement machine with Medicare insurance. Lo suggested that because Tere doesn't really have central events on her studies, they can focus on the COPD diagnosis guidelines and may be able to qualify her if she meets the following criteria:   Tere needs to have an ABG and results need to be >52 along with an overnight oximetry that demonstrates her oxygen levels drop <88% for >5 minutes and documentation/information that he has tried CPAP and it didn't work (they already have this piece) the overnight oximeter needs to have a minimum of 2 hours nocturnal recording time while on 2 L or what is prescribed for breathing. Whichever is greater.     Please review and advise. If you would like to proceed with Taco's recommendation please place orders and route back to pool so that we can call Tere to let her know what the plan is.      · Edema legs have significantly improved he does have some trace up he still has some rales at the bibasilar region I did repeat a chest x-ray 727 there was still mild vascular congestion patient also takes poor p o satting well a chest x-ray just has some bibasilar crackles otherwise no leg edema  Continue Lasix 60 mg p o  Daily as his intake is still poor  · BNP:  4500  · Daily weights  · I&Os  · TTE: LV function showing 55% EF  · Low-salt diet, fluid restriction    · Initially diuresed with Lasix and Bumex drip  · On admission was 276 lb went down to 237 at the weight right now is inaccurate patient is not eating or drinking adequately with advanced dementia no PEG tube as per wishes    As discussed with wife will be transition to hospice care see above

## 2022-01-14 NOTE — ED AVS SNAPSHOT
[facility] Emergency Department    [address] [facility] [address]    Phone:  [phone]    Fax:  [phone]                                       Tere Ortiz   MRN: [id number]    Department:  [facility] Emergency Department   Date of Visit:  11/3/2018           Patient Information     Date Of Birth          1955        Your diagnoses for this visit were:     Diarrhea of presumed infectious origin        You were seen by Kendrick Sargent DO.        Discharge Instructions       Gatorade 64 ounces in 24 hours to avoid dehydration.  Try to collect stool sample through methods discussed and bring sample back for testing.  Outpatient orders been placed.  He can return sample to the triage desk in the ED.  Results be sent to primary clinic provider and typically take 2-3 days.  Monitor for fever, abdominal distention, abdominal pain or bloody stools.  If noted please return your primary clinic provider or to the emergency department.    Recommend good hygiene/handwashing..      Your next 10 appointments already scheduled     Nov 16, 2018  2:30 PM CST   (Arrive by 2:15 PM)   RETURN ENDOCRINE with Karol Simmons MD   [facility] Endocrinology ([facility])    [address] [place] [address] [phone]              24 Hour Appointment Hotline       To make an appointment at any [facility], call [phone] ([phone]). If you don't have a family doctor or clinic, we will help you find one. [place] clinics are conveniently located to serve the needs of you and your family.          ED Discharge Orders     Clostridium difficile toxin B PCR           Enteric Bacteria and Virus Panel by TRAVIS Stool           Giardia antigen           Ova and Parasite Exam Routine                    Review of your medicines      Our records show that you are taking the medicines listed below. If these are incorrect, please call your family  doctor or clinic.        Dose / Directions Last dose taken    ammonium lactate 12 % cream   Commonly known as:  AMLACTIN   Quantity:  385 g        Apply topically 2 times daily   Refills:  1        aspirin 81 MG tablet   Dose:  1 tablet        Take 1 tablet by mouth daily.   Refills:  0        azelastine 0.1 % nasal spray   Commonly known as:  ASTELIN   Dose:  1 spray   Quantity:  1 Bottle        Spray 1 spray into both nostrils 2 times daily As needed.   Refills:  11        baclofen 10 MG tablet   Commonly known as:  LIORESAL   Dose:  10 mg   Quantity:  180 tablet        Take 1 tablet (10 mg) by mouth daily 2 at bedtime   Refills:  0        busPIRone 10 MG tablet   Commonly known as:  BUSPAR   Dose:  20-30 mg   Quantity:  90 tablet        Take 2-3 tablets (20-30 mg) by mouth At Bedtime   Refills:  0        CLINDAMAX 1 % lotion   Quantity:  60 mL   Generic drug:  clindamycin        Apply topically 2 times daily   Refills:  11        DULoxetine 60 MG EC capsule   Commonly known as:  CYMBALTA   Dose:  60 mg   Quantity:  30 capsule        Take 1 capsule (60 mg) by mouth daily   Refills:  1        FLONASE 50 MCG/ACT spray   Dose:  1 spray   Generic drug:  fluticasone        Spray 1 spray into both nostrils daily.   Refills:  0        levofloxacin 500 MG tablet   Commonly known as:  LEVAQUIN   Dose:  500 mg   Indication:  Community Acquired Pneumonia   Quantity:  4 tablet        Take 1 tablet (500 mg) by mouth daily for 4 days   Refills:  0        lidocaine 5 % ointment   Commonly known as:  XYLOCAINE   Quantity:  30 g        Apply topically daily As needed for pain   Refills:  0        melatonin 3 MG tablet   Dose:  1 mg        Take 1 mg by mouth nightly as needed for sleep   Refills:  0        meloxicam 7.5 MG tablet   Commonly known as:  MOBIC   Dose:  7.5 mg   Quantity:  30 tablet        Take 1 tablet (7.5 mg) by mouth daily   Refills:  0        nystatin 209177 UNIT/GM Powd   Commonly known as:  NYSTOP   Quantity:   60 g        APPLY TOPICALLY 3 TIMES DAILY FOR 1-2 WEEKS UNTIL RASH CLEARED   Refills:  11        ondansetron 4 MG ODT tab   Commonly known as:  ZOFRAN-ODT   Quantity:  18 tablet        DISSOLVE ONE TABLET IN MOUTH EVERY EIGHT HOURS AS NEEDED   Refills:  0        * order for DME   Quantity:  1 Device        BiPAP: IPAP 11 cm H2O EPAP 6 cm H2O Pt has her own BiPAP New CPAP supplies- try [place] mask if it comes in an extra small size.  Alternatively could try [place] with nasal prongs occluded. Lifetime need and heated humidity.   Refills:  0        * order for DME        BiPAP: IPAP 12 cm H2O EPAP 7 cm H2O  Lifetime need and heated humidity.   Refills:  0        * order for DME   Quantity:  1 Device        O2: During sleep 1.5 LPM via O2 Concentrator  Bled in through BiPAP   Refills:  0        order for DME   Quantity:  4 Units        Compression stockings (Thigh High)- 2 pairs   Refills:  0        pantoprazole 40 MG EC tablet   Commonly known as:  PROTONIX   Dose:  40 mg   Quantity:  30 tablet        Take 1 tablet (40 mg) by mouth daily Take 30-60 minutes before a meal.   Refills:  1        potassium chloride 10 MEQ tablet   Commonly known as:  K-TAB,KLOR-CON   Quantity:  120 tablet        TAKE TWO TABLETS BY MOUTH TWICE DAILY   Refills:  6        pramipexole 1 MG tablet   Commonly known as:  MIRAPEX   Dose:  1 mg        Take 1 mg by mouth 4 times daily as needed   Refills:  0        predniSONE 20 MG tablet   Commonly known as:  DELTASONE   Dose:  40 mg   Quantity:  6 tablet        Take 2 tablets (40 mg) by mouth daily for 3 days   Refills:  0        SOMA 350 MG tablet   Dose:  350 mg   Generic drug:  carisoprodol        Take 1 tablet by mouth. Take at bedtime   Refills:  0        SPIRIVA HANDIHALER 18 MCG capsule   Quantity:  30 capsule   Generic drug:  tiotropium        INHALE ONE CAPSULE VIA HANDIHALER ONE TIME DAILY   Refills:  5        SUMAtriptan 20 MG/ACT nasal spray   Commonly known as:  IMITREX   Quantity:   1 each        SPRAY ONCE INTO NOSTRIL CAN REPEAT IN 2 HRS IF NOT BETTER   Refills:  1        SYNTHROID 125 MCG tablet   Dose:  125 mcg   Quantity:  90 tablet   Generic drug:  levothyroxine        Take 1 tablet (125 mcg) by mouth daily   Refills:  3        TOPAMAX 100 MG tablet   Generic drug:  topiramate        Take  by mouth 2 times daily. Take 1.5 pill in mornings Take 1.5 at bedtime   Refills:  0        traZODone HCl 150 MG 24 hr tablet   Commonly known as:  OLEPTRO   Dose:  150 mg        Take 150 mg by mouth At Bedtime   Refills:  0        vitamin D3 2000 units Caps   Dose:  1 capsule   Quantity:  90 capsule        Take 1 capsule by mouth daily   Refills:  3        warfarin 5 MG tablet   Commonly known as:  COUMADIN   Quantity:  160 tablet        Take 1.5-2 tabs daily as directed by the Anticoag Clinic (maintenance dose being re-established after hospitalization)   Refills:  0        * Notice:  This list has 3 medication(s) that are the same as other medications prescribed for you. Read the directions carefully, and ask your doctor or other care provider to review them with you.            Orders Needing Specimen Collection     None      Pending Results     No orders found from 11/1/2018 to 11/4/2018.            Pending Culture Results     No orders found from 11/1/2018 to 11/4/2018.            Pending Results Instructions     If you had any lab results that were not finalized at the time of your Discharge, you can call the ED Lab Result RN at [phone]. You will be contacted by this team for any positive Lab results or changes in treatment. The nurses are available 7 days a week from 10A to 6:30P.  You can leave a message 24 hours per day and they will return your call.        Test Results From Your Hospital Stay               Thank you for choosing [facility]       Thank you for choosing [facility] for your care. Our goal is always to provide you with excellent care. Hearing back from our patients is one way we  can continue to improve our services. Please take a few minutes to complete the written survey that you may receive in the mail after you visit with us. Thank you!        [employer] Information     [facility] gives you secure access to your electronic health record. If you see a primary care provider, you can also send messages to your care team and make appointments. If you have questions, please call your primary care clinic.  If you do not have a primary care provider, please call [phone] and they will assist you.        Care EveryWhere ID     This is your Care EveryWhere ID. This could be used by other organizations to access your [facility] medical records  [id number]        Equal Access to Services     SHANE ATKINSON : Nicola Macias, hermelinda fofana, nicky mckenzie, salas cifuentes. So [facility] [phone].    ATENCIÓN: Si habla español, tiene a acosta disposición servicios gratuitos de asistencia lingüística. Llame al [phone].    We comply with applicable federal civil rights laws and [place] laws. We do not discriminate on the basis of race, color, national origin, age, disability, sex, sexual orientation, or gender identity.            After Visit Summary       This is your record. Keep this with you and show to your community pharmacist(s) and doctor(s) at your next visit.                   No

## 2022-05-03 NOTE — MR AVS SNAPSHOT
After Visit Summary   6/4/2018    Tere Ortiz    MRN: [id number]           Patient Information     Date Of Birth          1955        Visit Information        Provider Department      6/4/2018 11:00 AM [place] NB RN [facility]        Today's Diagnoses     TITO (obstructive sleep apnea)    -  1       Follow-ups after your visit        Your next 10 appointments already scheduled     Jun 07, 2018  3:00 PM CDT   Anticoagulation Visit with NB ANTI COAG   [facility]    [address] [place] [id number] [phone]            Oct 12, 2018 11:30 AM CDT   (Arrive by 11:15 AM)   RETURN ENDOCRINE with Karol Simmons MD   [facility] Endocrinology ([facility])    [address] [place] [address] [phone]              Who to contact     If you have questions or need follow up information about today's clinic visit or your schedule please contact [facility] directly at [phone].  Normal or non-critical lab and imaging results will be communicated to you by MyChart, letter or phone within 4 business days after the clinic has received the results. If you do not hear from us within 7 days, please contact the clinic through RF nanohart or phone. If you have a critical or abnormal lab result, we will notify you by phone as soon as possible.  Submit refill requests through [employer] or call your pharmacy and they will forward the refill request to us. Please allow 3 business days for your refill to be completed.          Additional Information About Your Visit        MyChart Information     [employer] gives you secure access to your electronic health record. If you see a primary care provider, you can also send messages to your care team and make appointments. If you have questions, please call your primary care clinic.  If you do not have a primary care provider,  please call [phone] and they will assist you.        Care EveryWhere ID     This is your Care EveryWhere ID. This could be used by other organizations to access your [facility] medical records  [id number]         Blood Pressure from Last 3 Encounters:   05/30/18 99/56   05/14/18 (!) 84/58   04/13/18 108/69    Weight from Last 3 Encounters:   05/30/18 126 lb (57.2 kg)   05/24/18 128 lb (58.1 kg)   05/14/18 131 lb 8 oz (59.6 kg)              Today, you had the following     No orders found for display       Primary Care Provider Office Phone # Fax #    Zina Pruett PA-C [phone] [address] [place] [address]        Equal Access to Services     SHANE ATKINSON : Hadii aad ku hadasho Soomaali, waaxda luqadaha, qaybta kaalmada adealexyayolanda, salas callahan . So [facility] [place] [phone].    ATENCIÓN: Si habla español, tiene a acosta disposición servicios gratuitos de asistencia lingüística. Bev al [phone].    We comply with applicable federal civil rights laws and [place] laws. We do not discriminate on the basis of race, color, national origin, age, disability, sex, sexual orientation, or gender identity.            Thank you!     Thank you for choosing [facility]  for your care. Our goal is always to provide you with excellent care. Hearing back from our patients is one way we can continue to improve our services. Please take a few minutes to complete the written survey that you may receive in the mail after your visit with us. Thank you!             Your Updated Medication List - Protect others around you: Learn how to safely use, store and throw away your medicines at www.disposemymeds.org.          This list is accurate as of 6/4/18 11:13 AM.  Always use your most recent med list.                   Brand Name Dispense Instructions for use Diagnosis    ammonium lactate 12 % cream    AMLACTIN    385 g    Apply topically 2 times daily     Other acute sinusitis, recurrence not specified, Chronic obstructive pulmonary disease, unspecified COPD type (H), Abnormal findings on diagnostic imaging of other specified body structures, Vitamin D deficiency, Abnormal finding of blood chemistry, Dry skin       aspirin 81 MG tablet      Take 1 tablet by mouth daily.        azelastine 0.1 % spray    ASTELIN    1 Bottle    Spray 1 spray into both nostrils 2 times daily As needed.    Seasonal allergic rhinitis, unspecified chronicity, unspecified trigger       baclofen 10 MG tablet    LIORESAL     Take 10 mg by mouth daily 2 at bedtime        busPIRone 10 MG tablet    BUSPAR    90 tablet    Take 2-3 tablets (20-30 mg) by mouth At Bedtime    Anxiety       CLINDAMAX 1 % lotion   Generic drug:  clindamycin     60 mL    Apply topically 2 times daily        dhea 25 MG Tabs     90 tablet    Take 1 tablet (25 mg) by mouth daily    Other fatigue       DULoxetine 60 MG EC capsule    CYMBALTA     Take 1 capsule by mouth daily.        DUONEB 0.5-2.5 (3) MG/3ML neb solution   Generic drug:  ipratropium - albuterol 0.5 mg/2.5 mg/3 mL      Take 1 ampule by nebulization every 4 hours as needed.        erythromycin ophthalmic ointment    ROMYCIN    3.5 g    Place 1 Application into the right eye 3 times daily for 7 days    Hordeolum externum of right upper eyelid       FLONASE 50 MCG/ACT spray   Generic drug:  fluticasone      Spray 1 spray into both nostrils daily.        nabumetone 500 MG tablet    RELAFEN    90 tablet    TAKE 1 TABLET (500 MG) BY MOUTH DAILY    Other acute sinusitis, recurrence not specified, Chronic obstructive pulmonary disease, unspecified COPD type (H), Abnormal findings on diagnostic imaging of other specified body structures, Vitamin D deficiency, Abnormal finding of blood chemistry, Dry skin       nystatin 212403 UNIT/GM Powd    NYSTOP    60 g    APPLY TOPICALLY 3 TIMES DAILY FOR 1-2 WEEKS UNTIL RASH CLEARED    Intertrigo       ondansetron 4 MG ODT tab     ZOFRAN-ODT    18 tablet    TAKE 1 TABLET (4 MG) BY MOUTH EVERY 8 HOURS AS NEEDED    Nausea       * order for DME     1 Device    BiPAP: IPAP 11 cm H2O EPAP 6 cm H2O Pt has her own BiPAP New CPAP supplies- try [place] mask if it comes in an extra small size.  Alternatively could try [place] with nasal prongs occluded. Lifetime need and heated humidity.    TITO (obstructive sleep apnea)       * order for DME      BiPAP: IPAP 12 cm H2O EPAP 7 cm H2O  Lifetime need and heated humidity.    TITO (obstructive sleep apnea)       * order for DME     1 Device    O2: During sleep 1.5 LPM via O2 Concentrator  Bled in through BiPAP    TITO (obstructive sleep apnea), Sleep related hypoventilation/hypoxemia in other disease       order for DME     4 Units    Compression stockings (Thigh High)- 2 pairs    Peripheral edema       OxyContin 80 MG 12 hr tablet   Generic drug:  oxyCODONE      Take  by mouth every 12 hours. Take at bedtime        penicillin V potassium 500 MG tablet    VEETID    21 tablet    Take 1 tablet (500 mg) by mouth 3 times daily for 7 days    Hordeolum externum of right upper eyelid       potassium chloride 10 MEQ tablet    K-TAB,KLOR-CON    360 tablet    2 tablets twice daily    Low blood potassium       pramipexole 1 MG tablet    MIRAPEX     Take 1 mg by mouth At Bedtime        ranitidine 150 MG tablet    ZANTAC    30 tablet    Take 1 tablet (150 mg) by mouth 2 times daily Has nausea once a week    Nausea       SOMA 350 MG tablet   Generic drug:  carisoprodol      Take 1 tablet by mouth. Take at bedtime        SUMAtriptan 20 MG/ACT nasal spray    IMITREX    30 each    Give once, can repeat in 2 hour if not better    Episodic tension-type headache, not intractable       SYNTHROID 125 MCG tablet   Generic drug:  levothyroxine     90 tablet    Take 1 tablet (125 mcg) by mouth daily    Hypothyroidism, unspecified type       tiotropium 2.5 MCG/ACT inhalation aerosol    SPIRIVA RESPIMAT    4 g    Inhale 2 puffs into the  lungs daily    Chronic obstructive pulmonary disease, unspecified COPD type (H)       TOPAMAX 100 MG tablet   Generic drug:  topiramate      Take  by mouth 2 times daily. Take 1.5 pill in mornings Take 1.5 at bedtime        traZODone HCl 150 MG 24 hr tablet    OLEPTRO     Take 150 mg by mouth At Bedtime        VICODIN ES PO      Take 7.5 mg by mouth as needed        vitamin D3 2000 units Caps     90 capsule    Take 1 capsule by mouth daily    Chronic fatigue       warfarin 5 MG tablet    COUMADIN    185 tablet    Take 7.5 mg MWF and 10 mg the rest of the week or as directed by Anticoagulation clinic.    Embolism (H), Lupus anticoagulant inhibitor syndrome (H)       * Notice:  This list has 3 medication(s) that are the same as other medications prescribed for you. Read the directions carefully, and ask your doctor or other care provider to review them with you.       No

## 2023-04-08 NOTE — PROGRESS NOTES
ANTICOAGULATION FOLLOW-UP CLINIC VISIT    Patient Name:  Tere Ortiz  Date:  11/12/2018  Contact Type:  Telephone/ [facility]    SUBJECTIVE:     Patient Findings     Positives Change in medications (stopped meloxicam, started diclofenac sodium gel -- both NSAIDS)    Comments Patient had 45mg in the previous 7 days, will adjust dose to keep weekly mg total the same by the next INR check on Thursday. Patient's INR is supratherapeutic, however her warfarin dose is very unbalanced in those 7 days. She takes higher doses at the end of the week. If her INR is therapeutic on Thursday, will plan to extend INR check to weekly to balance her dose out.    No issues with bleeding or unusual bruising noted.            OBJECTIVE    INR   Date Value Ref Range Status   11/12/2018 3.5  Final       ASSESSMENT / PLAN  No question data found.  Anticoagulation Summary as of 11/12/2018     INR goal 2.0-3.0   Today's INR 3.5!   Warfarin maintenance plan No maintenance plan   Full warfarin instructions 11/12: 2.5 mg; 11/13: 2.5 mg; 11/14: 2.5 mg   Weekly warfarin total 62.5 mg   Plan last modified Nadia Monahan RN (11/2/2018)   Next INR check 11/15/2018   Priority INR   Target end date Indefinite    Indications   Lupus anticoagulant inhibitor syndrome (H) [D68.62]  Pulmonary embolism with infarction (H) [I26.99]  Long-term (current) use of anticoagulants [Z79.01] [Z79.01]  Embolism - blood clot [I74.9]         Anticoagulation Episode Summary     INR check location     Preferred lab     Send INR reminders to [facility] PH[facility] ANTICOAG POOL    Comments * Marla Ramirez, Ok to continue same dose if in range & pt will call with concerns. warfarin in AM. Drinks high protein Ensure twice daily  [facility]      Anticoagulation Care Providers     Provider Role Specialty Phone number    Zina Pruett PA-C Responsible Physician Assistant [phone]            See the Encounter Report to view Anticoagulation Flowsheet and  Dosing Calendar (Go to Encounters tab in chart review, and find the Anticoagulation Therapy Visit)        [facility]                 98

## 2023-05-12 NOTE — PROGRESS NOTES
ANTICOAGULATION FOLLOW-UP CLINIC VISIT    Patient Name:  Tere Ortiz  Date:  11/8/2018  Contact Type:  Telephone/ [facility]    SUBJECTIVE:     Patient Findings     Positives Unexplained INR or factor level change (re-establishing maintenance dose after hospitalization)    Comments Patient had 40mg in the previous 7 days, will increase dose to 45mg by the next INR check on Monday (~13% increase).    Patient is taking about 4000mg of tylenol each day -- this was her max given by the MD according to the homecare nurse.    Patient denies concerns of chest pain, shortness of breath, or difficulty breathing. No issues with numbness, weakness, difficulty speaking, blurred vision, sudden severe headache, or fainting/seizures. There are no reports of pain/tenderness or other indications patient may have a new or worsening clot.     Patient is aware to go into the emergency room for any of the above mentioned concerns.             OBJECTIVE    INR   Date Value Ref Range Status   11/08/2018 1.8  Final       ASSESSMENT / PLAN  INR assessment SUB    Recheck INR In: 4 DAYS    INR Location Homecare INR [facility]     Anticoagulation Summary as of 11/8/2018     INR goal 2.0-3.0   Today's INR 1.8!   Warfarin maintenance plan No maintenance plan   Full warfarin instructions 11/8: 10 mg; 11/9: 10 mg; 11/10: 7.5 mg; 11/11: 10 mg   Weekly warfarin total 62.5 mg   Plan last modified Nadia Monahan RN (11/2/2018)   Next INR check 11/12/2018   Priority INR   Target end date Indefinite    Indications   Lupus anticoagulant inhibitor syndrome (H) [D68.62]  Pulmonary embolism with infarction (H) [I26.99]  Long-term (current) use of anticoagulants [Z79.01] [Z79.01]  Embolism - blood clot [I74.9]         Anticoagulation Episode Summary     INR check location     Preferred lab     Send INR reminders to [facility] ANTICOAG POOL    Comments * Marla Ramirez, Ok to continue same dose if in range & pt will call with  concerns. warfarin in AM. Drinks high protein Ensure twice daily  [facility]      Anticoagulation Care Providers     Provider Role Specialty Phone number    Zina Pruett PA-C Responsible Physician Assistant [phone]            See the Encounter Report to view Anticoagulation Flowsheet and Dosing Calendar (Go to Encounters tab in chart review, and find the Anticoagulation Therapy Visit)        Nadia Monahan RN [facility]                 8 (severe pain)